# Patient Record
Sex: MALE | Race: WHITE | Employment: PART TIME | URBAN - METROPOLITAN AREA
[De-identification: names, ages, dates, MRNs, and addresses within clinical notes are randomized per-mention and may not be internally consistent; named-entity substitution may affect disease eponyms.]

---

## 2019-03-14 ENCOUNTER — HOSPITAL ENCOUNTER (EMERGENCY)
Age: 69
Discharge: HOME | End: 2019-03-14
Attending: EMERGENCY MEDICINE
Payer: MEDICARE

## 2019-03-14 VITALS
SYSTOLIC BLOOD PRESSURE: 184 MMHG | DIASTOLIC BLOOD PRESSURE: 85 MMHG | RESPIRATION RATE: 20 BRPM | HEART RATE: 88 BPM | OXYGEN SATURATION: 96 % | TEMPERATURE: 97.8 F

## 2019-03-14 DIAGNOSIS — S61.211A LACERATION OF LEFT INDEX FINGER WITHOUT FOREIGN BODY WITHOUT DAMAGE TO NAIL, INITIAL ENCOUNTER: Primary | ICD-10-CM

## 2019-03-14 PROCEDURE — 90471 IMMUNIZATION ADMIN: CPT | Performed by: EMERGENCY MEDICINE

## 2019-03-14 PROCEDURE — 90715 TDAP VACCINE 7 YRS/> IM: CPT | Performed by: EMERGENCY MEDICINE

## 2019-03-14 PROCEDURE — 6360000002 HC RX W HCPCS: Performed by: EMERGENCY MEDICINE

## 2019-03-14 PROCEDURE — 4500000022 HC ED LEVEL 2 PROCEDURE

## 2019-03-14 PROCEDURE — 99282 EMERGENCY DEPT VISIT SF MDM: CPT

## 2019-03-14 RX ADMIN — TETANUS TOXOID, REDUCED DIPHTHERIA TOXOID AND ACELLULAR PERTUSSIS VACCINE, ADSORBED 0.5 ML: 5; 2.5; 8; 8; 2.5 SUSPENSION INTRAMUSCULAR at 20:37

## 2019-03-14 ASSESSMENT — PAIN SCALES - GENERAL: PAINLEVEL_OUTOF10: 7

## 2019-03-14 ASSESSMENT — PAIN DESCRIPTION - PAIN TYPE: TYPE: ACUTE PAIN

## 2019-10-31 ENCOUNTER — HOSPITAL ENCOUNTER (EMERGENCY)
Age: 69
Discharge: HOME | End: 2019-10-31
Attending: EMERGENCY MEDICINE
Payer: MEDICARE

## 2019-10-31 ENCOUNTER — APPOINTMENT (OUTPATIENT)
Dept: GENERAL RADIOLOGY | Age: 69
End: 2019-10-31
Payer: MEDICARE

## 2019-10-31 VITALS
TEMPERATURE: 98.4 F | RESPIRATION RATE: 16 BRPM | HEART RATE: 79 BPM | WEIGHT: 185 LBS | OXYGEN SATURATION: 97 % | HEIGHT: 68 IN | DIASTOLIC BLOOD PRESSURE: 75 MMHG | BODY MASS INDEX: 28.04 KG/M2 | SYSTOLIC BLOOD PRESSURE: 167 MMHG

## 2019-10-31 DIAGNOSIS — L03.116 CELLULITIS OF LEFT LOWER EXTREMITY: Primary | ICD-10-CM

## 2019-10-31 LAB
GLUCOSE BLD-MCNC: 159 MG/DL (ref 70–99)
PERFORMED ON: ABNORMAL

## 2019-10-31 PROCEDURE — 73620 X-RAY EXAM OF FOOT: CPT

## 2019-10-31 PROCEDURE — 6370000000 HC RX 637 (ALT 250 FOR IP): Performed by: STUDENT IN AN ORGANIZED HEALTH CARE EDUCATION/TRAINING PROGRAM

## 2019-10-31 PROCEDURE — 99283 EMERGENCY DEPT VISIT LOW MDM: CPT

## 2019-10-31 RX ORDER — OXYCODONE HYDROCHLORIDE AND ACETAMINOPHEN 5; 325 MG/1; MG/1
1 TABLET ORAL ONCE
Status: COMPLETED | OUTPATIENT
Start: 2019-10-31 | End: 2019-10-31

## 2019-10-31 RX ORDER — IBUPROFEN 800 MG/1
800 TABLET ORAL EVERY 8 HOURS PRN
Qty: 30 TABLET | Refills: 0 | Status: ON HOLD | OUTPATIENT
Start: 2019-10-31 | End: 2020-01-01 | Stop reason: CLARIF

## 2019-10-31 RX ORDER — ACETAMINOPHEN 500 MG
500 TABLET ORAL 4 TIMES DAILY PRN
Qty: 360 TABLET | Refills: 1 | Status: ON HOLD | OUTPATIENT
Start: 2019-10-31 | End: 2020-01-01 | Stop reason: CLARIF

## 2019-10-31 RX ORDER — PAROXETINE HYDROCHLORIDE 20 MG/1
40 TABLET, FILM COATED ORAL
Status: ON HOLD | COMMUNITY
Start: 2019-10-07 | End: 2020-01-01 | Stop reason: CLARIF

## 2019-10-31 RX ORDER — SULFAMETHOXAZOLE AND TRIMETHOPRIM 800; 160 MG/1; MG/1
1 TABLET ORAL 2 TIMES DAILY
Qty: 14 TABLET | Refills: 0 | Status: SHIPPED | OUTPATIENT
Start: 2019-10-31 | End: 2019-11-07

## 2019-10-31 RX ORDER — CEPHALEXIN 500 MG/1
500 CAPSULE ORAL 4 TIMES DAILY
Qty: 28 CAPSULE | Refills: 0 | Status: SHIPPED | OUTPATIENT
Start: 2019-10-31 | End: 2019-11-07

## 2019-10-31 RX ADMIN — OXYCODONE HYDROCHLORIDE AND ACETAMINOPHEN 1 TABLET: 5; 325 TABLET ORAL at 01:20

## 2019-10-31 ASSESSMENT — ENCOUNTER SYMPTOMS
PHOTOPHOBIA: 0
COUGH: 0
TROUBLE SWALLOWING: 0
SHORTNESS OF BREATH: 0
ABDOMINAL PAIN: 0
NAUSEA: 0
BLOOD IN STOOL: 0
RHINORRHEA: 0
CONSTIPATION: 0
SORE THROAT: 0
VOMITING: 0
DIARRHEA: 0

## 2019-10-31 ASSESSMENT — PAIN SCALES - GENERAL: PAINLEVEL_OUTOF10: 8

## 2020-01-01 ENCOUNTER — APPOINTMENT (OUTPATIENT)
Dept: CT IMAGING | Age: 70
DRG: 871 | End: 2020-01-01
Payer: MEDICARE

## 2020-01-01 ENCOUNTER — APPOINTMENT (OUTPATIENT)
Dept: GENERAL RADIOLOGY | Age: 70
DRG: 638 | End: 2020-01-01
Payer: MEDICARE

## 2020-01-01 ENCOUNTER — APPOINTMENT (OUTPATIENT)
Dept: GENERAL RADIOLOGY | Age: 70
DRG: 639 | End: 2020-01-01
Payer: MEDICARE

## 2020-01-01 ENCOUNTER — HOSPITAL ENCOUNTER (EMERGENCY)
Age: 70
Discharge: ANOTHER HOSPITAL | End: 2020-07-15
Attending: EMERGENCY MEDICINE
Payer: MEDICARE

## 2020-01-01 ENCOUNTER — APPOINTMENT (OUTPATIENT)
Dept: GENERAL RADIOLOGY | Age: 70
DRG: 871 | End: 2020-01-01
Payer: MEDICARE

## 2020-01-01 ENCOUNTER — HOSPITAL ENCOUNTER (EMERGENCY)
Age: 70
Discharge: HOME | DRG: 638 | End: 2020-09-22
Attending: EMERGENCY MEDICINE
Payer: MEDICARE

## 2020-01-01 ENCOUNTER — HOSPITAL ENCOUNTER (EMERGENCY)
Age: 70
Discharge: NURSING, REHAB OR LONG-TERM CARE | End: 2020-05-27
Attending: EMERGENCY MEDICINE
Payer: MEDICARE

## 2020-01-01 ENCOUNTER — APPOINTMENT (OUTPATIENT)
Dept: INTERVENTIONAL RADIOLOGY/VASCULAR | Age: 70
DRG: 871 | End: 2020-01-01
Payer: MEDICARE

## 2020-01-01 ENCOUNTER — APPOINTMENT (OUTPATIENT)
Dept: ULTRASOUND IMAGING | Age: 70
DRG: 871 | End: 2020-01-01
Payer: MEDICARE

## 2020-01-01 ENCOUNTER — CARE COORDINATION (OUTPATIENT)
Dept: CARE COORDINATION | Age: 70
End: 2020-01-01

## 2020-01-01 ENCOUNTER — TELEPHONE (OUTPATIENT)
Dept: OTHER | Facility: CLINIC | Age: 70
End: 2020-01-01

## 2020-01-01 ENCOUNTER — APPOINTMENT (OUTPATIENT)
Dept: VASCULAR LAB | Age: 70
DRG: 871 | End: 2020-01-01
Payer: MEDICARE

## 2020-01-01 ENCOUNTER — HOSPITAL ENCOUNTER (EMERGENCY)
Age: 70
Discharge: HOME | End: 2020-09-07
Attending: EMERGENCY MEDICINE
Payer: MEDICARE

## 2020-01-01 ENCOUNTER — APPOINTMENT (OUTPATIENT)
Dept: CT IMAGING | Age: 70
DRG: 637 | End: 2020-01-01
Payer: MEDICARE

## 2020-01-01 ENCOUNTER — APPOINTMENT (OUTPATIENT)
Dept: GENERAL RADIOLOGY | Age: 70
End: 2020-01-01
Payer: MEDICARE

## 2020-01-01 ENCOUNTER — HOSPITAL ENCOUNTER (INPATIENT)
Age: 70
LOS: 2 days | Discharge: NURSING, REHAB OR LONG-TERM CARE | DRG: 638 | End: 2020-08-06
Attending: EMERGENCY MEDICINE | Admitting: INTERNAL MEDICINE
Payer: MEDICARE

## 2020-01-01 ENCOUNTER — APPOINTMENT (OUTPATIENT)
Dept: CT IMAGING | Age: 70
End: 2020-01-01
Payer: MEDICARE

## 2020-01-01 ENCOUNTER — HOSPITAL ENCOUNTER (INPATIENT)
Age: 70
LOS: 2 days | Discharge: HOME HEALTH | DRG: 637 | End: 2020-10-03
Attending: EMERGENCY MEDICINE | Admitting: INTERNAL MEDICINE
Payer: MEDICARE

## 2020-01-01 ENCOUNTER — APPOINTMENT (OUTPATIENT)
Dept: CT IMAGING | Age: 70
DRG: 639 | End: 2020-01-01
Payer: MEDICARE

## 2020-01-01 ENCOUNTER — HOSPITAL ENCOUNTER (INPATIENT)
Age: 70
LOS: 3 days | Discharge: NURSING, REHAB OR LONG-TERM CARE | DRG: 639 | End: 2020-08-02
Attending: EMERGENCY MEDICINE | Admitting: INTERNAL MEDICINE
Payer: MEDICARE

## 2020-01-01 ENCOUNTER — FOLLOWUP TELEPHONE ENCOUNTER (OUTPATIENT)
Dept: NEPHROLOGY | Age: 70
End: 2020-01-01

## 2020-01-01 ENCOUNTER — HOSPITAL ENCOUNTER (INPATIENT)
Age: 70
LOS: 5 days | Discharge: HOME HEALTH | DRG: 377 | End: 2020-05-19
Attending: EMERGENCY MEDICINE | Admitting: INTERNAL MEDICINE
Payer: MEDICARE

## 2020-01-01 ENCOUNTER — HOSPITAL ENCOUNTER (EMERGENCY)
Age: 70
Discharge: HOME | DRG: 639 | End: 2020-06-17
Attending: EMERGENCY MEDICINE
Payer: MEDICARE

## 2020-01-01 ENCOUNTER — HOSPITAL ENCOUNTER (INPATIENT)
Age: 70
LOS: 9 days | Discharge: HOME HEALTH | DRG: 871 | End: 2020-08-30
Attending: EMERGENCY MEDICINE | Admitting: INTERNAL MEDICINE
Payer: MEDICARE

## 2020-01-01 ENCOUNTER — HOSPITAL ENCOUNTER (INPATIENT)
Age: 70
LOS: 7 days | Discharge: NURSING, REHAB OR LONG-TERM CARE | DRG: 871 | End: 2020-05-05
Attending: EMERGENCY MEDICINE | Admitting: INTERNAL MEDICINE
Payer: MEDICARE

## 2020-01-01 ENCOUNTER — HOSPITAL ENCOUNTER (EMERGENCY)
Age: 70
Discharge: HOME | End: 2020-10-07
Attending: EMERGENCY MEDICINE
Payer: MEDICARE

## 2020-01-01 ENCOUNTER — HOSPITAL ENCOUNTER (INPATIENT)
Age: 70
LOS: 4 days | Discharge: HOME HEALTH | DRG: 639 | End: 2020-06-22
Attending: EMERGENCY MEDICINE | Admitting: INTERNAL MEDICINE
Payer: MEDICARE

## 2020-01-01 ENCOUNTER — HOSPITAL ENCOUNTER (INPATIENT)
Age: 70
LOS: 2 days | Discharge: HOME | DRG: 639 | End: 2020-07-28
Attending: STUDENT IN AN ORGANIZED HEALTH CARE EDUCATION/TRAINING PROGRAM | Admitting: INTERNAL MEDICINE
Payer: MEDICARE

## 2020-01-01 ENCOUNTER — TELEPHONE (OUTPATIENT)
Dept: ENDOCRINOLOGY | Age: 70
End: 2020-01-01

## 2020-01-01 ENCOUNTER — APPOINTMENT (OUTPATIENT)
Dept: INTERVENTIONAL RADIOLOGY/VASCULAR | Age: 70
DRG: 639 | End: 2020-01-01
Payer: MEDICARE

## 2020-01-01 ENCOUNTER — APPOINTMENT (OUTPATIENT)
Dept: MRI IMAGING | Age: 70
DRG: 637 | End: 2020-01-01
Payer: MEDICARE

## 2020-01-01 ENCOUNTER — HOSPITAL ENCOUNTER (INPATIENT)
Age: 70
LOS: 2 days | Discharge: HOME HEALTH | DRG: 638 | End: 2020-09-25
Attending: EMERGENCY MEDICINE | Admitting: INTERNAL MEDICINE
Payer: MEDICARE

## 2020-01-01 ENCOUNTER — HOSPITAL ENCOUNTER (EMERGENCY)
Age: 70
Discharge: HOME | End: 2020-08-31
Attending: EMERGENCY MEDICINE
Payer: MEDICARE

## 2020-01-01 ENCOUNTER — HOSPITAL ENCOUNTER (EMERGENCY)
Age: 70
Discharge: HOME | End: 2020-07-21
Attending: EMERGENCY MEDICINE
Payer: MEDICARE

## 2020-01-01 VITALS
TEMPERATURE: 98.1 F | HEART RATE: 78 BPM | HEIGHT: 68 IN | DIASTOLIC BLOOD PRESSURE: 81 MMHG | SYSTOLIC BLOOD PRESSURE: 147 MMHG | WEIGHT: 206.79 LBS | BODY MASS INDEX: 31.34 KG/M2 | OXYGEN SATURATION: 100 % | RESPIRATION RATE: 18 BRPM

## 2020-01-01 VITALS
DIASTOLIC BLOOD PRESSURE: 76 MMHG | HEIGHT: 68 IN | RESPIRATION RATE: 20 BRPM | OXYGEN SATURATION: 95 % | HEART RATE: 81 BPM | TEMPERATURE: 97.9 F | BODY MASS INDEX: 24.39 KG/M2 | WEIGHT: 160.94 LBS | SYSTOLIC BLOOD PRESSURE: 160 MMHG

## 2020-01-01 VITALS
SYSTOLIC BLOOD PRESSURE: 154 MMHG | DIASTOLIC BLOOD PRESSURE: 66 MMHG | OXYGEN SATURATION: 98 % | RESPIRATION RATE: 18 BRPM | TEMPERATURE: 98.7 F | HEART RATE: 84 BPM

## 2020-01-01 VITALS
BODY MASS INDEX: 28.94 KG/M2 | HEIGHT: 68 IN | RESPIRATION RATE: 18 BRPM | DIASTOLIC BLOOD PRESSURE: 73 MMHG | SYSTOLIC BLOOD PRESSURE: 170 MMHG | HEART RATE: 85 BPM | TEMPERATURE: 97.3 F | WEIGHT: 190.92 LBS | OXYGEN SATURATION: 95 %

## 2020-01-01 VITALS
SYSTOLIC BLOOD PRESSURE: 138 MMHG | RESPIRATION RATE: 20 BRPM | DIASTOLIC BLOOD PRESSURE: 60 MMHG | BODY MASS INDEX: 31.47 KG/M2 | OXYGEN SATURATION: 98 % | HEART RATE: 93 BPM | TEMPERATURE: 98.5 F | WEIGHT: 207 LBS

## 2020-01-01 VITALS
SYSTOLIC BLOOD PRESSURE: 167 MMHG | BODY MASS INDEX: 27.97 KG/M2 | HEIGHT: 68 IN | OXYGEN SATURATION: 95 % | TEMPERATURE: 97.5 F | RESPIRATION RATE: 16 BRPM | WEIGHT: 184.53 LBS | HEART RATE: 90 BPM | DIASTOLIC BLOOD PRESSURE: 78 MMHG

## 2020-01-01 VITALS
TEMPERATURE: 98.2 F | DIASTOLIC BLOOD PRESSURE: 76 MMHG | HEART RATE: 82 BPM | OXYGEN SATURATION: 97 % | RESPIRATION RATE: 23 BRPM | HEIGHT: 68 IN | SYSTOLIC BLOOD PRESSURE: 165 MMHG | BODY MASS INDEX: 28.04 KG/M2 | WEIGHT: 185 LBS

## 2020-01-01 VITALS
WEIGHT: 193.34 LBS | HEIGHT: 68 IN | SYSTOLIC BLOOD PRESSURE: 147 MMHG | HEART RATE: 91 BPM | RESPIRATION RATE: 20 BRPM | TEMPERATURE: 97.7 F | BODY MASS INDEX: 29.3 KG/M2 | DIASTOLIC BLOOD PRESSURE: 74 MMHG | OXYGEN SATURATION: 96 %

## 2020-01-01 VITALS
BODY MASS INDEX: 26.13 KG/M2 | WEIGHT: 172.4 LBS | RESPIRATION RATE: 20 BRPM | SYSTOLIC BLOOD PRESSURE: 170 MMHG | TEMPERATURE: 98.1 F | DIASTOLIC BLOOD PRESSURE: 72 MMHG | OXYGEN SATURATION: 97 % | HEIGHT: 68 IN | HEART RATE: 76 BPM

## 2020-01-01 VITALS
TEMPERATURE: 98.1 F | OXYGEN SATURATION: 97 % | RESPIRATION RATE: 18 BRPM | WEIGHT: 160 LBS | HEART RATE: 84 BPM | DIASTOLIC BLOOD PRESSURE: 67 MMHG | BODY MASS INDEX: 24.25 KG/M2 | SYSTOLIC BLOOD PRESSURE: 116 MMHG | HEIGHT: 68 IN

## 2020-01-01 VITALS
RESPIRATION RATE: 22 BRPM | HEART RATE: 86 BPM | BODY MASS INDEX: 24.25 KG/M2 | TEMPERATURE: 97.6 F | DIASTOLIC BLOOD PRESSURE: 72 MMHG | WEIGHT: 160 LBS | HEIGHT: 68 IN | OXYGEN SATURATION: 100 % | SYSTOLIC BLOOD PRESSURE: 154 MMHG

## 2020-01-01 VITALS
HEIGHT: 68 IN | OXYGEN SATURATION: 96 % | WEIGHT: 185 LBS | DIASTOLIC BLOOD PRESSURE: 79 MMHG | SYSTOLIC BLOOD PRESSURE: 178 MMHG | HEART RATE: 85 BPM | BODY MASS INDEX: 28.04 KG/M2 | RESPIRATION RATE: 16 BRPM | TEMPERATURE: 98.3 F

## 2020-01-01 VITALS
HEART RATE: 87 BPM | DIASTOLIC BLOOD PRESSURE: 77 MMHG | TEMPERATURE: 97.8 F | RESPIRATION RATE: 25 BRPM | BODY MASS INDEX: 28.79 KG/M2 | HEIGHT: 68 IN | OXYGEN SATURATION: 99 % | SYSTOLIC BLOOD PRESSURE: 159 MMHG | WEIGHT: 190 LBS

## 2020-01-01 VITALS
OXYGEN SATURATION: 100 % | RESPIRATION RATE: 18 BRPM | SYSTOLIC BLOOD PRESSURE: 145 MMHG | HEART RATE: 82 BPM | TEMPERATURE: 98.7 F | DIASTOLIC BLOOD PRESSURE: 75 MMHG

## 2020-01-01 VITALS
OXYGEN SATURATION: 100 % | SYSTOLIC BLOOD PRESSURE: 147 MMHG | TEMPERATURE: 98.1 F | HEART RATE: 84 BPM | DIASTOLIC BLOOD PRESSURE: 57 MMHG | RESPIRATION RATE: 23 BRPM

## 2020-01-01 VITALS
TEMPERATURE: 96.9 F | WEIGHT: 207.67 LBS | OXYGEN SATURATION: 97 % | HEART RATE: 84 BPM | HEIGHT: 68 IN | RESPIRATION RATE: 16 BRPM | DIASTOLIC BLOOD PRESSURE: 73 MMHG | SYSTOLIC BLOOD PRESSURE: 156 MMHG | BODY MASS INDEX: 31.47 KG/M2

## 2020-01-01 VITALS
WEIGHT: 169.53 LBS | BODY MASS INDEX: 25.69 KG/M2 | SYSTOLIC BLOOD PRESSURE: 184 MMHG | HEIGHT: 68 IN | OXYGEN SATURATION: 95 % | HEART RATE: 79 BPM | DIASTOLIC BLOOD PRESSURE: 87 MMHG | TEMPERATURE: 97.4 F | RESPIRATION RATE: 18 BRPM

## 2020-01-01 LAB
A/G RATIO: 1.1 (ref 1.1–2.2)
A/G RATIO: 1.2 (ref 1.1–2.2)
A/G RATIO: 1.3 (ref 1.1–2.2)
A/G RATIO: 1.4 (ref 1.1–2.2)
A/G RATIO: 1.5 (ref 1.1–2.2)
ABO/RH: NORMAL
ABO/RH: NORMAL
ALBUMIN SERPL-MCNC: 2.3 G/DL (ref 3.4–5)
ALBUMIN SERPL-MCNC: 2.5 G/DL (ref 3.4–5)
ALBUMIN SERPL-MCNC: 2.6 G/DL (ref 3.4–5)
ALBUMIN SERPL-MCNC: 3 G/DL (ref 3.4–5)
ALBUMIN SERPL-MCNC: 3 G/DL (ref 3.4–5)
ALBUMIN SERPL-MCNC: 3.2 G/DL (ref 3.4–5)
ALBUMIN SERPL-MCNC: 3.3 G/DL (ref 3.4–5)
ALBUMIN SERPL-MCNC: 3.4 G/DL (ref 3.4–5)
ALBUMIN SERPL-MCNC: 3.5 G/DL (ref 3.4–5)
ALBUMIN SERPL-MCNC: 3.6 G/DL (ref 3.4–5)
ALBUMIN SERPL-MCNC: 3.7 G/DL (ref 3.4–5)
ALBUMIN SERPL-MCNC: 3.7 G/DL (ref 3.4–5)
ALBUMIN SERPL-MCNC: 3.9 G/DL (ref 3.4–5)
ALBUMIN SERPL-MCNC: 3.9 G/DL (ref 3.4–5)
ALBUMIN SERPL-MCNC: 4.1 G/DL (ref 3.4–5)
ALBUMIN SERPL-MCNC: 4.2 G/DL (ref 3.4–5)
ALP BLD-CCNC: 102 U/L (ref 40–129)
ALP BLD-CCNC: 103 U/L (ref 40–129)
ALP BLD-CCNC: 108 U/L (ref 40–129)
ALP BLD-CCNC: 112 U/L (ref 40–129)
ALP BLD-CCNC: 113 U/L (ref 40–129)
ALP BLD-CCNC: 121 U/L (ref 40–129)
ALP BLD-CCNC: 126 U/L (ref 40–129)
ALP BLD-CCNC: 127 U/L (ref 40–129)
ALP BLD-CCNC: 127 U/L (ref 40–129)
ALP BLD-CCNC: 128 U/L (ref 40–129)
ALP BLD-CCNC: 130 U/L (ref 40–129)
ALP BLD-CCNC: 135 U/L (ref 40–129)
ALP BLD-CCNC: 135 U/L (ref 40–129)
ALP BLD-CCNC: 136 U/L (ref 40–129)
ALP BLD-CCNC: 139 U/L (ref 40–129)
ALP BLD-CCNC: 140 U/L (ref 40–129)
ALP BLD-CCNC: 152 U/L (ref 40–129)
ALP BLD-CCNC: 154 U/L (ref 40–129)
ALP BLD-CCNC: 184 U/L (ref 40–129)
ALT SERPL-CCNC: 10 U/L (ref 10–40)
ALT SERPL-CCNC: 12 U/L (ref 10–40)
ALT SERPL-CCNC: 15 U/L (ref 10–40)
ALT SERPL-CCNC: 22 U/L (ref 10–40)
ALT SERPL-CCNC: 23 U/L (ref 10–40)
ALT SERPL-CCNC: <5 U/L (ref 10–40)
AMMONIA: 45 UMOL/L (ref 16–60)
AMOBARBITAL, SERUM/PLASMA, QUANT: <50 NG/ML
AMORPHOUS: ABNORMAL /HPF
AMPHETAMINE SCREEN, URINE: ABNORMAL
AMPHETAMINE SCREEN, URINE: ABNORMAL
AMPHETAMINES SCREEN BLOOD: NEGATIVE NG/ML
ANION GAP SERPL CALCULATED.3IONS-SCNC: 10 MMOL/L (ref 3–16)
ANION GAP SERPL CALCULATED.3IONS-SCNC: 11 MMOL/L (ref 3–16)
ANION GAP SERPL CALCULATED.3IONS-SCNC: 12 MMOL/L (ref 3–16)
ANION GAP SERPL CALCULATED.3IONS-SCNC: 13 MMOL/L (ref 3–16)
ANION GAP SERPL CALCULATED.3IONS-SCNC: 14 MMOL/L (ref 3–16)
ANION GAP SERPL CALCULATED.3IONS-SCNC: 15 MMOL/L (ref 3–16)
ANION GAP SERPL CALCULATED.3IONS-SCNC: 16 MMOL/L (ref 3–16)
ANION GAP SERPL CALCULATED.3IONS-SCNC: 17 MMOL/L (ref 3–16)
ANION GAP SERPL CALCULATED.3IONS-SCNC: 17 MMOL/L (ref 3–16)
ANION GAP SERPL CALCULATED.3IONS-SCNC: 19 MMOL/L (ref 3–16)
ANION GAP SERPL CALCULATED.3IONS-SCNC: 20 MMOL/L (ref 3–16)
ANION GAP SERPL CALCULATED.3IONS-SCNC: 20 MMOL/L (ref 3–16)
ANION GAP SERPL CALCULATED.3IONS-SCNC: 21 MMOL/L (ref 3–16)
ANION GAP SERPL CALCULATED.3IONS-SCNC: 22 MMOL/L (ref 3–16)
ANION GAP SERPL CALCULATED.3IONS-SCNC: 25 MMOL/L (ref 3–16)
ANION GAP SERPL CALCULATED.3IONS-SCNC: 25 MMOL/L (ref 3–16)
ANION GAP SERPL CALCULATED.3IONS-SCNC: 26 MMOL/L (ref 3–16)
ANION GAP SERPL CALCULATED.3IONS-SCNC: 26 MMOL/L (ref 3–16)
ANION GAP SERPL CALCULATED.3IONS-SCNC: 27 MMOL/L (ref 3–16)
ANION GAP SERPL CALCULATED.3IONS-SCNC: 31 MMOL/L (ref 3–16)
ANION GAP SERPL CALCULATED.3IONS-SCNC: 31 MMOL/L (ref 3–16)
ANION GAP SERPL CALCULATED.3IONS-SCNC: 32 MMOL/L (ref 3–16)
ANION GAP SERPL CALCULATED.3IONS-SCNC: 35 MMOL/L (ref 3–16)
ANION GAP SERPL CALCULATED.3IONS-SCNC: 6 MMOL/L (ref 3–16)
ANION GAP SERPL CALCULATED.3IONS-SCNC: 7 MMOL/L (ref 3–16)
ANION GAP SERPL CALCULATED.3IONS-SCNC: 8 MMOL/L (ref 3–16)
ANION GAP SERPL CALCULATED.3IONS-SCNC: 9 MMOL/L (ref 3–16)
ANISOCYTOSIS: ABNORMAL
ANTI-XA UNFRAC HEPARIN: 0.37 IU/ML (ref 0.3–0.7)
ANTI-XA UNFRAC HEPARIN: 0.39 IU/ML (ref 0.3–0.7)
ANTI-XA UNFRAC HEPARIN: 0.44 IU/ML (ref 0.3–0.7)
ANTI-XA UNFRAC HEPARIN: 0.47 IU/ML (ref 0.3–0.7)
ANTI-XA UNFRAC HEPARIN: 0.64 IU/ML (ref 0.3–0.7)
ANTIBODY SCREEN: NORMAL
APTT: 19.4 SEC (ref 24.2–36.2)
APTT: 23.6 SEC (ref 24.2–36.2)
AST SERPL-CCNC: 17 U/L (ref 15–37)
AST SERPL-CCNC: 17 U/L (ref 15–37)
AST SERPL-CCNC: 19 U/L (ref 15–37)
AST SERPL-CCNC: 20 U/L (ref 15–37)
AST SERPL-CCNC: 20 U/L (ref 15–37)
AST SERPL-CCNC: 21 U/L (ref 15–37)
AST SERPL-CCNC: 22 U/L (ref 15–37)
AST SERPL-CCNC: 25 U/L (ref 15–37)
AST SERPL-CCNC: 26 U/L (ref 15–37)
AST SERPL-CCNC: 32 U/L (ref 15–37)
AST SERPL-CCNC: 34 U/L (ref 15–37)
AST SERPL-CCNC: 41 U/L (ref 15–37)
AST SERPL-CCNC: 44 U/L (ref 15–37)
AST SERPL-CCNC: 51 U/L (ref 15–37)
AST SERPL-CCNC: 53 U/L (ref 15–37)
AST SERPL-CCNC: 73 U/L (ref 15–37)
BACTERIA: ABNORMAL /HPF
BANDED NEUTROPHILS RELATIVE PERCENT: 1 % (ref 0–7)
BANDED NEUTROPHILS RELATIVE PERCENT: 4 % (ref 0–7)
BANDED NEUTROPHILS RELATIVE PERCENT: 5 % (ref 0–7)
BANDED NEUTROPHILS RELATIVE PERCENT: 6 % (ref 0–7)
BANDED NEUTROPHILS RELATIVE PERCENT: 7 % (ref 0–7)
BARBITURATE SCREEN URINE: POSITIVE
BARBITURATE SCREEN URINE: POSITIVE
BARBITURATES SCREEN BLOOD: POSITIVE NG/ML
BASE EXCESS ARTERIAL: -11.2 MMOL/L (ref -3–3)
BASE EXCESS ARTERIAL: 0 (ref -3–3)
BASE EXCESS ARTERIAL: 1 (ref -3–3)
BASE EXCESS ARTERIAL: 3 (ref -3–3)
BASE EXCESS VENOUS: -0.4 MMOL/L (ref -2–3)
BASE EXCESS VENOUS: -0.7 MMOL/L (ref -2–3)
BASE EXCESS VENOUS: -1.3 MMOL/L (ref -2–3)
BASE EXCESS VENOUS: -1.8 MMOL/L (ref -2–3)
BASE EXCESS VENOUS: -10.8 MMOL/L (ref -2–3)
BASE EXCESS VENOUS: -14.6 MMOL/L (ref -2–3)
BASE EXCESS VENOUS: -15.2 MMOL/L (ref -2–3)
BASE EXCESS VENOUS: -15.4 MMOL/L (ref -2–3)
BASE EXCESS VENOUS: -17.5 MMOL/L (ref -2–3)
BASE EXCESS VENOUS: -3.7 MMOL/L (ref -2–3)
BASE EXCESS VENOUS: -5.2 MMOL/L (ref -2–3)
BASE EXCESS VENOUS: -7 (ref -3–3)
BASE EXCESS VENOUS: 0 MMOL/L (ref -2–3)
BASE EXCESS VENOUS: 1.3 MMOL/L (ref -2–3)
BASE EXCESS VENOUS: 2.2 MMOL/L (ref -2–3)
BASE EXCESS VENOUS: 4 (ref -3–3)
BASOPHILS ABSOLUTE: 0 K/UL (ref 0–0.2)
BASOPHILS ABSOLUTE: 0.1 K/UL (ref 0–0.2)
BASOPHILS RELATIVE PERCENT: 0 %
BASOPHILS RELATIVE PERCENT: 0.1 %
BASOPHILS RELATIVE PERCENT: 0.3 %
BASOPHILS RELATIVE PERCENT: 0.4 %
BASOPHILS RELATIVE PERCENT: 0.5 %
BASOPHILS RELATIVE PERCENT: 0.6 %
BASOPHILS RELATIVE PERCENT: 0.7 %
BASOPHILS RELATIVE PERCENT: 0.8 %
BASOPHILS RELATIVE PERCENT: 0.9 %
BASOPHILS RELATIVE PERCENT: 0.9 %
BASOPHILS RELATIVE PERCENT: 1 %
BASOPHILS RELATIVE PERCENT: 1 %
BASOPHILS RELATIVE PERCENT: 1.1 %
BASOPHILS RELATIVE PERCENT: 1.3 %
BASOPHILS RELATIVE PERCENT: 1.5 %
BASOPHILS RELATIVE PERCENT: 1.6 %
BENZODIAZEPINE SCREEN, URINE: ABNORMAL
BENZODIAZEPINE SCREEN, URINE: ABNORMAL
BENZODIAZEPINES SCREEN BLOOD: NEGATIVE NG/ML
BETA-HYDROXYBUTYRATE: 0.7 MMOL/L (ref 0–0.27)
BETA-HYDROXYBUTYRATE: 3.11 MMOL/L (ref 0–0.27)
BETA-HYDROXYBUTYRATE: 4.66 MMOL/L (ref 0–0.27)
BETA-HYDROXYBUTYRATE: 6.53 MMOL/L (ref 0–0.27)
BILIRUB SERPL-MCNC: 0.4 MG/DL (ref 0–1)
BILIRUB SERPL-MCNC: 0.4 MG/DL (ref 0–1)
BILIRUB SERPL-MCNC: 0.5 MG/DL (ref 0–1)
BILIRUB SERPL-MCNC: 0.6 MG/DL (ref 0–1)
BILIRUB SERPL-MCNC: 0.7 MG/DL (ref 0–1)
BILIRUB SERPL-MCNC: 1.1 MG/DL (ref 0–1)
BILIRUB SERPL-MCNC: 1.2 MG/DL (ref 0–1)
BILIRUB SERPL-MCNC: 1.4 MG/DL (ref 0–1)
BILIRUBIN DIRECT: 0.3 MG/DL (ref 0–0.3)
BILIRUBIN DIRECT: 0.3 MG/DL (ref 0–0.3)
BILIRUBIN DIRECT: <0.2 MG/DL (ref 0–0.3)
BILIRUBIN URINE: ABNORMAL
BILIRUBIN URINE: NEGATIVE
BILIRUBIN, INDIRECT: 0.8 MG/DL (ref 0–1)
BILIRUBIN, INDIRECT: 0.9 MG/DL (ref 0–1)
BILIRUBIN, INDIRECT: ABNORMAL MG/DL (ref 0–1)
BLOOD BANK DISPENSE STATUS: NORMAL
BLOOD BANK PRODUCT CODE: NORMAL
BLOOD CULTURE, ROUTINE: NORMAL
BLOOD CULTURE, ROUTINE: NORMAL
BLOOD, URINE: ABNORMAL
BLOOD, URINE: NEGATIVE
BPU ID: NORMAL
BUN BLDV-MCNC: 11 MG/DL (ref 7–20)
BUN BLDV-MCNC: 11 MG/DL (ref 7–20)
BUN BLDV-MCNC: 116 MG/DL (ref 7–20)
BUN BLDV-MCNC: 12 MG/DL (ref 7–20)
BUN BLDV-MCNC: 13 MG/DL (ref 7–20)
BUN BLDV-MCNC: 13 MG/DL (ref 7–20)
BUN BLDV-MCNC: 14 MG/DL (ref 7–20)
BUN BLDV-MCNC: 15 MG/DL (ref 7–20)
BUN BLDV-MCNC: 16 MG/DL (ref 7–20)
BUN BLDV-MCNC: 17 MG/DL (ref 7–20)
BUN BLDV-MCNC: 17 MG/DL (ref 7–20)
BUN BLDV-MCNC: 18 MG/DL (ref 7–20)
BUN BLDV-MCNC: 19 MG/DL (ref 7–20)
BUN BLDV-MCNC: 20 MG/DL (ref 7–20)
BUN BLDV-MCNC: 21 MG/DL (ref 7–20)
BUN BLDV-MCNC: 22 MG/DL (ref 7–20)
BUN BLDV-MCNC: 22 MG/DL (ref 7–20)
BUN BLDV-MCNC: 23 MG/DL (ref 7–20)
BUN BLDV-MCNC: 24 MG/DL (ref 7–20)
BUN BLDV-MCNC: 25 MG/DL (ref 7–20)
BUN BLDV-MCNC: 27 MG/DL (ref 7–20)
BUN BLDV-MCNC: 28 MG/DL (ref 7–20)
BUN BLDV-MCNC: 29 MG/DL (ref 7–20)
BUN BLDV-MCNC: 29 MG/DL (ref 7–20)
BUN BLDV-MCNC: 30 MG/DL (ref 7–20)
BUN BLDV-MCNC: 32 MG/DL (ref 7–20)
BUN BLDV-MCNC: 32 MG/DL (ref 7–20)
BUN BLDV-MCNC: 33 MG/DL (ref 7–20)
BUN BLDV-MCNC: 34 MG/DL (ref 7–20)
BUN BLDV-MCNC: 35 MG/DL (ref 7–20)
BUN BLDV-MCNC: 35 MG/DL (ref 7–20)
BUN BLDV-MCNC: 36 MG/DL (ref 7–20)
BUN BLDV-MCNC: 36 MG/DL (ref 7–20)
BUN BLDV-MCNC: 37 MG/DL (ref 7–20)
BUN BLDV-MCNC: 38 MG/DL (ref 7–20)
BUN BLDV-MCNC: 40 MG/DL (ref 7–20)
BUN BLDV-MCNC: 41 MG/DL (ref 7–20)
BUN BLDV-MCNC: 42 MG/DL (ref 7–20)
BUN BLDV-MCNC: 42 MG/DL (ref 7–20)
BUN BLDV-MCNC: 45 MG/DL (ref 7–20)
BUN BLDV-MCNC: 46 MG/DL (ref 7–20)
BUN BLDV-MCNC: 47 MG/DL (ref 7–20)
BUN BLDV-MCNC: 49 MG/DL (ref 7–20)
BUN BLDV-MCNC: 50 MG/DL (ref 7–20)
BUN BLDV-MCNC: 50 MG/DL (ref 7–20)
BUN BLDV-MCNC: 51 MG/DL (ref 7–20)
BUN BLDV-MCNC: 52 MG/DL (ref 7–20)
BUN BLDV-MCNC: 53 MG/DL (ref 7–20)
BUN BLDV-MCNC: 53 MG/DL (ref 7–20)
BUN BLDV-MCNC: 54 MG/DL (ref 7–20)
BUN BLDV-MCNC: 57 MG/DL (ref 7–20)
BUN BLDV-MCNC: 60 MG/DL (ref 7–20)
BUN BLDV-MCNC: 65 MG/DL (ref 7–20)
BUN BLDV-MCNC: 69 MG/DL (ref 7–20)
BUN BLDV-MCNC: 69 MG/DL (ref 7–20)
BUN BLDV-MCNC: 74 MG/DL (ref 7–20)
BUN BLDV-MCNC: 79 MG/DL (ref 7–20)
BUN BLDV-MCNC: 82 MG/DL (ref 7–20)
BUN BLDV-MCNC: 82 MG/DL (ref 7–20)
BUN BLDV-MCNC: 83 MG/DL (ref 7–20)
BUN BLDV-MCNC: 92 MG/DL (ref 7–20)
BUN BLDV-MCNC: 93 MG/DL (ref 7–20)
BUN BLDV-MCNC: 95 MG/DL (ref 7–20)
BUN BLDV-MCNC: 99 MG/DL (ref 7–20)
BUPRENORPHINE: NEGATIVE NG/ML
BUTALBITAL, SERUM/PLASMA, QUANT: <50 NG/ML
C-PEPTIDE: <0.1 NG/ML (ref 1.1–4.4)
CALCIUM IONIZED: 1.1 MMOL/L (ref 1.12–1.32)
CALCIUM IONIZED: 1.12 MMOL/L (ref 1.12–1.32)
CALCIUM IONIZED: 1.13 MMOL/L (ref 1.12–1.32)
CALCIUM IONIZED: 1.16 MMOL/L (ref 1.12–1.32)
CALCIUM SERPL-MCNC: 10 MG/DL (ref 8.3–10.6)
CALCIUM SERPL-MCNC: 6.2 MG/DL (ref 8.3–10.6)
CALCIUM SERPL-MCNC: 7 MG/DL (ref 8.3–10.6)
CALCIUM SERPL-MCNC: 7.4 MG/DL (ref 8.3–10.6)
CALCIUM SERPL-MCNC: 7.5 MG/DL (ref 8.3–10.6)
CALCIUM SERPL-MCNC: 7.6 MG/DL (ref 8.3–10.6)
CALCIUM SERPL-MCNC: 7.7 MG/DL (ref 8.3–10.6)
CALCIUM SERPL-MCNC: 7.7 MG/DL (ref 8.3–10.6)
CALCIUM SERPL-MCNC: 7.8 MG/DL (ref 8.3–10.6)
CALCIUM SERPL-MCNC: 8 MG/DL (ref 8.3–10.6)
CALCIUM SERPL-MCNC: 8 MG/DL (ref 8.3–10.6)
CALCIUM SERPL-MCNC: 8.1 MG/DL (ref 8.3–10.6)
CALCIUM SERPL-MCNC: 8.2 MG/DL (ref 8.3–10.6)
CALCIUM SERPL-MCNC: 8.3 MG/DL (ref 8.3–10.6)
CALCIUM SERPL-MCNC: 8.4 MG/DL (ref 8.3–10.6)
CALCIUM SERPL-MCNC: 8.5 MG/DL (ref 8.3–10.6)
CALCIUM SERPL-MCNC: 8.6 MG/DL (ref 8.3–10.6)
CALCIUM SERPL-MCNC: 8.7 MG/DL (ref 8.3–10.6)
CALCIUM SERPL-MCNC: 8.8 MG/DL (ref 8.3–10.6)
CALCIUM SERPL-MCNC: 8.9 MG/DL (ref 8.3–10.6)
CALCIUM SERPL-MCNC: 9 MG/DL (ref 8.3–10.6)
CALCIUM SERPL-MCNC: 9.1 MG/DL (ref 8.3–10.6)
CALCIUM SERPL-MCNC: 9.2 MG/DL (ref 8.3–10.6)
CALCIUM SERPL-MCNC: 9.3 MG/DL (ref 8.3–10.6)
CALCIUM SERPL-MCNC: 9.3 MG/DL (ref 8.3–10.6)
CALCIUM SERPL-MCNC: 9.4 MG/DL (ref 8.3–10.6)
CALCIUM SERPL-MCNC: 9.5 MG/DL (ref 8.3–10.6)
CALCIUM SERPL-MCNC: 9.6 MG/DL (ref 8.3–10.6)
CALCIUM SERPL-MCNC: 9.6 MG/DL (ref 8.3–10.6)
CALCIUM SERPL-MCNC: 9.7 MG/DL (ref 8.3–10.6)
CALCIUM SERPL-MCNC: 9.8 MG/DL (ref 8.3–10.6)
CANNABINOID SCREEN BLOOD: NEGATIVE NG/ML
CANNABINOID SCREEN URINE: ABNORMAL
CANNABINOID SCREEN URINE: ABNORMAL
CARBOXYHEMOGLOBIN ARTERIAL: 2.4 % (ref 0–1.5)
CARBOXYHEMOGLOBIN: 1 % (ref 0–1.5)
CARBOXYHEMOGLOBIN: 1 % (ref 0–1.5)
CARBOXYHEMOGLOBIN: 1.2 % (ref 0–1.5)
CARBOXYHEMOGLOBIN: 1.3 % (ref 0–1.5)
CARBOXYHEMOGLOBIN: 1.6 % (ref 0–1.5)
CARBOXYHEMOGLOBIN: 1.6 % (ref 0–1.5)
CARBOXYHEMOGLOBIN: 1.9 % (ref 0–1.5)
CARBOXYHEMOGLOBIN: 2 % (ref 0–1.5)
CARBOXYHEMOGLOBIN: 2.1 % (ref 0–1.5)
CARBOXYHEMOGLOBIN: 2.2 % (ref 0–1.5)
CARBOXYHEMOGLOBIN: 2.3 % (ref 0–1.5)
CARBOXYHEMOGLOBIN: 2.7 % (ref 0–1.5)
CHLORIDE BLD-SCNC: 100 MMOL/L (ref 99–110)
CHLORIDE BLD-SCNC: 101 MMOL/L (ref 99–110)
CHLORIDE BLD-SCNC: 102 MMOL/L (ref 99–110)
CHLORIDE BLD-SCNC: 103 MMOL/L (ref 99–110)
CHLORIDE BLD-SCNC: 104 MMOL/L (ref 99–110)
CHLORIDE BLD-SCNC: 105 MMOL/L (ref 99–110)
CHLORIDE BLD-SCNC: 105 MMOL/L (ref 99–110)
CHLORIDE BLD-SCNC: 107 MMOL/L (ref 99–110)
CHLORIDE BLD-SCNC: 107 MMOL/L (ref 99–110)
CHLORIDE BLD-SCNC: 84 MMOL/L (ref 99–110)
CHLORIDE BLD-SCNC: 85 MMOL/L (ref 99–110)
CHLORIDE BLD-SCNC: 87 MMOL/L (ref 99–110)
CHLORIDE BLD-SCNC: 87 MMOL/L (ref 99–110)
CHLORIDE BLD-SCNC: 88 MMOL/L (ref 99–110)
CHLORIDE BLD-SCNC: 88 MMOL/L (ref 99–110)
CHLORIDE BLD-SCNC: 89 MMOL/L (ref 99–110)
CHLORIDE BLD-SCNC: 90 MMOL/L (ref 99–110)
CHLORIDE BLD-SCNC: 91 MMOL/L (ref 99–110)
CHLORIDE BLD-SCNC: 92 MMOL/L (ref 99–110)
CHLORIDE BLD-SCNC: 93 MMOL/L (ref 99–110)
CHLORIDE BLD-SCNC: 94 MMOL/L (ref 99–110)
CHLORIDE BLD-SCNC: 95 MMOL/L (ref 99–110)
CHLORIDE BLD-SCNC: 96 MMOL/L (ref 99–110)
CHLORIDE BLD-SCNC: 97 MMOL/L (ref 99–110)
CHLORIDE BLD-SCNC: 98 MMOL/L (ref 99–110)
CHLORIDE BLD-SCNC: 99 MMOL/L (ref 99–110)
CHOLESTEROL, TOTAL: 96 MG/DL (ref 0–199)
CHP ED QC CHECK: YES
CLARITY: ABNORMAL
CLARITY: CLEAR
CO2: 10 MMOL/L (ref 21–32)
CO2: 11 MMOL/L (ref 21–32)
CO2: 11 MMOL/L (ref 21–32)
CO2: 12 MMOL/L (ref 21–32)
CO2: 13 MMOL/L (ref 21–32)
CO2: 14 MMOL/L (ref 21–32)
CO2: 15 MMOL/L (ref 21–32)
CO2: 16 MMOL/L (ref 21–32)
CO2: 16 MMOL/L (ref 21–32)
CO2: 18 MMOL/L (ref 21–32)
CO2: 18 MMOL/L (ref 21–32)
CO2: 19 MMOL/L (ref 21–32)
CO2: 20 MMOL/L (ref 21–32)
CO2: 21 MMOL/L (ref 21–32)
CO2: 22 MMOL/L (ref 21–32)
CO2: 23 MMOL/L (ref 21–32)
CO2: 24 MMOL/L (ref 21–32)
CO2: 25 MMOL/L (ref 21–32)
CO2: 26 MMOL/L (ref 21–32)
CO2: 27 MMOL/L (ref 21–32)
CO2: 28 MMOL/L (ref 21–32)
CO2: 28 MMOL/L (ref 21–32)
CO2: 30 MMOL/L (ref 21–32)
CO2: 31 MMOL/L (ref 21–32)
CO2: 33 MMOL/L (ref 21–32)
CO2: 33 MMOL/L (ref 21–32)
CO2: 34 MMOL/L (ref 21–32)
CO2: 37 MMOL/L (ref 21–32)
CO2: 38 MMOL/L (ref 21–32)
CO2: 40 MMOL/L (ref 21–32)
CO2: 5 MMOL/L (ref 21–32)
COCAINE METABOLITE SCREEN URINE: ABNORMAL
COCAINE METABOLITE SCREEN URINE: ABNORMAL
COCAINE SCREEN BLOOD: NEGATIVE NG/ML
COLOR: ABNORMAL
COLOR: YELLOW
CREAT SERPL-MCNC: 0.7 MG/DL (ref 0.8–1.3)
CREAT SERPL-MCNC: 0.8 MG/DL (ref 0.8–1.3)
CREAT SERPL-MCNC: 0.9 MG/DL (ref 0.8–1.3)
CREAT SERPL-MCNC: 1 MG/DL (ref 0.8–1.3)
CREAT SERPL-MCNC: 1.1 MG/DL (ref 0.8–1.3)
CREAT SERPL-MCNC: 1.2 MG/DL (ref 0.8–1.3)
CREAT SERPL-MCNC: 1.3 MG/DL (ref 0.8–1.3)
CREAT SERPL-MCNC: 1.4 MG/DL (ref 0.8–1.3)
CREAT SERPL-MCNC: 1.5 MG/DL (ref 0.8–1.3)
CREAT SERPL-MCNC: 1.7 MG/DL (ref 0.8–1.3)
CREAT SERPL-MCNC: 1.9 MG/DL (ref 0.8–1.3)
CREAT SERPL-MCNC: 2 MG/DL (ref 0.8–1.3)
CREAT SERPL-MCNC: 2.1 MG/DL (ref 0.8–1.3)
CREAT SERPL-MCNC: 2.3 MG/DL (ref 0.8–1.3)
CREAT SERPL-MCNC: 2.3 MG/DL (ref 0.8–1.3)
CREAT SERPL-MCNC: 2.6 MG/DL (ref 0.8–1.3)
CREAT SERPL-MCNC: 2.7 MG/DL (ref 0.8–1.3)
CREAT SERPL-MCNC: 3.5 MG/DL (ref 0.8–1.3)
CREAT SERPL-MCNC: 3.6 MG/DL (ref 0.8–1.3)
CREAT SERPL-MCNC: 3.7 MG/DL (ref 0.8–1.3)
CREAT SERPL-MCNC: 3.9 MG/DL (ref 0.8–1.3)
CREAT SERPL-MCNC: 4.1 MG/DL (ref 0.8–1.3)
CREAT SERPL-MCNC: 4.1 MG/DL (ref 0.8–1.3)
CREAT SERPL-MCNC: 4.2 MG/DL (ref 0.8–1.3)
CREAT SERPL-MCNC: 4.3 MG/DL (ref 0.8–1.3)
CREAT SERPL-MCNC: 4.3 MG/DL (ref 0.8–1.3)
CREAT SERPL-MCNC: 4.7 MG/DL (ref 0.8–1.3)
CREAT SERPL-MCNC: 4.8 MG/DL (ref 0.8–1.3)
CREAT SERPL-MCNC: 4.8 MG/DL (ref 0.8–1.3)
CREAT SERPL-MCNC: 5 MG/DL (ref 0.8–1.3)
CREAT SERPL-MCNC: 5.3 MG/DL (ref 0.8–1.3)
CREAT SERPL-MCNC: 5.7 MG/DL (ref 0.8–1.3)
CREAT SERPL-MCNC: 6.3 MG/DL (ref 0.8–1.3)
CREAT SERPL-MCNC: 6.9 MG/DL (ref 0.8–1.3)
CREATININE URINE: 203.7 MG/DL (ref 39–259)
CULTURE, BLOOD 2: NORMAL
CULTURE, BLOOD 2: NORMAL
CULTURE, RESPIRATORY: NORMAL
DESCRIPTION BLOOD BANK: NORMAL
EKG ATRIAL RATE: 104 BPM
EKG ATRIAL RATE: 67 BPM
EKG ATRIAL RATE: 74 BPM
EKG ATRIAL RATE: 77 BPM
EKG ATRIAL RATE: 84 BPM
EKG ATRIAL RATE: 85 BPM
EKG ATRIAL RATE: 88 BPM
EKG ATRIAL RATE: 90 BPM
EKG ATRIAL RATE: 92 BPM
EKG ATRIAL RATE: 94 BPM
EKG ATRIAL RATE: 96 BPM
EKG ATRIAL RATE: 96 BPM
EKG ATRIAL RATE: 98 BPM
EKG ATRIAL RATE: 98 BPM
EKG DIAGNOSIS: NORMAL
EKG P AXIS: 49 DEGREES
EKG P AXIS: 49 DEGREES
EKG P AXIS: 51 DEGREES
EKG P AXIS: 58 DEGREES
EKG P AXIS: 62 DEGREES
EKG P AXIS: 64 DEGREES
EKG P AXIS: 64 DEGREES
EKG P AXIS: 66 DEGREES
EKG P AXIS: 68 DEGREES
EKG P AXIS: 68 DEGREES
EKG P AXIS: 73 DEGREES
EKG P AXIS: 82 DEGREES
EKG P AXIS: 84 DEGREES
EKG P AXIS: 91 DEGREES
EKG P-R INTERVAL: 160 MS
EKG P-R INTERVAL: 164 MS
EKG P-R INTERVAL: 172 MS
EKG P-R INTERVAL: 174 MS
EKG P-R INTERVAL: 176 MS
EKG P-R INTERVAL: 178 MS
EKG P-R INTERVAL: 178 MS
EKG P-R INTERVAL: 180 MS
EKG P-R INTERVAL: 180 MS
EKG P-R INTERVAL: 186 MS
EKG P-R INTERVAL: 192 MS
EKG P-R INTERVAL: 192 MS
EKG P-R INTERVAL: 198 MS
EKG P-R INTERVAL: 210 MS
EKG Q-T INTERVAL: 338 MS
EKG Q-T INTERVAL: 348 MS
EKG Q-T INTERVAL: 350 MS
EKG Q-T INTERVAL: 354 MS
EKG Q-T INTERVAL: 356 MS
EKG Q-T INTERVAL: 356 MS
EKG Q-T INTERVAL: 360 MS
EKG Q-T INTERVAL: 360 MS
EKG Q-T INTERVAL: 364 MS
EKG Q-T INTERVAL: 368 MS
EKG Q-T INTERVAL: 388 MS
EKG Q-T INTERVAL: 388 MS
EKG Q-T INTERVAL: 394 MS
EKG Q-T INTERVAL: 410 MS
EKG QRS DURATION: 100 MS
EKG QRS DURATION: 100 MS
EKG QRS DURATION: 104 MS
EKG QRS DURATION: 106 MS
EKG QRS DURATION: 108 MS
EKG QRS DURATION: 116 MS
EKG QRS DURATION: 120 MS
EKG QRS DURATION: 90 MS
EKG QRS DURATION: 90 MS
EKG QRS DURATION: 92 MS
EKG QRS DURATION: 98 MS
EKG QRS DURATION: 98 MS
EKG QTC CALCULATION (BAZETT): 423 MS
EKG QTC CALCULATION (BAZETT): 425 MS
EKG QTC CALCULATION (BAZETT): 430 MS
EKG QTC CALCULATION (BAZETT): 433 MS
EKG QTC CALCULATION (BAZETT): 437 MS
EKG QTC CALCULATION (BAZETT): 437 MS
EKG QTC CALCULATION (BAZETT): 439 MS
EKG QTC CALCULATION (BAZETT): 439 MS
EKG QTC CALCULATION (BAZETT): 444 MS
EKG QTC CALCULATION (BAZETT): 445 MS
EKG QTC CALCULATION (BAZETT): 447 MS
EKG QTC CALCULATION (BAZETT): 459 MS
EKG QTC CALCULATION (BAZETT): 469 MS
EKG QTC CALCULATION (BAZETT): 479 MS
EKG R AXIS: -1 DEGREES
EKG R AXIS: -10 DEGREES
EKG R AXIS: -10 DEGREES
EKG R AXIS: -2 DEGREES
EKG R AXIS: -53 DEGREES
EKG R AXIS: -56 DEGREES
EKG R AXIS: -8 DEGREES
EKG R AXIS: -8 DEGREES
EKG R AXIS: 13 DEGREES
EKG R AXIS: 16 DEGREES
EKG R AXIS: 18 DEGREES
EKG R AXIS: 2 DEGREES
EKG R AXIS: 34 DEGREES
EKG R AXIS: 46 DEGREES
EKG T AXIS: -15 DEGREES
EKG T AXIS: 42 DEGREES
EKG T AXIS: 42 DEGREES
EKG T AXIS: 45 DEGREES
EKG T AXIS: 58 DEGREES
EKG T AXIS: 60 DEGREES
EKG T AXIS: 60 DEGREES
EKG T AXIS: 62 DEGREES
EKG T AXIS: 68 DEGREES
EKG T AXIS: 72 DEGREES
EKG T AXIS: 76 DEGREES
EKG T AXIS: 80 DEGREES
EKG T AXIS: 80 DEGREES
EKG T AXIS: 96 DEGREES
EKG VENTRICULAR RATE: 104 BPM
EKG VENTRICULAR RATE: 67 BPM
EKG VENTRICULAR RATE: 74 BPM
EKG VENTRICULAR RATE: 77 BPM
EKG VENTRICULAR RATE: 84 BPM
EKG VENTRICULAR RATE: 85 BPM
EKG VENTRICULAR RATE: 88 BPM
EKG VENTRICULAR RATE: 90 BPM
EKG VENTRICULAR RATE: 92 BPM
EKG VENTRICULAR RATE: 94 BPM
EKG VENTRICULAR RATE: 96 BPM
EKG VENTRICULAR RATE: 96 BPM
EKG VENTRICULAR RATE: 98 BPM
EKG VENTRICULAR RATE: 98 BPM
EOSINOPHILS ABSOLUTE: 0 K/UL (ref 0–0.6)
EOSINOPHILS ABSOLUTE: 0.1 K/UL (ref 0–0.6)
EOSINOPHILS ABSOLUTE: 0.2 K/UL (ref 0–0.6)
EOSINOPHILS ABSOLUTE: 0.3 K/UL (ref 0–0.6)
EOSINOPHILS ABSOLUTE: 0.5 K/UL (ref 0–0.6)
EOSINOPHILS RELATIVE PERCENT: 0 %
EOSINOPHILS RELATIVE PERCENT: 0.1 %
EOSINOPHILS RELATIVE PERCENT: 0.2 %
EOSINOPHILS RELATIVE PERCENT: 0.3 %
EOSINOPHILS RELATIVE PERCENT: 0.3 %
EOSINOPHILS RELATIVE PERCENT: 0.5 %
EOSINOPHILS RELATIVE PERCENT: 0.6 %
EOSINOPHILS RELATIVE PERCENT: 0.7 %
EOSINOPHILS RELATIVE PERCENT: 1 %
EOSINOPHILS RELATIVE PERCENT: 1.2 %
EOSINOPHILS RELATIVE PERCENT: 1.4 %
EOSINOPHILS RELATIVE PERCENT: 1.7 %
EOSINOPHILS RELATIVE PERCENT: 1.7 %
EOSINOPHILS RELATIVE PERCENT: 2 %
EOSINOPHILS RELATIVE PERCENT: 2.1 %
EOSINOPHILS RELATIVE PERCENT: 2.7 %
EOSINOPHILS RELATIVE PERCENT: 2.8 %
EOSINOPHILS RELATIVE PERCENT: 2.9 %
EOSINOPHILS RELATIVE PERCENT: 3 %
EOSINOPHILS RELATIVE PERCENT: 3.3 %
EOSINOPHILS RELATIVE PERCENT: 3.4 %
EOSINOPHILS RELATIVE PERCENT: 4 %
EOSINOPHILS RELATIVE PERCENT: 4 %
EOSINOPHILS RELATIVE PERCENT: 4.8 %
EPITHELIAL CELLS, UA: ABNORMAL /HPF (ref 0–5)
ESTIMATED AVERAGE GLUCOSE: 148.5 MG/DL
ESTIMATED AVERAGE GLUCOSE: 177.2 MG/DL
ESTIMATED AVERAGE GLUCOSE: 197.3 MG/DL
ESTIMATED AVERAGE GLUCOSE: 217.3 MG/DL
ESTIMATED AVERAGE GLUCOSE: 220.2 MG/DL
FERRITIN: 183.4 NG/ML (ref 30–400)
GFR AFRICAN AMERICAN: 10
GFR AFRICAN AMERICAN: 11
GFR AFRICAN AMERICAN: 12
GFR AFRICAN AMERICAN: 13
GFR AFRICAN AMERICAN: 14
GFR AFRICAN AMERICAN: 15
GFR AFRICAN AMERICAN: 17
GFR AFRICAN AMERICAN: 18
GFR AFRICAN AMERICAN: 18
GFR AFRICAN AMERICAN: 19
GFR AFRICAN AMERICAN: 20
GFR AFRICAN AMERICAN: 20
GFR AFRICAN AMERICAN: 21
GFR AFRICAN AMERICAN: 29
GFR AFRICAN AMERICAN: 30
GFR AFRICAN AMERICAN: 34
GFR AFRICAN AMERICAN: 34
GFR AFRICAN AMERICAN: 38
GFR AFRICAN AMERICAN: 40
GFR AFRICAN AMERICAN: 43
GFR AFRICAN AMERICAN: 49
GFR AFRICAN AMERICAN: 56
GFR AFRICAN AMERICAN: >60
GFR NON-AFRICAN AMERICAN: 10
GFR NON-AFRICAN AMERICAN: 11
GFR NON-AFRICAN AMERICAN: 12
GFR NON-AFRICAN AMERICAN: 14
GFR NON-AFRICAN AMERICAN: 15
GFR NON-AFRICAN AMERICAN: 16
GFR NON-AFRICAN AMERICAN: 17
GFR NON-AFRICAN AMERICAN: 17
GFR NON-AFRICAN AMERICAN: 24
GFR NON-AFRICAN AMERICAN: 25
GFR NON-AFRICAN AMERICAN: 28
GFR NON-AFRICAN AMERICAN: 28
GFR NON-AFRICAN AMERICAN: 31
GFR NON-AFRICAN AMERICAN: 33
GFR NON-AFRICAN AMERICAN: 35
GFR NON-AFRICAN AMERICAN: 40
GFR NON-AFRICAN AMERICAN: 46
GFR NON-AFRICAN AMERICAN: 50
GFR NON-AFRICAN AMERICAN: 55
GFR NON-AFRICAN AMERICAN: 8
GFR NON-AFRICAN AMERICAN: 9
GFR NON-AFRICAN AMERICAN: >60
GLOBULIN: 2.1 G/DL
GLOBULIN: 2.2 G/DL
GLOBULIN: 2.3 G/DL
GLOBULIN: 2.4 G/DL
GLOBULIN: 2.4 G/DL
GLOBULIN: 2.6 G/DL
GLOBULIN: 2.7 G/DL
GLOBULIN: 2.8 G/DL
GLOBULIN: 2.9 G/DL
GLOBULIN: 2.9 G/DL
GLOBULIN: 3 G/DL
GLOBULIN: 3 G/DL
GLOBULIN: 3.1 G/DL
GLUCOSE BLD-MCNC: 100 MG/DL (ref 70–99)
GLUCOSE BLD-MCNC: 100 MG/DL (ref 70–99)
GLUCOSE BLD-MCNC: 101 MG/DL (ref 70–99)
GLUCOSE BLD-MCNC: 103 MG/DL (ref 70–99)
GLUCOSE BLD-MCNC: 104 MG/DL (ref 70–99)
GLUCOSE BLD-MCNC: 105 MG/DL (ref 70–99)
GLUCOSE BLD-MCNC: 105 MG/DL (ref 70–99)
GLUCOSE BLD-MCNC: 106 MG/DL (ref 70–99)
GLUCOSE BLD-MCNC: 107 MG/DL (ref 70–99)
GLUCOSE BLD-MCNC: 107 MG/DL (ref 70–99)
GLUCOSE BLD-MCNC: 108 MG/DL (ref 70–99)
GLUCOSE BLD-MCNC: 108 MG/DL (ref 70–99)
GLUCOSE BLD-MCNC: 109 MG/DL (ref 70–99)
GLUCOSE BLD-MCNC: 110 MG/DL (ref 70–99)
GLUCOSE BLD-MCNC: 111 MG/DL (ref 70–99)
GLUCOSE BLD-MCNC: 112 MG/DL (ref 70–99)
GLUCOSE BLD-MCNC: 113 MG/DL (ref 70–99)
GLUCOSE BLD-MCNC: 114 MG/DL (ref 70–99)
GLUCOSE BLD-MCNC: 115 MG/DL (ref 70–99)
GLUCOSE BLD-MCNC: 116 MG/DL (ref 70–99)
GLUCOSE BLD-MCNC: 116 MG/DL (ref 70–99)
GLUCOSE BLD-MCNC: 118 MG/DL (ref 70–99)
GLUCOSE BLD-MCNC: 118 MG/DL (ref 70–99)
GLUCOSE BLD-MCNC: 119 MG/DL (ref 70–99)
GLUCOSE BLD-MCNC: 120 MG/DL (ref 70–99)
GLUCOSE BLD-MCNC: 121 MG/DL (ref 70–99)
GLUCOSE BLD-MCNC: 121 MG/DL (ref 70–99)
GLUCOSE BLD-MCNC: 122 MG/DL
GLUCOSE BLD-MCNC: 122 MG/DL (ref 70–99)
GLUCOSE BLD-MCNC: 122 MG/DL (ref 70–99)
GLUCOSE BLD-MCNC: 123 MG/DL (ref 70–99)
GLUCOSE BLD-MCNC: 124 MG/DL (ref 70–99)
GLUCOSE BLD-MCNC: 125 MG/DL (ref 70–99)
GLUCOSE BLD-MCNC: 127 MG/DL (ref 70–99)
GLUCOSE BLD-MCNC: 128 MG/DL (ref 70–99)
GLUCOSE BLD-MCNC: 129 MG/DL (ref 70–99)
GLUCOSE BLD-MCNC: 130 MG/DL (ref 70–99)
GLUCOSE BLD-MCNC: 131 MG/DL (ref 70–99)
GLUCOSE BLD-MCNC: 132 MG/DL (ref 70–99)
GLUCOSE BLD-MCNC: 133 MG/DL (ref 70–99)
GLUCOSE BLD-MCNC: 134 MG/DL (ref 70–99)
GLUCOSE BLD-MCNC: 135 MG/DL (ref 70–99)
GLUCOSE BLD-MCNC: 135 MG/DL (ref 70–99)
GLUCOSE BLD-MCNC: 136 MG/DL (ref 70–99)
GLUCOSE BLD-MCNC: 136 MG/DL (ref 70–99)
GLUCOSE BLD-MCNC: 137 MG/DL (ref 70–99)
GLUCOSE BLD-MCNC: 138 MG/DL (ref 70–99)
GLUCOSE BLD-MCNC: 139 MG/DL (ref 70–99)
GLUCOSE BLD-MCNC: 140 MG/DL (ref 70–99)
GLUCOSE BLD-MCNC: 142 MG/DL (ref 70–99)
GLUCOSE BLD-MCNC: 144 MG/DL (ref 70–99)
GLUCOSE BLD-MCNC: 145 MG/DL (ref 70–99)
GLUCOSE BLD-MCNC: 145 MG/DL (ref 70–99)
GLUCOSE BLD-MCNC: 146 MG/DL (ref 70–99)
GLUCOSE BLD-MCNC: 148 MG/DL (ref 70–99)
GLUCOSE BLD-MCNC: 148 MG/DL (ref 70–99)
GLUCOSE BLD-MCNC: 149 MG/DL (ref 70–99)
GLUCOSE BLD-MCNC: 149 MG/DL (ref 70–99)
GLUCOSE BLD-MCNC: 150 MG/DL (ref 70–99)
GLUCOSE BLD-MCNC: 151 MG/DL (ref 70–99)
GLUCOSE BLD-MCNC: 151 MG/DL (ref 70–99)
GLUCOSE BLD-MCNC: 152 MG/DL (ref 70–99)
GLUCOSE BLD-MCNC: 153 MG/DL (ref 70–99)
GLUCOSE BLD-MCNC: 153 MG/DL (ref 70–99)
GLUCOSE BLD-MCNC: 154 MG/DL (ref 70–99)
GLUCOSE BLD-MCNC: 160 MG/DL (ref 70–99)
GLUCOSE BLD-MCNC: 160 MG/DL (ref 70–99)
GLUCOSE BLD-MCNC: 161 MG/DL (ref 70–99)
GLUCOSE BLD-MCNC: 162 MG/DL (ref 70–99)
GLUCOSE BLD-MCNC: 163 MG/DL (ref 70–99)
GLUCOSE BLD-MCNC: 164 MG/DL (ref 70–99)
GLUCOSE BLD-MCNC: 165 MG/DL (ref 70–99)
GLUCOSE BLD-MCNC: 165 MG/DL (ref 70–99)
GLUCOSE BLD-MCNC: 167 MG/DL (ref 70–99)
GLUCOSE BLD-MCNC: 167 MG/DL (ref 70–99)
GLUCOSE BLD-MCNC: 168 MG/DL (ref 70–99)
GLUCOSE BLD-MCNC: 169 MG/DL (ref 70–99)
GLUCOSE BLD-MCNC: 171 MG/DL (ref 70–99)
GLUCOSE BLD-MCNC: 172 MG/DL (ref 70–99)
GLUCOSE BLD-MCNC: 172 MG/DL (ref 70–99)
GLUCOSE BLD-MCNC: 174 MG/DL (ref 70–99)
GLUCOSE BLD-MCNC: 174 MG/DL (ref 70–99)
GLUCOSE BLD-MCNC: 175 MG/DL (ref 70–99)
GLUCOSE BLD-MCNC: 175 MG/DL (ref 70–99)
GLUCOSE BLD-MCNC: 176 MG/DL (ref 70–99)
GLUCOSE BLD-MCNC: 177 MG/DL (ref 70–99)
GLUCOSE BLD-MCNC: 177 MG/DL (ref 70–99)
GLUCOSE BLD-MCNC: 18 MG/DL (ref 70–99)
GLUCOSE BLD-MCNC: 18 MG/DL (ref 70–99)
GLUCOSE BLD-MCNC: 180 MG/DL (ref 70–99)
GLUCOSE BLD-MCNC: 181 MG/DL (ref 70–99)
GLUCOSE BLD-MCNC: 181 MG/DL (ref 70–99)
GLUCOSE BLD-MCNC: 182 MG/DL (ref 70–99)
GLUCOSE BLD-MCNC: 183 MG/DL (ref 70–99)
GLUCOSE BLD-MCNC: 184 MG/DL (ref 70–99)
GLUCOSE BLD-MCNC: 185 MG/DL (ref 70–99)
GLUCOSE BLD-MCNC: 188 MG/DL (ref 70–99)
GLUCOSE BLD-MCNC: 189 MG/DL (ref 70–99)
GLUCOSE BLD-MCNC: 192 MG/DL (ref 70–99)
GLUCOSE BLD-MCNC: 192 MG/DL (ref 70–99)
GLUCOSE BLD-MCNC: 193 MG/DL (ref 70–99)
GLUCOSE BLD-MCNC: 194 MG/DL (ref 70–99)
GLUCOSE BLD-MCNC: 195 MG/DL (ref 70–99)
GLUCOSE BLD-MCNC: 196 MG/DL (ref 70–99)
GLUCOSE BLD-MCNC: 196 MG/DL (ref 70–99)
GLUCOSE BLD-MCNC: 197 MG/DL (ref 70–99)
GLUCOSE BLD-MCNC: 197 MG/DL (ref 70–99)
GLUCOSE BLD-MCNC: 199 MG/DL (ref 70–99)
GLUCOSE BLD-MCNC: 200 MG/DL (ref 70–99)
GLUCOSE BLD-MCNC: 201 MG/DL (ref 70–99)
GLUCOSE BLD-MCNC: 201 MG/DL (ref 70–99)
GLUCOSE BLD-MCNC: 202 MG/DL (ref 70–99)
GLUCOSE BLD-MCNC: 203 MG/DL (ref 70–99)
GLUCOSE BLD-MCNC: 205 MG/DL (ref 70–99)
GLUCOSE BLD-MCNC: 207 MG/DL (ref 70–99)
GLUCOSE BLD-MCNC: 210 MG/DL (ref 70–99)
GLUCOSE BLD-MCNC: 211 MG/DL (ref 70–99)
GLUCOSE BLD-MCNC: 211 MG/DL (ref 70–99)
GLUCOSE BLD-MCNC: 212 MG/DL (ref 70–99)
GLUCOSE BLD-MCNC: 213 MG/DL (ref 70–99)
GLUCOSE BLD-MCNC: 214 MG/DL (ref 70–99)
GLUCOSE BLD-MCNC: 215 MG/DL (ref 70–99)
GLUCOSE BLD-MCNC: 215 MG/DL (ref 70–99)
GLUCOSE BLD-MCNC: 216 MG/DL (ref 70–99)
GLUCOSE BLD-MCNC: 216 MG/DL (ref 70–99)
GLUCOSE BLD-MCNC: 220 MG/DL (ref 70–99)
GLUCOSE BLD-MCNC: 220 MG/DL (ref 70–99)
GLUCOSE BLD-MCNC: 223 MG/DL (ref 70–99)
GLUCOSE BLD-MCNC: 224 MG/DL (ref 70–99)
GLUCOSE BLD-MCNC: 225 MG/DL (ref 70–99)
GLUCOSE BLD-MCNC: 226 MG/DL (ref 70–99)
GLUCOSE BLD-MCNC: 227 MG/DL (ref 70–99)
GLUCOSE BLD-MCNC: 229 MG/DL (ref 70–99)
GLUCOSE BLD-MCNC: 229 MG/DL (ref 70–99)
GLUCOSE BLD-MCNC: 230 MG/DL (ref 70–99)
GLUCOSE BLD-MCNC: 231 MG/DL (ref 70–99)
GLUCOSE BLD-MCNC: 231 MG/DL (ref 70–99)
GLUCOSE BLD-MCNC: 232 MG/DL (ref 70–99)
GLUCOSE BLD-MCNC: 232 MG/DL (ref 70–99)
GLUCOSE BLD-MCNC: 233 MG/DL (ref 70–99)
GLUCOSE BLD-MCNC: 233 MG/DL (ref 70–99)
GLUCOSE BLD-MCNC: 234 MG/DL (ref 70–99)
GLUCOSE BLD-MCNC: 234 MG/DL (ref 70–99)
GLUCOSE BLD-MCNC: 235 MG/DL (ref 70–99)
GLUCOSE BLD-MCNC: 237 MG/DL (ref 70–99)
GLUCOSE BLD-MCNC: 238 MG/DL (ref 70–99)
GLUCOSE BLD-MCNC: 239 MG/DL (ref 70–99)
GLUCOSE BLD-MCNC: 24 MG/DL (ref 70–99)
GLUCOSE BLD-MCNC: 240 MG/DL (ref 70–99)
GLUCOSE BLD-MCNC: 240 MG/DL (ref 70–99)
GLUCOSE BLD-MCNC: 241 MG/DL (ref 70–99)
GLUCOSE BLD-MCNC: 242 MG/DL (ref 70–99)
GLUCOSE BLD-MCNC: 243 MG/DL (ref 70–99)
GLUCOSE BLD-MCNC: 243 MG/DL (ref 70–99)
GLUCOSE BLD-MCNC: 244 MG/DL (ref 70–99)
GLUCOSE BLD-MCNC: 245 MG/DL (ref 70–99)
GLUCOSE BLD-MCNC: 246 MG/DL (ref 70–99)
GLUCOSE BLD-MCNC: 246 MG/DL (ref 70–99)
GLUCOSE BLD-MCNC: 248 MG/DL (ref 70–99)
GLUCOSE BLD-MCNC: 249 MG/DL (ref 70–99)
GLUCOSE BLD-MCNC: 250 MG/DL (ref 70–99)
GLUCOSE BLD-MCNC: 250 MG/DL (ref 70–99)
GLUCOSE BLD-MCNC: 251 MG/DL (ref 70–99)
GLUCOSE BLD-MCNC: 252 MG/DL (ref 70–99)
GLUCOSE BLD-MCNC: 253 MG/DL
GLUCOSE BLD-MCNC: 253 MG/DL (ref 70–99)
GLUCOSE BLD-MCNC: 253 MG/DL (ref 70–99)
GLUCOSE BLD-MCNC: 254 MG/DL (ref 70–99)
GLUCOSE BLD-MCNC: 256 MG/DL (ref 70–99)
GLUCOSE BLD-MCNC: 258 MG/DL (ref 70–99)
GLUCOSE BLD-MCNC: 258 MG/DL (ref 70–99)
GLUCOSE BLD-MCNC: 259 MG/DL (ref 70–99)
GLUCOSE BLD-MCNC: 259 MG/DL (ref 70–99)
GLUCOSE BLD-MCNC: 26 MG/DL (ref 70–99)
GLUCOSE BLD-MCNC: 260 MG/DL (ref 70–99)
GLUCOSE BLD-MCNC: 262 MG/DL (ref 70–99)
GLUCOSE BLD-MCNC: 264 MG/DL (ref 70–99)
GLUCOSE BLD-MCNC: 264 MG/DL (ref 70–99)
GLUCOSE BLD-MCNC: 265 MG/DL (ref 70–99)
GLUCOSE BLD-MCNC: 266 MG/DL (ref 70–99)
GLUCOSE BLD-MCNC: 266 MG/DL (ref 70–99)
GLUCOSE BLD-MCNC: 267 MG/DL (ref 70–99)
GLUCOSE BLD-MCNC: 267 MG/DL (ref 70–99)
GLUCOSE BLD-MCNC: 269 MG/DL (ref 70–99)
GLUCOSE BLD-MCNC: 271 MG/DL (ref 70–99)
GLUCOSE BLD-MCNC: 272 MG/DL (ref 70–99)
GLUCOSE BLD-MCNC: 272 MG/DL (ref 70–99)
GLUCOSE BLD-MCNC: 274 MG/DL (ref 70–99)
GLUCOSE BLD-MCNC: 274 MG/DL (ref 70–99)
GLUCOSE BLD-MCNC: 275 MG/DL (ref 70–99)
GLUCOSE BLD-MCNC: 276 MG/DL (ref 70–99)
GLUCOSE BLD-MCNC: 279 MG/DL (ref 70–99)
GLUCOSE BLD-MCNC: 282 MG/DL (ref 70–99)
GLUCOSE BLD-MCNC: 284 MG/DL (ref 70–99)
GLUCOSE BLD-MCNC: 286 MG/DL (ref 70–99)
GLUCOSE BLD-MCNC: 286 MG/DL (ref 70–99)
GLUCOSE BLD-MCNC: 287 MG/DL (ref 70–99)
GLUCOSE BLD-MCNC: 289 MG/DL (ref 70–99)
GLUCOSE BLD-MCNC: 29 MG/DL (ref 70–99)
GLUCOSE BLD-MCNC: 290 MG/DL (ref 70–99)
GLUCOSE BLD-MCNC: 290 MG/DL (ref 70–99)
GLUCOSE BLD-MCNC: 293 MG/DL (ref 70–99)
GLUCOSE BLD-MCNC: 293 MG/DL (ref 70–99)
GLUCOSE BLD-MCNC: 297 MG/DL (ref 70–99)
GLUCOSE BLD-MCNC: 297 MG/DL (ref 70–99)
GLUCOSE BLD-MCNC: 300 MG/DL (ref 70–99)
GLUCOSE BLD-MCNC: 300 MG/DL (ref 70–99)
GLUCOSE BLD-MCNC: 301 MG/DL (ref 70–99)
GLUCOSE BLD-MCNC: 303 MG/DL (ref 70–99)
GLUCOSE BLD-MCNC: 306 MG/DL (ref 70–99)
GLUCOSE BLD-MCNC: 308 MG/DL (ref 70–99)
GLUCOSE BLD-MCNC: 310 MG/DL (ref 70–99)
GLUCOSE BLD-MCNC: 311 MG/DL (ref 70–99)
GLUCOSE BLD-MCNC: 313 MG/DL (ref 70–99)
GLUCOSE BLD-MCNC: 321 MG/DL (ref 70–99)
GLUCOSE BLD-MCNC: 323 MG/DL (ref 70–99)
GLUCOSE BLD-MCNC: 325 MG/DL (ref 70–99)
GLUCOSE BLD-MCNC: 331 MG/DL (ref 70–99)
GLUCOSE BLD-MCNC: 333 MG/DL (ref 70–99)
GLUCOSE BLD-MCNC: 333 MG/DL (ref 70–99)
GLUCOSE BLD-MCNC: 334 MG/DL (ref 70–99)
GLUCOSE BLD-MCNC: 338 MG/DL (ref 70–99)
GLUCOSE BLD-MCNC: 340 MG/DL (ref 70–99)
GLUCOSE BLD-MCNC: 340 MG/DL (ref 70–99)
GLUCOSE BLD-MCNC: 342 MG/DL (ref 70–99)
GLUCOSE BLD-MCNC: 343 MG/DL (ref 70–99)
GLUCOSE BLD-MCNC: 344 MG/DL (ref 70–99)
GLUCOSE BLD-MCNC: 347 MG/DL (ref 70–99)
GLUCOSE BLD-MCNC: 350 MG/DL (ref 70–99)
GLUCOSE BLD-MCNC: 351 MG/DL (ref 70–99)
GLUCOSE BLD-MCNC: 353 MG/DL (ref 70–99)
GLUCOSE BLD-MCNC: 353 MG/DL (ref 70–99)
GLUCOSE BLD-MCNC: 374 MG/DL (ref 70–99)
GLUCOSE BLD-MCNC: 381 MG/DL (ref 70–99)
GLUCOSE BLD-MCNC: 382 MG/DL (ref 70–99)
GLUCOSE BLD-MCNC: 384 MG/DL (ref 70–99)
GLUCOSE BLD-MCNC: 387 MG/DL (ref 70–99)
GLUCOSE BLD-MCNC: 405 MG/DL (ref 70–99)
GLUCOSE BLD-MCNC: 405 MG/DL (ref 70–99)
GLUCOSE BLD-MCNC: 407 MG/DL (ref 70–99)
GLUCOSE BLD-MCNC: 41 MG/DL (ref 70–99)
GLUCOSE BLD-MCNC: 411 MG/DL (ref 70–99)
GLUCOSE BLD-MCNC: 412 MG/DL (ref 70–99)
GLUCOSE BLD-MCNC: 414 MG/DL (ref 70–99)
GLUCOSE BLD-MCNC: 415 MG/DL (ref 70–99)
GLUCOSE BLD-MCNC: 423 MG/DL (ref 70–99)
GLUCOSE BLD-MCNC: 427 MG/DL (ref 70–99)
GLUCOSE BLD-MCNC: 428 MG/DL (ref 70–99)
GLUCOSE BLD-MCNC: 436 MG/DL (ref 70–99)
GLUCOSE BLD-MCNC: 442 MG/DL (ref 70–99)
GLUCOSE BLD-MCNC: 443 MG/DL (ref 70–99)
GLUCOSE BLD-MCNC: 45 MG/DL (ref 70–99)
GLUCOSE BLD-MCNC: 451 MG/DL (ref 70–99)
GLUCOSE BLD-MCNC: 46 MG/DL (ref 70–99)
GLUCOSE BLD-MCNC: 462 MG/DL (ref 70–99)
GLUCOSE BLD-MCNC: 477 MG/DL (ref 70–99)
GLUCOSE BLD-MCNC: 477 MG/DL (ref 70–99)
GLUCOSE BLD-MCNC: 48 MG/DL (ref 70–99)
GLUCOSE BLD-MCNC: 489 MG/DL (ref 70–99)
GLUCOSE BLD-MCNC: 523 MG/DL (ref 70–99)
GLUCOSE BLD-MCNC: 527 MG/DL (ref 70–99)
GLUCOSE BLD-MCNC: 537 MG/DL (ref 70–99)
GLUCOSE BLD-MCNC: 539 MG/DL (ref 70–99)
GLUCOSE BLD-MCNC: 54 MG/DL (ref 70–99)
GLUCOSE BLD-MCNC: 543 MG/DL (ref 70–99)
GLUCOSE BLD-MCNC: 564 MG/DL (ref 70–99)
GLUCOSE BLD-MCNC: 567 MG/DL (ref 70–99)
GLUCOSE BLD-MCNC: 569 MG/DL (ref 70–99)
GLUCOSE BLD-MCNC: 57 MG/DL (ref 70–99)
GLUCOSE BLD-MCNC: 578 MG/DL (ref 70–99)
GLUCOSE BLD-MCNC: 59 MG/DL (ref 70–99)
GLUCOSE BLD-MCNC: 595 MG/DL (ref 70–99)
GLUCOSE BLD-MCNC: 596 MG/DL (ref 70–99)
GLUCOSE BLD-MCNC: 60 MG/DL (ref 70–99)
GLUCOSE BLD-MCNC: 627 MG/DL (ref 70–99)
GLUCOSE BLD-MCNC: 63 MG/DL (ref 70–99)
GLUCOSE BLD-MCNC: 637 MG/DL (ref 70–99)
GLUCOSE BLD-MCNC: 64 MG/DL (ref 70–99)
GLUCOSE BLD-MCNC: 65 MG/DL (ref 70–99)
GLUCOSE BLD-MCNC: 666 MG/DL (ref 70–99)
GLUCOSE BLD-MCNC: 689 MG/DL (ref 70–99)
GLUCOSE BLD-MCNC: 698 MG/DL (ref 70–99)
GLUCOSE BLD-MCNC: 71 MG/DL (ref 70–99)
GLUCOSE BLD-MCNC: 71 MG/DL (ref 70–99)
GLUCOSE BLD-MCNC: 73 MG/DL (ref 70–99)
GLUCOSE BLD-MCNC: 74 MG/DL (ref 70–99)
GLUCOSE BLD-MCNC: 75 MG/DL (ref 70–99)
GLUCOSE BLD-MCNC: 76 MG/DL (ref 70–99)
GLUCOSE BLD-MCNC: 77 MG/DL (ref 70–99)
GLUCOSE BLD-MCNC: 774 MG/DL (ref 70–99)
GLUCOSE BLD-MCNC: 79 MG/DL (ref 70–99)
GLUCOSE BLD-MCNC: 81 MG/DL (ref 70–99)
GLUCOSE BLD-MCNC: 82 MG/DL (ref 70–99)
GLUCOSE BLD-MCNC: 822 MG/DL (ref 70–99)
GLUCOSE BLD-MCNC: 84 MG/DL (ref 70–99)
GLUCOSE BLD-MCNC: 84 MG/DL (ref 70–99)
GLUCOSE BLD-MCNC: 85 MG/DL (ref 70–99)
GLUCOSE BLD-MCNC: 86 MG/DL (ref 70–99)
GLUCOSE BLD-MCNC: 87 MG/DL (ref 70–99)
GLUCOSE BLD-MCNC: 88 MG/DL (ref 70–99)
GLUCOSE BLD-MCNC: 88 MG/DL (ref 70–99)
GLUCOSE BLD-MCNC: 89 MG/DL (ref 70–99)
GLUCOSE BLD-MCNC: 90 MG/DL (ref 70–99)
GLUCOSE BLD-MCNC: 90 MG/DL (ref 70–99)
GLUCOSE BLD-MCNC: 91 MG/DL (ref 70–99)
GLUCOSE BLD-MCNC: 92 MG/DL (ref 70–99)
GLUCOSE BLD-MCNC: 922 MG/DL (ref 70–99)
GLUCOSE BLD-MCNC: 93 MG/DL (ref 70–99)
GLUCOSE BLD-MCNC: 93 MG/DL (ref 70–99)
GLUCOSE BLD-MCNC: 94 MG/DL (ref 70–99)
GLUCOSE BLD-MCNC: 95 MG/DL (ref 70–99)
GLUCOSE BLD-MCNC: 97 MG/DL (ref 70–99)
GLUCOSE BLD-MCNC: 98 MG/DL (ref 70–99)
GLUCOSE BLD-MCNC: 99 MG/DL (ref 70–99)
GLUCOSE BLD-MCNC: 99 MG/DL (ref 70–99)
GLUCOSE BLD-MCNC: >600 MG/DL (ref 70–99)
GLUCOSE URINE: 250 MG/DL
GLUCOSE URINE: 500 MG/DL
GLUCOSE URINE: >=1000 MG/DL
GLUCOSE URINE: NEGATIVE MG/DL
GLUTAMIC ACID DECARB AB: <5 IU/ML (ref 0–5)
GRAM STAIN RESULT: NORMAL
HBA1C MFR BLD: 6.8 %
HBA1C MFR BLD: 7.8 %
HBA1C MFR BLD: 8.5 %
HBA1C MFR BLD: 9.2 %
HBA1C MFR BLD: 9.3 %
HBV SURFACE AB TITR SER: <3.5 MIU/ML
HCO3 ARTERIAL: 13 MMOL/L (ref 21–29)
HCO3 ARTERIAL: 25.5 MMOL/L (ref 21–29)
HCO3 ARTERIAL: 25.7 MMOL/L (ref 21–29)
HCO3 ARTERIAL: 27.7 MMOL/L (ref 21–29)
HCO3 VENOUS: 11 MMOL/L (ref 24–28)
HCO3 VENOUS: 11.8 MMOL/L (ref 24–28)
HCO3 VENOUS: 12.3 MMOL/L (ref 24–28)
HCO3 VENOUS: 13 MMOL/L (ref 24–28)
HCO3 VENOUS: 13.9 MMOL/L (ref 24–28)
HCO3 VENOUS: 19.6 MMOL/L (ref 24–28)
HCO3 VENOUS: 19.9 MMOL/L (ref 23–29)
HCO3 VENOUS: 20.7 MMOL/L (ref 24–28)
HCO3 VENOUS: 21.9 MMOL/L (ref 24–28)
HCO3 VENOUS: 23.1 MMOL/L (ref 24–28)
HCO3 VENOUS: 23.4 MMOL/L (ref 24–28)
HCO3 VENOUS: 23.5 MMOL/L (ref 24–28)
HCO3 VENOUS: 25.4 MMOL/L (ref 24–28)
HCO3 VENOUS: 26.7 MMOL/L (ref 24–28)
HCO3 VENOUS: 27 MMOL/L (ref 24–28)
HCO3 VENOUS: 29.8 MMOL/L (ref 23–29)
HCT VFR BLD CALC: 18.7 % (ref 40.5–52.5)
HCT VFR BLD CALC: 18.7 % (ref 40.5–52.5)
HCT VFR BLD CALC: 19.3 % (ref 40.5–52.5)
HCT VFR BLD CALC: 20.1 % (ref 40.5–52.5)
HCT VFR BLD CALC: 20.2 % (ref 40.5–52.5)
HCT VFR BLD CALC: 21.2 % (ref 40.5–52.5)
HCT VFR BLD CALC: 21.4 % (ref 40.5–52.5)
HCT VFR BLD CALC: 21.9 % (ref 40.5–52.5)
HCT VFR BLD CALC: 22.4 % (ref 40.5–52.5)
HCT VFR BLD CALC: 23.5 % (ref 40.5–52.5)
HCT VFR BLD CALC: 24.3 % (ref 40.5–52.5)
HCT VFR BLD CALC: 24.9 % (ref 40.5–52.5)
HCT VFR BLD CALC: 25.5 % (ref 40.5–52.5)
HCT VFR BLD CALC: 25.6 % (ref 40.5–52.5)
HCT VFR BLD CALC: 26.4 % (ref 40.5–52.5)
HCT VFR BLD CALC: 26.5 % (ref 40.5–52.5)
HCT VFR BLD CALC: 26.9 % (ref 40.5–52.5)
HCT VFR BLD CALC: 27.2 % (ref 40.5–52.5)
HCT VFR BLD CALC: 27.3 % (ref 40.5–52.5)
HCT VFR BLD CALC: 27.4 % (ref 40.5–52.5)
HCT VFR BLD CALC: 28 % (ref 40.5–52.5)
HCT VFR BLD CALC: 28 % (ref 40.5–52.5)
HCT VFR BLD CALC: 28.3 % (ref 40.5–52.5)
HCT VFR BLD CALC: 28.6 % (ref 40.5–52.5)
HCT VFR BLD CALC: 28.8 % (ref 40.5–52.5)
HCT VFR BLD CALC: 28.9 % (ref 40.5–52.5)
HCT VFR BLD CALC: 28.9 % (ref 40.5–52.5)
HCT VFR BLD CALC: 29.1 % (ref 40.5–52.5)
HCT VFR BLD CALC: 29.5 % (ref 40.5–52.5)
HCT VFR BLD CALC: 29.6 % (ref 40.5–52.5)
HCT VFR BLD CALC: 30 % (ref 40.5–52.5)
HCT VFR BLD CALC: 30.2 % (ref 40.5–52.5)
HCT VFR BLD CALC: 30.7 % (ref 40.5–52.5)
HCT VFR BLD CALC: 30.7 % (ref 40.5–52.5)
HCT VFR BLD CALC: 30.8 % (ref 40.5–52.5)
HCT VFR BLD CALC: 31 % (ref 40.5–52.5)
HCT VFR BLD CALC: 31.3 % (ref 40.5–52.5)
HCT VFR BLD CALC: 31.5 % (ref 40.5–52.5)
HCT VFR BLD CALC: 32 % (ref 40.5–52.5)
HCT VFR BLD CALC: 33.4 % (ref 40.5–52.5)
HCT VFR BLD CALC: 33.5 % (ref 40.5–52.5)
HCT VFR BLD CALC: 33.9 % (ref 40.5–52.5)
HCT VFR BLD CALC: 33.9 % (ref 40.5–52.5)
HCT VFR BLD CALC: 35.4 % (ref 40.5–52.5)
HCT VFR BLD CALC: 35.8 % (ref 40.5–52.5)
HCT VFR BLD CALC: 36.1 % (ref 40.5–52.5)
HCT VFR BLD CALC: 37.6 % (ref 40.5–52.5)
HCT VFR BLD CALC: 38 % (ref 40.5–52.5)
HCT VFR BLD CALC: 40.5 % (ref 40.5–52.5)
HCT VFR BLD CALC: 42 % (ref 40.5–52.5)
HCT VFR BLD CALC: 42.5 % (ref 40.5–52.5)
HCT VFR BLD CALC: 45.2 % (ref 40.5–52.5)
HDLC SERPL-MCNC: 40 MG/DL (ref 40–60)
HEMATOLOGY PATH CONSULT: NO
HEMATOLOGY PATH CONSULT: NO
HEMOGLOBIN, ART, EXTENDED: 12.7 G/DL
HEMOGLOBIN, VEN, REDUCED: 0.6 %
HEMOGLOBIN, VEN, REDUCED: 10.4 %
HEMOGLOBIN, VEN, REDUCED: 13.4 %
HEMOGLOBIN, VEN, REDUCED: 16.1 %
HEMOGLOBIN, VEN, REDUCED: 23.3 %
HEMOGLOBIN, VEN, REDUCED: 23.8 %
HEMOGLOBIN, VEN, REDUCED: 26.5 %
HEMOGLOBIN, VEN, REDUCED: 33.6 %
HEMOGLOBIN, VEN, REDUCED: 35.8 %
HEMOGLOBIN, VEN, REDUCED: 39.2 %
HEMOGLOBIN, VEN, REDUCED: 52.7 %
HEMOGLOBIN, VEN, REDUCED: 70.9 %
HEMOGLOBIN, VEN, REDUCED: 73.3 %
HEMOGLOBIN, VEN, REDUCED: 9.8 %
HEMOGLOBIN: 10.1 G/DL (ref 13.5–17.5)
HEMOGLOBIN: 10.1 G/DL (ref 13.5–17.5)
HEMOGLOBIN: 10.2 G/DL (ref 13.5–17.5)
HEMOGLOBIN: 10.4 G/DL (ref 13.5–17.5)
HEMOGLOBIN: 10.6 G/DL (ref 13.5–17.5)
HEMOGLOBIN: 10.8 G/DL (ref 13.5–17.5)
HEMOGLOBIN: 10.9 G/DL (ref 13.5–17.5)
HEMOGLOBIN: 10.9 G/DL (ref 13.5–17.5)
HEMOGLOBIN: 12 G/DL (ref 13.5–17.5)
HEMOGLOBIN: 12 G/DL (ref 13.5–17.5)
HEMOGLOBIN: 12.7 G/DL (ref 13.5–17.5)
HEMOGLOBIN: 12.8 G/DL (ref 13.5–17.5)
HEMOGLOBIN: 13.6 G/DL (ref 13.5–17.5)
HEMOGLOBIN: 14 G/DL (ref 13.5–17.5)
HEMOGLOBIN: 14.1 G/DL (ref 13.5–17.5)
HEMOGLOBIN: 15.2 G/DL (ref 13.5–17.5)
HEMOGLOBIN: 6.2 G/DL (ref 13.5–17.5)
HEMOGLOBIN: 6.4 G/DL (ref 13.5–17.5)
HEMOGLOBIN: 6.7 G/DL (ref 13.5–17.5)
HEMOGLOBIN: 6.8 G/DL (ref 13.5–17.5)
HEMOGLOBIN: 7 G/DL (ref 13.5–17.5)
HEMOGLOBIN: 7.4 G/DL (ref 13.5–17.5)
HEMOGLOBIN: 7.7 G/DL (ref 13.5–17.5)
HEMOGLOBIN: 7.9 G/DL (ref 13.5–17.5)
HEMOGLOBIN: 8.2 G/DL (ref 13.5–17.5)
HEMOGLOBIN: 8.3 G/DL (ref 13.5–17.5)
HEMOGLOBIN: 8.4 G/DL (ref 13.5–17.5)
HEMOGLOBIN: 8.7 G/DL (ref 13.5–17.5)
HEMOGLOBIN: 8.8 G/DL (ref 13.5–17.5)
HEMOGLOBIN: 8.8 G/DL (ref 13.5–17.5)
HEMOGLOBIN: 8.9 G/DL (ref 13.5–17.5)
HEMOGLOBIN: 8.9 G/DL (ref 13.5–17.5)
HEMOGLOBIN: 9 G/DL (ref 13.5–17.5)
HEMOGLOBIN: 9.2 G/DL (ref 13.5–17.5)
HEMOGLOBIN: 9.3 G/DL (ref 13.5–17.5)
HEMOGLOBIN: 9.3 G/DL (ref 13.5–17.5)
HEMOGLOBIN: 9.4 G/DL (ref 13.5–17.5)
HEMOGLOBIN: 9.5 G/DL (ref 13.5–17.5)
HEMOGLOBIN: 9.6 G/DL (ref 13.5–17.5)
HEMOGLOBIN: 9.7 G/DL (ref 13.5–17.5)
HEMOGLOBIN: 9.8 G/DL (ref 13.5–17.5)
HEMOGLOBIN: 9.8 G/DL (ref 13.5–17.5)
HEMOGLOBIN: 9.9 G/DL (ref 13.5–17.5)
HEMOGLOBIN: 9.9 G/DL (ref 13.5–17.5)
HEPATITIS B CORE TOTAL ANTIBODY: NEGATIVE
HEPATITIS B SURFACE ANTIGEN INTERPRETATION: NORMAL
HYALINE CASTS: ABNORMAL /LPF (ref 0–2)
INR BLD: 0.97 (ref 0.86–1.14)
INR BLD: 0.99 (ref 0.86–1.14)
INR BLD: 1.01 (ref 0.86–1.14)
INR BLD: 1.03 (ref 0.86–1.14)
INR BLD: 1.13 (ref 0.86–1.14)
IRON SATURATION: 8 % (ref 20–50)
IRON: 21 UG/DL (ref 59–158)
ISLET CELL ANTIBODY: NORMAL
KETONES, URINE: 15 MG/DL
KETONES, URINE: 15 MG/DL
KETONES, URINE: 40 MG/DL
KETONES, URINE: ABNORMAL MG/DL
KETONES, URINE: ABNORMAL MG/DL
KETONES, URINE: NEGATIVE MG/DL
KETONES, URINE: NEGATIVE MG/DL
L. PNEUMOPHILA SEROGP 1 UR AG: NORMAL
LACTIC ACID: 1.2 MMOL/L (ref 0.4–2)
LACTIC ACID: 2 MMOL/L (ref 0.4–2)
LACTIC ACID: 2 MMOL/L (ref 0.4–2)
LACTIC ACID: 2.3 MMOL/L (ref 0.4–2)
LACTIC ACID: 2.8 MMOL/L (ref 0.4–2)
LACTIC ACID: 6.6 MMOL/L (ref 0.4–2)
LACTIC ACID: 9.8 MMOL/L (ref 0.4–2)
LDL CHOLESTEROL CALCULATED: 42 MG/DL
LEUKOCYTE ESTERASE, URINE: ABNORMAL
LEUKOCYTE ESTERASE, URINE: NEGATIVE
LIPASE: 7 U/L (ref 13–60)
LV EF: 48 %
LV EF: 55 %
LVEF MODALITY: NORMAL
LVEF MODALITY: NORMAL
LYMPHOCYTES ABSOLUTE: 0.4 K/UL (ref 1–5.1)
LYMPHOCYTES ABSOLUTE: 0.7 K/UL (ref 1–5.1)
LYMPHOCYTES ABSOLUTE: 0.8 K/UL (ref 1–5.1)
LYMPHOCYTES ABSOLUTE: 0.9 K/UL (ref 1–5.1)
LYMPHOCYTES ABSOLUTE: 1 K/UL (ref 1–5.1)
LYMPHOCYTES ABSOLUTE: 1 K/UL (ref 1–5.1)
LYMPHOCYTES ABSOLUTE: 1.1 K/UL (ref 1–5.1)
LYMPHOCYTES ABSOLUTE: 1.2 K/UL (ref 1–5.1)
LYMPHOCYTES ABSOLUTE: 1.3 K/UL (ref 1–5.1)
LYMPHOCYTES ABSOLUTE: 1.4 K/UL (ref 1–5.1)
LYMPHOCYTES ABSOLUTE: 1.5 K/UL (ref 1–5.1)
LYMPHOCYTES ABSOLUTE: 1.6 K/UL (ref 1–5.1)
LYMPHOCYTES ABSOLUTE: 1.6 K/UL (ref 1–5.1)
LYMPHOCYTES ABSOLUTE: 1.8 K/UL (ref 1–5.1)
LYMPHOCYTES ABSOLUTE: 1.9 K/UL (ref 1–5.1)
LYMPHOCYTES ABSOLUTE: 1.9 K/UL (ref 1–5.1)
LYMPHOCYTES ABSOLUTE: 2 K/UL (ref 1–5.1)
LYMPHOCYTES ABSOLUTE: 2.1 K/UL (ref 1–5.1)
LYMPHOCYTES ABSOLUTE: 2.1 K/UL (ref 1–5.1)
LYMPHOCYTES ABSOLUTE: 2.2 K/UL (ref 1–5.1)
LYMPHOCYTES ABSOLUTE: 2.6 K/UL (ref 1–5.1)
LYMPHOCYTES RELATIVE PERCENT: 10.4 %
LYMPHOCYTES RELATIVE PERCENT: 10.6 %
LYMPHOCYTES RELATIVE PERCENT: 11 %
LYMPHOCYTES RELATIVE PERCENT: 11 %
LYMPHOCYTES RELATIVE PERCENT: 11.1 %
LYMPHOCYTES RELATIVE PERCENT: 12.6 %
LYMPHOCYTES RELATIVE PERCENT: 12.8 %
LYMPHOCYTES RELATIVE PERCENT: 13 %
LYMPHOCYTES RELATIVE PERCENT: 14 %
LYMPHOCYTES RELATIVE PERCENT: 14 %
LYMPHOCYTES RELATIVE PERCENT: 14.1 %
LYMPHOCYTES RELATIVE PERCENT: 14.3 %
LYMPHOCYTES RELATIVE PERCENT: 14.5 %
LYMPHOCYTES RELATIVE PERCENT: 14.8 %
LYMPHOCYTES RELATIVE PERCENT: 15 %
LYMPHOCYTES RELATIVE PERCENT: 15 %
LYMPHOCYTES RELATIVE PERCENT: 15.7 %
LYMPHOCYTES RELATIVE PERCENT: 15.9 %
LYMPHOCYTES RELATIVE PERCENT: 16.1 %
LYMPHOCYTES RELATIVE PERCENT: 16.2 %
LYMPHOCYTES RELATIVE PERCENT: 18.6 %
LYMPHOCYTES RELATIVE PERCENT: 19.7 %
LYMPHOCYTES RELATIVE PERCENT: 2.6 %
LYMPHOCYTES RELATIVE PERCENT: 20 %
LYMPHOCYTES RELATIVE PERCENT: 20.7 %
LYMPHOCYTES RELATIVE PERCENT: 25.4 %
LYMPHOCYTES RELATIVE PERCENT: 26.2 %
LYMPHOCYTES RELATIVE PERCENT: 26.9 %
LYMPHOCYTES RELATIVE PERCENT: 28.7 %
LYMPHOCYTES RELATIVE PERCENT: 30 %
LYMPHOCYTES RELATIVE PERCENT: 30.9 %
LYMPHOCYTES RELATIVE PERCENT: 31 %
LYMPHOCYTES RELATIVE PERCENT: 36.6 %
LYMPHOCYTES RELATIVE PERCENT: 38 %
LYMPHOCYTES RELATIVE PERCENT: 4 %
LYMPHOCYTES RELATIVE PERCENT: 5.9 %
LYMPHOCYTES RELATIVE PERCENT: 6.3 %
LYMPHOCYTES RELATIVE PERCENT: 6.8 %
LYMPHOCYTES RELATIVE PERCENT: 7 %
LYMPHOCYTES RELATIVE PERCENT: 8.1 %
LYMPHOCYTES RELATIVE PERCENT: 9 %
LYMPHOCYTES RELATIVE PERCENT: 9.6 %
LYMPHOCYTES RELATIVE PERCENT: 9.7 %
Lab: ABNORMAL
Lab: ABNORMAL
Lab: NORMAL
MAGNESIUM: 1.4 MG/DL (ref 1.8–2.4)
MAGNESIUM: 1.7 MG/DL (ref 1.8–2.4)
MAGNESIUM: 1.8 MG/DL (ref 1.8–2.4)
MAGNESIUM: 1.9 MG/DL (ref 1.8–2.4)
MAGNESIUM: 2 MG/DL (ref 1.8–2.4)
MAGNESIUM: 2.1 MG/DL (ref 1.8–2.4)
MAGNESIUM: 2.2 MG/DL (ref 1.8–2.4)
MAGNESIUM: 2.3 MG/DL (ref 1.8–2.4)
MAGNESIUM: 2.3 MG/DL (ref 1.8–2.4)
MAGNESIUM: 2.4 MG/DL (ref 1.8–2.4)
MAGNESIUM: 2.5 MG/DL (ref 1.8–2.4)
MAGNESIUM: 2.6 MG/DL (ref 1.8–2.4)
MAGNESIUM: 2.7 MG/DL (ref 1.8–2.4)
MAGNESIUM: 2.7 MG/DL (ref 1.8–2.4)
MAGNESIUM: 2.9 MG/DL (ref 1.8–2.4)
MCH RBC QN AUTO: 24.5 PG (ref 26–34)
MCH RBC QN AUTO: 24.7 PG (ref 26–34)
MCH RBC QN AUTO: 24.8 PG (ref 26–34)
MCH RBC QN AUTO: 24.9 PG (ref 26–34)
MCH RBC QN AUTO: 24.9 PG (ref 26–34)
MCH RBC QN AUTO: 25 PG (ref 26–34)
MCH RBC QN AUTO: 25 PG (ref 26–34)
MCH RBC QN AUTO: 25.1 PG (ref 26–34)
MCH RBC QN AUTO: 25.2 PG (ref 26–34)
MCH RBC QN AUTO: 25.3 PG (ref 26–34)
MCH RBC QN AUTO: 25.4 PG (ref 26–34)
MCH RBC QN AUTO: 25.5 PG (ref 26–34)
MCH RBC QN AUTO: 25.6 PG (ref 26–34)
MCH RBC QN AUTO: 25.6 PG (ref 26–34)
MCH RBC QN AUTO: 25.7 PG (ref 26–34)
MCH RBC QN AUTO: 25.8 PG (ref 26–34)
MCH RBC QN AUTO: 25.8 PG (ref 26–34)
MCH RBC QN AUTO: 25.9 PG (ref 26–34)
MCH RBC QN AUTO: 26 PG (ref 26–34)
MCH RBC QN AUTO: 26.3 PG (ref 26–34)
MCH RBC QN AUTO: 26.6 PG (ref 26–34)
MCH RBC QN AUTO: 26.8 PG (ref 26–34)
MCH RBC QN AUTO: 27.9 PG (ref 26–34)
MCH RBC QN AUTO: 28.3 PG (ref 26–34)
MCH RBC QN AUTO: 28.4 PG (ref 26–34)
MCH RBC QN AUTO: 28.4 PG (ref 26–34)
MCH RBC QN AUTO: 28.6 PG (ref 26–34)
MCH RBC QN AUTO: 28.6 PG (ref 26–34)
MCH RBC QN AUTO: 28.7 PG (ref 26–34)
MCH RBC QN AUTO: 28.7 PG (ref 26–34)
MCH RBC QN AUTO: 28.9 PG (ref 26–34)
MCH RBC QN AUTO: 29.2 PG (ref 26–34)
MCH RBC QN AUTO: 30.6 PG (ref 26–34)
MCH RBC QN AUTO: 30.8 PG (ref 26–34)
MCH RBC QN AUTO: 30.9 PG (ref 26–34)
MCH RBC QN AUTO: 31.1 PG (ref 26–34)
MCH RBC QN AUTO: 31.2 PG (ref 26–34)
MCHC RBC AUTO-ENTMCNC: 30.2 G/DL (ref 31–36)
MCHC RBC AUTO-ENTMCNC: 30.4 G/DL (ref 31–36)
MCHC RBC AUTO-ENTMCNC: 30.6 G/DL (ref 31–36)
MCHC RBC AUTO-ENTMCNC: 31.1 G/DL (ref 31–36)
MCHC RBC AUTO-ENTMCNC: 31.5 G/DL (ref 31–36)
MCHC RBC AUTO-ENTMCNC: 31.7 G/DL (ref 31–36)
MCHC RBC AUTO-ENTMCNC: 32 G/DL (ref 31–36)
MCHC RBC AUTO-ENTMCNC: 32 G/DL (ref 31–36)
MCHC RBC AUTO-ENTMCNC: 32.2 G/DL (ref 31–36)
MCHC RBC AUTO-ENTMCNC: 32.3 G/DL (ref 31–36)
MCHC RBC AUTO-ENTMCNC: 32.3 G/DL (ref 31–36)
MCHC RBC AUTO-ENTMCNC: 32.4 G/DL (ref 31–36)
MCHC RBC AUTO-ENTMCNC: 32.5 G/DL (ref 31–36)
MCHC RBC AUTO-ENTMCNC: 32.6 G/DL (ref 31–36)
MCHC RBC AUTO-ENTMCNC: 32.8 G/DL (ref 31–36)
MCHC RBC AUTO-ENTMCNC: 32.9 G/DL (ref 31–36)
MCHC RBC AUTO-ENTMCNC: 33 G/DL (ref 31–36)
MCHC RBC AUTO-ENTMCNC: 33.1 G/DL (ref 31–36)
MCHC RBC AUTO-ENTMCNC: 33.2 G/DL (ref 31–36)
MCHC RBC AUTO-ENTMCNC: 33.2 G/DL (ref 31–36)
MCHC RBC AUTO-ENTMCNC: 33.3 G/DL (ref 31–36)
MCHC RBC AUTO-ENTMCNC: 33.3 G/DL (ref 31–36)
MCHC RBC AUTO-ENTMCNC: 33.5 G/DL (ref 31–36)
MCHC RBC AUTO-ENTMCNC: 33.5 G/DL (ref 31–36)
MCHC RBC AUTO-ENTMCNC: 33.6 G/DL (ref 31–36)
MCHC RBC AUTO-ENTMCNC: 33.7 G/DL (ref 31–36)
MCHC RBC AUTO-ENTMCNC: 33.9 G/DL (ref 31–36)
MCHC RBC AUTO-ENTMCNC: 34 G/DL (ref 31–36)
MCHC RBC AUTO-ENTMCNC: 34.5 G/DL (ref 31–36)
MCHC RBC AUTO-ENTMCNC: 34.6 G/DL (ref 31–36)
MCHC RBC AUTO-ENTMCNC: 34.7 G/DL (ref 31–36)
MCHC RBC AUTO-ENTMCNC: 34.8 G/DL (ref 31–36)
MCV RBC AUTO: 75 FL (ref 80–100)
MCV RBC AUTO: 75 FL (ref 80–100)
MCV RBC AUTO: 75.2 FL (ref 80–100)
MCV RBC AUTO: 75.3 FL (ref 80–100)
MCV RBC AUTO: 75.7 FL (ref 80–100)
MCV RBC AUTO: 75.8 FL (ref 80–100)
MCV RBC AUTO: 76.1 FL (ref 80–100)
MCV RBC AUTO: 76.1 FL (ref 80–100)
MCV RBC AUTO: 76.2 FL (ref 80–100)
MCV RBC AUTO: 76.2 FL (ref 80–100)
MCV RBC AUTO: 76.3 FL (ref 80–100)
MCV RBC AUTO: 76.5 FL (ref 80–100)
MCV RBC AUTO: 76.7 FL (ref 80–100)
MCV RBC AUTO: 76.8 FL (ref 80–100)
MCV RBC AUTO: 77.2 FL (ref 80–100)
MCV RBC AUTO: 77.2 FL (ref 80–100)
MCV RBC AUTO: 78.1 FL (ref 80–100)
MCV RBC AUTO: 78.2 FL (ref 80–100)
MCV RBC AUTO: 78.3 FL (ref 80–100)
MCV RBC AUTO: 78.4 FL (ref 80–100)
MCV RBC AUTO: 78.9 FL (ref 80–100)
MCV RBC AUTO: 78.9 FL (ref 80–100)
MCV RBC AUTO: 79.7 FL (ref 80–100)
MCV RBC AUTO: 79.9 FL (ref 80–100)
MCV RBC AUTO: 79.9 FL (ref 80–100)
MCV RBC AUTO: 81.8 FL (ref 80–100)
MCV RBC AUTO: 82.6 FL (ref 80–100)
MCV RBC AUTO: 83.1 FL (ref 80–100)
MCV RBC AUTO: 83.4 FL (ref 80–100)
MCV RBC AUTO: 83.6 FL (ref 80–100)
MCV RBC AUTO: 84.2 FL (ref 80–100)
MCV RBC AUTO: 84.4 FL (ref 80–100)
MCV RBC AUTO: 84.4 FL (ref 80–100)
MCV RBC AUTO: 84.6 FL (ref 80–100)
MCV RBC AUTO: 84.8 FL (ref 80–100)
MCV RBC AUTO: 85.1 FL (ref 80–100)
MCV RBC AUTO: 85.5 FL (ref 80–100)
MCV RBC AUTO: 85.9 FL (ref 80–100)
MCV RBC AUTO: 86.1 FL (ref 80–100)
MCV RBC AUTO: 87.9 FL (ref 80–100)
MCV RBC AUTO: 89.3 FL (ref 80–100)
MCV RBC AUTO: 89.4 FL (ref 80–100)
MCV RBC AUTO: 89.7 FL (ref 80–100)
MCV RBC AUTO: 91.7 FL (ref 80–100)
METAMYELOCYTES RELATIVE PERCENT: 1 %
METAMYELOCYTES RELATIVE PERCENT: 2 %
METAMYELOCYTES RELATIVE PERCENT: 3 %
METAMYELOCYTES RELATIVE PERCENT: 5 %
METAMYELOCYTES RELATIVE PERCENT: 8 %
METHADONE SCREEN BLOOD: NEGATIVE NG/ML
METHADONE SCREEN, URINE: ABNORMAL
METHADONE SCREEN, URINE: ABNORMAL
METHAMPHETAMINES SERUM/ PLASMA: NEGATIVE NG/ML
METHEMOGLOBIN ARTERIAL: 0.6 % (ref 0–1.4)
METHEMOGLOBIN VENOUS: 0.3 % (ref 0–1.5)
METHEMOGLOBIN VENOUS: 0.4 % (ref 0–1.5)
METHEMOGLOBIN VENOUS: 0.5 % (ref 0–1.5)
METHEMOGLOBIN VENOUS: 0.6 % (ref 0–1.5)
METHEMOGLOBIN VENOUS: 0.7 % (ref 0–1.5)
METHEMOGLOBIN VENOUS: 0.8 % (ref 0–1.5)
MICROSCOPIC EXAMINATION: ABNORMAL
MICROSCOPIC EXAMINATION: ABNORMAL
MICROSCOPIC EXAMINATION: NORMAL
MICROSCOPIC EXAMINATION: YES
MONOCYTES ABSOLUTE: 0.2 K/UL (ref 0–1.3)
MONOCYTES ABSOLUTE: 0.3 K/UL (ref 0–1.3)
MONOCYTES ABSOLUTE: 0.3 K/UL (ref 0–1.3)
MONOCYTES ABSOLUTE: 0.4 K/UL (ref 0–1.3)
MONOCYTES ABSOLUTE: 0.5 K/UL (ref 0–1.3)
MONOCYTES ABSOLUTE: 0.6 K/UL (ref 0–1.3)
MONOCYTES ABSOLUTE: 0.7 K/UL (ref 0–1.3)
MONOCYTES ABSOLUTE: 0.8 K/UL (ref 0–1.3)
MONOCYTES ABSOLUTE: 0.9 K/UL (ref 0–1.3)
MONOCYTES ABSOLUTE: 1 K/UL (ref 0–1.3)
MONOCYTES ABSOLUTE: 1.1 K/UL (ref 0–1.3)
MONOCYTES ABSOLUTE: 1.2 K/UL (ref 0–1.3)
MONOCYTES ABSOLUTE: 1.2 K/UL (ref 0–1.3)
MONOCYTES ABSOLUTE: 1.3 K/UL (ref 0–1.3)
MONOCYTES ABSOLUTE: 1.4 K/UL (ref 0–1.3)
MONOCYTES ABSOLUTE: 1.6 K/UL (ref 0–1.3)
MONOCYTES RELATIVE PERCENT: 1.1 %
MONOCYTES RELATIVE PERCENT: 10.2 %
MONOCYTES RELATIVE PERCENT: 10.3 %
MONOCYTES RELATIVE PERCENT: 10.6 %
MONOCYTES RELATIVE PERCENT: 10.8 %
MONOCYTES RELATIVE PERCENT: 11.1 %
MONOCYTES RELATIVE PERCENT: 11.5 %
MONOCYTES RELATIVE PERCENT: 12.4 %
MONOCYTES RELATIVE PERCENT: 13 %
MONOCYTES RELATIVE PERCENT: 13 %
MONOCYTES RELATIVE PERCENT: 14.1 %
MONOCYTES RELATIVE PERCENT: 14.5 %
MONOCYTES RELATIVE PERCENT: 3 %
MONOCYTES RELATIVE PERCENT: 4 %
MONOCYTES RELATIVE PERCENT: 4.6 %
MONOCYTES RELATIVE PERCENT: 5.1 %
MONOCYTES RELATIVE PERCENT: 5.2 %
MONOCYTES RELATIVE PERCENT: 5.4 %
MONOCYTES RELATIVE PERCENT: 5.8 %
MONOCYTES RELATIVE PERCENT: 6 %
MONOCYTES RELATIVE PERCENT: 6.3 %
MONOCYTES RELATIVE PERCENT: 6.6 %
MONOCYTES RELATIVE PERCENT: 6.8 %
MONOCYTES RELATIVE PERCENT: 6.8 %
MONOCYTES RELATIVE PERCENT: 7.5 %
MONOCYTES RELATIVE PERCENT: 7.5 %
MONOCYTES RELATIVE PERCENT: 7.6 %
MONOCYTES RELATIVE PERCENT: 7.8 %
MONOCYTES RELATIVE PERCENT: 7.8 %
MONOCYTES RELATIVE PERCENT: 8 %
MONOCYTES RELATIVE PERCENT: 8 %
MONOCYTES RELATIVE PERCENT: 8.1 %
MONOCYTES RELATIVE PERCENT: 8.3 %
MONOCYTES RELATIVE PERCENT: 8.3 %
MONOCYTES RELATIVE PERCENT: 8.4 %
MONOCYTES RELATIVE PERCENT: 9 %
MONOCYTES RELATIVE PERCENT: 9.3 %
MONOCYTES RELATIVE PERCENT: 9.7 %
MRSA CULTURE ONLY: NORMAL
MRSA SCREEN RT-PCR: NORMAL
MYELOCYTE PERCENT: 1 %
MYELOCYTE PERCENT: 3 %
MYELOCYTE PERCENT: 4 %
MYELOCYTE PERCENT: 5 %
NEUTROPHILS ABSOLUTE: 1.8 K/UL (ref 1.7–7.7)
NEUTROPHILS ABSOLUTE: 10.2 K/UL (ref 1.7–7.7)
NEUTROPHILS ABSOLUTE: 10.3 K/UL (ref 1.7–7.7)
NEUTROPHILS ABSOLUTE: 10.8 K/UL (ref 1.7–7.7)
NEUTROPHILS ABSOLUTE: 11 K/UL (ref 1.7–7.7)
NEUTROPHILS ABSOLUTE: 11.2 K/UL (ref 1.7–7.7)
NEUTROPHILS ABSOLUTE: 11.4 K/UL (ref 1.7–7.7)
NEUTROPHILS ABSOLUTE: 11.8 K/UL (ref 1.7–7.7)
NEUTROPHILS ABSOLUTE: 12.4 K/UL (ref 1.7–7.7)
NEUTROPHILS ABSOLUTE: 12.6 K/UL (ref 1.7–7.7)
NEUTROPHILS ABSOLUTE: 15.5 K/UL (ref 1.7–7.7)
NEUTROPHILS ABSOLUTE: 15.6 K/UL (ref 1.7–7.7)
NEUTROPHILS ABSOLUTE: 15.9 K/UL (ref 1.7–7.7)
NEUTROPHILS ABSOLUTE: 18.9 K/UL (ref 1.7–7.7)
NEUTROPHILS ABSOLUTE: 2.4 K/UL (ref 1.7–7.7)
NEUTROPHILS ABSOLUTE: 2.6 K/UL (ref 1.7–7.7)
NEUTROPHILS ABSOLUTE: 3.4 K/UL (ref 1.7–7.7)
NEUTROPHILS ABSOLUTE: 3.6 K/UL (ref 1.7–7.7)
NEUTROPHILS ABSOLUTE: 3.8 K/UL (ref 1.7–7.7)
NEUTROPHILS ABSOLUTE: 3.9 K/UL (ref 1.7–7.7)
NEUTROPHILS ABSOLUTE: 4.1 K/UL (ref 1.7–7.7)
NEUTROPHILS ABSOLUTE: 4.4 K/UL (ref 1.7–7.7)
NEUTROPHILS ABSOLUTE: 4.7 K/UL (ref 1.7–7.7)
NEUTROPHILS ABSOLUTE: 4.8 K/UL (ref 1.7–7.7)
NEUTROPHILS ABSOLUTE: 4.9 K/UL (ref 1.7–7.7)
NEUTROPHILS ABSOLUTE: 5.1 K/UL (ref 1.7–7.7)
NEUTROPHILS ABSOLUTE: 5.1 K/UL (ref 1.7–7.7)
NEUTROPHILS ABSOLUTE: 5.2 K/UL (ref 1.7–7.7)
NEUTROPHILS ABSOLUTE: 5.8 K/UL (ref 1.7–7.7)
NEUTROPHILS ABSOLUTE: 6.2 K/UL (ref 1.7–7.7)
NEUTROPHILS ABSOLUTE: 6.3 K/UL (ref 1.7–7.7)
NEUTROPHILS ABSOLUTE: 6.4 K/UL (ref 1.7–7.7)
NEUTROPHILS ABSOLUTE: 6.4 K/UL (ref 1.7–7.7)
NEUTROPHILS ABSOLUTE: 7.1 K/UL (ref 1.7–7.7)
NEUTROPHILS ABSOLUTE: 7.1 K/UL (ref 1.7–7.7)
NEUTROPHILS ABSOLUTE: 7.5 K/UL (ref 1.7–7.7)
NEUTROPHILS ABSOLUTE: 9.1 K/UL (ref 1.7–7.7)
NEUTROPHILS ABSOLUTE: 9.1 K/UL (ref 1.7–7.7)
NEUTROPHILS ABSOLUTE: 9.5 K/UL (ref 1.7–7.7)
NEUTROPHILS ABSOLUTE: 9.6 K/UL (ref 1.7–7.7)
NEUTROPHILS ABSOLUTE: 9.7 K/UL (ref 1.7–7.7)
NEUTROPHILS ABSOLUTE: 9.8 K/UL (ref 1.7–7.7)
NEUTROPHILS ABSOLUTE: 9.8 K/UL (ref 1.7–7.7)
NEUTROPHILS RELATIVE PERCENT: 44.3 %
NEUTROPHILS RELATIVE PERCENT: 48.2 %
NEUTROPHILS RELATIVE PERCENT: 52 %
NEUTROPHILS RELATIVE PERCENT: 52 %
NEUTROPHILS RELATIVE PERCENT: 54.6 %
NEUTROPHILS RELATIVE PERCENT: 56.3 %
NEUTROPHILS RELATIVE PERCENT: 57.7 %
NEUTROPHILS RELATIVE PERCENT: 61 %
NEUTROPHILS RELATIVE PERCENT: 61.7 %
NEUTROPHILS RELATIVE PERCENT: 63 %
NEUTROPHILS RELATIVE PERCENT: 63.2 %
NEUTROPHILS RELATIVE PERCENT: 64.5 %
NEUTROPHILS RELATIVE PERCENT: 66 %
NEUTROPHILS RELATIVE PERCENT: 67.3 %
NEUTROPHILS RELATIVE PERCENT: 68 %
NEUTROPHILS RELATIVE PERCENT: 70.3 %
NEUTROPHILS RELATIVE PERCENT: 71 %
NEUTROPHILS RELATIVE PERCENT: 72.2 %
NEUTROPHILS RELATIVE PERCENT: 73 %
NEUTROPHILS RELATIVE PERCENT: 74 %
NEUTROPHILS RELATIVE PERCENT: 75 %
NEUTROPHILS RELATIVE PERCENT: 75.6 %
NEUTROPHILS RELATIVE PERCENT: 76.2 %
NEUTROPHILS RELATIVE PERCENT: 77 %
NEUTROPHILS RELATIVE PERCENT: 77.2 %
NEUTROPHILS RELATIVE PERCENT: 77.9 %
NEUTROPHILS RELATIVE PERCENT: 78 %
NEUTROPHILS RELATIVE PERCENT: 78.5 %
NEUTROPHILS RELATIVE PERCENT: 78.8 %
NEUTROPHILS RELATIVE PERCENT: 80.2 %
NEUTROPHILS RELATIVE PERCENT: 81 %
NEUTROPHILS RELATIVE PERCENT: 81.1 %
NEUTROPHILS RELATIVE PERCENT: 81.3 %
NEUTROPHILS RELATIVE PERCENT: 82.3 %
NEUTROPHILS RELATIVE PERCENT: 82.4 %
NEUTROPHILS RELATIVE PERCENT: 82.5 %
NEUTROPHILS RELATIVE PERCENT: 82.6 %
NEUTROPHILS RELATIVE PERCENT: 84.4 %
NEUTROPHILS RELATIVE PERCENT: 85 %
NEUTROPHILS RELATIVE PERCENT: 86 %
NEUTROPHILS RELATIVE PERCENT: 86.4 %
NEUTROPHILS RELATIVE PERCENT: 87 %
NEUTROPHILS RELATIVE PERCENT: 87.2 %
NEUTROPHILS RELATIVE PERCENT: 88 %
NEUTROPHILS RELATIVE PERCENT: 95.9 %
NITRITE, URINE: NEGATIVE
NITRITE, URINE: POSITIVE
O2 SAT, ARTERIAL: 95 % (ref 93–100)
O2 SAT, ARTERIAL: 97 % (ref 93–100)
O2 SAT, ARTERIAL: 99 % (ref 93–100)
O2 SAT, ARTERIAL: 99 % (ref 93–100)
O2 SAT, VEN: 25 %
O2 SAT, VEN: 28 %
O2 SAT, VEN: 46 %
O2 SAT, VEN: 60 %
O2 SAT, VEN: 63 %
O2 SAT, VEN: 66 %
O2 SAT, VEN: 67 %
O2 SAT, VEN: 72 %
O2 SAT, VEN: 73 %
O2 SAT, VEN: 75 %
O2 SAT, VEN: 76 %
O2 SAT, VEN: 84 %
O2 SAT, VEN: 86 %
O2 SAT, VEN: 89 %
O2 SAT, VEN: 90 %
O2 SAT, VEN: 99 %
OPIATE SCREEN URINE: ABNORMAL
OPIATE SCREEN URINE: ABNORMAL
OPIATES SCREEN BLOOD: NEGATIVE NG/ML
OVALOCYTES: ABNORMAL
OXYCODONE URINE: ABNORMAL
OXYCODONE URINE: ABNORMAL
OXYCODONE: NEGATIVE NG/ML
PCO2 ARTERIAL: 25.5 MMHG (ref 35–45)
PCO2 ARTERIAL: 42 MM HG (ref 35–45)
PCO2 ARTERIAL: 42.8 MM HG (ref 35–45)
PCO2 ARTERIAL: 43.6 MM HG (ref 35–45)
PCO2, VEN: 27.8 MMHG (ref 41–51)
PCO2, VEN: 28.4 MMHG (ref 41–51)
PCO2, VEN: 33.4 MMHG (ref 41–51)
PCO2, VEN: 35 MMHG (ref 41–51)
PCO2, VEN: 36.6 MMHG (ref 41–51)
PCO2, VEN: 36.9 MMHG (ref 41–51)
PCO2, VEN: 37 MMHG (ref 41–51)
PCO2, VEN: 38 MMHG (ref 41–51)
PCO2, VEN: 39.8 MMHG (ref 41–51)
PCO2, VEN: 41.3 MM HG (ref 40–50)
PCO2, VEN: 41.9 MMHG (ref 41–51)
PCO2, VEN: 42.9 MMHG (ref 41–51)
PCO2, VEN: 46.3 MMHG (ref 41–51)
PCO2, VEN: 49.2 MMHG (ref 41–51)
PCO2, VEN: 52 MMHG (ref 41–51)
PCO2, VEN: 53.8 MM HG (ref 40–50)
PDW BLD-RTO: 14.7 % (ref 12.4–15.4)
PDW BLD-RTO: 14.9 % (ref 12.4–15.4)
PDW BLD-RTO: 15 % (ref 12.4–15.4)
PDW BLD-RTO: 15.1 % (ref 12.4–15.4)
PDW BLD-RTO: 15.2 % (ref 12.4–15.4)
PDW BLD-RTO: 15.5 % (ref 12.4–15.4)
PDW BLD-RTO: 15.5 % (ref 12.4–15.4)
PDW BLD-RTO: 15.8 % (ref 12.4–15.4)
PDW BLD-RTO: 15.9 % (ref 12.4–15.4)
PDW BLD-RTO: 15.9 % (ref 12.4–15.4)
PDW BLD-RTO: 16.1 % (ref 12.4–15.4)
PDW BLD-RTO: 16.2 % (ref 12.4–15.4)
PDW BLD-RTO: 16.2 % (ref 12.4–15.4)
PDW BLD-RTO: 16.3 % (ref 12.4–15.4)
PDW BLD-RTO: 16.4 % (ref 12.4–15.4)
PDW BLD-RTO: 18.3 % (ref 12.4–15.4)
PDW BLD-RTO: 18.6 % (ref 12.4–15.4)
PDW BLD-RTO: 18.8 % (ref 12.4–15.4)
PDW BLD-RTO: 18.9 % (ref 12.4–15.4)
PDW BLD-RTO: 19 % (ref 12.4–15.4)
PDW BLD-RTO: 19.1 % (ref 12.4–15.4)
PDW BLD-RTO: 19.2 % (ref 12.4–15.4)
PDW BLD-RTO: 19.3 % (ref 12.4–15.4)
PDW BLD-RTO: 19.4 % (ref 12.4–15.4)
PDW BLD-RTO: 19.5 % (ref 12.4–15.4)
PDW BLD-RTO: 19.6 % (ref 12.4–15.4)
PDW BLD-RTO: 19.8 % (ref 12.4–15.4)
PDW BLD-RTO: 20.1 % (ref 12.4–15.4)
PDW BLD-RTO: 20.1 % (ref 12.4–15.4)
PDW BLD-RTO: 20.4 % (ref 12.4–15.4)
PENTOBARBITAL, SERUM/PLASMA, QUANT: <50 NG/ML
PERFORMED ON: ABNORMAL
PERFORMED ON: NORMAL
PH ARTERIAL: 7.33 (ref 7.35–7.45)
PH ARTERIAL: 7.37 (ref 7.35–7.45)
PH ARTERIAL: 7.39 (ref 7.35–7.45)
PH ARTERIAL: 7.42 (ref 7.35–7.45)
PH UA: 5 (ref 5–8)
PH UA: 5 (ref 5–8)
PH UA: 5.5
PH UA: 5.5 (ref 5–8)
PH UA: 6
PH UA: 6 (ref 5–8)
PH UA: 6 (ref 5–8)
PH UA: 6.5 (ref 5–8)
PH UA: 7 (ref 5–8)
PH VENOUS: 7.07 (ref 7.35–7.45)
PH VENOUS: 7.12 (ref 7.35–7.45)
PH VENOUS: 7.19 (ref 7.35–7.45)
PH VENOUS: 7.21 (ref 7.35–7.45)
PH VENOUS: 7.29 (ref 7.35–7.45)
PH VENOUS: 7.31 (ref 7.35–7.45)
PH VENOUS: 7.32 (ref 7.35–7.45)
PH VENOUS: 7.33 (ref 7.35–7.45)
PH VENOUS: 7.35 (ref 7.35–7.45)
PH VENOUS: 7.36 (ref 7.35–7.45)
PH VENOUS: 7.36 (ref 7.35–7.45)
PH VENOUS: 7.37 (ref 7.35–7.45)
PH VENOUS: 7.38 (ref 7.35–7.45)
PH VENOUS: 7.38 (ref 7.35–7.45)
PH VENOUS: 7.4 (ref 7.35–7.45)
PH VENOUS: 7.41 (ref 7.35–7.45)
PH VENOUS: 7.42 (ref 7.35–7.45)
PH VENOUS: 7.46 (ref 7.35–7.45)
PHENCYCLIDINE SCREEN BLOOD: NEGATIVE NG/ML
PHENCYCLIDINE SCREEN URINE: ABNORMAL
PHENCYCLIDINE SCREEN URINE: ABNORMAL
PHENOBARBITAL, SERUM/PLASMA, QUANT: 858 NG/ML
PHOSPHORUS: 2 MG/DL (ref 2.5–4.9)
PHOSPHORUS: 2.2 MG/DL (ref 2.5–4.9)
PHOSPHORUS: 2.4 MG/DL (ref 2.5–4.9)
PHOSPHORUS: 2.5 MG/DL (ref 2.5–4.9)
PHOSPHORUS: 2.6 MG/DL (ref 2.5–4.9)
PHOSPHORUS: 2.7 MG/DL (ref 2.5–4.9)
PHOSPHORUS: 2.8 MG/DL (ref 2.5–4.9)
PHOSPHORUS: 2.9 MG/DL (ref 2.5–4.9)
PHOSPHORUS: 2.9 MG/DL (ref 2.5–4.9)
PHOSPHORUS: 3 MG/DL (ref 2.5–4.9)
PHOSPHORUS: 3.1 MG/DL (ref 2.5–4.9)
PHOSPHORUS: 3.1 MG/DL (ref 2.5–4.9)
PHOSPHORUS: 3.2 MG/DL (ref 2.5–4.9)
PHOSPHORUS: 3.3 MG/DL (ref 2.5–4.9)
PHOSPHORUS: 3.4 MG/DL (ref 2.5–4.9)
PHOSPHORUS: 3.4 MG/DL (ref 2.5–4.9)
PHOSPHORUS: 3.5 MG/DL (ref 2.5–4.9)
PHOSPHORUS: 3.7 MG/DL (ref 2.5–4.9)
PHOSPHORUS: 3.8 MG/DL (ref 2.5–4.9)
PHOSPHORUS: 3.8 MG/DL (ref 2.5–4.9)
PHOSPHORUS: 3.9 MG/DL (ref 2.5–4.9)
PHOSPHORUS: 4.3 MG/DL (ref 2.5–4.9)
PHOSPHORUS: 4.3 MG/DL (ref 2.5–4.9)
PHOSPHORUS: 4.7 MG/DL (ref 2.5–4.9)
PHOSPHORUS: 4.7 MG/DL (ref 2.5–4.9)
PHOSPHORUS: 5.2 MG/DL (ref 2.5–4.9)
PHOSPHORUS: 5.6 MG/DL (ref 2.5–4.9)
PHOSPHORUS: 5.8 MG/DL (ref 2.5–4.9)
PHOSPHORUS: 7.3 MG/DL (ref 2.5–4.9)
PHOSPHORUS: 8.1 MG/DL (ref 2.5–4.9)
PLATELET # BLD: 130 K/UL (ref 135–450)
PLATELET # BLD: 132 K/UL (ref 135–450)
PLATELET # BLD: 134 K/UL (ref 135–450)
PLATELET # BLD: 135 K/UL (ref 135–450)
PLATELET # BLD: 139 K/UL (ref 135–450)
PLATELET # BLD: 144 K/UL (ref 135–450)
PLATELET # BLD: 150 K/UL (ref 135–450)
PLATELET # BLD: 151 K/UL (ref 135–450)
PLATELET # BLD: 152 K/UL (ref 135–450)
PLATELET # BLD: 153 K/UL (ref 135–450)
PLATELET # BLD: 162 K/UL (ref 135–450)
PLATELET # BLD: 163 K/UL (ref 135–450)
PLATELET # BLD: 163 K/UL (ref 135–450)
PLATELET # BLD: 167 K/UL (ref 135–450)
PLATELET # BLD: 172 K/UL (ref 135–450)
PLATELET # BLD: 175 K/UL (ref 135–450)
PLATELET # BLD: 178 K/UL (ref 135–450)
PLATELET # BLD: 183 K/UL (ref 135–450)
PLATELET # BLD: 184 K/UL (ref 135–450)
PLATELET # BLD: 187 K/UL (ref 135–450)
PLATELET # BLD: 202 K/UL (ref 135–450)
PLATELET # BLD: 203 K/UL (ref 135–450)
PLATELET # BLD: 204 K/UL (ref 135–450)
PLATELET # BLD: 210 K/UL (ref 135–450)
PLATELET # BLD: 213 K/UL (ref 135–450)
PLATELET # BLD: 214 K/UL (ref 135–450)
PLATELET # BLD: 229 K/UL (ref 135–450)
PLATELET # BLD: 229 K/UL (ref 135–450)
PLATELET # BLD: 233 K/UL (ref 135–450)
PLATELET # BLD: 238 K/UL (ref 135–450)
PLATELET # BLD: 241 K/UL (ref 135–450)
PLATELET # BLD: 243 K/UL (ref 135–450)
PLATELET # BLD: 250 K/UL (ref 135–450)
PLATELET # BLD: 257 K/UL (ref 135–450)
PLATELET # BLD: 265 K/UL (ref 135–450)
PLATELET # BLD: 274 K/UL (ref 135–450)
PLATELET # BLD: 277 K/UL (ref 135–450)
PLATELET # BLD: 281 K/UL (ref 135–450)
PLATELET # BLD: 281 K/UL (ref 135–450)
PLATELET # BLD: 287 K/UL (ref 135–450)
PLATELET # BLD: 289 K/UL (ref 135–450)
PLATELET # BLD: 289 K/UL (ref 135–450)
PLATELET # BLD: 300 K/UL (ref 135–450)
PLATELET # BLD: 302 K/UL (ref 135–450)
PLATELET # BLD: 311 K/UL (ref 135–450)
PLATELET # BLD: 361 K/UL (ref 135–450)
PLATELET SLIDE REVIEW: ADEQUATE
PMV BLD AUTO: 6.6 FL (ref 5–10.5)
PMV BLD AUTO: 6.7 FL (ref 5–10.5)
PMV BLD AUTO: 6.8 FL (ref 5–10.5)
PMV BLD AUTO: 7 FL (ref 5–10.5)
PMV BLD AUTO: 7.2 FL (ref 5–10.5)
PMV BLD AUTO: 7.3 FL (ref 5–10.5)
PMV BLD AUTO: 7.3 FL (ref 5–10.5)
PMV BLD AUTO: 7.4 FL (ref 5–10.5)
PMV BLD AUTO: 7.5 FL (ref 5–10.5)
PMV BLD AUTO: 7.6 FL (ref 5–10.5)
PMV BLD AUTO: 7.7 FL (ref 5–10.5)
PMV BLD AUTO: 7.8 FL (ref 5–10.5)
PMV BLD AUTO: 7.9 FL (ref 5–10.5)
PMV BLD AUTO: 8 FL (ref 5–10.5)
PMV BLD AUTO: 8.1 FL (ref 5–10.5)
PMV BLD AUTO: 8.2 FL (ref 5–10.5)
PMV BLD AUTO: 8.3 FL (ref 5–10.5)
PMV BLD AUTO: 8.4 FL (ref 5–10.5)
PO2 ARTERIAL: 123 MMHG (ref 75–108)
PO2 ARTERIAL: 130.9 MM HG (ref 75–108)
PO2 ARTERIAL: 75.2 MM HG (ref 75–108)
PO2 ARTERIAL: 92.9 MM HG (ref 75–108)
PO2, VEN: 117 MMHG (ref 25–40)
PO2, VEN: 22 MMHG (ref 25–40)
PO2, VEN: 28.9 MMHG (ref 25–40)
PO2, VEN: 35.9 MMHG (ref 25–40)
PO2, VEN: 36.3 MMHG (ref 25–40)
PO2, VEN: 39 MM HG
PO2, VEN: 39.8 MMHG (ref 25–40)
PO2, VEN: 40 MM HG
PO2, VEN: 42.7 MMHG (ref 25–40)
PO2, VEN: 44.1 MMHG (ref 25–40)
PO2, VEN: 47.2 MMHG (ref 25–40)
PO2, VEN: 50.7 MMHG (ref 25–40)
PO2, VEN: 55.4 MMHG (ref 25–40)
PO2, VEN: 59.1 MMHG (ref 25–40)
PO2, VEN: 60.4 MMHG (ref 25–40)
PO2, VEN: 68.9 MMHG (ref 25–40)
POC OCCULT BLOOD STOOL: POSITIVE
POC SAMPLE TYPE: ABNORMAL
POC SAMPLE TYPE: NORMAL
POIKILOCYTES: ABNORMAL
POLYCHROMASIA: ABNORMAL
POTASSIUM REFLEX MAGNESIUM: 3.1 MMOL/L (ref 3.5–5.1)
POTASSIUM REFLEX MAGNESIUM: 3.2 MMOL/L (ref 3.5–5.1)
POTASSIUM REFLEX MAGNESIUM: 3.4 MMOL/L (ref 3.5–5.1)
POTASSIUM REFLEX MAGNESIUM: 3.5 MMOL/L (ref 3.5–5.1)
POTASSIUM REFLEX MAGNESIUM: 3.6 MMOL/L (ref 3.5–5.1)
POTASSIUM REFLEX MAGNESIUM: 3.7 MMOL/L (ref 3.5–5.1)
POTASSIUM REFLEX MAGNESIUM: 3.8 MMOL/L (ref 3.5–5.1)
POTASSIUM REFLEX MAGNESIUM: 3.8 MMOL/L (ref 3.5–5.1)
POTASSIUM REFLEX MAGNESIUM: 3.9 MMOL/L (ref 3.5–5.1)
POTASSIUM REFLEX MAGNESIUM: 4 MMOL/L (ref 3.5–5.1)
POTASSIUM REFLEX MAGNESIUM: 4.1 MMOL/L (ref 3.5–5.1)
POTASSIUM REFLEX MAGNESIUM: 4.1 MMOL/L (ref 3.5–5.1)
POTASSIUM REFLEX MAGNESIUM: 4.3 MMOL/L (ref 3.5–5.1)
POTASSIUM REFLEX MAGNESIUM: 4.4 MMOL/L (ref 3.5–5.1)
POTASSIUM REFLEX MAGNESIUM: 4.4 MMOL/L (ref 3.5–5.1)
POTASSIUM REFLEX MAGNESIUM: 4.5 MMOL/L (ref 3.5–5.1)
POTASSIUM REFLEX MAGNESIUM: 4.5 MMOL/L (ref 3.5–5.1)
POTASSIUM REFLEX MAGNESIUM: 4.6 MMOL/L (ref 3.5–5.1)
POTASSIUM REFLEX MAGNESIUM: 4.6 MMOL/L (ref 3.5–5.1)
POTASSIUM REFLEX MAGNESIUM: 4.7 MMOL/L (ref 3.5–5.1)
POTASSIUM REFLEX MAGNESIUM: 4.8 MMOL/L (ref 3.5–5.1)
POTASSIUM REFLEX MAGNESIUM: 4.9 MMOL/L (ref 3.5–5.1)
POTASSIUM REFLEX MAGNESIUM: 5 MMOL/L (ref 3.5–5.1)
POTASSIUM REFLEX MAGNESIUM: 5 MMOL/L (ref 3.5–5.1)
POTASSIUM REFLEX MAGNESIUM: 5.2 MMOL/L (ref 3.5–5.1)
POTASSIUM REFLEX MAGNESIUM: 5.3 MMOL/L (ref 3.5–5.1)
POTASSIUM REFLEX MAGNESIUM: 5.5 MMOL/L (ref 3.5–5.1)
POTASSIUM REFLEX MAGNESIUM: 5.8 MMOL/L (ref 3.5–5.1)
POTASSIUM REFLEX MAGNESIUM: 5.8 MMOL/L (ref 3.5–5.1)
POTASSIUM REFLEX MAGNESIUM: 5.9 MMOL/L (ref 3.5–5.1)
POTASSIUM REFLEX MAGNESIUM: 6.1 MMOL/L (ref 3.5–5.1)
POTASSIUM REFLEX MAGNESIUM: 6.4 MMOL/L (ref 3.5–5.1)
POTASSIUM SERPL-SCNC: 3 MMOL/L (ref 3.5–5.1)
POTASSIUM SERPL-SCNC: 3.3 MMOL/L (ref 3.5–5.1)
POTASSIUM SERPL-SCNC: 3.4 MMOL/L (ref 3.5–5.1)
POTASSIUM SERPL-SCNC: 3.4 MMOL/L (ref 3.5–5.1)
POTASSIUM SERPL-SCNC: 3.5 MMOL/L (ref 3.5–5.1)
POTASSIUM SERPL-SCNC: 3.6 MMOL/L (ref 3.5–5.1)
POTASSIUM SERPL-SCNC: 3.7 MMOL/L (ref 3.5–5.1)
POTASSIUM SERPL-SCNC: 3.8 MMOL/L (ref 3.5–5.1)
POTASSIUM SERPL-SCNC: 3.9 MMOL/L (ref 3.5–5.1)
POTASSIUM SERPL-SCNC: 4 MMOL/L (ref 3.5–5.1)
POTASSIUM SERPL-SCNC: 4.1 MMOL/L (ref 3.5–5.1)
POTASSIUM SERPL-SCNC: 4.2 MMOL/L (ref 3.5–5.1)
POTASSIUM SERPL-SCNC: 4.3 MMOL/L (ref 3.5–5.1)
POTASSIUM SERPL-SCNC: 4.3 MMOL/L (ref 3.5–5.1)
POTASSIUM SERPL-SCNC: 4.4 MMOL/L (ref 3.5–5.1)
POTASSIUM SERPL-SCNC: 4.5 MMOL/L (ref 3.5–5.1)
POTASSIUM SERPL-SCNC: 4.5 MMOL/L (ref 3.5–5.1)
POTASSIUM SERPL-SCNC: 4.6 MMOL/L (ref 3.5–5.1)
POTASSIUM SERPL-SCNC: 4.6 MMOL/L (ref 3.5–5.1)
POTASSIUM SERPL-SCNC: 4.8 MMOL/L (ref 3.5–5.1)
POTASSIUM SERPL-SCNC: 4.9 MMOL/L (ref 3.5–5.1)
POTASSIUM SERPL-SCNC: 4.9 MMOL/L (ref 3.5–5.1)
POTASSIUM SERPL-SCNC: 5.1 MMOL/L (ref 3.5–5.1)
POTASSIUM SERPL-SCNC: 5.1 MMOL/L (ref 3.5–5.1)
POTASSIUM SERPL-SCNC: 5.2 MMOL/L (ref 3.5–5.1)
POTASSIUM SERPL-SCNC: 5.2 MMOL/L (ref 3.5–5.1)
POTASSIUM SERPL-SCNC: 5.3 MMOL/L (ref 3.5–5.1)
POTASSIUM SERPL-SCNC: 5.7 MMOL/L (ref 3.5–5.1)
POTASSIUM SERPL-SCNC: 5.8 MMOL/L (ref 3.5–5.1)
POTASSIUM SERPL-SCNC: 5.8 MMOL/L (ref 3.5–5.1)
POTASSIUM SERPL-SCNC: 5.9 MMOL/L (ref 3.5–5.1)
POTASSIUM SERPL-SCNC: 6 MMOL/L (ref 3.5–5.1)
POTASSIUM SERPL-SCNC: 7.1 MMOL/L (ref 3.5–5.1)
PRO-BNP: 1493 PG/ML (ref 0–124)
PRO-BNP: 2073 PG/ML (ref 0–124)
PRO-BNP: 3675 PG/ML (ref 0–124)
PROCALCITONIN: 2.15 NG/ML (ref 0–0.15)
PROCALCITONIN: 3.23 NG/ML (ref 0–0.15)
PROMYELOCYTES PERCENT: 7 %
PROPOXYPHENE SCREEN: ABNORMAL
PROPOXYPHENE SCREEN: ABNORMAL
PROTEIN PROTEIN: 107.8 MG/DL
PROTEIN UA: 100 MG/DL
PROTEIN UA: 30 MG/DL
PROTEIN UA: NEGATIVE MG/DL
PROTHROMBIN TIME: 11.2 SEC (ref 10–13.2)
PROTHROMBIN TIME: 11.5 SEC (ref 10–13.2)
PROTHROMBIN TIME: 11.7 SEC (ref 10–13.2)
PROTHROMBIN TIME: 12 SEC (ref 10–13.2)
PROTHROMBIN TIME: 13.1 SEC (ref 10–13.2)
RBC # BLD: 2.23 M/UL (ref 4.2–5.9)
RBC # BLD: 2.24 M/UL (ref 4.2–5.9)
RBC # BLD: 2.39 M/UL (ref 4.2–5.9)
RBC # BLD: 2.41 M/UL (ref 4.2–5.9)
RBC # BLD: 2.54 M/UL (ref 4.2–5.9)
RBC # BLD: 2.71 M/UL (ref 4.2–5.9)
RBC # BLD: 2.86 M/UL (ref 4.2–5.9)
RBC # BLD: 3.21 M/UL (ref 4.2–5.9)
RBC # BLD: 3.35 M/UL (ref 4.2–5.9)
RBC # BLD: 3.35 M/UL (ref 4.2–5.9)
RBC # BLD: 3.38 M/UL (ref 4.2–5.9)
RBC # BLD: 3.52 M/UL (ref 4.2–5.9)
RBC # BLD: 3.57 M/UL (ref 4.2–5.9)
RBC # BLD: 3.58 M/UL (ref 4.2–5.9)
RBC # BLD: 3.58 M/UL (ref 4.2–5.9)
RBC # BLD: 3.64 M/UL (ref 4.2–5.9)
RBC # BLD: 3.67 M/UL (ref 4.2–5.9)
RBC # BLD: 3.69 M/UL (ref 4.2–5.9)
RBC # BLD: 3.7 M/UL (ref 4.2–5.9)
RBC # BLD: 3.71 M/UL (ref 4.2–5.9)
RBC # BLD: 3.73 M/UL (ref 4.2–5.9)
RBC # BLD: 3.73 M/UL (ref 4.2–5.9)
RBC # BLD: 3.76 M/UL (ref 4.2–5.9)
RBC # BLD: 3.78 M/UL (ref 4.2–5.9)
RBC # BLD: 3.81 M/UL (ref 4.2–5.9)
RBC # BLD: 3.82 M/UL (ref 4.2–5.9)
RBC # BLD: 3.83 M/UL (ref 4.2–5.9)
RBC # BLD: 3.83 M/UL (ref 4.2–5.9)
RBC # BLD: 3.87 M/UL (ref 4.2–5.9)
RBC # BLD: 3.9 M/UL (ref 4.2–5.9)
RBC # BLD: 3.92 M/UL (ref 4.2–5.9)
RBC # BLD: 3.95 M/UL (ref 4.2–5.9)
RBC # BLD: 4.01 M/UL (ref 4.2–5.9)
RBC # BLD: 4.04 M/UL (ref 4.2–5.9)
RBC # BLD: 4.18 M/UL (ref 4.2–5.9)
RBC # BLD: 4.2 M/UL (ref 4.2–5.9)
RBC # BLD: 4.24 M/UL (ref 4.2–5.9)
RBC # BLD: 4.28 M/UL (ref 4.2–5.9)
RBC # BLD: 4.36 M/UL (ref 4.2–5.9)
RBC # BLD: 4.39 M/UL (ref 4.2–5.9)
RBC # BLD: 4.45 M/UL (ref 4.2–5.9)
RBC # BLD: 4.46 M/UL (ref 4.2–5.9)
RBC # BLD: 4.8 M/UL (ref 4.2–5.9)
RBC # BLD: 4.88 M/UL (ref 4.2–5.9)
RBC # BLD: 4.97 M/UL (ref 4.2–5.9)
RBC # BLD: 5.36 M/UL (ref 4.2–5.9)
RBC UA: ABNORMAL /HPF (ref 0–4)
RBC UA: NORMAL /HPF (ref 0–4)
RBC UA: NORMAL /HPF (ref 0–4)
REPORT: NORMAL
REPORT: NORMAL
RESPIRATORY PANEL PCR: NORMAL
RESPIRATORY PANEL PCR: NORMAL
SARS-COV-2, NAA: NOT DETECTED
SARS-COV-2, NAAT: NOT DETECTED
SARS-COV-2, PCR: NOT DETECTED
SECOBARBITAL, SERUM/PLASMA, QUANT: <50 NG/ML
SLIDE REVIEW: ABNORMAL
SODIUM BLD-SCNC: 116 MMOL/L (ref 136–145)
SODIUM BLD-SCNC: 124 MMOL/L (ref 136–145)
SODIUM BLD-SCNC: 125 MMOL/L (ref 136–145)
SODIUM BLD-SCNC: 125 MMOL/L (ref 136–145)
SODIUM BLD-SCNC: 126 MMOL/L (ref 136–145)
SODIUM BLD-SCNC: 126 MMOL/L (ref 136–145)
SODIUM BLD-SCNC: 127 MMOL/L (ref 136–145)
SODIUM BLD-SCNC: 128 MMOL/L (ref 136–145)
SODIUM BLD-SCNC: 129 MMOL/L (ref 136–145)
SODIUM BLD-SCNC: 130 MMOL/L (ref 136–145)
SODIUM BLD-SCNC: 131 MMOL/L (ref 136–145)
SODIUM BLD-SCNC: 132 MMOL/L (ref 136–145)
SODIUM BLD-SCNC: 133 MMOL/L (ref 136–145)
SODIUM BLD-SCNC: 134 MMOL/L (ref 136–145)
SODIUM BLD-SCNC: 135 MMOL/L (ref 136–145)
SODIUM BLD-SCNC: 136 MMOL/L (ref 136–145)
SODIUM BLD-SCNC: 137 MMOL/L (ref 136–145)
SODIUM BLD-SCNC: 138 MMOL/L (ref 136–145)
SODIUM BLD-SCNC: 139 MMOL/L (ref 136–145)
SODIUM BLD-SCNC: 140 MMOL/L (ref 136–145)
SODIUM BLD-SCNC: 141 MMOL/L (ref 136–145)
SODIUM BLD-SCNC: 141 MMOL/L (ref 136–145)
SODIUM BLD-SCNC: 143 MMOL/L (ref 136–145)
SODIUM BLD-SCNC: 144 MMOL/L (ref 136–145)
SODIUM BLD-SCNC: 145 MMOL/L (ref 136–145)
SODIUM URINE: 66 MMOL/L
SPECIFIC GRAVITY UA: 1.01 (ref 1–1.03)
SPECIFIC GRAVITY UA: 1.02 (ref 1–1.03)
SPECIFIC GRAVITY UA: 1.02 (ref 1–1.03)
SPECIFIC GRAVITY UA: >=1.03 (ref 1–1.03)
STREP PNEUMONIAE ANTIGEN, URINE: NORMAL
TCO2 ARTERIAL: 14 MMOL/L
TCO2 ARTERIAL: 27 MMOL/L
TCO2 ARTERIAL: 27 MMOL/L
TCO2 ARTERIAL: 29 MMOL/L
TCO2 CALC VENOUS: 12 MMOL/L
TCO2 CALC VENOUS: 13 MMOL/L
TCO2 CALC VENOUS: 13 MMOL/L
TCO2 CALC VENOUS: 14 MMOL/L
TCO2 CALC VENOUS: 15 MMOL/L
TCO2 CALC VENOUS: 21 MMOL/L
TCO2 CALC VENOUS: 21 MMOL/L
TCO2 CALC VENOUS: 22 MMOL/L
TCO2 CALC VENOUS: 23 MMOL/L
TCO2 CALC VENOUS: 24 MMOL/L
TCO2 CALC VENOUS: 25 MMOL/L
TCO2 CALC VENOUS: 25 MMOL/L
TCO2 CALC VENOUS: 27 MMOL/L
TCO2 CALC VENOUS: 28 MMOL/L
TCO2 CALC VENOUS: 29 MMOL/L
TCO2 CALC VENOUS: 31 MMOL/L
TEAR DROP CELLS: ABNORMAL
TEAR DROP CELLS: ABNORMAL
TOTAL CK: 204 U/L (ref 39–308)
TOTAL CK: 2252 U/L (ref 39–308)
TOTAL CK: 269 U/L (ref 39–308)
TOTAL CK: 458 U/L (ref 39–308)
TOTAL CK: 4987 U/L (ref 39–308)
TOTAL CK: 86 U/L (ref 39–308)
TOTAL CK: ABNORMAL U/L (ref 39–308)
TOTAL IRON BINDING CAPACITY: 261 UG/DL (ref 260–445)
TOTAL PROTEIN: 5.1 G/DL (ref 6.4–8.2)
TOTAL PROTEIN: 5.4 G/DL (ref 6.4–8.2)
TOTAL PROTEIN: 5.5 G/DL (ref 6.4–8.2)
TOTAL PROTEIN: 5.7 G/DL (ref 6.4–8.2)
TOTAL PROTEIN: 6 G/DL (ref 6.4–8.2)
TOTAL PROTEIN: 6 G/DL (ref 6.4–8.2)
TOTAL PROTEIN: 6.2 G/DL (ref 6.4–8.2)
TOTAL PROTEIN: 6.3 G/DL (ref 6.4–8.2)
TOTAL PROTEIN: 6.5 G/DL (ref 6.4–8.2)
TOTAL PROTEIN: 6.5 G/DL (ref 6.4–8.2)
TOTAL PROTEIN: 6.7 G/DL (ref 6.4–8.2)
TOTAL PROTEIN: 7 G/DL (ref 6.4–8.2)
TOTAL PROTEIN: 7 G/DL (ref 6.4–8.2)
TOTAL PROTEIN: 7.2 G/DL (ref 6.4–8.2)
TOTAL PROTEIN: 7.3 G/DL (ref 6.4–8.2)
TRANSFERRIN: 231 MG/DL (ref 200–360)
TRICHOMONAS: ABNORMAL /HPF
TRICHOMONAS: ABNORMAL /HPF
TRIGL SERPL-MCNC: 69 MG/DL (ref 0–150)
TROPONIN: 0.02 NG/ML
TROPONIN: 0.02 NG/ML
TROPONIN: 0.04 NG/ML
TROPONIN: 0.06 NG/ML
TROPONIN: 0.12 NG/ML
TROPONIN: 0.15 NG/ML
TROPONIN: 1.03 NG/ML
TROPONIN: 1.05 NG/ML
TROPONIN: 1.1 NG/ML
TROPONIN: 1.11 NG/ML
TROPONIN: 1.82 NG/ML
TROPONIN: 2.54 NG/ML
TSH REFLEX: 1.32 UIU/ML (ref 0.27–4.2)
URINE CULTURE, ROUTINE: NORMAL
URINE CULTURE, ROUTINE: NORMAL
URINE REFLEX TO CULTURE: ABNORMAL
URINE REFLEX TO CULTURE: YES
URINE TYPE: ABNORMAL
URINE TYPE: NORMAL
UROBILINOGEN, URINE: 0.2 E.U./DL
VANCOMYCIN RANDOM: 14.8 UG/ML
VANCOMYCIN RANDOM: 15.4 UG/ML
VANCOMYCIN RANDOM: 15.5 UG/ML
VANCOMYCIN RANDOM: 15.6 UG/ML
VANCOMYCIN RANDOM: 18 UG/ML
VANCOMYCIN RANDOM: 22.6 UG/ML
VANCOMYCIN RANDOM: 24.8 UG/ML
VLDLC SERPL CALC-MCNC: 14 MG/DL
WBC # BLD: 11 K/UL (ref 4–11)
WBC # BLD: 11.5 K/UL (ref 4–11)
WBC # BLD: 12 K/UL (ref 4–11)
WBC # BLD: 12.4 K/UL (ref 4–11)
WBC # BLD: 12.4 K/UL (ref 4–11)
WBC # BLD: 12.5 K/UL (ref 4–11)
WBC # BLD: 12.6 K/UL (ref 4–11)
WBC # BLD: 12.7 K/UL (ref 4–11)
WBC # BLD: 12.8 K/UL (ref 4–11)
WBC # BLD: 12.8 K/UL (ref 4–11)
WBC # BLD: 12.9 K/UL (ref 4–11)
WBC # BLD: 13.3 K/UL (ref 4–11)
WBC # BLD: 14.3 K/UL (ref 4–11)
WBC # BLD: 14.3 K/UL (ref 4–11)
WBC # BLD: 14.8 K/UL (ref 4–11)
WBC # BLD: 14.9 K/UL (ref 4–11)
WBC # BLD: 16.3 K/UL (ref 4–11)
WBC # BLD: 17.8 K/UL (ref 4–11)
WBC # BLD: 18.1 K/UL (ref 4–11)
WBC # BLD: 21.7 K/UL (ref 4–11)
WBC # BLD: 3.8 K/UL (ref 4–11)
WBC # BLD: 4.6 K/UL (ref 4–11)
WBC # BLD: 5.5 K/UL (ref 4–11)
WBC # BLD: 5.8 K/UL (ref 4–11)
WBC # BLD: 6.1 K/UL (ref 4–11)
WBC # BLD: 6.2 K/UL (ref 4–11)
WBC # BLD: 6.5 K/UL (ref 4–11)
WBC # BLD: 6.6 K/UL (ref 4–11)
WBC # BLD: 6.7 K/UL (ref 4–11)
WBC # BLD: 6.8 K/UL (ref 4–11)
WBC # BLD: 6.9 K/UL (ref 4–11)
WBC # BLD: 7 K/UL (ref 4–11)
WBC # BLD: 7.2 K/UL (ref 4–11)
WBC # BLD: 7.2 K/UL (ref 4–11)
WBC # BLD: 7.4 K/UL (ref 4–11)
WBC # BLD: 7.9 K/UL (ref 4–11)
WBC # BLD: 8 K/UL (ref 4–11)
WBC # BLD: 8.1 K/UL (ref 4–11)
WBC # BLD: 8.2 K/UL (ref 4–11)
WBC # BLD: 8.3 K/UL (ref 4–11)
WBC # BLD: 8.6 K/UL (ref 4–11)
WBC # BLD: 8.7 K/UL (ref 4–11)
WBC # BLD: 9.2 K/UL (ref 4–11)
WBC # BLD: 9.3 K/UL (ref 4–11)
WBC UA: ABNORMAL /HPF (ref 0–5)
WBC UA: NORMAL /HPF (ref 0–5)
WBC UA: NORMAL /HPF (ref 0–5)

## 2020-01-01 PROCEDURE — 36415 COLL VENOUS BLD VENIPUNCTURE: CPT

## 2020-01-01 PROCEDURE — 99285 EMERGENCY DEPT VISIT HI MDM: CPT

## 2020-01-01 PROCEDURE — 83880 ASSAY OF NATRIURETIC PEPTIDE: CPT

## 2020-01-01 PROCEDURE — 2580000003 HC RX 258: Performed by: STUDENT IN AN ORGANIZED HEALTH CARE EDUCATION/TRAINING PROGRAM

## 2020-01-01 PROCEDURE — 85025 COMPLETE CBC W/AUTO DIFF WBC: CPT

## 2020-01-01 PROCEDURE — 82550 ASSAY OF CK (CPK): CPT

## 2020-01-01 PROCEDURE — 6370000000 HC RX 637 (ALT 250 FOR IP): Performed by: STUDENT IN AN ORGANIZED HEALTH CARE EDUCATION/TRAINING PROGRAM

## 2020-01-01 PROCEDURE — 82803 BLOOD GASES ANY COMBINATION: CPT

## 2020-01-01 PROCEDURE — 0JH63XZ INSERTION OF TUNNELED VASCULAR ACCESS DEVICE INTO CHEST SUBCUTANEOUS TISSUE AND FASCIA, PERCUTANEOUS APPROACH: ICD-10-PCS | Performed by: RADIOLOGY

## 2020-01-01 PROCEDURE — 6360000002 HC RX W HCPCS: Performed by: STUDENT IN AN ORGANIZED HEALTH CARE EDUCATION/TRAINING PROGRAM

## 2020-01-01 PROCEDURE — 80069 RENAL FUNCTION PANEL: CPT

## 2020-01-01 PROCEDURE — 84100 ASSAY OF PHOSPHORUS: CPT

## 2020-01-01 PROCEDURE — 70450 CT HEAD/BRAIN W/O DYE: CPT

## 2020-01-01 PROCEDURE — 83735 ASSAY OF MAGNESIUM: CPT

## 2020-01-01 PROCEDURE — 1200000000 HC SEMI PRIVATE

## 2020-01-01 PROCEDURE — 93010 ELECTROCARDIOGRAM REPORT: CPT | Performed by: INTERNAL MEDICINE

## 2020-01-01 PROCEDURE — 2580000003 HC RX 258: Performed by: NURSE PRACTITIONER

## 2020-01-01 PROCEDURE — 2000000000 HC ICU R&B

## 2020-01-01 PROCEDURE — 6370000000 HC RX 637 (ALT 250 FOR IP): Performed by: INTERNAL MEDICINE

## 2020-01-01 PROCEDURE — 36592 COLLECT BLOOD FROM PICC: CPT

## 2020-01-01 PROCEDURE — 94761 N-INVAS EAR/PLS OXIMETRY MLT: CPT

## 2020-01-01 PROCEDURE — 2580000003 HC RX 258: Performed by: INTERNAL MEDICINE

## 2020-01-01 PROCEDURE — 80048 BASIC METABOLIC PNL TOTAL CA: CPT

## 2020-01-01 PROCEDURE — 88305 TISSUE EXAM BY PATHOLOGIST: CPT

## 2020-01-01 PROCEDURE — 0100U HC RESPIRPTHGN MULT REV TRANS & AMP PRB TECH 21 TRGT: CPT

## 2020-01-01 PROCEDURE — 80053 COMPREHEN METABOLIC PANEL: CPT

## 2020-01-01 PROCEDURE — 93005 ELECTROCARDIOGRAM TRACING: CPT | Performed by: STUDENT IN AN ORGANIZED HEALTH CARE EDUCATION/TRAINING PROGRAM

## 2020-01-01 PROCEDURE — 74018 RADEX ABDOMEN 1 VIEW: CPT

## 2020-01-01 PROCEDURE — 2700000000 HC OXYGEN THERAPY PER DAY

## 2020-01-01 PROCEDURE — 2500000003 HC RX 250 WO HCPCS: Performed by: STUDENT IN AN ORGANIZED HEALTH CARE EDUCATION/TRAINING PROGRAM

## 2020-01-01 PROCEDURE — 6370000000 HC RX 637 (ALT 250 FOR IP): Performed by: EMERGENCY MEDICINE

## 2020-01-01 PROCEDURE — 2060000000 HC ICU INTERMEDIATE R&B

## 2020-01-01 PROCEDURE — 80076 HEPATIC FUNCTION PANEL: CPT

## 2020-01-01 PROCEDURE — 99232 SBSQ HOSP IP/OBS MODERATE 35: CPT | Performed by: INTERNAL MEDICINE

## 2020-01-01 PROCEDURE — 2709999900 HC NON-CHARGEABLE SUPPLY: Performed by: INTERNAL MEDICINE

## 2020-01-01 PROCEDURE — 5A1D70Z PERFORMANCE OF URINARY FILTRATION, INTERMITTENT, LESS THAN 6 HOURS PER DAY: ICD-10-PCS | Performed by: INTERNAL MEDICINE

## 2020-01-01 PROCEDURE — 94760 N-INVAS EAR/PLS OXIMETRY 1: CPT

## 2020-01-01 PROCEDURE — 97530 THERAPEUTIC ACTIVITIES: CPT

## 2020-01-01 PROCEDURE — U0002 COVID-19 LAB TEST NON-CDC: HCPCS

## 2020-01-01 PROCEDURE — 84484 ASSAY OF TROPONIN QUANT: CPT

## 2020-01-01 PROCEDURE — 93970 EXTREMITY STUDY: CPT

## 2020-01-01 PROCEDURE — 99232 SBSQ HOSP IP/OBS MODERATE 35: CPT | Performed by: NURSE PRACTITIONER

## 2020-01-01 PROCEDURE — 2500000003 HC RX 250 WO HCPCS: Performed by: INTERNAL MEDICINE

## 2020-01-01 PROCEDURE — 84145 PROCALCITONIN (PCT): CPT

## 2020-01-01 PROCEDURE — 94150 VITAL CAPACITY TEST: CPT

## 2020-01-01 PROCEDURE — 95819 EEG AWAKE AND ASLEEP: CPT

## 2020-01-01 PROCEDURE — 83036 HEMOGLOBIN GLYCOSYLATED A1C: CPT

## 2020-01-01 PROCEDURE — 86341 ISLET CELL ANTIBODY: CPT

## 2020-01-01 PROCEDURE — 6370000000 HC RX 637 (ALT 250 FOR IP)

## 2020-01-01 PROCEDURE — 2500000003 HC RX 250 WO HCPCS: Performed by: EMERGENCY MEDICINE

## 2020-01-01 PROCEDURE — 94644 CONT INHLJ TX 1ST HOUR: CPT

## 2020-01-01 PROCEDURE — 97162 PT EVAL MOD COMPLEX 30 MIN: CPT

## 2020-01-01 PROCEDURE — 6370000000 HC RX 637 (ALT 250 FOR IP): Performed by: HOSPITALIST

## 2020-01-01 PROCEDURE — 85018 HEMOGLOBIN: CPT

## 2020-01-01 PROCEDURE — 2580000003 HC RX 258: Performed by: EMERGENCY MEDICINE

## 2020-01-01 PROCEDURE — 72125 CT NECK SPINE W/O DYE: CPT

## 2020-01-01 PROCEDURE — 96372 THER/PROPH/DIAG INJ SC/IM: CPT

## 2020-01-01 PROCEDURE — 94664 DEMO&/EVAL PT USE INHALER: CPT

## 2020-01-01 PROCEDURE — 73130 X-RAY EXAM OF HAND: CPT

## 2020-01-01 PROCEDURE — 6360000002 HC RX W HCPCS

## 2020-01-01 PROCEDURE — 96374 THER/PROPH/DIAG INJ IV PUSH: CPT

## 2020-01-01 PROCEDURE — 87205 SMEAR GRAM STAIN: CPT

## 2020-01-01 PROCEDURE — 84466 ASSAY OF TRANSFERRIN: CPT

## 2020-01-01 PROCEDURE — C9113 INJ PANTOPRAZOLE SODIUM, VIA: HCPCS | Performed by: STUDENT IN AN ORGANIZED HEALTH CARE EDUCATION/TRAINING PROGRAM

## 2020-01-01 PROCEDURE — 99222 1ST HOSP IP/OBS MODERATE 55: CPT | Performed by: INTERNAL MEDICINE

## 2020-01-01 PROCEDURE — 84300 ASSAY OF URINE SODIUM: CPT

## 2020-01-01 PROCEDURE — 94003 VENT MGMT INPAT SUBQ DAY: CPT

## 2020-01-01 PROCEDURE — 85610 PROTHROMBIN TIME: CPT

## 2020-01-01 PROCEDURE — 6360000002 HC RX W HCPCS: Performed by: EMERGENCY MEDICINE

## 2020-01-01 PROCEDURE — 97166 OT EVAL MOD COMPLEX 45 MIN: CPT

## 2020-01-01 PROCEDURE — 97535 SELF CARE MNGMENT TRAINING: CPT

## 2020-01-01 PROCEDURE — 2500000003 HC RX 250 WO HCPCS

## 2020-01-01 PROCEDURE — 6370000000 HC RX 637 (ALT 250 FOR IP): Performed by: NURSE PRACTITIONER

## 2020-01-01 PROCEDURE — 80202 ASSAY OF VANCOMYCIN: CPT

## 2020-01-01 PROCEDURE — 82570 ASSAY OF URINE CREATININE: CPT

## 2020-01-01 PROCEDURE — 99233 SBSQ HOSP IP/OBS HIGH 50: CPT | Performed by: INTERNAL MEDICINE

## 2020-01-01 PROCEDURE — 6360000002 HC RX W HCPCS: Performed by: INTERNAL MEDICINE

## 2020-01-01 PROCEDURE — 87086 URINE CULTURE/COLONY COUNT: CPT

## 2020-01-01 PROCEDURE — 81001 URINALYSIS AUTO W/SCOPE: CPT

## 2020-01-01 PROCEDURE — 83605 ASSAY OF LACTIC ACID: CPT

## 2020-01-01 PROCEDURE — 97116 GAIT TRAINING THERAPY: CPT

## 2020-01-01 PROCEDURE — 96376 TX/PRO/DX INJ SAME DRUG ADON: CPT

## 2020-01-01 PROCEDURE — 93306 TTE W/DOPPLER COMPLETE: CPT

## 2020-01-01 PROCEDURE — 99284 EMERGENCY DEPT VISIT MOD MDM: CPT

## 2020-01-01 PROCEDURE — 2720000010 HC SURG SUPPLY STERILE: Performed by: INTERNAL MEDICINE

## 2020-01-01 PROCEDURE — C1769 GUIDE WIRE: HCPCS

## 2020-01-01 PROCEDURE — 96375 TX/PRO/DX INJ NEW DRUG ADDON: CPT

## 2020-01-01 PROCEDURE — 90935 HEMODIALYSIS ONE EVALUATION: CPT

## 2020-01-01 PROCEDURE — 94770 HC ETCO2 MONITOR DAILY: CPT

## 2020-01-01 PROCEDURE — 99291 CRITICAL CARE FIRST HOUR: CPT | Performed by: INTERNAL MEDICINE

## 2020-01-01 PROCEDURE — 2580000003 HC RX 258

## 2020-01-01 PROCEDURE — 85027 COMPLETE CBC AUTOMATED: CPT

## 2020-01-01 PROCEDURE — 81003 URINALYSIS AUTO W/O SCOPE: CPT

## 2020-01-01 PROCEDURE — 70551 MRI BRAIN STEM W/O DYE: CPT

## 2020-01-01 PROCEDURE — 86704 HEP B CORE ANTIBODY TOTAL: CPT

## 2020-01-01 PROCEDURE — 77001 FLUOROGUIDE FOR VEIN DEVICE: CPT

## 2020-01-01 PROCEDURE — 36556 INSERT NON-TUNNEL CV CATH: CPT

## 2020-01-01 PROCEDURE — 37799 UNLISTED PX VASCULAR SURGERY: CPT

## 2020-01-01 PROCEDURE — B548ZZA ULTRASONOGRAPHY OF SUPERIOR VENA CAVA, GUIDANCE: ICD-10-PCS | Performed by: SURGERY

## 2020-01-01 PROCEDURE — 93005 ELECTROCARDIOGRAM TRACING: CPT | Performed by: EMERGENCY MEDICINE

## 2020-01-01 PROCEDURE — 6370000000 HC RX 637 (ALT 250 FOR IP): Performed by: PHYSICIAN ASSISTANT

## 2020-01-01 PROCEDURE — 6360000004 HC RX CONTRAST MEDICATION: Performed by: EMERGENCY MEDICINE

## 2020-01-01 PROCEDURE — 5A1D90Z PERFORMANCE OF URINARY FILTRATION, CONTINUOUS, GREATER THAN 18 HOURS PER DAY: ICD-10-PCS | Performed by: INTERNAL MEDICINE

## 2020-01-01 PROCEDURE — 71046 X-RAY EXAM CHEST 2 VIEWS: CPT

## 2020-01-01 PROCEDURE — 82010 KETONE BODYS QUAN: CPT

## 2020-01-01 PROCEDURE — 82728 ASSAY OF FERRITIN: CPT

## 2020-01-01 PROCEDURE — 97163 PT EVAL HIGH COMPLEX 45 MIN: CPT

## 2020-01-01 PROCEDURE — 02PY33Z REMOVAL OF INFUSION DEVICE FROM GREAT VESSEL, PERCUTANEOUS APPROACH: ICD-10-PCS | Performed by: RADIOLOGY

## 2020-01-01 PROCEDURE — 88342 IMHCHEM/IMCYTCHM 1ST ANTB: CPT

## 2020-01-01 PROCEDURE — 92526 ORAL FUNCTION THERAPY: CPT

## 2020-01-01 PROCEDURE — 99291 CRITICAL CARE FIRST HOUR: CPT

## 2020-01-01 PROCEDURE — 86850 RBC ANTIBODY SCREEN: CPT

## 2020-01-01 PROCEDURE — 99223 1ST HOSP IP/OBS HIGH 75: CPT | Performed by: INTERNAL MEDICINE

## 2020-01-01 PROCEDURE — 73562 X-RAY EXAM OF KNEE 3: CPT

## 2020-01-01 PROCEDURE — 86900 BLOOD TYPING SEROLOGIC ABO: CPT

## 2020-01-01 PROCEDURE — 85014 HEMATOCRIT: CPT

## 2020-01-01 PROCEDURE — 86901 BLOOD TYPING SEROLOGIC RH(D): CPT

## 2020-01-01 PROCEDURE — 02HV33Z INSERTION OF INFUSION DEVICE INTO SUPERIOR VENA CAVA, PERCUTANEOUS APPROACH: ICD-10-PCS | Performed by: SURGERY

## 2020-01-01 PROCEDURE — 92610 EVALUATE SWALLOWING FUNCTION: CPT

## 2020-01-01 PROCEDURE — 71250 CT THORAX DX C-: CPT

## 2020-01-01 PROCEDURE — 03HY32Z INSERTION OF MONITORING DEVICE INTO UPPER ARTERY, PERCUTANEOUS APPROACH: ICD-10-PCS | Performed by: INTERNAL MEDICINE

## 2020-01-01 PROCEDURE — P9016 RBC LEUKOCYTES REDUCED: HCPCS

## 2020-01-01 PROCEDURE — 94640 AIRWAY INHALATION TREATMENT: CPT

## 2020-01-01 PROCEDURE — 2580000003 HC RX 258: Performed by: PHYSICIAN ASSISTANT

## 2020-01-01 PROCEDURE — 82330 ASSAY OF CALCIUM: CPT

## 2020-01-01 PROCEDURE — U0003 INFECTIOUS AGENT DETECTION BY NUCLEIC ACID (DNA OR RNA); SEVERE ACUTE RESPIRATORY SYNDROME CORONAVIRUS 2 (SARS-COV-2) (CORONAVIRUS DISEASE [COVID-19]), AMPLIFIED PROBE TECHNIQUE, MAKING USE OF HIGH THROUGHPUT TECHNOLOGIES AS DESCRIBED BY CMS-2020-01-R: HCPCS

## 2020-01-01 PROCEDURE — 70480 CT ORBIT/EAR/FOSSA W/O DYE: CPT

## 2020-01-01 PROCEDURE — 3609013000 HC EGD TRANSORAL CONTROL BLEEDING ANY METHOD: Performed by: INTERNAL MEDICINE

## 2020-01-01 PROCEDURE — 99222 1ST HOSP IP/OBS MODERATE 55: CPT | Performed by: NURSE PRACTITIONER

## 2020-01-01 PROCEDURE — 87081 CULTURE SCREEN ONLY: CPT

## 2020-01-01 PROCEDURE — 76770 US EXAM ABDO BACK WALL COMP: CPT

## 2020-01-01 PROCEDURE — 80307 DRUG TEST PRSMV CHEM ANLYZR: CPT

## 2020-01-01 PROCEDURE — 86923 COMPATIBILITY TEST ELECTRIC: CPT

## 2020-01-01 PROCEDURE — 85730 THROMBOPLASTIN TIME PARTIAL: CPT

## 2020-01-01 PROCEDURE — 71260 CT THORAX DX C+: CPT

## 2020-01-01 PROCEDURE — 84681 ASSAY OF C-PEPTIDE: CPT

## 2020-01-01 PROCEDURE — 94750 HC PULMONARY COMPLIANCE STUDY: CPT

## 2020-01-01 PROCEDURE — 99221 1ST HOSP IP/OBS SF/LOW 40: CPT | Performed by: INTERNAL MEDICINE

## 2020-01-01 PROCEDURE — 83690 ASSAY OF LIPASE: CPT

## 2020-01-01 PROCEDURE — 71045 X-RAY EXAM CHEST 1 VIEW: CPT

## 2020-01-01 PROCEDURE — 84443 ASSAY THYROID STIM HORMONE: CPT

## 2020-01-01 PROCEDURE — 72131 CT LUMBAR SPINE W/O DYE: CPT

## 2020-01-01 PROCEDURE — 0DB78ZX EXCISION OF STOMACH, PYLORUS, VIA NATURAL OR ARTIFICIAL OPENING ENDOSCOPIC, DIAGNOSTIC: ICD-10-PCS | Performed by: INTERNAL MEDICINE

## 2020-01-01 PROCEDURE — 85520 HEPARIN ASSAY: CPT

## 2020-01-01 PROCEDURE — C9113 INJ PANTOPRAZOLE SODIUM, VIA: HCPCS | Performed by: EMERGENCY MEDICINE

## 2020-01-01 PROCEDURE — 83540 ASSAY OF IRON: CPT

## 2020-01-01 PROCEDURE — 36430 TRANSFUSION BLD/BLD COMPNT: CPT

## 2020-01-01 PROCEDURE — C9113 INJ PANTOPRAZOLE SODIUM, VIA: HCPCS

## 2020-01-01 PROCEDURE — 82947 ASSAY GLUCOSE BLOOD QUANT: CPT

## 2020-01-01 PROCEDURE — 3609012400 HC EGD TRANSORAL BIOPSY SINGLE/MULTIPLE: Performed by: INTERNAL MEDICINE

## 2020-01-01 PROCEDURE — 94002 VENT MGMT INPAT INIT DAY: CPT

## 2020-01-01 PROCEDURE — 86706 HEP B SURFACE ANTIBODY: CPT

## 2020-01-01 PROCEDURE — 87040 BLOOD CULTURE FOR BACTERIA: CPT

## 2020-01-01 PROCEDURE — 87340 HEPATITIS B SURFACE AG IA: CPT

## 2020-01-01 PROCEDURE — 93005 ELECTROCARDIOGRAM TRACING: CPT

## 2020-01-01 PROCEDURE — 96366 THER/PROPH/DIAG IV INF ADDON: CPT

## 2020-01-01 PROCEDURE — 80061 LIPID PANEL: CPT

## 2020-01-01 PROCEDURE — 99152 MOD SED SAME PHYS/QHP 5/>YRS: CPT | Performed by: INTERNAL MEDICINE

## 2020-01-01 PROCEDURE — B548ZZA ULTRASONOGRAPHY OF SUPERIOR VENA CAVA, GUIDANCE: ICD-10-PCS | Performed by: RADIOLOGY

## 2020-01-01 PROCEDURE — 87449 NOS EACH ORGANISM AG IA: CPT

## 2020-01-01 PROCEDURE — 36620 INSERTION CATHETER ARTERY: CPT

## 2020-01-01 PROCEDURE — 02H633Z INSERTION OF INFUSION DEVICE INTO RIGHT ATRIUM, PERCUTANEOUS APPROACH: ICD-10-PCS | Performed by: RADIOLOGY

## 2020-01-01 PROCEDURE — 36589 REMOVAL TUNNELED CV CATH: CPT

## 2020-01-01 PROCEDURE — 5A1945Z RESPIRATORY VENTILATION, 24-96 CONSECUTIVE HOURS: ICD-10-PCS | Performed by: INTERNAL MEDICINE

## 2020-01-01 PROCEDURE — 87641 MR-STAPH DNA AMP PROBE: CPT

## 2020-01-01 PROCEDURE — 99153 MOD SED SAME PHYS/QHP EA: CPT | Performed by: INTERNAL MEDICINE

## 2020-01-01 PROCEDURE — 0W3P8ZZ CONTROL BLEEDING IN GASTROINTESTINAL TRACT, VIA NATURAL OR ARTIFICIAL OPENING ENDOSCOPIC: ICD-10-PCS | Performed by: INTERNAL MEDICINE

## 2020-01-01 PROCEDURE — 02HV33Z INSERTION OF INFUSION DEVICE INTO SUPERIOR VENA CAVA, PERCUTANEOUS APPROACH: ICD-10-PCS | Performed by: STUDENT IN AN ORGANIZED HEALTH CARE EDUCATION/TRAINING PROGRAM

## 2020-01-01 PROCEDURE — 05HM33Z INSERTION OF INFUSION DEVICE INTO RIGHT INTERNAL JUGULAR VEIN, PERCUTANEOUS APPROACH: ICD-10-PCS | Performed by: SURGERY

## 2020-01-01 PROCEDURE — 96367 TX/PROPH/DG ADDL SEQ IV INF: CPT

## 2020-01-01 PROCEDURE — 90945 DIALYSIS ONE EVALUATION: CPT

## 2020-01-01 PROCEDURE — 84132 ASSAY OF SERUM POTASSIUM: CPT

## 2020-01-01 PROCEDURE — 0BH17EZ INSERTION OF ENDOTRACHEAL AIRWAY INTO TRACHEA, VIA NATURAL OR ARTIFICIAL OPENING: ICD-10-PCS | Performed by: STUDENT IN AN ORGANIZED HEALTH CARE EDUCATION/TRAINING PROGRAM

## 2020-01-01 PROCEDURE — 73502 X-RAY EXAM HIP UNI 2-3 VIEWS: CPT

## 2020-01-01 PROCEDURE — 36558 INSERT TUNNELED CV CATH: CPT

## 2020-01-01 PROCEDURE — 74176 CT ABD & PELVIS W/O CONTRAST: CPT

## 2020-01-01 PROCEDURE — C1881 DIALYSIS ACCESS SYSTEM: HCPCS

## 2020-01-01 PROCEDURE — 89220 SPUTUM SPECIMEN COLLECTION: CPT

## 2020-01-01 PROCEDURE — 87070 CULTURE OTHR SPECIMN AEROBIC: CPT

## 2020-01-01 PROCEDURE — G0480 DRUG TEST DEF 1-7 CLASSES: HCPCS

## 2020-01-01 PROCEDURE — 93005 ELECTROCARDIOGRAM TRACING: CPT | Performed by: INTERNAL MEDICINE

## 2020-01-01 PROCEDURE — 94799 UNLISTED PULMONARY SVC/PX: CPT

## 2020-01-01 PROCEDURE — 82272 OCCULT BLD FECES 1-3 TESTS: CPT

## 2020-01-01 PROCEDURE — 82140 ASSAY OF AMMONIA: CPT

## 2020-01-01 PROCEDURE — 96365 THER/PROPH/DIAG IV INF INIT: CPT

## 2020-01-01 PROCEDURE — 84156 ASSAY OF PROTEIN URINE: CPT

## 2020-01-01 PROCEDURE — 99152 MOD SED SAME PHYS/QHP 5/>YRS: CPT

## 2020-01-01 RX ORDER — INSULIN LISPRO 100 [IU]/ML
9 INJECTION, SOLUTION INTRAVENOUS; SUBCUTANEOUS
Status: DISCONTINUED | OUTPATIENT
Start: 2020-01-01 | End: 2020-01-01

## 2020-01-01 RX ORDER — NIFEDIPINE 60 MG/1
60 TABLET, FILM COATED, EXTENDED RELEASE ORAL DAILY
Qty: 30 TABLET | Refills: 3 | Status: ON HOLD | OUTPATIENT
Start: 2020-01-01 | End: 2020-01-01

## 2020-01-01 RX ORDER — ALBUTEROL SULFATE 2.5 MG/3ML
2.5 SOLUTION RESPIRATORY (INHALATION) EVERY 4 HOURS PRN
Status: DISCONTINUED | OUTPATIENT
Start: 2020-01-01 | End: 2020-01-01 | Stop reason: HOSPADM

## 2020-01-01 RX ORDER — INSULIN LISPRO 100 [IU]/ML
10 INJECTION, SOLUTION INTRAVENOUS; SUBCUTANEOUS
Status: DISCONTINUED | OUTPATIENT
Start: 2020-01-01 | End: 2020-01-01 | Stop reason: HOSPADM

## 2020-01-01 RX ORDER — ONDANSETRON 2 MG/ML
4 INJECTION INTRAMUSCULAR; INTRAVENOUS ONCE
Status: COMPLETED | OUTPATIENT
Start: 2020-01-01 | End: 2020-01-01

## 2020-01-01 RX ORDER — LOSARTAN POTASSIUM 100 MG/1
100 TABLET ORAL DAILY
Status: DISCONTINUED | OUTPATIENT
Start: 2020-01-01 | End: 2020-01-01 | Stop reason: HOSPADM

## 2020-01-01 RX ORDER — POTASSIUM CHLORIDE 20 MEQ/1
20 TABLET, EXTENDED RELEASE ORAL 2 TIMES DAILY WITH MEALS
Status: DISCONTINUED | OUTPATIENT
Start: 2020-01-01 | End: 2020-01-01 | Stop reason: HOSPADM

## 2020-01-01 RX ORDER — INSULIN LISPRO 100 [IU]/ML
0-3 INJECTION, SOLUTION INTRAVENOUS; SUBCUTANEOUS NIGHTLY
Status: DISCONTINUED | OUTPATIENT
Start: 2020-01-01 | End: 2020-01-01 | Stop reason: HOSPADM

## 2020-01-01 RX ORDER — POTASSIUM CHLORIDE 7.45 MG/ML
10 INJECTION INTRAVENOUS PRN
Status: DISCONTINUED | OUTPATIENT
Start: 2020-01-01 | End: 2020-01-01 | Stop reason: HOSPADM

## 2020-01-01 RX ORDER — NIFEDIPINE 60 MG/1
60 TABLET, EXTENDED RELEASE ORAL DAILY
Status: DISCONTINUED | OUTPATIENT
Start: 2020-01-01 | End: 2020-01-01 | Stop reason: HOSPADM

## 2020-01-01 RX ORDER — OXYCODONE HYDROCHLORIDE 5 MG/1
5 TABLET ORAL ONCE
Status: COMPLETED | OUTPATIENT
Start: 2020-01-01 | End: 2020-01-01

## 2020-01-01 RX ORDER — ACETAMINOPHEN 325 MG/1
650 TABLET ORAL EVERY 4 HOURS PRN
Status: DISCONTINUED | OUTPATIENT
Start: 2020-01-01 | End: 2020-01-01 | Stop reason: HOSPADM

## 2020-01-01 RX ORDER — INSULIN LISPRO 100 [IU]/ML
3 INJECTION, SOLUTION INTRAVENOUS; SUBCUTANEOUS
Status: DISCONTINUED | OUTPATIENT
Start: 2020-01-01 | End: 2020-01-01

## 2020-01-01 RX ORDER — SODIUM CHLORIDE, SODIUM LACTATE, POTASSIUM CHLORIDE, CALCIUM CHLORIDE 600; 310; 30; 20 MG/100ML; MG/100ML; MG/100ML; MG/100ML
INJECTION, SOLUTION INTRAVENOUS ONCE
Status: DISCONTINUED | OUTPATIENT
Start: 2020-01-01 | End: 2020-01-01 | Stop reason: HOSPADM

## 2020-01-01 RX ORDER — DEXTROSE MONOHYDRATE 25 G/50ML
12.5 INJECTION, SOLUTION INTRAVENOUS PRN
Status: DISCONTINUED | OUTPATIENT
Start: 2020-01-01 | End: 2020-01-01 | Stop reason: ALTCHOICE

## 2020-01-01 RX ORDER — OMEGA-3S/DHA/EPA/FISH OIL/D3 300MG-1000
1000 CAPSULE ORAL DAILY
Status: DISCONTINUED | OUTPATIENT
Start: 2020-01-01 | End: 2020-01-01 | Stop reason: HOSPADM

## 2020-01-01 RX ORDER — ALBUTEROL SULFATE 2.5 MG/3ML
2.5 SOLUTION RESPIRATORY (INHALATION) EVERY 6 HOURS PRN
Status: DISCONTINUED | OUTPATIENT
Start: 2020-01-01 | End: 2020-01-01 | Stop reason: HOSPADM

## 2020-01-01 RX ORDER — ACETAMINOPHEN 325 MG/1
650 TABLET ORAL EVERY 6 HOURS PRN
Status: DISCONTINUED | OUTPATIENT
Start: 2020-01-01 | End: 2020-01-01 | Stop reason: HOSPADM

## 2020-01-01 RX ORDER — INSULIN LISPRO 100 [IU]/ML
0-6 INJECTION, SOLUTION INTRAVENOUS; SUBCUTANEOUS
Status: DISCONTINUED | OUTPATIENT
Start: 2020-01-01 | End: 2020-01-01 | Stop reason: HOSPADM

## 2020-01-01 RX ORDER — OMEGA-3S/DHA/EPA/FISH OIL/D3 300MG-1000
400 CAPSULE ORAL DAILY
COMMUNITY

## 2020-01-01 RX ORDER — PRIMIDONE 50 MG/1
50 TABLET ORAL 2 TIMES DAILY
Status: DISCONTINUED | OUTPATIENT
Start: 2020-01-01 | End: 2020-01-01 | Stop reason: HOSPADM

## 2020-01-01 RX ORDER — DEXTROSE MONOHYDRATE 50 MG/ML
100 INJECTION, SOLUTION INTRAVENOUS PRN
Status: DISCONTINUED | OUTPATIENT
Start: 2020-01-01 | End: 2020-01-01 | Stop reason: HOSPADM

## 2020-01-01 RX ORDER — HEPARIN SODIUM 1000 [USP'U]/ML
2400 INJECTION, SOLUTION INTRAVENOUS; SUBCUTANEOUS PRN
Status: DISCONTINUED | OUTPATIENT
Start: 2020-01-01 | End: 2020-01-01 | Stop reason: HOSPADM

## 2020-01-01 RX ORDER — POLYETHYLENE GLYCOL 3350 17 G/17G
17 POWDER, FOR SOLUTION ORAL DAILY PRN
Status: DISCONTINUED | OUTPATIENT
Start: 2020-01-01 | End: 2020-01-01 | Stop reason: HOSPADM

## 2020-01-01 RX ORDER — DEXTROSE MONOHYDRATE 25 G/50ML
INJECTION, SOLUTION INTRAVENOUS
Status: DISCONTINUED
Start: 2020-01-01 | End: 2020-01-01 | Stop reason: HOSPADM

## 2020-01-01 RX ORDER — INSULIN LISPRO 100 [IU]/ML
12 INJECTION, SOLUTION INTRAVENOUS; SUBCUTANEOUS
Qty: 15 PEN | Refills: 3 | Status: SHIPPED | OUTPATIENT
Start: 2020-01-01 | End: 2020-01-01

## 2020-01-01 RX ORDER — DEXTROSE AND SODIUM CHLORIDE 5; .45 G/100ML; G/100ML
INJECTION, SOLUTION INTRAVENOUS CONTINUOUS PRN
Status: DISCONTINUED | OUTPATIENT
Start: 2020-01-01 | End: 2020-01-01

## 2020-01-01 RX ORDER — LOSARTAN POTASSIUM 50 MG/1
50 TABLET ORAL DAILY
Status: CANCELLED | OUTPATIENT
Start: 2020-01-01

## 2020-01-01 RX ORDER — LOSARTAN POTASSIUM 100 MG/1
100 TABLET ORAL DAILY
Status: ON HOLD | COMMUNITY
End: 2020-01-01

## 2020-01-01 RX ORDER — SODIUM CHLORIDE 0.9 % (FLUSH) 0.9 %
10 SYRINGE (ML) INJECTION PRN
Status: DISCONTINUED | OUTPATIENT
Start: 2020-01-01 | End: 2020-01-01 | Stop reason: HOSPADM

## 2020-01-01 RX ORDER — HEPARIN SODIUM 1000 [USP'U]/ML
3200 INJECTION, SOLUTION INTRAVENOUS; SUBCUTANEOUS PRN
Status: DISCONTINUED | OUTPATIENT
Start: 2020-01-01 | End: 2020-01-01 | Stop reason: HOSPADM

## 2020-01-01 RX ORDER — FAMOTIDINE 20 MG/1
10 TABLET, FILM COATED ORAL 2 TIMES DAILY
Status: DISCONTINUED | OUTPATIENT
Start: 2020-01-01 | End: 2020-01-01

## 2020-01-01 RX ORDER — SODIUM CHLORIDE, SODIUM LACTATE, POTASSIUM CHLORIDE, AND CALCIUM CHLORIDE .6; .31; .03; .02 G/100ML; G/100ML; G/100ML; G/100ML
30 INJECTION, SOLUTION INTRAVENOUS ONCE
Status: COMPLETED | OUTPATIENT
Start: 2020-01-01 | End: 2020-01-01

## 2020-01-01 RX ORDER — HYDRALAZINE HYDROCHLORIDE 20 MG/ML
10 INJECTION INTRAMUSCULAR; INTRAVENOUS EVERY 4 HOURS PRN
Status: DISCONTINUED | OUTPATIENT
Start: 2020-01-01 | End: 2020-01-01 | Stop reason: HOSPADM

## 2020-01-01 RX ORDER — INSULIN LISPRO 100 [IU]/ML
2 INJECTION, SOLUTION INTRAVENOUS; SUBCUTANEOUS
Status: DISCONTINUED | OUTPATIENT
Start: 2020-01-01 | End: 2020-01-01

## 2020-01-01 RX ORDER — MIDAZOLAM HYDROCHLORIDE 1 MG/ML
INJECTION INTRAMUSCULAR; INTRAVENOUS PRN
Status: DISCONTINUED | OUTPATIENT
Start: 2020-01-01 | End: 2020-01-01 | Stop reason: HOSPADM

## 2020-01-01 RX ORDER — POTASSIUM CHLORIDE 7.45 MG/ML
10 INJECTION INTRAVENOUS PRN
Status: DISCONTINUED | OUTPATIENT
Start: 2020-01-01 | End: 2020-01-01

## 2020-01-01 RX ORDER — INSULIN LISPRO 100 [IU]/ML
0-6 INJECTION, SOLUTION INTRAVENOUS; SUBCUTANEOUS NIGHTLY
Status: DISCONTINUED | OUTPATIENT
Start: 2020-01-01 | End: 2020-01-01

## 2020-01-01 RX ORDER — 0.9 % SODIUM CHLORIDE 0.9 %
1000 INTRAVENOUS SOLUTION INTRAVENOUS
Status: DISPENSED | OUTPATIENT
Start: 2020-01-01 | End: 2020-01-01

## 2020-01-01 RX ORDER — CARVEDILOL 6.25 MG/1
6.25 TABLET ORAL 2 TIMES DAILY WITH MEALS
Status: DISCONTINUED | OUTPATIENT
Start: 2020-01-01 | End: 2020-01-01

## 2020-01-01 RX ORDER — CARVEDILOL 3.12 MG/1
3.12 TABLET ORAL 2 TIMES DAILY WITH MEALS
Status: DISCONTINUED | OUTPATIENT
Start: 2020-01-01 | End: 2020-01-01

## 2020-01-01 RX ORDER — NICOTINE POLACRILEX 4 MG
15 LOZENGE BUCCAL PRN
Status: DISCONTINUED | OUTPATIENT
Start: 2020-01-01 | End: 2020-01-01 | Stop reason: HOSPADM

## 2020-01-01 RX ORDER — ACETAMINOPHEN 325 MG/1
650 TABLET ORAL ONCE
Status: COMPLETED | OUTPATIENT
Start: 2020-01-01 | End: 2020-01-01

## 2020-01-01 RX ORDER — INSULIN LISPRO 100 [IU]/ML
5 INJECTION, SOLUTION INTRAVENOUS; SUBCUTANEOUS
Status: DISCONTINUED | OUTPATIENT
Start: 2020-01-01 | End: 2020-01-01

## 2020-01-01 RX ORDER — CALCIUM CARBONATE 200(500)MG
500 TABLET,CHEWABLE ORAL ONCE
Status: COMPLETED | OUTPATIENT
Start: 2020-01-01 | End: 2020-01-01

## 2020-01-01 RX ORDER — LOSARTAN POTASSIUM 50 MG/1
50 TABLET ORAL DAILY
Status: DISCONTINUED | OUTPATIENT
Start: 2020-01-01 | End: 2020-01-01

## 2020-01-01 RX ORDER — POTASSIUM CHLORIDE 750 MG/1
10 TABLET, EXTENDED RELEASE ORAL ONCE
Status: COMPLETED | OUTPATIENT
Start: 2020-01-01 | End: 2020-01-01

## 2020-01-01 RX ORDER — LABETALOL 20 MG/4 ML (5 MG/ML) INTRAVENOUS SYRINGE
10
Status: COMPLETED | OUTPATIENT
Start: 2020-01-01 | End: 2020-01-01

## 2020-01-01 RX ORDER — 0.9 % SODIUM CHLORIDE 0.9 %
1000 INTRAVENOUS SOLUTION INTRAVENOUS ONCE
Status: COMPLETED | OUTPATIENT
Start: 2020-01-01 | End: 2020-01-01

## 2020-01-01 RX ORDER — PROPRANOLOL HYDROCHLORIDE 20 MG/1
20 TABLET ORAL DAILY
Status: DISCONTINUED | OUTPATIENT
Start: 2020-01-01 | End: 2020-01-01

## 2020-01-01 RX ORDER — DEXTROSE MONOHYDRATE 25 G/50ML
12.5 INJECTION, SOLUTION INTRAVENOUS PRN
Status: DISCONTINUED | OUTPATIENT
Start: 2020-01-01 | End: 2020-01-01 | Stop reason: HOSPADM

## 2020-01-01 RX ORDER — SODIUM CHLORIDE 9 MG/ML
INJECTION, SOLUTION INTRAVENOUS
Status: DISPENSED
Start: 2020-01-01 | End: 2020-01-01

## 2020-01-01 RX ORDER — ALBUTEROL SULFATE 90 UG/1
2 AEROSOL, METERED RESPIRATORY (INHALATION) EVERY 6 HOURS PRN
Status: DISCONTINUED | OUTPATIENT
Start: 2020-01-01 | End: 2020-01-01 | Stop reason: HOSPADM

## 2020-01-01 RX ORDER — 0.9 % SODIUM CHLORIDE 0.9 %
20 INTRAVENOUS SOLUTION INTRAVENOUS ONCE
Status: DISCONTINUED | OUTPATIENT
Start: 2020-01-01 | End: 2020-01-01

## 2020-01-01 RX ORDER — ROCURONIUM BROMIDE 10 MG/ML
INJECTION, SOLUTION INTRAVENOUS
Status: DISPENSED
Start: 2020-01-01 | End: 2020-01-01

## 2020-01-01 RX ORDER — SODIUM CHLORIDE, SODIUM LACTATE, POTASSIUM CHLORIDE, CALCIUM CHLORIDE 600; 310; 30; 20 MG/100ML; MG/100ML; MG/100ML; MG/100ML
1000 INJECTION, SOLUTION INTRAVENOUS ONCE
Status: COMPLETED | OUTPATIENT
Start: 2020-01-01 | End: 2020-01-01

## 2020-01-01 RX ORDER — INSULIN LISPRO 100 [IU]/ML
10 INJECTION, SOLUTION INTRAVENOUS; SUBCUTANEOUS
Qty: 15 PEN | Refills: 3 | Status: SHIPPED | OUTPATIENT
Start: 2020-01-01 | End: 2020-01-01

## 2020-01-01 RX ORDER — INSULIN LISPRO 100 [IU]/ML
7 INJECTION, SOLUTION INTRAVENOUS; SUBCUTANEOUS
Status: DISCONTINUED | OUTPATIENT
Start: 2020-01-01 | End: 2020-01-01 | Stop reason: HOSPADM

## 2020-01-01 RX ORDER — DEXTROSE MONOHYDRATE 25 G/50ML
25 INJECTION, SOLUTION INTRAVENOUS ONCE
Status: DISCONTINUED | OUTPATIENT
Start: 2020-01-01 | End: 2020-01-01

## 2020-01-01 RX ORDER — POTASSIUM CHLORIDE 20 MEQ/1
40 TABLET, EXTENDED RELEASE ORAL PRN
Status: DISCONTINUED | OUTPATIENT
Start: 2020-01-01 | End: 2020-01-01 | Stop reason: HOSPADM

## 2020-01-01 RX ORDER — ROPINIROLE 1 MG/1
3 TABLET, FILM COATED ORAL 3 TIMES DAILY
Status: DISCONTINUED | OUTPATIENT
Start: 2020-01-01 | End: 2020-01-01 | Stop reason: HOSPADM

## 2020-01-01 RX ORDER — AMLODIPINE BESYLATE 5 MG/1
5 TABLET ORAL DAILY
Status: DISCONTINUED | OUTPATIENT
Start: 2020-01-01 | End: 2020-01-01

## 2020-01-01 RX ORDER — VALSARTAN 160 MG/1
160 TABLET ORAL DAILY
Status: DISCONTINUED | OUTPATIENT
Start: 2020-01-01 | End: 2020-01-01

## 2020-01-01 RX ORDER — BLOOD SUGAR DIAGNOSTIC
1 STRIP MISCELLANEOUS 4 TIMES DAILY
Qty: 100 EACH | Refills: 3 | Status: ON HOLD | OUTPATIENT
Start: 2020-01-01 | End: 2020-01-01 | Stop reason: HOSPADM

## 2020-01-01 RX ORDER — PAROXETINE HYDROCHLORIDE 20 MG/1
40 TABLET, FILM COATED ORAL EVERY MORNING
Status: DISCONTINUED | OUTPATIENT
Start: 2020-01-01 | End: 2020-01-01 | Stop reason: HOSPADM

## 2020-01-01 RX ORDER — DEXTROSE MONOHYDRATE 50 MG/ML
100 INJECTION, SOLUTION INTRAVENOUS PRN
Status: DISCONTINUED | OUTPATIENT
Start: 2020-01-01 | End: 2020-01-01

## 2020-01-01 RX ORDER — NIFEDIPINE 60 MG/1
60 TABLET, EXTENDED RELEASE ORAL NIGHTLY
Status: ON HOLD | COMMUNITY
Start: 2020-01-01 | End: 2020-01-01 | Stop reason: HOSPADM

## 2020-01-01 RX ORDER — ASPIRIN 81 MG/1
81 TABLET, CHEWABLE ORAL DAILY
Status: DISCONTINUED | OUTPATIENT
Start: 2020-01-01 | End: 2020-01-01 | Stop reason: HOSPADM

## 2020-01-01 RX ORDER — HEPARIN SODIUM 1000 [USP'U]/ML
1000 INJECTION, SOLUTION INTRAVENOUS; SUBCUTANEOUS ONCE
Status: COMPLETED | OUTPATIENT
Start: 2020-01-01 | End: 2020-01-01

## 2020-01-01 RX ORDER — PRIMIDONE 50 MG/1
50 TABLET ORAL 2 TIMES DAILY
COMMUNITY
Start: 2019-07-16

## 2020-01-01 RX ORDER — OMEGA-3S/DHA/EPA/FISH OIL/D3 300MG-1000
400 CAPSULE ORAL DAILY
Status: CANCELLED | OUTPATIENT
Start: 2020-01-01

## 2020-01-01 RX ORDER — PAROXETINE HYDROCHLORIDE 40 MG/1
40 TABLET, FILM COATED ORAL EVERY MORNING
Status: DISCONTINUED | OUTPATIENT
Start: 2020-01-01 | End: 2020-01-01 | Stop reason: HOSPADM

## 2020-01-01 RX ORDER — SODIUM CHLORIDE 9 MG/ML
INJECTION, SOLUTION INTRAVENOUS CONTINUOUS
Status: DISCONTINUED | OUTPATIENT
Start: 2020-01-01 | End: 2020-01-01

## 2020-01-01 RX ORDER — SODIUM CHLORIDE 0.9 % (FLUSH) 0.9 %
10 SYRINGE (ML) INJECTION EVERY 12 HOURS SCHEDULED
Status: DISCONTINUED | OUTPATIENT
Start: 2020-01-01 | End: 2020-01-01 | Stop reason: HOSPADM

## 2020-01-01 RX ORDER — SODIUM CHLORIDE 9 MG/ML
INJECTION, SOLUTION INTRAVENOUS
Status: COMPLETED
Start: 2020-01-01 | End: 2020-01-01

## 2020-01-01 RX ORDER — SODIUM CHLORIDE, SODIUM LACTATE, POTASSIUM CHLORIDE, AND CALCIUM CHLORIDE .6; .31; .03; .02 G/100ML; G/100ML; G/100ML; G/100ML
1000 INJECTION, SOLUTION INTRAVENOUS
Status: DISCONTINUED | OUTPATIENT
Start: 2020-01-01 | End: 2020-01-01

## 2020-01-01 RX ORDER — ATORVASTATIN CALCIUM 20 MG/1
10 TABLET, FILM COATED ORAL NIGHTLY
Status: DISCONTINUED | OUTPATIENT
Start: 2020-01-01 | End: 2020-01-01 | Stop reason: HOSPADM

## 2020-01-01 RX ORDER — CALCIUM CARBONATE 200(500)MG
1000 TABLET,CHEWABLE ORAL 3 TIMES DAILY PRN
Status: DISCONTINUED | OUTPATIENT
Start: 2020-01-01 | End: 2020-01-01 | Stop reason: HOSPADM

## 2020-01-01 RX ORDER — OMEGA-3S/DHA/EPA/FISH OIL/D3 300MG-1000
400 CAPSULE ORAL DAILY
Status: DISCONTINUED | OUTPATIENT
Start: 2020-01-01 | End: 2020-01-01 | Stop reason: HOSPADM

## 2020-01-01 RX ORDER — INSULIN LISPRO 100 [IU]/ML
8 INJECTION, SOLUTION INTRAVENOUS; SUBCUTANEOUS
Status: DISCONTINUED | OUTPATIENT
Start: 2020-01-01 | End: 2020-01-01

## 2020-01-01 RX ORDER — CALCIUM GLUCONATE 94 MG/ML
1 INJECTION, SOLUTION INTRAVENOUS ONCE
Status: COMPLETED | OUTPATIENT
Start: 2020-01-01 | End: 2020-01-01

## 2020-01-01 RX ORDER — ATORVASTATIN CALCIUM 20 MG/1
10 TABLET, FILM COATED ORAL DAILY
Status: CANCELLED | OUTPATIENT
Start: 2020-01-01

## 2020-01-01 RX ORDER — NIFEDIPINE 30 MG/1
60 TABLET, FILM COATED, EXTENDED RELEASE ORAL NIGHTLY
Status: DISCONTINUED | OUTPATIENT
Start: 2020-01-01 | End: 2020-01-01 | Stop reason: HOSPADM

## 2020-01-01 RX ORDER — LOSARTAN POTASSIUM 25 MG/1
25 TABLET ORAL DAILY
Status: DISCONTINUED | OUTPATIENT
Start: 2020-01-01 | End: 2020-01-01 | Stop reason: HOSPADM

## 2020-01-01 RX ORDER — ROPINIROLE 3 MG/1
3 TABLET, FILM COATED ORAL NIGHTLY
Status: ON HOLD | COMMUNITY
Start: 2020-01-01 | End: 2020-01-01 | Stop reason: CLARIF

## 2020-01-01 RX ORDER — DEXTROSE MONOHYDRATE 25 G/50ML
12.5 INJECTION, SOLUTION INTRAVENOUS PRN
Status: DISCONTINUED | OUTPATIENT
Start: 2020-01-01 | End: 2020-01-01

## 2020-01-01 RX ORDER — HYDRALAZINE HYDROCHLORIDE 20 MG/ML
5 INJECTION INTRAMUSCULAR; INTRAVENOUS EVERY 6 HOURS PRN
Status: DISCONTINUED | OUTPATIENT
Start: 2020-01-01 | End: 2020-01-01

## 2020-01-01 RX ORDER — DEXTROSE MONOHYDRATE 50 MG/ML
INJECTION, SOLUTION INTRAVENOUS
Status: DISPENSED
Start: 2020-01-01 | End: 2020-01-01

## 2020-01-01 RX ORDER — DEXTROSE MONOHYDRATE 25 G/50ML
25 INJECTION, SOLUTION INTRAVENOUS ONCE
Status: COMPLETED | OUTPATIENT
Start: 2020-01-01 | End: 2020-01-01

## 2020-01-01 RX ORDER — PAROXETINE HYDROCHLORIDE 40 MG/1
40 TABLET, FILM COATED ORAL EVERY MORNING
COMMUNITY
Start: 2020-01-01

## 2020-01-01 RX ORDER — MANNITOL 20 G/100ML
1 INJECTION, SOLUTION INTRAVENOUS ONCE
Status: DISCONTINUED | OUTPATIENT
Start: 2020-01-01 | End: 2020-01-01 | Stop reason: CLARIF

## 2020-01-01 RX ORDER — MAGNESIUM SULFATE 1 G/100ML
1 INJECTION INTRAVENOUS PRN
Status: DISCONTINUED | OUTPATIENT
Start: 2020-01-01 | End: 2020-01-01

## 2020-01-01 RX ORDER — KETAMINE HYDROCHLORIDE 50 MG/ML
INJECTION, SOLUTION, CONCENTRATE INTRAMUSCULAR; INTRAVENOUS
Status: COMPLETED
Start: 2020-01-01 | End: 2020-01-01

## 2020-01-01 RX ORDER — LOSARTAN POTASSIUM 100 MG/1
100 TABLET ORAL DAILY
Status: DISCONTINUED | OUTPATIENT
Start: 2020-01-01 | End: 2020-01-01

## 2020-01-01 RX ORDER — ATORVASTATIN CALCIUM 10 MG/1
10 TABLET, FILM COATED ORAL DAILY
Status: DISCONTINUED | OUTPATIENT
Start: 2020-01-01 | End: 2020-01-01 | Stop reason: HOSPADM

## 2020-01-01 RX ORDER — HEPARIN SODIUM 5000 [USP'U]/ML
4000 INJECTION, SOLUTION INTRAVENOUS; SUBCUTANEOUS PRN
Status: DISCONTINUED | OUTPATIENT
Start: 2020-01-01 | End: 2020-01-01 | Stop reason: ALTCHOICE

## 2020-01-01 RX ORDER — SUCCINYLCHOLINE CHLORIDE 20 MG/ML
120 INJECTION INTRAMUSCULAR; INTRAVENOUS ONCE
Status: DISCONTINUED | OUTPATIENT
Start: 2020-01-01 | End: 2020-01-01

## 2020-01-01 RX ORDER — NICOTINE POLACRILEX 4 MG
15 LOZENGE BUCCAL PRN
Status: DISCONTINUED | OUTPATIENT
Start: 2020-01-01 | End: 2020-01-01

## 2020-01-01 RX ORDER — INSULIN LISPRO 100 [IU]/ML
10 INJECTION, SOLUTION INTRAVENOUS; SUBCUTANEOUS
Qty: 15 PEN | Refills: 3 | Status: SHIPPED
Start: 2020-01-01 | End: 2020-01-01 | Stop reason: HOSPADM

## 2020-01-01 RX ORDER — POTASSIUM CHLORIDE 7.45 MG/ML
10 INJECTION INTRAVENOUS
Status: DISCONTINUED | OUTPATIENT
Start: 2020-01-01 | End: 2020-01-01

## 2020-01-01 RX ORDER — INSULIN LISPRO 100 [IU]/ML
0-12 INJECTION, SOLUTION INTRAVENOUS; SUBCUTANEOUS
Status: DISCONTINUED | OUTPATIENT
Start: 2020-01-01 | End: 2020-01-01

## 2020-01-01 RX ORDER — SODIUM CHLORIDE, SODIUM LACTATE, POTASSIUM CHLORIDE, AND CALCIUM CHLORIDE .6; .31; .03; .02 G/100ML; G/100ML; G/100ML; G/100ML
1000 INJECTION, SOLUTION INTRAVENOUS ONCE
Status: COMPLETED | OUTPATIENT
Start: 2020-01-01 | End: 2020-01-01

## 2020-01-01 RX ORDER — SODIUM CHLORIDE 450 MG/100ML
INJECTION, SOLUTION INTRAVENOUS CONTINUOUS
Status: DISCONTINUED | OUTPATIENT
Start: 2020-01-01 | End: 2020-01-01

## 2020-01-01 RX ORDER — FLUTICASONE PROPIONATE 50 MCG
2 SPRAY, SUSPENSION (ML) NASAL DAILY
Status: DISCONTINUED | OUTPATIENT
Start: 2020-01-01 | End: 2020-01-01 | Stop reason: HOSPADM

## 2020-01-01 RX ORDER — INSULIN LISPRO 100 [IU]/ML
9 INJECTION, SOLUTION INTRAVENOUS; SUBCUTANEOUS
Qty: 15 PEN | Refills: 3 | Status: SHIPPED
Start: 2020-01-01 | End: 2020-01-01 | Stop reason: HOSPADM

## 2020-01-01 RX ORDER — INSULIN LISPRO 100 [IU]/ML
6 INJECTION, SOLUTION INTRAVENOUS; SUBCUTANEOUS
Status: DISCONTINUED | OUTPATIENT
Start: 2020-01-01 | End: 2020-01-01

## 2020-01-01 RX ORDER — SODIUM CHLORIDE 9 MG/ML
INJECTION, SOLUTION INTRAVENOUS CONTINUOUS
Status: ACTIVE | OUTPATIENT
Start: 2020-01-01 | End: 2020-01-01

## 2020-01-01 RX ORDER — PROPOFOL 10 MG/ML
INJECTION, EMULSION INTRAVENOUS
Status: DISPENSED
Start: 2020-01-01 | End: 2020-01-01

## 2020-01-01 RX ORDER — HEPARIN SODIUM 5000 [USP'U]/ML
5000 INJECTION, SOLUTION INTRAVENOUS; SUBCUTANEOUS EVERY 8 HOURS SCHEDULED
Status: DISCONTINUED | OUTPATIENT
Start: 2020-01-01 | End: 2020-01-01 | Stop reason: HOSPADM

## 2020-01-01 RX ORDER — POTASSIUM CHLORIDE 20 MEQ/1
20 TABLET, EXTENDED RELEASE ORAL 2 TIMES DAILY WITH MEALS
Status: COMPLETED | OUTPATIENT
Start: 2020-01-01 | End: 2020-01-01

## 2020-01-01 RX ORDER — DEXTROSE, SODIUM CHLORIDE, AND POTASSIUM CHLORIDE 5; .45; .15 G/100ML; G/100ML; G/100ML
INJECTION INTRAVENOUS CONTINUOUS PRN
Status: DISCONTINUED | OUTPATIENT
Start: 2020-01-01 | End: 2020-01-01 | Stop reason: HOSPADM

## 2020-01-01 RX ORDER — FLUTICASONE PROPIONATE 50 MCG
1 SPRAY, SUSPENSION (ML) NASAL DAILY
Status: DISCONTINUED | OUTPATIENT
Start: 2020-01-01 | End: 2020-01-01

## 2020-01-01 RX ORDER — HEPARIN SODIUM 5000 [USP'U]/ML
80 INJECTION, SOLUTION INTRAVENOUS; SUBCUTANEOUS ONCE
Status: DISCONTINUED | OUTPATIENT
Start: 2020-01-01 | End: 2020-01-01

## 2020-01-01 RX ORDER — HYDRALAZINE HYDROCHLORIDE 20 MG/ML
10 INJECTION INTRAMUSCULAR; INTRAVENOUS EVERY 8 HOURS PRN
Status: DISCONTINUED | OUTPATIENT
Start: 2020-01-01 | End: 2020-01-01 | Stop reason: HOSPADM

## 2020-01-01 RX ORDER — INSULIN LISPRO 100 [IU]/ML
8 INJECTION, SOLUTION INTRAVENOUS; SUBCUTANEOUS
Status: DISCONTINUED | OUTPATIENT
Start: 2020-01-01 | End: 2020-01-01 | Stop reason: HOSPADM

## 2020-01-01 RX ORDER — AMPICILLIN TRIHYDRATE 250 MG
1 CAPSULE ORAL DAILY
COMMUNITY

## 2020-01-01 RX ORDER — NIFEDIPINE 30 MG/1
60 TABLET, FILM COATED, EXTENDED RELEASE ORAL DAILY
Status: DISCONTINUED | OUTPATIENT
Start: 2020-01-01 | End: 2020-01-01

## 2020-01-01 RX ORDER — INSULIN LISPRO 100 [IU]/ML
12 INJECTION, SOLUTION INTRAVENOUS; SUBCUTANEOUS
Status: DISCONTINUED | OUTPATIENT
Start: 2020-01-01 | End: 2020-01-01

## 2020-01-01 RX ORDER — LOSARTAN POTASSIUM 100 MG/1
100 TABLET ORAL DAILY
Status: ON HOLD | COMMUNITY
End: 2020-01-01 | Stop reason: HOSPADM

## 2020-01-01 RX ORDER — MAGNESIUM SULFATE 1 G/100ML
1 INJECTION INTRAVENOUS PRN
Status: DISCONTINUED | OUTPATIENT
Start: 2020-01-01 | End: 2020-01-01 | Stop reason: SDUPTHER

## 2020-01-01 RX ORDER — INSULIN LISPRO 100 [IU]/ML
0-3 INJECTION, SOLUTION INTRAVENOUS; SUBCUTANEOUS NIGHTLY
Status: DISCONTINUED | OUTPATIENT
Start: 2020-01-01 | End: 2020-01-01

## 2020-01-01 RX ORDER — HEPARIN SODIUM 5000 [USP'U]/ML
2000 INJECTION, SOLUTION INTRAVENOUS; SUBCUTANEOUS PRN
Status: DISCONTINUED | OUTPATIENT
Start: 2020-01-01 | End: 2020-01-01 | Stop reason: ALTCHOICE

## 2020-01-01 RX ORDER — SODIUM CHLORIDE, SODIUM LACTATE, POTASSIUM CHLORIDE, CALCIUM CHLORIDE 600; 310; 30; 20 MG/100ML; MG/100ML; MG/100ML; MG/100ML
1000 INJECTION, SOLUTION INTRAVENOUS CONTINUOUS
Status: DISPENSED | OUTPATIENT
Start: 2020-01-01 | End: 2020-01-01

## 2020-01-01 RX ORDER — LOSARTAN POTASSIUM 100 MG/1
100 TABLET ORAL DAILY
COMMUNITY
Start: 2018-12-04

## 2020-01-01 RX ORDER — LORAZEPAM 2 MG/ML
1 INJECTION INTRAMUSCULAR ONCE
Status: COMPLETED | OUTPATIENT
Start: 2020-01-01 | End: 2020-01-01

## 2020-01-01 RX ORDER — FUROSEMIDE 10 MG/ML
80 INJECTION INTRAMUSCULAR; INTRAVENOUS ONCE
Status: COMPLETED | OUTPATIENT
Start: 2020-01-01 | End: 2020-01-01

## 2020-01-01 RX ORDER — MAGNESIUM SULFATE 1 G/100ML
1 INJECTION INTRAVENOUS PRN
Status: DISCONTINUED | OUTPATIENT
Start: 2020-01-01 | End: 2020-01-01 | Stop reason: HOSPADM

## 2020-01-01 RX ORDER — ATORVASTATIN CALCIUM 20 MG/1
10 TABLET, FILM COATED ORAL DAILY
Status: DISCONTINUED | OUTPATIENT
Start: 2020-01-01 | End: 2020-01-01 | Stop reason: HOSPADM

## 2020-01-01 RX ORDER — PEN NEEDLE, DIABETIC 29 GAUGE
1 NEEDLE, DISPOSABLE MISCELLANEOUS DAILY
Qty: 100 EACH | Refills: 3 | Status: SHIPPED | OUTPATIENT
Start: 2020-01-01

## 2020-01-01 RX ORDER — ACETAMINOPHEN 500 MG
500 TABLET ORAL 4 TIMES DAILY PRN
Status: DISCONTINUED | OUTPATIENT
Start: 2020-01-01 | End: 2020-01-01 | Stop reason: HOSPADM

## 2020-01-01 RX ORDER — PANTOPRAZOLE SODIUM 40 MG/1
40 TABLET, DELAYED RELEASE ORAL 2 TIMES DAILY
COMMUNITY

## 2020-01-01 RX ORDER — ONDANSETRON 2 MG/ML
4 INJECTION INTRAMUSCULAR; INTRAVENOUS EVERY 6 HOURS PRN
Status: DISCONTINUED | OUTPATIENT
Start: 2020-01-01 | End: 2020-01-01 | Stop reason: HOSPADM

## 2020-01-01 RX ORDER — HEPARIN SODIUM 5000 [USP'U]/ML
40 INJECTION, SOLUTION INTRAVENOUS; SUBCUTANEOUS PRN
Status: DISCONTINUED | OUTPATIENT
Start: 2020-01-01 | End: 2020-01-01

## 2020-01-01 RX ORDER — INSULIN GLARGINE 100 [IU]/ML
20 INJECTION, SOLUTION SUBCUTANEOUS NIGHTLY
Qty: 1 VIAL | Refills: 2 | Status: SHIPPED
Start: 2020-01-01 | End: 2020-01-01 | Stop reason: HOSPADM

## 2020-01-01 RX ORDER — INSULIN LISPRO 100 [IU]/ML
0-18 INJECTION, SOLUTION INTRAVENOUS; SUBCUTANEOUS
Status: DISCONTINUED | OUTPATIENT
Start: 2020-01-01 | End: 2020-01-01

## 2020-01-01 RX ORDER — VALSARTAN 160 MG/1
160 TABLET ORAL DAILY
Status: ON HOLD | COMMUNITY
Start: 2019-06-20 | End: 2020-01-01 | Stop reason: CLARIF

## 2020-01-01 RX ORDER — ALBUTEROL SULFATE 2.5 MG/3ML
10 SOLUTION RESPIRATORY (INHALATION) ONCE
Status: COMPLETED | OUTPATIENT
Start: 2020-01-01 | End: 2020-01-01

## 2020-01-01 RX ORDER — DEXTROSE AND SODIUM CHLORIDE 5; .45 G/100ML; G/100ML
INJECTION, SOLUTION INTRAVENOUS CONTINUOUS
Status: DISCONTINUED | OUTPATIENT
Start: 2020-01-01 | End: 2020-01-01

## 2020-01-01 RX ORDER — PROMETHAZINE HYDROCHLORIDE 12.5 MG/1
12.5 TABLET ORAL EVERY 6 HOURS PRN
Status: DISCONTINUED | OUTPATIENT
Start: 2020-01-01 | End: 2020-01-01 | Stop reason: HOSPADM

## 2020-01-01 RX ORDER — ALBUTEROL SULFATE 90 UG/1
2 AEROSOL, METERED RESPIRATORY (INHALATION) EVERY 6 HOURS PRN
Status: DISCONTINUED | OUTPATIENT
Start: 2020-01-01 | End: 2020-01-01 | Stop reason: ALTCHOICE

## 2020-01-01 RX ORDER — PANTOPRAZOLE SODIUM 40 MG/10ML
40 INJECTION, POWDER, LYOPHILIZED, FOR SOLUTION INTRAVENOUS ONCE
Status: COMPLETED | OUTPATIENT
Start: 2020-01-01 | End: 2020-01-01

## 2020-01-01 RX ORDER — LORAZEPAM 2 MG/ML
0.5 INJECTION INTRAMUSCULAR ONCE
Status: DISCONTINUED | OUTPATIENT
Start: 2020-01-01 | End: 2020-01-01 | Stop reason: HOSPADM

## 2020-01-01 RX ORDER — DOCUSATE SODIUM 100 MG/1
100 CAPSULE, LIQUID FILLED ORAL DAILY
Status: DISCONTINUED | OUTPATIENT
Start: 2020-01-01 | End: 2020-01-01 | Stop reason: HOSPADM

## 2020-01-01 RX ORDER — KETAMINE HYDROCHLORIDE 50 MG/ML
100 INJECTION, SOLUTION, CONCENTRATE INTRAMUSCULAR; INTRAVENOUS ONCE
Status: COMPLETED | OUTPATIENT
Start: 2020-01-01 | End: 2020-01-01

## 2020-01-01 RX ORDER — CARVEDILOL 12.5 MG/1
12.5 TABLET ORAL 2 TIMES DAILY WITH MEALS
Status: DISCONTINUED | OUTPATIENT
Start: 2020-01-01 | End: 2020-01-01 | Stop reason: HOSPADM

## 2020-01-01 RX ORDER — INSULIN LISPRO 100 [IU]/ML
0-6 INJECTION, SOLUTION INTRAVENOUS; SUBCUTANEOUS NIGHTLY
Status: DISCONTINUED | OUTPATIENT
Start: 2020-01-01 | End: 2020-01-01 | Stop reason: HOSPADM

## 2020-01-01 RX ORDER — 0.9 % SODIUM CHLORIDE 0.9 %
30 INTRAVENOUS SOLUTION INTRAVENOUS ONCE
Status: DISCONTINUED | OUTPATIENT
Start: 2020-01-01 | End: 2020-01-01

## 2020-01-01 RX ORDER — INSULIN LISPRO 100 [IU]/ML
0-6 INJECTION, SOLUTION INTRAVENOUS; SUBCUTANEOUS
Status: DISCONTINUED | OUTPATIENT
Start: 2020-01-01 | End: 2020-01-01

## 2020-01-01 RX ORDER — MAGNESIUM SULFATE IN WATER 40 MG/ML
2 INJECTION, SOLUTION INTRAVENOUS ONCE
Status: COMPLETED | OUTPATIENT
Start: 2020-01-01 | End: 2020-01-01

## 2020-01-01 RX ORDER — INSULIN GLARGINE 100 [IU]/ML
10 INJECTION, SOLUTION SUBCUTANEOUS EVERY MORNING
Qty: 5 PEN | Refills: 0 | Status: ON HOLD | OUTPATIENT
Start: 2020-01-01 | End: 2020-01-01 | Stop reason: SDUPTHER

## 2020-01-01 RX ORDER — CALCIUM CARBONATE 200(500)MG
500 TABLET,CHEWABLE ORAL 3 TIMES DAILY PRN
Status: DISCONTINUED | OUTPATIENT
Start: 2020-01-01 | End: 2020-01-01 | Stop reason: HOSPADM

## 2020-01-01 RX ORDER — INSULIN GLARGINE 100 [IU]/ML
10 INJECTION, SOLUTION SUBCUTANEOUS NIGHTLY PRN
Qty: 5 PEN | Refills: 0 | Status: SHIPPED
Start: 2020-01-01 | End: 2020-01-01 | Stop reason: HOSPADM

## 2020-01-01 RX ORDER — CLINDAMYCIN PHOSPHATE 300 MG/50ML
300 INJECTION INTRAVENOUS EVERY 6 HOURS
Status: DISCONTINUED | OUTPATIENT
Start: 2020-01-01 | End: 2020-01-01

## 2020-01-01 RX ORDER — INSULIN LISPRO 100 [IU]/ML
4 INJECTION, SOLUTION INTRAVENOUS; SUBCUTANEOUS
Status: DISCONTINUED | OUTPATIENT
Start: 2020-01-01 | End: 2020-01-01

## 2020-01-01 RX ORDER — ACETAMINOPHEN 650 MG/1
650 SUPPOSITORY RECTAL EVERY 6 HOURS PRN
Status: DISCONTINUED | OUTPATIENT
Start: 2020-01-01 | End: 2020-01-01 | Stop reason: HOSPADM

## 2020-01-01 RX ORDER — FUROSEMIDE 10 MG/ML
40 INJECTION INTRAMUSCULAR; INTRAVENOUS DAILY
Status: DISCONTINUED | OUTPATIENT
Start: 2020-01-01 | End: 2020-01-01

## 2020-01-01 RX ORDER — DEXTROSE AND SODIUM CHLORIDE 5; .45 G/100ML; G/100ML
INJECTION, SOLUTION INTRAVENOUS CONTINUOUS PRN
Status: DISCONTINUED | OUTPATIENT
Start: 2020-01-01 | End: 2020-01-01 | Stop reason: HOSPADM

## 2020-01-01 RX ORDER — DEXTROSE AND SODIUM CHLORIDE 5; .9 G/100ML; G/100ML
INJECTION, SOLUTION INTRAVENOUS CONTINUOUS
Status: DISCONTINUED | OUTPATIENT
Start: 2020-01-01 | End: 2020-01-01

## 2020-01-01 RX ORDER — PANTOPRAZOLE SODIUM 40 MG/1
40 TABLET, DELAYED RELEASE ORAL
Status: DISCONTINUED | OUTPATIENT
Start: 2020-01-01 | End: 2020-01-01 | Stop reason: HOSPADM

## 2020-01-01 RX ORDER — PRIMIDONE 50 MG/1
50 TABLET ORAL 2 TIMES DAILY
Status: ON HOLD | COMMUNITY
End: 2020-01-01 | Stop reason: CLARIF

## 2020-01-01 RX ORDER — PANTOPRAZOLE SODIUM 40 MG/10ML
40 INJECTION, POWDER, LYOPHILIZED, FOR SOLUTION INTRAVENOUS DAILY
Status: DISCONTINUED | OUTPATIENT
Start: 2020-01-01 | End: 2020-01-01 | Stop reason: HOSPADM

## 2020-01-01 RX ORDER — ASPIRIN 81 MG/1
81 TABLET, CHEWABLE ORAL DAILY
Qty: 30 TABLET | Refills: 3 | Status: SHIPPED | OUTPATIENT
Start: 2020-01-01

## 2020-01-01 RX ORDER — ROCURONIUM BROMIDE 10 MG/ML
INJECTION, SOLUTION INTRAVENOUS
Status: COMPLETED
Start: 2020-01-01 | End: 2020-01-01

## 2020-01-01 RX ORDER — PANTOPRAZOLE SODIUM 40 MG/10ML
40 INJECTION, POWDER, LYOPHILIZED, FOR SOLUTION INTRAVENOUS 2 TIMES DAILY
Status: DISCONTINUED | OUTPATIENT
Start: 2020-01-01 | End: 2020-01-01 | Stop reason: SDUPTHER

## 2020-01-01 RX ORDER — INSULIN LISPRO 100 [IU]/ML
10 INJECTION, SOLUTION INTRAVENOUS; SUBCUTANEOUS ONCE
Status: COMPLETED | OUTPATIENT
Start: 2020-01-01 | End: 2020-01-01

## 2020-01-01 RX ORDER — HEPARIN SODIUM 5000 [USP'U]/ML
4000 INJECTION, SOLUTION INTRAVENOUS; SUBCUTANEOUS ONCE
Status: COMPLETED | OUTPATIENT
Start: 2020-01-01 | End: 2020-01-01

## 2020-01-01 RX ORDER — INSULIN LISPRO 100 [IU]/ML
0-9 INJECTION, SOLUTION INTRAVENOUS; SUBCUTANEOUS NIGHTLY
Status: DISCONTINUED | OUTPATIENT
Start: 2020-01-01 | End: 2020-01-01

## 2020-01-01 RX ORDER — BACITRACIN ZINC AND POLYMYXIN B SULFATE 500; 1000 [USP'U]/G; [USP'U]/G
OINTMENT TOPICAL ONCE
Status: COMPLETED | OUTPATIENT
Start: 2020-01-01 | End: 2020-01-01

## 2020-01-01 RX ORDER — PROPOFOL 10 MG/ML
INJECTION, EMULSION INTRAVENOUS
Status: COMPLETED
Start: 2020-01-01 | End: 2020-01-01

## 2020-01-01 RX ORDER — POTASSIUM CHLORIDE 29.8 MG/ML
20 INJECTION INTRAVENOUS PRN
Status: DISCONTINUED | OUTPATIENT
Start: 2020-01-01 | End: 2020-01-01

## 2020-01-01 RX ORDER — 0.9 % SODIUM CHLORIDE 0.9 %
20 INTRAVENOUS SOLUTION INTRAVENOUS ONCE
Status: COMPLETED | OUTPATIENT
Start: 2020-01-01 | End: 2020-01-01

## 2020-01-01 RX ORDER — HEPARIN SODIUM 5000 [USP'U]/ML
80 INJECTION, SOLUTION INTRAVENOUS; SUBCUTANEOUS PRN
Status: DISCONTINUED | OUTPATIENT
Start: 2020-01-01 | End: 2020-01-01 | Stop reason: ALTCHOICE

## 2020-01-01 RX ORDER — INSULIN GLARGINE 100 [IU]/ML
20 INJECTION, SOLUTION SUBCUTANEOUS NIGHTLY
Qty: 1 VIAL | Refills: 2 | Status: ON HOLD | OUTPATIENT
Start: 2020-01-01 | End: 2020-01-01 | Stop reason: SDUPTHER

## 2020-01-01 RX ORDER — BACITRACIN ZINC AND POLYMYXIN B SULFATE 500; 1000 [USP'U]/G; [USP'U]/G
OINTMENT TOPICAL
Qty: 1 TUBE | Refills: 2 | Status: SHIPPED | OUTPATIENT
Start: 2020-01-01 | End: 2020-01-01

## 2020-01-01 RX ORDER — NIFEDIPINE 30 MG/1
30 TABLET, FILM COATED, EXTENDED RELEASE ORAL DAILY
Status: DISCONTINUED | OUTPATIENT
Start: 2020-01-01 | End: 2020-01-01

## 2020-01-01 RX ORDER — INSULIN LISPRO 100 [IU]/ML
0-9 INJECTION, SOLUTION INTRAVENOUS; SUBCUTANEOUS NIGHTLY
Status: DISCONTINUED | OUTPATIENT
Start: 2020-01-01 | End: 2020-01-01 | Stop reason: HOSPADM

## 2020-01-01 RX ORDER — INSULIN LISPRO 100 [IU]/ML
0-12 INJECTION, SOLUTION INTRAVENOUS; SUBCUTANEOUS
Status: DISCONTINUED | OUTPATIENT
Start: 2020-01-01 | End: 2020-01-01 | Stop reason: HOSPADM

## 2020-01-01 RX ORDER — ATORVASTATIN CALCIUM 10 MG/1
10 TABLET, FILM COATED ORAL DAILY
Status: ON HOLD | COMMUNITY
Start: 2020-01-01 | End: 2020-01-01 | Stop reason: CLARIF

## 2020-01-01 RX ORDER — DOXYCYCLINE 100 MG/1
100 CAPSULE ORAL EVERY 12 HOURS SCHEDULED
Status: DISCONTINUED | OUTPATIENT
Start: 2020-01-01 | End: 2020-01-01 | Stop reason: HOSPADM

## 2020-01-01 RX ORDER — HEPARIN SODIUM 5000 [USP'U]/ML
5000 INJECTION, SOLUTION INTRAVENOUS; SUBCUTANEOUS EVERY 8 HOURS SCHEDULED
Status: DISCONTINUED | OUTPATIENT
Start: 2020-01-01 | End: 2020-01-01 | Stop reason: ALTCHOICE

## 2020-01-01 RX ORDER — NIFEDIPINE 60 MG/1
60 TABLET, EXTENDED RELEASE ORAL NIGHTLY
Status: DISCONTINUED | OUTPATIENT
Start: 2020-01-01 | End: 2020-01-01 | Stop reason: HOSPADM

## 2020-01-01 RX ORDER — PANTOPRAZOLE SODIUM 40 MG/1
40 TABLET, DELAYED RELEASE ORAL 2 TIMES DAILY
Status: DISCONTINUED | OUTPATIENT
Start: 2020-01-01 | End: 2020-01-01 | Stop reason: HOSPADM

## 2020-01-01 RX ORDER — PROMETHAZINE HYDROCHLORIDE 25 MG/1
12.5 TABLET ORAL EVERY 6 HOURS PRN
Status: DISCONTINUED | OUTPATIENT
Start: 2020-01-01 | End: 2020-01-01 | Stop reason: HOSPADM

## 2020-01-01 RX ORDER — INSULIN LISPRO 100 [IU]/ML
0-18 INJECTION, SOLUTION INTRAVENOUS; SUBCUTANEOUS
Status: DISCONTINUED | OUTPATIENT
Start: 2020-01-01 | End: 2020-01-01 | Stop reason: HOSPADM

## 2020-01-01 RX ORDER — HEPARIN SODIUM 1000 [USP'U]/ML
2600 INJECTION, SOLUTION INTRAVENOUS; SUBCUTANEOUS PRN
Status: DISCONTINUED | OUTPATIENT
Start: 2020-01-01 | End: 2020-01-01 | Stop reason: HOSPADM

## 2020-01-01 RX ORDER — HEPARIN SODIUM 10000 [USP'U]/100ML
11 INJECTION, SOLUTION INTRAVENOUS CONTINUOUS
Status: DISCONTINUED | OUTPATIENT
Start: 2020-01-01 | End: 2020-01-01

## 2020-01-01 RX ORDER — CARVEDILOL 12.5 MG/1
12.5 TABLET ORAL 2 TIMES DAILY WITH MEALS
Qty: 60 TABLET | Refills: 3 | Status: SHIPPED | OUTPATIENT
Start: 2020-01-01

## 2020-01-01 RX ORDER — SPIRONOLACTONE 25 MG/1
12.5 TABLET ORAL DAILY
Status: DISCONTINUED | OUTPATIENT
Start: 2020-01-01 | End: 2020-01-01 | Stop reason: HOSPADM

## 2020-01-01 RX ORDER — FUROSEMIDE 10 MG/ML
40 INJECTION INTRAMUSCULAR; INTRAVENOUS ONCE
Status: COMPLETED | OUTPATIENT
Start: 2020-01-01 | End: 2020-01-01

## 2020-01-01 RX ORDER — LOSARTAN POTASSIUM 25 MG/1
12.5 TABLET ORAL ONCE
Status: COMPLETED | OUTPATIENT
Start: 2020-01-01 | End: 2020-01-01

## 2020-01-01 RX ORDER — LORAZEPAM 2 MG/ML
INJECTION INTRAMUSCULAR
Status: DISPENSED
Start: 2020-01-01 | End: 2020-01-01

## 2020-01-01 RX ORDER — ASPIRIN 81 MG/1
81 TABLET ORAL DAILY
Status: DISCONTINUED | OUTPATIENT
Start: 2020-01-01 | End: 2020-01-01 | Stop reason: HOSPADM

## 2020-01-01 RX ORDER — INSULIN LISPRO 100 [IU]/ML
7 INJECTION, SOLUTION INTRAVENOUS; SUBCUTANEOUS
Qty: 6.3 ML | Refills: 0 | Status: SHIPPED
Start: 2020-01-01 | End: 2020-01-01 | Stop reason: HOSPADM

## 2020-01-01 RX ORDER — METHOCARBAMOL 500 MG/1
750 TABLET, FILM COATED ORAL 4 TIMES DAILY
Status: DISCONTINUED | OUTPATIENT
Start: 2020-01-01 | End: 2020-01-01 | Stop reason: HOSPADM

## 2020-01-01 RX ORDER — ENALAPRILAT 2.5 MG/2ML
1.25 INJECTION INTRAVENOUS ONCE
Status: COMPLETED | OUTPATIENT
Start: 2020-01-01 | End: 2020-01-01

## 2020-01-01 RX ORDER — POTASSIUM CHLORIDE 29.8 MG/ML
20 INJECTION INTRAVENOUS
Status: COMPLETED | OUTPATIENT
Start: 2020-01-01 | End: 2020-01-01

## 2020-01-01 RX ORDER — 0.9 % SODIUM CHLORIDE 0.9 %
500 INTRAVENOUS SOLUTION INTRAVENOUS ONCE
Status: DISCONTINUED | OUTPATIENT
Start: 2020-01-01 | End: 2020-01-01

## 2020-01-01 RX ORDER — POLYMYXIN B SULFATE AND TRIMETHOPRIM 1; 10000 MG/ML; [USP'U]/ML
2 SOLUTION OPHTHALMIC EVERY 6 HOURS SCHEDULED
Status: DISCONTINUED | OUTPATIENT
Start: 2020-01-01 | End: 2020-01-01 | Stop reason: HOSPADM

## 2020-01-01 RX ORDER — FENTANYL CITRATE 50 UG/ML
INJECTION, SOLUTION INTRAMUSCULAR; INTRAVENOUS PRN
Status: DISCONTINUED | OUTPATIENT
Start: 2020-01-01 | End: 2020-01-01 | Stop reason: HOSPADM

## 2020-01-01 RX ORDER — MAGNESIUM SULFATE IN WATER 40 MG/ML
2 INJECTION, SOLUTION INTRAVENOUS PRN
Status: DISCONTINUED | OUTPATIENT
Start: 2020-01-01 | End: 2020-01-01 | Stop reason: HOSPADM

## 2020-01-01 RX ORDER — NIFEDIPINE 30 MG/1
60 TABLET, FILM COATED, EXTENDED RELEASE ORAL ONCE
Status: COMPLETED | OUTPATIENT
Start: 2020-01-01 | End: 2020-01-01

## 2020-01-01 RX ORDER — FUROSEMIDE 10 MG/ML
100 INJECTION INTRAMUSCULAR; INTRAVENOUS ONCE
Status: COMPLETED | OUTPATIENT
Start: 2020-01-01 | End: 2020-01-01

## 2020-01-01 RX ORDER — DEXAMETHASONE SODIUM PHOSPHATE 4 MG/ML
6 INJECTION, SOLUTION INTRA-ARTICULAR; INTRALESIONAL; INTRAMUSCULAR; INTRAVENOUS; SOFT TISSUE DAILY
Status: DISCONTINUED | OUTPATIENT
Start: 2020-01-01 | End: 2020-01-01

## 2020-01-01 RX ORDER — PAROXETINE HYDROCHLORIDE 20 MG/1
20 TABLET, FILM COATED ORAL DAILY
Status: DISCONTINUED | OUTPATIENT
Start: 2020-01-01 | End: 2020-01-01

## 2020-01-01 RX ORDER — ROPINIROLE 3 MG/1
3 TABLET, FILM COATED ORAL 3 TIMES DAILY
COMMUNITY

## 2020-01-01 RX ORDER — LOSARTAN POTASSIUM 25 MG/1
25 TABLET ORAL DAILY
Status: DISCONTINUED | OUTPATIENT
Start: 2020-01-01 | End: 2020-01-01

## 2020-01-01 RX ORDER — LORAZEPAM 2 MG/ML
INJECTION INTRAMUSCULAR
Status: COMPLETED
Start: 2020-01-01 | End: 2020-01-01

## 2020-01-01 RX ORDER — ATORVASTATIN CALCIUM 40 MG/1
40 TABLET, FILM COATED ORAL NIGHTLY
Status: DISCONTINUED | OUTPATIENT
Start: 2020-01-01 | End: 2020-01-01 | Stop reason: HOSPADM

## 2020-01-01 RX ORDER — 0.9 % SODIUM CHLORIDE 0.9 %
15 INTRAVENOUS SOLUTION INTRAVENOUS ONCE
Status: DISCONTINUED | OUTPATIENT
Start: 2020-01-01 | End: 2020-01-01

## 2020-01-01 RX ORDER — SPIRONOLACTONE 25 MG/1
12.5 TABLET ORAL DAILY
Qty: 30 TABLET | Refills: 3 | Status: SHIPPED | OUTPATIENT
Start: 2020-01-01

## 2020-01-01 RX ORDER — HEPARIN SODIUM 10000 [USP'U]/100ML
18 INJECTION, SOLUTION INTRAVENOUS CONTINUOUS
Status: DISCONTINUED | OUTPATIENT
Start: 2020-01-01 | End: 2020-01-01

## 2020-01-01 RX ORDER — DEXTROSE MONOHYDRATE 25 G/50ML
50 INJECTION, SOLUTION INTRAVENOUS ONCE
Status: COMPLETED | OUTPATIENT
Start: 2020-01-01 | End: 2020-01-01

## 2020-01-01 RX ORDER — PANTOPRAZOLE SODIUM 40 MG/1
40 TABLET, DELAYED RELEASE ORAL
Qty: 30 TABLET | Refills: 3 | Status: SHIPPED | OUTPATIENT
Start: 2020-01-01 | End: 2020-01-01

## 2020-01-01 RX ORDER — INSULIN ASPART 100 [IU]/ML
10 INJECTION, SOLUTION INTRAVENOUS; SUBCUTANEOUS
Qty: 5 PEN | Refills: 3 | Status: SHIPPED | OUTPATIENT
Start: 2020-01-01

## 2020-01-01 RX ORDER — ALBUTEROL SULFATE 2.5 MG/3ML
2.5 SOLUTION RESPIRATORY (INHALATION) EVERY 6 HOURS PRN
Status: DISCONTINUED | OUTPATIENT
Start: 2020-01-01 | End: 2020-01-01

## 2020-01-01 RX ORDER — FLUTICASONE PROPIONATE 50 MCG
2 SPRAY, SUSPENSION (ML) NASAL DAILY
COMMUNITY
Start: 2020-01-01

## 2020-01-01 RX ORDER — QUETIAPINE FUMARATE 25 MG/1
12.5 TABLET, FILM COATED ORAL NIGHTLY
Status: DISCONTINUED | OUTPATIENT
Start: 2020-01-01 | End: 2020-01-01 | Stop reason: HOSPADM

## 2020-01-01 RX ORDER — INSULIN ASPART 100 [IU]/ML
8 INJECTION, SOLUTION INTRAVENOUS; SUBCUTANEOUS
Qty: 5 PEN | Refills: 3 | Status: ON HOLD | OUTPATIENT
Start: 2020-01-01 | End: 2020-01-01 | Stop reason: SDUPTHER

## 2020-01-01 RX ORDER — ALBUTEROL SULFATE 90 UG/1
2 AEROSOL, METERED RESPIRATORY (INHALATION) EVERY 4 HOURS PRN
Status: DISCONTINUED | OUTPATIENT
Start: 2020-01-01 | End: 2020-01-01

## 2020-01-01 RX ORDER — HEPARIN SODIUM 1000 [USP'U]/ML
10000 INJECTION, SOLUTION INTRAVENOUS; SUBCUTANEOUS ONCE
Status: DISCONTINUED | OUTPATIENT
Start: 2020-01-01 | End: 2020-01-01 | Stop reason: HOSPADM

## 2020-01-01 RX ORDER — TETRACAINE HYDROCHLORIDE 5 MG/ML
1 SOLUTION OPHTHALMIC ONCE
Status: COMPLETED | OUTPATIENT
Start: 2020-01-01 | End: 2020-01-01

## 2020-01-01 RX ORDER — ATORVASTATIN CALCIUM 10 MG/1
10 TABLET, FILM COATED ORAL NIGHTLY
Status: DISCONTINUED | OUTPATIENT
Start: 2020-01-01 | End: 2020-01-01 | Stop reason: HOSPADM

## 2020-01-01 RX ORDER — PAROXETINE HYDROCHLORIDE 40 MG/1
40 TABLET, FILM COATED ORAL EVERY MORNING
Status: ON HOLD | COMMUNITY
End: 2020-01-01 | Stop reason: CLARIF

## 2020-01-01 RX ORDER — DEXTROSE MONOHYDRATE 25 G/50ML
INJECTION, SOLUTION INTRAVENOUS
Status: DISPENSED
Start: 2020-01-01 | End: 2020-01-01

## 2020-01-01 RX ORDER — PANTOPRAZOLE SODIUM 40 MG/1
40 TABLET, DELAYED RELEASE ORAL
Qty: 30 TABLET | Refills: 3 | Status: ON HOLD | OUTPATIENT
Start: 2020-01-01 | End: 2020-01-01

## 2020-01-01 RX ORDER — PANTOPRAZOLE SODIUM 40 MG/1
40 TABLET, DELAYED RELEASE ORAL
Status: DISCONTINUED | OUTPATIENT
Start: 2020-01-01 | End: 2020-01-01

## 2020-01-01 RX ORDER — FERROUS SULFATE 325(65) MG
325 TABLET ORAL
Status: DISCONTINUED | OUTPATIENT
Start: 2020-01-01 | End: 2020-01-01 | Stop reason: HOSPADM

## 2020-01-01 RX ORDER — SODIUM CHLORIDE, SODIUM LACTATE, POTASSIUM CHLORIDE, CALCIUM CHLORIDE 600; 310; 30; 20 MG/100ML; MG/100ML; MG/100ML; MG/100ML
1000 INJECTION, SOLUTION INTRAVENOUS ONCE
Status: DISCONTINUED | OUTPATIENT
Start: 2020-01-01 | End: 2020-01-01

## 2020-01-01 RX ORDER — INSULIN LISPRO 100 [IU]/ML
0-12 INJECTION, SOLUTION INTRAVENOUS; SUBCUTANEOUS EVERY 6 HOURS SCHEDULED
Status: DISCONTINUED | OUTPATIENT
Start: 2020-01-01 | End: 2020-01-01

## 2020-01-01 RX ORDER — LOSARTAN POTASSIUM 25 MG/1
12.5 TABLET ORAL DAILY
Status: DISCONTINUED | OUTPATIENT
Start: 2020-01-01 | End: 2020-01-01

## 2020-01-01 RX ORDER — FUROSEMIDE 10 MG/ML
40 INJECTION INTRAMUSCULAR; INTRAVENOUS 2 TIMES DAILY
Status: DISCONTINUED | OUTPATIENT
Start: 2020-01-01 | End: 2020-01-01

## 2020-01-01 RX ORDER — SODIUM CHLORIDE 9 MG/ML
INJECTION, SOLUTION INTRAVENOUS CONTINUOUS
Status: CANCELLED | OUTPATIENT
Start: 2020-01-01

## 2020-01-01 RX ORDER — LORAZEPAM 2 MG/ML
0.5 INJECTION INTRAMUSCULAR ONCE
Status: COMPLETED | OUTPATIENT
Start: 2020-01-01 | End: 2020-01-01

## 2020-01-01 RX ADMIN — SODIUM CHLORIDE 8 MG/HR: 900 INJECTION, SOLUTION INTRAVENOUS at 22:29

## 2020-01-01 RX ADMIN — ROPINIROLE HYDROCHLORIDE 3 MG: 1 TABLET, FILM COATED ORAL at 15:20

## 2020-01-01 RX ADMIN — SODIUM CHLORIDE 0.1 UNITS/KG/HR: 9 INJECTION, SOLUTION INTRAVENOUS at 13:09

## 2020-01-01 RX ADMIN — PANTOPRAZOLE SODIUM 40 MG: 40 TABLET, DELAYED RELEASE ORAL at 04:08

## 2020-01-01 RX ADMIN — POTASSIUM CHLORIDE 10 MEQ: 7.46 INJECTION, SOLUTION INTRAVENOUS at 09:29

## 2020-01-01 RX ADMIN — INSULIN LISPRO 6 UNITS: 100 INJECTION, SOLUTION INTRAVENOUS; SUBCUTANEOUS at 12:06

## 2020-01-01 RX ADMIN — PRIMIDONE 50 MG: 50 TABLET ORAL at 08:28

## 2020-01-01 RX ADMIN — ATORVASTATIN CALCIUM 10 MG: 10 TABLET, FILM COATED ORAL at 08:48

## 2020-01-01 RX ADMIN — DEXMEDETOMIDINE 1.4 MCG/KG/HR: 100 INJECTION, SOLUTION, CONCENTRATE INTRAVENOUS at 02:20

## 2020-01-01 RX ADMIN — INSULIN LISPRO 3 UNITS: 100 INJECTION, SOLUTION INTRAVENOUS; SUBCUTANEOUS at 17:29

## 2020-01-01 RX ADMIN — CARBIDOPA AND LEVODOPA 2 TABLET: 25; 100 TABLET ORAL at 08:27

## 2020-01-01 RX ADMIN — ENOXAPARIN SODIUM 40 MG: 40 INJECTION SUBCUTANEOUS at 08:27

## 2020-01-01 RX ADMIN — ATORVASTATIN CALCIUM 10 MG: 20 TABLET, FILM COATED ORAL at 21:17

## 2020-01-01 RX ADMIN — Medication 10 ML: at 08:28

## 2020-01-01 RX ADMIN — CARBIDOPA AND LEVODOPA 2 TABLET: 25; 100 TABLET ORAL at 08:07

## 2020-01-01 RX ADMIN — ROPINIROLE HYDROCHLORIDE 3 MG: 2 TABLET, FILM COATED ORAL at 08:35

## 2020-01-01 RX ADMIN — Medication 10 ML: at 22:34

## 2020-01-01 RX ADMIN — VANCOMYCIN HYDROCHLORIDE 1250 MG: 10 INJECTION, POWDER, LYOPHILIZED, FOR SOLUTION INTRAVENOUS at 10:55

## 2020-01-01 RX ADMIN — ENOXAPARIN SODIUM 40 MG: 40 INJECTION SUBCUTANEOUS at 08:47

## 2020-01-01 RX ADMIN — ROPINIROLE HYDROCHLORIDE 3 MG: 2 TABLET, FILM COATED ORAL at 12:51

## 2020-01-01 RX ADMIN — INSULIN LISPRO 2 UNITS: 100 INJECTION, SOLUTION INTRAVENOUS; SUBCUTANEOUS at 08:35

## 2020-01-01 RX ADMIN — DEXMEDETOMIDINE 1.1 MCG/KG/HR: 100 INJECTION, SOLUTION, CONCENTRATE INTRAVENOUS at 11:51

## 2020-01-01 RX ADMIN — CALCIUM GLUCONATE 1 G: 94 INJECTION, SOLUTION INTRAVENOUS at 22:58

## 2020-01-01 RX ADMIN — PRIMIDONE 50 MG: 50 TABLET ORAL at 13:06

## 2020-01-01 RX ADMIN — FUROSEMIDE 40 MG: 10 INJECTION, SOLUTION INTRAMUSCULAR; INTRAVENOUS at 08:39

## 2020-01-01 RX ADMIN — Medication 1000 ML/HR: at 09:37

## 2020-01-01 RX ADMIN — HEPARIN SODIUM 5000 UNITS: 5000 INJECTION INTRAVENOUS; SUBCUTANEOUS at 22:26

## 2020-01-01 RX ADMIN — CARVEDILOL 3.12 MG: 3.12 TABLET, FILM COATED ORAL at 19:28

## 2020-01-01 RX ADMIN — CARBIDOPA AND LEVODOPA 2 TABLET: 25; 100 TABLET ORAL at 08:59

## 2020-01-01 RX ADMIN — PAROXETINE 40 MG: 40 TABLET, FILM COATED ORAL at 08:30

## 2020-01-01 RX ADMIN — ENOXAPARIN SODIUM 40 MG: 40 INJECTION SUBCUTANEOUS at 01:32

## 2020-01-01 RX ADMIN — INSULIN LISPRO 8 UNITS: 100 INJECTION, SOLUTION INTRAVENOUS; SUBCUTANEOUS at 08:31

## 2020-01-01 RX ADMIN — POTASSIUM BICARBONATE 40 MEQ: 782 TABLET, EFFERVESCENT ORAL at 10:13

## 2020-01-01 RX ADMIN — CARBIDOPA AND LEVODOPA 2 TABLET: 25; 100 TABLET ORAL at 21:49

## 2020-01-01 RX ADMIN — Medication 10 ML: at 21:04

## 2020-01-01 RX ADMIN — PRIMIDONE 50 MG: 50 TABLET ORAL at 08:45

## 2020-01-01 RX ADMIN — HEPARIN SODIUM 5000 UNITS: 5000 INJECTION INTRAVENOUS; SUBCUTANEOUS at 14:09

## 2020-01-01 RX ADMIN — INSULIN HUMAN 10 UNITS: 100 INJECTION, SOLUTION PARENTERAL at 10:04

## 2020-01-01 RX ADMIN — SODIUM CHLORIDE 1000 ML: 9 INJECTION, SOLUTION INTRAVENOUS at 13:01

## 2020-01-01 RX ADMIN — SODIUM CHLORIDE, SODIUM LACTATE, POTASSIUM CHLORIDE, AND CALCIUM CHLORIDE 1000 ML: .6; .31; .03; .02 INJECTION, SOLUTION INTRAVENOUS at 10:50

## 2020-01-01 RX ADMIN — PAROXETINE HYDROCHLORIDE 40 MG: 20 TABLET, FILM COATED ORAL at 21:21

## 2020-01-01 RX ADMIN — CARBIDOPA AND LEVODOPA 2 TABLET: 25; 100 TABLET ORAL at 21:17

## 2020-01-01 RX ADMIN — ENALAPRILAT 1.25 MG: 2.5 INJECTION INTRAVENOUS at 21:21

## 2020-01-01 RX ADMIN — SODIUM CHLORIDE 6.6 UNITS/HR: 900 INJECTION, SOLUTION INTRAVENOUS at 11:14

## 2020-01-01 RX ADMIN — ROPINIROLE HYDROCHLORIDE 3 MG: 2 TABLET, FILM COATED ORAL at 08:31

## 2020-01-01 RX ADMIN — PAROXETINE HYDROCHLORIDE 40 MG: 20 TABLET, FILM COATED ORAL at 08:07

## 2020-01-01 RX ADMIN — PANTOPRAZOLE SODIUM 40 MG: 40 TABLET, DELAYED RELEASE ORAL at 05:56

## 2020-01-01 RX ADMIN — HEPARIN SODIUM 5000 UNITS: 5000 INJECTION INTRAVENOUS; SUBCUTANEOUS at 09:32

## 2020-01-01 RX ADMIN — ALBUTEROL SULFATE 2.5 MG: 2.5 SOLUTION RESPIRATORY (INHALATION) at 18:40

## 2020-01-01 RX ADMIN — INSULIN GLARGINE 50 UNITS: 100 INJECTION, SOLUTION SUBCUTANEOUS at 21:42

## 2020-01-01 RX ADMIN — CARBIDOPA AND LEVODOPA 2 TABLET: 25; 100 TABLET ORAL at 04:16

## 2020-01-01 RX ADMIN — PAROXETINE HYDROCHLORIDE 40 MG: 20 TABLET, FILM COATED ORAL at 20:00

## 2020-01-01 RX ADMIN — FUROSEMIDE 40 MG: 10 INJECTION, SOLUTION INTRAMUSCULAR; INTRAVENOUS at 19:28

## 2020-01-01 RX ADMIN — PANTOPRAZOLE SODIUM 40 MG: 40 TABLET, DELAYED RELEASE ORAL at 10:19

## 2020-01-01 RX ADMIN — INSULIN LISPRO 8 UNITS: 100 INJECTION, SOLUTION INTRAVENOUS; SUBCUTANEOUS at 08:49

## 2020-01-01 RX ADMIN — ANTACID TABLETS 500 MG: 500 TABLET, CHEWABLE ORAL at 20:58

## 2020-01-01 RX ADMIN — INSULIN LISPRO 1 UNITS: 100 INJECTION, SOLUTION INTRAVENOUS; SUBCUTANEOUS at 13:43

## 2020-01-01 RX ADMIN — ATORVASTATIN CALCIUM 10 MG: 20 TABLET, FILM COATED ORAL at 22:27

## 2020-01-01 RX ADMIN — INSULIN LISPRO 10 UNITS: 100 INJECTION, SOLUTION INTRAVENOUS; SUBCUTANEOUS at 12:17

## 2020-01-01 RX ADMIN — POLYMYXIN B SULFATE AND TRIMETHOPRIM SULFATE 2 DROP: 100000; 1 SOLUTION/ DROPS OPHTHALMIC at 05:19

## 2020-01-01 RX ADMIN — FAMOTIDINE 10 MG: 10 INJECTION, SOLUTION INTRAVENOUS at 20:34

## 2020-01-01 RX ADMIN — FUROSEMIDE 40 MG: 10 INJECTION, SOLUTION INTRAMUSCULAR; INTRAVENOUS at 07:59

## 2020-01-01 RX ADMIN — PRIMIDONE 50 MG: 50 TABLET ORAL at 01:33

## 2020-01-01 RX ADMIN — FAMOTIDINE 10 MG: 10 INJECTION, SOLUTION INTRAVENOUS at 19:29

## 2020-01-01 RX ADMIN — SODIUM CHLORIDE 0.04 UNITS/KG/HR: 9 INJECTION, SOLUTION INTRAVENOUS at 08:57

## 2020-01-01 RX ADMIN — INSULIN LISPRO 2 UNITS: 100 INJECTION, SOLUTION INTRAVENOUS; SUBCUTANEOUS at 08:19

## 2020-01-01 RX ADMIN — ONDANSETRON 4 MG: 2 INJECTION INTRAMUSCULAR; INTRAVENOUS at 12:19

## 2020-01-01 RX ADMIN — LOSARTAN POTASSIUM 100 MG: 100 TABLET, FILM COATED ORAL at 09:48

## 2020-01-01 RX ADMIN — ROPINIROLE HYDROCHLORIDE 3 MG: 1 TABLET, FILM COATED ORAL at 21:21

## 2020-01-01 RX ADMIN — PANTOPRAZOLE SODIUM 40 MG: 40 TABLET, DELAYED RELEASE ORAL at 23:33

## 2020-01-01 RX ADMIN — POLYMYXIN B SULFATE AND TRIMETHOPRIM SULFATE 2 DROP: 100000; 1 SOLUTION/ DROPS OPHTHALMIC at 00:30

## 2020-01-01 RX ADMIN — ROPINIROLE HYDROCHLORIDE 3 MG: 2 TABLET, FILM COATED ORAL at 15:09

## 2020-01-01 RX ADMIN — FUROSEMIDE 5 MG/HR: 10 INJECTION, SOLUTION INTRAMUSCULAR; INTRAVENOUS at 20:57

## 2020-01-01 RX ADMIN — ATORVASTATIN CALCIUM 40 MG: 40 TABLET, FILM COATED ORAL at 21:04

## 2020-01-01 RX ADMIN — INSULIN LISPRO 3 UNITS: 100 INJECTION, SOLUTION INTRAVENOUS; SUBCUTANEOUS at 13:14

## 2020-01-01 RX ADMIN — INSULIN LISPRO 3 UNITS: 100 INJECTION, SOLUTION INTRAVENOUS; SUBCUTANEOUS at 13:19

## 2020-01-01 RX ADMIN — FAMOTIDINE 10 MG: 20 TABLET ORAL at 07:58

## 2020-01-01 RX ADMIN — INSULIN LISPRO 2 UNITS: 100 INJECTION, SOLUTION INTRAVENOUS; SUBCUTANEOUS at 17:29

## 2020-01-01 RX ADMIN — POTASSIUM CHLORIDE 10 MEQ: 7.46 INJECTION, SOLUTION INTRAVENOUS at 05:26

## 2020-01-01 RX ADMIN — CARBIDOPA AND LEVODOPA 2 TABLET: 25; 100 TABLET ORAL at 13:52

## 2020-01-01 RX ADMIN — ASPIRIN 81 MG: 81 TABLET, COATED ORAL at 10:06

## 2020-01-01 RX ADMIN — INSULIN LISPRO 4 UNITS: 100 INJECTION, SOLUTION INTRAVENOUS; SUBCUTANEOUS at 12:34

## 2020-01-01 RX ADMIN — Medication 10 ML: at 22:23

## 2020-01-01 RX ADMIN — CEFTRIAXONE 1 G: 1 INJECTION, POWDER, FOR SOLUTION INTRAMUSCULAR; INTRAVENOUS at 23:48

## 2020-01-01 RX ADMIN — PRIMIDONE 50 MG: 50 TABLET ORAL at 22:43

## 2020-01-01 RX ADMIN — VANCOMYCIN HYDROCHLORIDE 1000 MG: 10 INJECTION, POWDER, LYOPHILIZED, FOR SOLUTION INTRAVENOUS at 08:50

## 2020-01-01 RX ADMIN — ROPINIROLE HYDROCHLORIDE 3 MG: 2 TABLET, FILM COATED ORAL at 01:43

## 2020-01-01 RX ADMIN — CARBIDOPA AND LEVODOPA 2 TABLET: 25; 100 TABLET ORAL at 13:23

## 2020-01-01 RX ADMIN — CARBIDOPA AND LEVODOPA 2 TABLET: 25; 100 TABLET ORAL at 14:11

## 2020-01-01 RX ADMIN — PRIMIDONE 50 MG: 50 TABLET ORAL at 10:19

## 2020-01-01 RX ADMIN — POTASSIUM PHOSPHATE, MONOBASIC 250 MG: 500 TABLET, SOLUBLE ORAL at 13:10

## 2020-01-01 RX ADMIN — IRON SUCROSE 200 MG: 20 INJECTION, SOLUTION INTRAVENOUS at 11:16

## 2020-01-01 RX ADMIN — Medication 10 ML: at 22:01

## 2020-01-01 RX ADMIN — INSULIN LISPRO 10 UNITS: 100 INJECTION, SOLUTION INTRAVENOUS; SUBCUTANEOUS at 10:15

## 2020-01-01 RX ADMIN — POLYMYXIN B SULFATE AND TRIMETHOPRIM SULFATE 2 DROP: 100000; 1 SOLUTION/ DROPS OPHTHALMIC at 18:15

## 2020-01-01 RX ADMIN — Medication 10 ML: at 20:52

## 2020-01-01 RX ADMIN — PRIMIDONE 50 MG: 50 TABLET ORAL at 20:16

## 2020-01-01 RX ADMIN — ROPINIROLE HYDROCHLORIDE 3 MG: 1 TABLET, FILM COATED ORAL at 20:45

## 2020-01-01 RX ADMIN — INSULIN LISPRO 5 UNITS: 100 INJECTION, SOLUTION INTRAVENOUS; SUBCUTANEOUS at 21:25

## 2020-01-01 RX ADMIN — PRIMIDONE 50 MG: 50 TABLET ORAL at 20:14

## 2020-01-01 RX ADMIN — INSULIN LISPRO 3 UNITS: 100 INJECTION, SOLUTION INTRAVENOUS; SUBCUTANEOUS at 13:04

## 2020-01-01 RX ADMIN — PANTOPRAZOLE SODIUM 40 MG: 40 INJECTION, POWDER, FOR SOLUTION INTRAVENOUS at 10:42

## 2020-01-01 RX ADMIN — DOXYCYCLINE 100 MG: 100 CAPSULE ORAL at 20:29

## 2020-01-01 RX ADMIN — CARBIDOPA AND LEVODOPA 2 TABLET: 25; 100 TABLET ORAL at 08:26

## 2020-01-01 RX ADMIN — DEXTROSE 50 % IN WATER (D50W) INTRAVENOUS SYRINGE 50 ML: at 13:48

## 2020-01-01 RX ADMIN — PANTOPRAZOLE SODIUM 40 MG: 40 TABLET, DELAYED RELEASE ORAL at 17:56

## 2020-01-01 RX ADMIN — ANTACID TABLETS 500 MG: 500 TABLET, CHEWABLE ORAL at 06:24

## 2020-01-01 RX ADMIN — CEFEPIME HYDROCHLORIDE 1 G: 1 INJECTION, POWDER, FOR SOLUTION INTRAMUSCULAR; INTRAVENOUS at 14:59

## 2020-01-01 RX ADMIN — PRIMIDONE 50 MG: 50 TABLET ORAL at 13:33

## 2020-01-01 RX ADMIN — LOSARTAN POTASSIUM 100 MG: 100 TABLET, FILM COATED ORAL at 08:28

## 2020-01-01 RX ADMIN — DEXMEDETOMIDINE 1.4 MCG/KG/HR: 100 INJECTION, SOLUTION, CONCENTRATE INTRAVENOUS at 05:52

## 2020-01-01 RX ADMIN — ONDANSETRON 4 MG: 2 INJECTION INTRAMUSCULAR; INTRAVENOUS at 04:59

## 2020-01-01 RX ADMIN — CARBIDOPA AND LEVODOPA 2 TABLET: 25; 100 TABLET ORAL at 08:30

## 2020-01-01 RX ADMIN — PAROXETINE HYDROCHLORIDE 40 MG: 20 TABLET, FILM COATED ORAL at 08:24

## 2020-01-01 RX ADMIN — CARVEDILOL 3.12 MG: 3.12 TABLET, FILM COATED ORAL at 07:59

## 2020-01-01 RX ADMIN — ENOXAPARIN SODIUM 40 MG: 40 INJECTION SUBCUTANEOUS at 20:13

## 2020-01-01 RX ADMIN — HEPARIN SODIUM 5000 UNITS: 5000 INJECTION INTRAVENOUS; SUBCUTANEOUS at 15:19

## 2020-01-01 RX ADMIN — CARBIDOPA AND LEVODOPA 2 TABLET: 25; 100 TABLET ORAL at 15:31

## 2020-01-01 RX ADMIN — ATORVASTATIN CALCIUM 10 MG: 10 TABLET, FILM COATED ORAL at 10:19

## 2020-01-01 RX ADMIN — DEXTROSE AND SODIUM CHLORIDE: 5; 450 INJECTION, SOLUTION INTRAVENOUS at 04:49

## 2020-01-01 RX ADMIN — PRIMIDONE 50 MG: 50 TABLET ORAL at 08:31

## 2020-01-01 RX ADMIN — ROPINIROLE 3 MG: 2 TABLET, FILM COATED ORAL at 13:14

## 2020-01-01 RX ADMIN — HEPARIN SODIUM 5000 UNITS: 5000 INJECTION INTRAVENOUS; SUBCUTANEOUS at 15:40

## 2020-01-01 RX ADMIN — INSULIN LISPRO 3 UNITS: 100 INJECTION, SOLUTION INTRAVENOUS; SUBCUTANEOUS at 18:45

## 2020-01-01 RX ADMIN — HEPARIN SODIUM 5000 UNITS: 5000 INJECTION INTRAVENOUS; SUBCUTANEOUS at 06:22

## 2020-01-01 RX ADMIN — ASPIRIN 81 MG: 81 TABLET, CHEWABLE ORAL at 09:21

## 2020-01-01 RX ADMIN — NIFEDIPINE 60 MG: 60 TABLET, FILM COATED, EXTENDED RELEASE ORAL at 17:22

## 2020-01-01 RX ADMIN — CARBIDOPA AND LEVODOPA 2 TABLET: 25; 100 TABLET ORAL at 21:21

## 2020-01-01 RX ADMIN — LOSARTAN POTASSIUM 25 MG: 25 TABLET, FILM COATED ORAL at 07:58

## 2020-01-01 RX ADMIN — SODIUM CHLORIDE, POTASSIUM CHLORIDE, SODIUM LACTATE AND CALCIUM CHLORIDE 1000 ML: 600; 310; 30; 20 INJECTION, SOLUTION INTRAVENOUS at 15:09

## 2020-01-01 RX ADMIN — SODIUM BICARBONATE 50 MEQ: 84 INJECTION, SOLUTION INTRAVENOUS at 10:06

## 2020-01-01 RX ADMIN — CARBIDOPA AND LEVODOPA 2 TABLET: 25; 100 TABLET ORAL at 09:48

## 2020-01-01 RX ADMIN — INSULIN LISPRO 4 UNITS: 100 INJECTION, SOLUTION INTRAVENOUS; SUBCUTANEOUS at 06:16

## 2020-01-01 RX ADMIN — CARBIDOPA AND LEVODOPA 2 TABLET: 25; 100 TABLET ORAL at 21:37

## 2020-01-01 RX ADMIN — SODIUM CHLORIDE 1000 ML: 9 INJECTION, SOLUTION INTRAVENOUS at 08:54

## 2020-01-01 RX ADMIN — HEPARIN SODIUM 2600 UNITS: 1000 INJECTION INTRAVENOUS; SUBCUTANEOUS at 12:16

## 2020-01-01 RX ADMIN — CARBIDOPA AND LEVODOPA 2 TABLET: 25; 100 TABLET ORAL at 19:28

## 2020-01-01 RX ADMIN — PRIMIDONE 50 MG: 50 TABLET ORAL at 08:07

## 2020-01-01 RX ADMIN — POLYMYXIN B SULFATE AND TRIMETHOPRIM SULFATE 2 DROP: 100000; 1 SOLUTION/ DROPS OPHTHALMIC at 16:49

## 2020-01-01 RX ADMIN — ROPINIROLE HYDROCHLORIDE 3 MG: 1 TABLET, FILM COATED ORAL at 08:47

## 2020-01-01 RX ADMIN — MEROPENEM 1 G: 1 INJECTION, POWDER, FOR SOLUTION INTRAVENOUS at 16:10

## 2020-01-01 RX ADMIN — LOSARTAN POTASSIUM 100 MG: 100 TABLET, FILM COATED ORAL at 10:21

## 2020-01-01 RX ADMIN — PRIMIDONE 50 MG: 50 TABLET ORAL at 09:54

## 2020-01-01 RX ADMIN — SODIUM CHLORIDE: 9 INJECTION, SOLUTION INTRAVENOUS at 17:43

## 2020-01-01 RX ADMIN — LOSARTAN POTASSIUM 12.5 MG: 25 TABLET, FILM COATED ORAL at 18:40

## 2020-01-01 RX ADMIN — ROPINIROLE HYDROCHLORIDE 3 MG: 2 TABLET, FILM COATED ORAL at 10:26

## 2020-01-01 RX ADMIN — INSULIN LISPRO 2 UNITS: 100 INJECTION, SOLUTION INTRAVENOUS; SUBCUTANEOUS at 12:53

## 2020-01-01 RX ADMIN — CARBIDOPA AND LEVODOPA 2 TABLET: 25; 100 TABLET ORAL at 08:25

## 2020-01-01 RX ADMIN — POLYMYXIN B SULFATE AND TRIMETHOPRIM SULFATE 2 DROP: 100000; 1 SOLUTION/ DROPS OPHTHALMIC at 18:09

## 2020-01-01 RX ADMIN — ROPINIROLE HYDROCHLORIDE 3 MG: 1 TABLET, FILM COATED ORAL at 09:48

## 2020-01-01 RX ADMIN — SUGAMMADEX 1200 MG: 100 INJECTION, SOLUTION INTRAVENOUS at 11:44

## 2020-01-01 RX ADMIN — INSULIN LISPRO 9 UNITS: 100 INJECTION, SOLUTION INTRAVENOUS; SUBCUTANEOUS at 18:09

## 2020-01-01 RX ADMIN — ONDANSETRON 4 MG: 2 INJECTION INTRAMUSCULAR; INTRAVENOUS at 08:23

## 2020-01-01 RX ADMIN — PRIMIDONE 50 MG: 50 TABLET ORAL at 09:47

## 2020-01-01 RX ADMIN — INSULIN LISPRO 10 UNITS: 100 INJECTION, SOLUTION INTRAVENOUS; SUBCUTANEOUS at 12:16

## 2020-01-01 RX ADMIN — ATORVASTATIN CALCIUM 10 MG: 10 TABLET, FILM COATED ORAL at 08:30

## 2020-01-01 RX ADMIN — FAMOTIDINE 10 MG: 10 INJECTION, SOLUTION INTRAVENOUS at 08:49

## 2020-01-01 RX ADMIN — Medication 10 ML: at 08:27

## 2020-01-01 RX ADMIN — Medication 10 ML: at 08:32

## 2020-01-01 RX ADMIN — DEXMEDETOMIDINE 1.2 MCG/KG/HR: 100 INJECTION, SOLUTION, CONCENTRATE INTRAVENOUS at 20:57

## 2020-01-01 RX ADMIN — PAROXETINE HYDROCHLORIDE 40 MG: 20 TABLET, FILM COATED ORAL at 22:09

## 2020-01-01 RX ADMIN — PAROXETINE 40 MG: 40 TABLET, FILM COATED ORAL at 10:19

## 2020-01-01 RX ADMIN — Medication 10 ML: at 21:29

## 2020-01-01 RX ADMIN — INSULIN LISPRO 2 UNITS: 100 INJECTION, SOLUTION INTRAVENOUS; SUBCUTANEOUS at 20:29

## 2020-01-01 RX ADMIN — PAROXETINE 40 MG: 40 TABLET, FILM COATED ORAL at 10:18

## 2020-01-01 RX ADMIN — Medication 10 ML: at 00:30

## 2020-01-01 RX ADMIN — CARBIDOPA AND LEVODOPA 2 TABLET: 25; 100 TABLET ORAL at 17:27

## 2020-01-01 RX ADMIN — PAROXETINE HYDROCHLORIDE 40 MG: 20 TABLET, FILM COATED ORAL at 13:33

## 2020-01-01 RX ADMIN — ATORVASTATIN CALCIUM 10 MG: 10 TABLET, FILM COATED ORAL at 09:48

## 2020-01-01 RX ADMIN — SODIUM CHLORIDE 0.1 UNITS/KG/HR: 9 INJECTION, SOLUTION INTRAVENOUS at 11:35

## 2020-01-01 RX ADMIN — SODIUM CHLORIDE, POTASSIUM CHLORIDE, SODIUM LACTATE AND CALCIUM CHLORIDE 1000 ML: 600; 310; 30; 20 INJECTION, SOLUTION INTRAVENOUS at 17:34

## 2020-01-01 RX ADMIN — INSULIN LISPRO 6 UNITS: 100 INJECTION, SOLUTION INTRAVENOUS; SUBCUTANEOUS at 10:16

## 2020-01-01 RX ADMIN — CARBIDOPA AND LEVODOPA 2 TABLET: 25; 100 TABLET ORAL at 14:35

## 2020-01-01 RX ADMIN — FUROSEMIDE 40 MG: 10 INJECTION, SOLUTION INTRAMUSCULAR; INTRAVENOUS at 10:12

## 2020-01-01 RX ADMIN — INSULIN HUMAN 30 UNITS: 100 INJECTION, SUSPENSION SUBCUTANEOUS at 12:03

## 2020-01-01 RX ADMIN — CARBIDOPA AND LEVODOPA 2 TABLET: 25; 100 TABLET ORAL at 15:09

## 2020-01-01 RX ADMIN — LABETALOL 20 MG/4 ML (5 MG/ML) INTRAVENOUS SYRINGE 10 MG: at 04:29

## 2020-01-01 RX ADMIN — FLUORESCEIN SODIUM 1 MG: 1 STRIP OPHTHALMIC at 13:42

## 2020-01-01 RX ADMIN — PRIMIDONE 50 MG: 50 TABLET ORAL at 21:32

## 2020-01-01 RX ADMIN — ROPINIROLE HYDROCHLORIDE 3 MG: 1 TABLET, FILM COATED ORAL at 17:26

## 2020-01-01 RX ADMIN — INSULIN GLARGINE 10 UNITS: 100 INJECTION, SOLUTION SUBCUTANEOUS at 12:44

## 2020-01-01 RX ADMIN — INSULIN HUMAN 8 UNITS: 100 INJECTION, SUSPENSION SUBCUTANEOUS at 17:10

## 2020-01-01 RX ADMIN — MEROPENEM 1 G: 1 INJECTION, POWDER, FOR SOLUTION INTRAVENOUS at 11:39

## 2020-01-01 RX ADMIN — CARBIDOPA AND LEVODOPA 2 TABLET: 25; 100 TABLET ORAL at 21:22

## 2020-01-01 RX ADMIN — NIFEDIPINE 60 MG: 60 TABLET, FILM COATED, EXTENDED RELEASE ORAL at 10:19

## 2020-01-01 RX ADMIN — CARBIDOPA AND LEVODOPA 2 TABLET: 25; 100 TABLET ORAL at 10:18

## 2020-01-01 RX ADMIN — PAROXETINE 40 MG: 40 TABLET, FILM COATED ORAL at 09:07

## 2020-01-01 RX ADMIN — CARBIDOPA AND LEVODOPA 2 TABLET: 25; 100 TABLET ORAL at 08:22

## 2020-01-01 RX ADMIN — VANCOMYCIN HYDROCHLORIDE 1500 MG: 10 INJECTION, POWDER, LYOPHILIZED, FOR SOLUTION INTRAVENOUS at 16:18

## 2020-01-01 RX ADMIN — INSULIN GLARGINE 20 UNITS: 100 INJECTION, SOLUTION SUBCUTANEOUS at 02:10

## 2020-01-01 RX ADMIN — DOXYCYCLINE 100 MG: 100 CAPSULE ORAL at 13:06

## 2020-01-01 RX ADMIN — INSULIN HUMAN 10 UNITS: 100 INJECTION, SOLUTION PARENTERAL at 17:49

## 2020-01-01 RX ADMIN — INSULIN LISPRO 3 UNITS: 100 INJECTION, SOLUTION INTRAVENOUS; SUBCUTANEOUS at 09:22

## 2020-01-01 RX ADMIN — CARBIDOPA AND LEVODOPA 2 TABLET: 25; 100 TABLET ORAL at 20:16

## 2020-01-01 RX ADMIN — MAGNESIUM SULFATE HEPTAHYDRATE 2 G: 40 INJECTION, SOLUTION INTRAVENOUS at 20:18

## 2020-01-01 RX ADMIN — QUETIAPINE FUMARATE 12.5 MG: 25 TABLET ORAL at 21:05

## 2020-01-01 RX ADMIN — ATORVASTATIN CALCIUM 10 MG: 20 TABLET, FILM COATED ORAL at 23:27

## 2020-01-01 RX ADMIN — ROPINIROLE 3 MG: 2 TABLET, FILM COATED ORAL at 08:24

## 2020-01-01 RX ADMIN — CARBIDOPA AND LEVODOPA 2 TABLET: 25; 100 TABLET ORAL at 08:28

## 2020-01-01 RX ADMIN — CARBIDOPA AND LEVODOPA 2 TABLET: 25; 100 TABLET ORAL at 22:01

## 2020-01-01 RX ADMIN — INSULIN LISPRO 3 UNITS: 100 INJECTION, SOLUTION INTRAVENOUS; SUBCUTANEOUS at 18:28

## 2020-01-01 RX ADMIN — HEPARIN SODIUM 5000 UNITS: 5000 INJECTION INTRAVENOUS; SUBCUTANEOUS at 13:14

## 2020-01-01 RX ADMIN — ROPINIROLE HYDROCHLORIDE 3 MG: 1 TABLET, FILM COATED ORAL at 07:35

## 2020-01-01 RX ADMIN — PAROXETINE HYDROCHLORIDE 40 MG: 20 TABLET, FILM COATED ORAL at 09:37

## 2020-01-01 RX ADMIN — PANTOPRAZOLE SODIUM 40 MG: 40 TABLET, DELAYED RELEASE ORAL at 15:04

## 2020-01-01 RX ADMIN — PRIMIDONE 50 MG: 50 TABLET ORAL at 21:09

## 2020-01-01 RX ADMIN — ATORVASTATIN CALCIUM 10 MG: 20 TABLET, FILM COATED ORAL at 08:59

## 2020-01-01 RX ADMIN — ONDANSETRON 4 MG: 2 INJECTION INTRAMUSCULAR; INTRAVENOUS at 18:50

## 2020-01-01 RX ADMIN — HEPARIN SODIUM 5000 UNITS: 5000 INJECTION INTRAVENOUS; SUBCUTANEOUS at 05:18

## 2020-01-01 RX ADMIN — INSULIN LISPRO 2 UNITS: 100 INJECTION, SOLUTION INTRAVENOUS; SUBCUTANEOUS at 12:16

## 2020-01-01 RX ADMIN — CARBIDOPA AND LEVODOPA 2 TABLET: 25; 100 TABLET ORAL at 08:21

## 2020-01-01 RX ADMIN — FAMOTIDINE 10 MG: 10 INJECTION, SOLUTION INTRAVENOUS at 20:57

## 2020-01-01 RX ADMIN — PRIMIDONE 50 MG: 50 TABLET ORAL at 20:30

## 2020-01-01 RX ADMIN — ACETAMINOPHEN 650 MG: 325 TABLET ORAL at 12:45

## 2020-01-01 RX ADMIN — FAMOTIDINE 10 MG: 10 INJECTION, SOLUTION INTRAVENOUS at 08:21

## 2020-01-01 RX ADMIN — DEXTROSE AND SODIUM CHLORIDE: 5; 450 INJECTION, SOLUTION INTRAVENOUS at 21:45

## 2020-01-01 RX ADMIN — DEXMEDETOMIDINE 1.2 MCG/KG/HR: 100 INJECTION, SOLUTION, CONCENTRATE INTRAVENOUS at 04:13

## 2020-01-01 RX ADMIN — SODIUM CHLORIDE 3.88 UNITS/HR: 900 INJECTION, SOLUTION INTRAVENOUS at 09:00

## 2020-01-01 RX ADMIN — DARBEPOETIN ALFA 100 MCG: 100 INJECTION, SOLUTION INTRAVENOUS; SUBCUTANEOUS at 11:52

## 2020-01-01 RX ADMIN — FUROSEMIDE 5 MG/HR: 10 INJECTION, SOLUTION INTRAMUSCULAR; INTRAVENOUS at 16:08

## 2020-01-01 RX ADMIN — ALTEPLASE 1 MG: 2.2 INJECTION, POWDER, LYOPHILIZED, FOR SOLUTION INTRAVENOUS at 18:13

## 2020-01-01 RX ADMIN — POTASSIUM PHOSPHATE, MONOBASIC 250 MG: 500 TABLET, SOLUBLE ORAL at 16:34

## 2020-01-01 RX ADMIN — NIFEDIPINE 60 MG: 30 TABLET, EXTENDED RELEASE ORAL at 06:54

## 2020-01-01 RX ADMIN — ROPINIROLE HYDROCHLORIDE 3 MG: 1 TABLET, FILM COATED ORAL at 20:59

## 2020-01-01 RX ADMIN — ASPIRIN 81 MG: 81 TABLET, CHEWABLE ORAL at 08:22

## 2020-01-01 RX ADMIN — INSULIN LISPRO 10 UNITS: 100 INJECTION, SOLUTION INTRAVENOUS; SUBCUTANEOUS at 13:38

## 2020-01-01 RX ADMIN — PANTOPRAZOLE SODIUM 40 MG: 40 TABLET, DELAYED RELEASE ORAL at 06:42

## 2020-01-01 RX ADMIN — ROPINIROLE HYDROCHLORIDE 3 MG: 1 TABLET, FILM COATED ORAL at 13:52

## 2020-01-01 RX ADMIN — DEXMEDETOMIDINE 1.4 MCG/KG/HR: 100 INJECTION, SOLUTION, CONCENTRATE INTRAVENOUS at 14:31

## 2020-01-01 RX ADMIN — POLYMYXIN B SULFATE AND TRIMETHOPRIM SULFATE 2 DROP: 100000; 1 SOLUTION/ DROPS OPHTHALMIC at 05:53

## 2020-01-01 RX ADMIN — INSULIN LISPRO 2 UNITS: 100 INJECTION, SOLUTION INTRAVENOUS; SUBCUTANEOUS at 16:56

## 2020-01-01 RX ADMIN — PRIMIDONE 50 MG: 50 TABLET ORAL at 21:29

## 2020-01-01 RX ADMIN — ATORVASTATIN CALCIUM 10 MG: 10 TABLET, FILM COATED ORAL at 08:28

## 2020-01-01 RX ADMIN — LOSARTAN POTASSIUM 100 MG: 100 TABLET, FILM COATED ORAL at 10:18

## 2020-01-01 RX ADMIN — POTASSIUM CHLORIDE 20 MEQ: 400 INJECTION, SOLUTION INTRAVENOUS at 05:38

## 2020-01-01 RX ADMIN — ROPINIROLE HYDROCHLORIDE 3 MG: 1 TABLET, FILM COATED ORAL at 15:03

## 2020-01-01 RX ADMIN — DEXTROSE 15 G: 15 GEL ORAL at 01:18

## 2020-01-01 RX ADMIN — LORAZEPAM 1 MG: 2 INJECTION INTRAMUSCULAR at 03:26

## 2020-01-01 RX ADMIN — VANCOMYCIN HYDROCHLORIDE 750 MG: 10 INJECTION, POWDER, LYOPHILIZED, FOR SOLUTION INTRAVENOUS at 10:46

## 2020-01-01 RX ADMIN — INSULIN GLARGINE 30 UNITS: 100 INJECTION, SOLUTION SUBCUTANEOUS at 21:12

## 2020-01-01 RX ADMIN — PANTOPRAZOLE SODIUM 40 MG: 40 TABLET, DELAYED RELEASE ORAL at 05:23

## 2020-01-01 RX ADMIN — ROPINIROLE HYDROCHLORIDE 3 MG: 1 TABLET, FILM COATED ORAL at 08:31

## 2020-01-01 RX ADMIN — PANTOPRAZOLE SODIUM 40 MG: 40 TABLET, DELAYED RELEASE ORAL at 08:27

## 2020-01-01 RX ADMIN — POTASSIUM CHLORIDE 10 MEQ: 7.46 INJECTION, SOLUTION INTRAVENOUS at 06:17

## 2020-01-01 RX ADMIN — DOXYCYCLINE 100 MG: 100 CAPSULE ORAL at 22:23

## 2020-01-01 RX ADMIN — ATORVASTATIN CALCIUM 40 MG: 40 TABLET, FILM COATED ORAL at 20:58

## 2020-01-01 RX ADMIN — INSULIN GLARGINE 40 UNITS: 100 INJECTION, SOLUTION SUBCUTANEOUS at 21:10

## 2020-01-01 RX ADMIN — PRIMIDONE 50 MG: 50 TABLET ORAL at 08:26

## 2020-01-01 RX ADMIN — ASPIRIN 81 MG: 81 TABLET, COATED ORAL at 08:44

## 2020-01-01 RX ADMIN — PANTOPRAZOLE SODIUM 40 MG: 40 INJECTION, POWDER, FOR SOLUTION INTRAVENOUS at 18:03

## 2020-01-01 RX ADMIN — CARBIDOPA AND LEVODOPA 2 TABLET: 25; 100 TABLET ORAL at 15:18

## 2020-01-01 RX ADMIN — INSULIN LISPRO 8 UNITS: 100 INJECTION, SOLUTION INTRAVENOUS; SUBCUTANEOUS at 13:48

## 2020-01-01 RX ADMIN — SODIUM CHLORIDE 8 MG/HR: 900 INJECTION, SOLUTION INTRAVENOUS at 08:08

## 2020-01-01 RX ADMIN — DEXMEDETOMIDINE 0.9 MCG/KG/HR: 100 INJECTION, SOLUTION, CONCENTRATE INTRAVENOUS at 08:27

## 2020-01-01 RX ADMIN — BACITRACIN ZINC AND POLYMYXIN B SULFATE 28.3 G: at 22:43

## 2020-01-01 RX ADMIN — VANCOMYCIN HYDROCHLORIDE 1500 MG: 10 INJECTION, POWDER, LYOPHILIZED, FOR SOLUTION INTRAVENOUS at 12:37

## 2020-01-01 RX ADMIN — ONDANSETRON 4 MG: 2 INJECTION INTRAMUSCULAR; INTRAVENOUS at 18:03

## 2020-01-01 RX ADMIN — INSULIN LISPRO 2 UNITS: 100 INJECTION, SOLUTION INTRAVENOUS; SUBCUTANEOUS at 20:54

## 2020-01-01 RX ADMIN — ROPINIROLE HYDROCHLORIDE 3 MG: 2 TABLET, FILM COATED ORAL at 14:59

## 2020-01-01 RX ADMIN — ATORVASTATIN CALCIUM 10 MG: 10 TABLET, FILM COATED ORAL at 10:07

## 2020-01-01 RX ADMIN — HEPARIN SODIUM 5000 UNITS: 5000 INJECTION INTRAVENOUS; SUBCUTANEOUS at 22:10

## 2020-01-01 RX ADMIN — CARBIDOPA AND LEVODOPA 2 TABLET: 25; 100 TABLET ORAL at 15:04

## 2020-01-01 RX ADMIN — INSULIN LISPRO 2 UNITS: 100 INJECTION, SOLUTION INTRAVENOUS; SUBCUTANEOUS at 21:48

## 2020-01-01 RX ADMIN — PAROXETINE HYDROCHLORIDE 40 MG: 20 TABLET, FILM COATED ORAL at 11:18

## 2020-01-01 RX ADMIN — CARBIDOPA AND LEVODOPA 2 TABLET: 25; 100 TABLET ORAL at 21:27

## 2020-01-01 RX ADMIN — DEXTROSE 50 % IN WATER (D50W) INTRAVENOUS SYRINGE 12.5 G: at 00:10

## 2020-01-01 RX ADMIN — DEXMEDETOMIDINE 1.4 MCG/KG/HR: 100 INJECTION, SOLUTION, CONCENTRATE INTRAVENOUS at 10:36

## 2020-01-01 RX ADMIN — MEROPENEM 1 G: 1 INJECTION, POWDER, FOR SOLUTION INTRAVENOUS at 14:19

## 2020-01-01 RX ADMIN — ATORVASTATIN CALCIUM 10 MG: 10 TABLET, FILM COATED ORAL at 08:07

## 2020-01-01 RX ADMIN — SODIUM CHLORIDE: 9 INJECTION, SOLUTION INTRAVENOUS at 22:19

## 2020-01-01 RX ADMIN — DEXMEDETOMIDINE 1.3 MCG/KG/HR: 100 INJECTION, SOLUTION, CONCENTRATE INTRAVENOUS at 06:10

## 2020-01-01 RX ADMIN — ACETAMINOPHEN 650 MG: 325 TABLET ORAL at 15:43

## 2020-01-01 RX ADMIN — PRIMIDONE 50 MG: 50 TABLET ORAL at 21:36

## 2020-01-01 RX ADMIN — NIFEDIPINE 60 MG: 60 TABLET, FILM COATED, EXTENDED RELEASE ORAL at 08:28

## 2020-01-01 RX ADMIN — Medication 10 ML: at 21:09

## 2020-01-01 RX ADMIN — CARBIDOPA AND LEVODOPA 2 TABLET: 25; 100 TABLET ORAL at 09:37

## 2020-01-01 RX ADMIN — SODIUM CHLORIDE 1000 ML: 9 INJECTION, SOLUTION INTRAVENOUS at 14:22

## 2020-01-01 RX ADMIN — PAROXETINE HYDROCHLORIDE 40 MG: 20 TABLET, FILM COATED ORAL at 08:26

## 2020-01-01 RX ADMIN — INSULIN LISPRO 3 UNITS: 100 INJECTION, SOLUTION INTRAVENOUS; SUBCUTANEOUS at 22:44

## 2020-01-01 RX ADMIN — ROPINIROLE HYDROCHLORIDE 3 MG: 2 TABLET, FILM COATED ORAL at 09:21

## 2020-01-01 RX ADMIN — INSULIN HUMAN 10 UNITS: 100 INJECTION, SUSPENSION SUBCUTANEOUS at 15:20

## 2020-01-01 RX ADMIN — AZITHROMYCIN MONOHYDRATE 250 MG: 500 INJECTION, POWDER, LYOPHILIZED, FOR SOLUTION INTRAVENOUS at 16:24

## 2020-01-01 RX ADMIN — SPIRONOLACTONE 12.5 MG: 25 TABLET, FILM COATED ORAL at 07:58

## 2020-01-01 RX ADMIN — PANTOPRAZOLE SODIUM 40 MG: 40 TABLET, DELAYED RELEASE ORAL at 06:48

## 2020-01-01 RX ADMIN — POLYETHYLENE GLYCOL 3350 17 G: 17 POWDER, FOR SOLUTION ORAL at 13:33

## 2020-01-01 RX ADMIN — FERROUS SULFATE TAB 325 MG (65 MG ELEMENTAL FE) 325 MG: 325 (65 FE) TAB at 08:28

## 2020-01-01 RX ADMIN — CARBIDOPA AND LEVODOPA 2 TABLET: 25; 100 TABLET ORAL at 21:04

## 2020-01-01 RX ADMIN — DEXTROSE AND SODIUM CHLORIDE: 5; 450 INJECTION, SOLUTION INTRAVENOUS at 03:29

## 2020-01-01 RX ADMIN — LOSARTAN POTASSIUM 12.5 MG: 25 TABLET, FILM COATED ORAL at 11:02

## 2020-01-01 RX ADMIN — PRIMIDONE 50 MG: 50 TABLET ORAL at 20:29

## 2020-01-01 RX ADMIN — ENOXAPARIN SODIUM 40 MG: 40 INJECTION SUBCUTANEOUS at 19:08

## 2020-01-01 RX ADMIN — INSULIN HUMAN 20 UNITS: 100 INJECTION, SOLUTION PARENTERAL at 16:33

## 2020-01-01 RX ADMIN — ROPINIROLE HYDROCHLORIDE 3 MG: 1 TABLET, FILM COATED ORAL at 13:33

## 2020-01-01 RX ADMIN — ROPINIROLE HYDROCHLORIDE 3 MG: 1 TABLET, FILM COATED ORAL at 22:01

## 2020-01-01 RX ADMIN — INSULIN LISPRO 2 UNITS: 100 INJECTION, SOLUTION INTRAVENOUS; SUBCUTANEOUS at 13:23

## 2020-01-01 RX ADMIN — CARVEDILOL 3.12 MG: 3.12 TABLET, FILM COATED ORAL at 18:23

## 2020-01-01 RX ADMIN — CARBIDOPA AND LEVODOPA 2 TABLET: 25; 100 TABLET ORAL at 08:12

## 2020-01-01 RX ADMIN — PRIMIDONE 50 MG: 50 TABLET ORAL at 21:22

## 2020-01-01 RX ADMIN — SODIUM CHLORIDE 1000 ML: 9 INJECTION, SOLUTION INTRAVENOUS at 09:48

## 2020-01-01 RX ADMIN — ENOXAPARIN SODIUM 30 MG: 30 INJECTION SUBCUTANEOUS at 20:37

## 2020-01-01 RX ADMIN — ANTACID TABLETS 500 MG: 500 TABLET, CHEWABLE ORAL at 03:56

## 2020-01-01 RX ADMIN — ATORVASTATIN CALCIUM 40 MG: 40 TABLET, FILM COATED ORAL at 19:28

## 2020-01-01 RX ADMIN — SODIUM CHLORIDE: 9 INJECTION, SOLUTION INTRAVENOUS at 04:11

## 2020-01-01 RX ADMIN — INSULIN GLARGINE 20 UNITS: 100 INJECTION, SOLUTION SUBCUTANEOUS at 18:58

## 2020-01-01 RX ADMIN — CARBIDOPA AND LEVODOPA 2 TABLET: 25; 100 TABLET ORAL at 22:26

## 2020-01-01 RX ADMIN — DEXTROSE AND SODIUM CHLORIDE: 5; 450 INJECTION, SOLUTION INTRAVENOUS at 02:41

## 2020-01-01 RX ADMIN — INSULIN LISPRO 4 UNITS: 100 INJECTION, SOLUTION INTRAVENOUS; SUBCUTANEOUS at 08:31

## 2020-01-01 RX ADMIN — SODIUM CHLORIDE: 4.5 INJECTION, SOLUTION INTRAVENOUS at 19:53

## 2020-01-01 RX ADMIN — SODIUM CHLORIDE, POTASSIUM CHLORIDE, SODIUM LACTATE AND CALCIUM CHLORIDE 1000 ML: 600; 310; 30; 20 INJECTION, SOLUTION INTRAVENOUS at 04:33

## 2020-01-01 RX ADMIN — CALCIUM GLUCONATE 1 G: 98 INJECTION, SOLUTION INTRAVENOUS at 07:56

## 2020-01-01 RX ADMIN — ROPINIROLE HYDROCHLORIDE 3 MG: 1 TABLET, FILM COATED ORAL at 21:35

## 2020-01-01 RX ADMIN — INSULIN LISPRO 3 UNITS: 100 INJECTION, SOLUTION INTRAVENOUS; SUBCUTANEOUS at 21:22

## 2020-01-01 RX ADMIN — POLYMYXIN B SULFATE AND TRIMETHOPRIM SULFATE 2 DROP: 100000; 1 SOLUTION/ DROPS OPHTHALMIC at 17:30

## 2020-01-01 RX ADMIN — CARBIDOPA AND LEVODOPA 2 TABLET: 25; 100 TABLET ORAL at 08:31

## 2020-01-01 RX ADMIN — ENOXAPARIN SODIUM 40 MG: 40 INJECTION SUBCUTANEOUS at 09:21

## 2020-01-01 RX ADMIN — HEPARIN SODIUM 5000 UNITS: 5000 INJECTION INTRAVENOUS; SUBCUTANEOUS at 07:01

## 2020-01-01 RX ADMIN — CARBIDOPA AND LEVODOPA 2 TABLET: 25; 100 TABLET ORAL at 15:00

## 2020-01-01 RX ADMIN — INSULIN LISPRO 10 UNITS: 100 INJECTION, SOLUTION INTRAVENOUS; SUBCUTANEOUS at 12:33

## 2020-01-01 RX ADMIN — SPIRONOLACTONE 12.5 MG: 25 TABLET, FILM COATED ORAL at 10:02

## 2020-01-01 RX ADMIN — Medication 10 ML: at 20:30

## 2020-01-01 RX ADMIN — ROPINIROLE 3 MG: 2 TABLET, FILM COATED ORAL at 20:20

## 2020-01-01 RX ADMIN — SODIUM CHLORIDE 10.7 UNITS/HR: 9 INJECTION, SOLUTION INTRAVENOUS at 23:08

## 2020-01-01 RX ADMIN — ROPINIROLE HYDROCHLORIDE 3 MG: 1 TABLET, FILM COATED ORAL at 08:06

## 2020-01-01 RX ADMIN — METHOCARBAMOL TABLETS 750 MG: 500 TABLET, COATED ORAL at 12:46

## 2020-01-01 RX ADMIN — SODIUM CHLORIDE, POTASSIUM CHLORIDE, SODIUM LACTATE AND CALCIUM CHLORIDE 2802 ML: 600; 310; 30; 20 INJECTION, SOLUTION INTRAVENOUS at 12:47

## 2020-01-01 RX ADMIN — SODIUM CHLORIDE 4.5 UNITS/HR: 9 INJECTION, SOLUTION INTRAVENOUS at 03:29

## 2020-01-01 RX ADMIN — ROPINIROLE HYDROCHLORIDE 3 MG: 1 TABLET, FILM COATED ORAL at 15:18

## 2020-01-01 RX ADMIN — ASPIRIN 81 MG: 81 TABLET, CHEWABLE ORAL at 08:01

## 2020-01-01 RX ADMIN — ENOXAPARIN SODIUM 40 MG: 40 INJECTION SUBCUTANEOUS at 09:00

## 2020-01-01 RX ADMIN — INSULIN LISPRO 2 UNITS: 100 INJECTION, SOLUTION INTRAVENOUS; SUBCUTANEOUS at 11:07

## 2020-01-01 RX ADMIN — ANTACID TABLETS 500 MG: 500 TABLET, CHEWABLE ORAL at 22:09

## 2020-01-01 RX ADMIN — INSULIN LISPRO 2 UNITS: 100 INJECTION, SOLUTION INTRAVENOUS; SUBCUTANEOUS at 21:42

## 2020-01-01 RX ADMIN — INSULIN LISPRO 2 UNITS: 100 INJECTION, SOLUTION INTRAVENOUS; SUBCUTANEOUS at 20:46

## 2020-01-01 RX ADMIN — INSULIN LISPRO 6 UNITS: 100 INJECTION, SOLUTION INTRAVENOUS; SUBCUTANEOUS at 08:26

## 2020-01-01 RX ADMIN — DEXMEDETOMIDINE 1.2 MCG/KG/HR: 100 INJECTION, SOLUTION, CONCENTRATE INTRAVENOUS at 00:06

## 2020-01-01 RX ADMIN — POLYMYXIN B SULFATE AND TRIMETHOPRIM SULFATE 2 DROP: 100000; 1 SOLUTION/ DROPS OPHTHALMIC at 12:38

## 2020-01-01 RX ADMIN — FUROSEMIDE 80 MG: 10 INJECTION, SOLUTION INTRAMUSCULAR; INTRAVENOUS at 23:53

## 2020-01-01 RX ADMIN — KETAMINE HYDROCHLORIDE 100 MG: 50 INJECTION, SOLUTION, CONCENTRATE INTRAMUSCULAR; INTRAVENOUS at 10:57

## 2020-01-01 RX ADMIN — Medication 10 ML: at 09:04

## 2020-01-01 RX ADMIN — POLYETHYLENE GLYCOL 3350 17 G: 17 POWDER, FOR SOLUTION ORAL at 10:06

## 2020-01-01 RX ADMIN — CARVEDILOL 6.25 MG: 6.25 TABLET, FILM COATED ORAL at 16:46

## 2020-01-01 RX ADMIN — ENOXAPARIN SODIUM 40 MG: 40 INJECTION SUBCUTANEOUS at 09:38

## 2020-01-01 RX ADMIN — PANTOPRAZOLE SODIUM 40 MG: 40 TABLET, DELAYED RELEASE ORAL at 17:27

## 2020-01-01 RX ADMIN — PRIMIDONE 50 MG: 50 TABLET ORAL at 09:38

## 2020-01-01 RX ADMIN — LOSARTAN POTASSIUM 100 MG: 100 TABLET, FILM COATED ORAL at 11:18

## 2020-01-01 RX ADMIN — ROPINIROLE HYDROCHLORIDE 3 MG: 1 TABLET, FILM COATED ORAL at 13:05

## 2020-01-01 RX ADMIN — ATORVASTATIN CALCIUM 10 MG: 10 TABLET, FILM COATED ORAL at 21:04

## 2020-01-01 RX ADMIN — CHOLECALCIFEROL (VITAMIN D3) 10 MCG (400 UNIT) TABLET 400 UNITS: at 08:31

## 2020-01-01 RX ADMIN — ANTACID TABLETS 500 MG: 500 TABLET, CHEWABLE ORAL at 02:32

## 2020-01-01 RX ADMIN — PROPOFOL 1000 MG: 10 INJECTION, EMULSION INTRAVENOUS at 11:02

## 2020-01-01 RX ADMIN — SODIUM CHLORIDE 0.05 UNITS/KG/HR: 900 INJECTION, SOLUTION INTRAVENOUS at 12:46

## 2020-01-01 RX ADMIN — SODIUM CHLORIDE 1000 ML: 9 INJECTION, SOLUTION INTRAVENOUS at 07:03

## 2020-01-01 RX ADMIN — MEROPENEM 1 G: 1 INJECTION, POWDER, FOR SOLUTION INTRAVENOUS at 04:17

## 2020-01-01 RX ADMIN — POTASSIUM CHLORIDE 10 MEQ: 7.46 INJECTION, SOLUTION INTRAVENOUS at 10:42

## 2020-01-01 RX ADMIN — DEXMEDETOMIDINE 1.4 MCG/KG/HR: 100 INJECTION, SOLUTION, CONCENTRATE INTRAVENOUS at 02:55

## 2020-01-01 RX ADMIN — ONDANSETRON 4 MG: 2 INJECTION INTRAMUSCULAR; INTRAVENOUS at 04:31

## 2020-01-01 RX ADMIN — HEPARIN SODIUM 5000 UNITS: 5000 INJECTION INTRAVENOUS; SUBCUTANEOUS at 21:22

## 2020-01-01 RX ADMIN — CARBIDOPA AND LEVODOPA 2 TABLET: 25; 100 TABLET ORAL at 12:52

## 2020-01-01 RX ADMIN — DEXMEDETOMIDINE 1.4 MCG/KG/HR: 100 INJECTION, SOLUTION, CONCENTRATE INTRAVENOUS at 02:36

## 2020-01-01 RX ADMIN — CARBIDOPA AND LEVODOPA 2 TABLET: 25; 100 TABLET ORAL at 15:08

## 2020-01-01 RX ADMIN — PAROXETINE HYDROCHLORIDE 40 MG: 20 TABLET, FILM COATED ORAL at 08:31

## 2020-01-01 RX ADMIN — Medication 1000 ML/HR: at 03:19

## 2020-01-01 RX ADMIN — DEXTROSE AND SODIUM CHLORIDE: 5; 900 INJECTION, SOLUTION INTRAVENOUS at 20:56

## 2020-01-01 RX ADMIN — LOSARTAN POTASSIUM 100 MG: 100 TABLET, FILM COATED ORAL at 06:03

## 2020-01-01 RX ADMIN — DOCUSATE SODIUM 100 MG: 100 CAPSULE, LIQUID FILLED ORAL at 19:31

## 2020-01-01 RX ADMIN — SODIUM CHLORIDE: 9 INJECTION, SOLUTION INTRAVENOUS at 17:12

## 2020-01-01 RX ADMIN — PAROXETINE HYDROCHLORIDE 40 MG: 20 TABLET, FILM COATED ORAL at 07:35

## 2020-01-01 RX ADMIN — ROPINIROLE 3 MG: 2 TABLET, FILM COATED ORAL at 22:43

## 2020-01-01 RX ADMIN — ROPINIROLE HYDROCHLORIDE 3 MG: 2 TABLET, FILM COATED ORAL at 13:51

## 2020-01-01 RX ADMIN — NIFEDIPINE 60 MG: 30 TABLET, EXTENDED RELEASE ORAL at 21:17

## 2020-01-01 RX ADMIN — INSULIN LISPRO 10 UNITS: 100 INJECTION, SOLUTION INTRAVENOUS; SUBCUTANEOUS at 14:04

## 2020-01-01 RX ADMIN — SODIUM CHLORIDE, POTASSIUM CHLORIDE, SODIUM LACTATE AND CALCIUM CHLORIDE 1000 ML: 600; 310; 30; 20 INJECTION, SOLUTION INTRAVENOUS at 13:42

## 2020-01-01 RX ADMIN — POLYMYXIN B SULFATE AND TRIMETHOPRIM SULFATE 2 DROP: 100000; 1 SOLUTION/ DROPS OPHTHALMIC at 05:52

## 2020-01-01 RX ADMIN — ASPIRIN 81 MG: 81 TABLET, CHEWABLE ORAL at 08:40

## 2020-01-01 RX ADMIN — QUETIAPINE FUMARATE 12.5 MG: 25 TABLET ORAL at 20:20

## 2020-01-01 RX ADMIN — CARBIDOPA AND LEVODOPA 2 TABLET: 25; 100 TABLET ORAL at 08:43

## 2020-01-01 RX ADMIN — PRIMIDONE 50 MG: 50 TABLET ORAL at 09:07

## 2020-01-01 RX ADMIN — POLYMYXIN B SULFATE AND TRIMETHOPRIM SULFATE 2 DROP: 100000; 1 SOLUTION/ DROPS OPHTHALMIC at 19:29

## 2020-01-01 RX ADMIN — PAROXETINE HYDROCHLORIDE 40 MG: 20 TABLET, FILM COATED ORAL at 13:06

## 2020-01-01 RX ADMIN — INSULIN LISPRO 10 UNITS: 100 INJECTION, SOLUTION INTRAVENOUS; SUBCUTANEOUS at 17:29

## 2020-01-01 RX ADMIN — ROPINIROLE 3 MG: 2 TABLET, FILM COATED ORAL at 08:31

## 2020-01-01 RX ADMIN — INSULIN GLARGINE 45 UNITS: 100 INJECTION, SOLUTION SUBCUTANEOUS at 03:43

## 2020-01-01 RX ADMIN — Medication 10 ML: at 21:33

## 2020-01-01 RX ADMIN — ROPINIROLE HYDROCHLORIDE 3 MG: 2 TABLET, FILM COATED ORAL at 21:17

## 2020-01-01 RX ADMIN — INSULIN LISPRO 10 UNITS: 100 INJECTION, SOLUTION INTRAVENOUS; SUBCUTANEOUS at 13:09

## 2020-01-01 RX ADMIN — SODIUM CHLORIDE 1000 ML: 9 INJECTION, SOLUTION INTRAVENOUS at 20:37

## 2020-01-01 RX ADMIN — PRIMIDONE 50 MG: 50 TABLET ORAL at 08:27

## 2020-01-01 RX ADMIN — SODIUM CHLORIDE 0.1 UNITS/KG/HR: 9 INJECTION, SOLUTION INTRAVENOUS at 13:42

## 2020-01-01 RX ADMIN — CARBIDOPA AND LEVODOPA 2 TABLET: 25; 100 TABLET ORAL at 20:48

## 2020-01-01 RX ADMIN — ROPINIROLE HYDROCHLORIDE 3 MG: 1 TABLET, FILM COATED ORAL at 14:11

## 2020-01-01 RX ADMIN — INSULIN LISPRO 5 UNITS: 100 INJECTION, SOLUTION INTRAVENOUS; SUBCUTANEOUS at 17:56

## 2020-01-01 RX ADMIN — NIFEDIPINE 60 MG: 30 TABLET, EXTENDED RELEASE ORAL at 20:16

## 2020-01-01 RX ADMIN — INSULIN GLARGINE 20 UNITS: 100 INJECTION, SOLUTION SUBCUTANEOUS at 20:55

## 2020-01-01 RX ADMIN — INSULIN LISPRO 10 UNITS: 100 INJECTION, SOLUTION INTRAVENOUS; SUBCUTANEOUS at 19:23

## 2020-01-01 RX ADMIN — DEXMEDETOMIDINE 1.4 MCG/KG/HR: 100 INJECTION, SOLUTION, CONCENTRATE INTRAVENOUS at 19:28

## 2020-01-01 RX ADMIN — INSULIN LISPRO 4 UNITS: 100 INJECTION, SOLUTION INTRAVENOUS; SUBCUTANEOUS at 12:06

## 2020-01-01 RX ADMIN — INSULIN LISPRO 2 UNITS: 100 INJECTION, SOLUTION INTRAVENOUS; SUBCUTANEOUS at 13:36

## 2020-01-01 RX ADMIN — Medication 1000 UNITS: at 13:19

## 2020-01-01 RX ADMIN — LORAZEPAM 1 MG: 2 INJECTION INTRAMUSCULAR; INTRAVENOUS at 03:26

## 2020-01-01 RX ADMIN — INSULIN LISPRO 6 UNITS: 100 INJECTION, SOLUTION INTRAVENOUS; SUBCUTANEOUS at 18:29

## 2020-01-01 RX ADMIN — INSULIN GLARGINE 30 UNITS: 100 INJECTION, SOLUTION SUBCUTANEOUS at 19:41

## 2020-01-01 RX ADMIN — ASPIRIN 81 MG: 81 TABLET, CHEWABLE ORAL at 10:03

## 2020-01-01 RX ADMIN — DOCUSATE SODIUM 100 MG: 100 CAPSULE, LIQUID FILLED ORAL at 08:27

## 2020-01-01 RX ADMIN — CARBIDOPA AND LEVODOPA 2 TABLET: 25; 100 TABLET ORAL at 07:35

## 2020-01-01 RX ADMIN — ROPINIROLE HYDROCHLORIDE 3 MG: 1 TABLET, FILM COATED ORAL at 20:01

## 2020-01-01 RX ADMIN — INSULIN GLARGINE 25 UNITS: 100 INJECTION, SOLUTION SUBCUTANEOUS at 20:15

## 2020-01-01 RX ADMIN — Medication 10 ML: at 09:22

## 2020-01-01 RX ADMIN — DEXMEDETOMIDINE 1.4 MCG/KG/HR: 100 INJECTION, SOLUTION, CONCENTRATE INTRAVENOUS at 23:50

## 2020-01-01 RX ADMIN — PRIMIDONE 50 MG: 50 TABLET ORAL at 08:56

## 2020-01-01 RX ADMIN — DEXTROSE MONOHYDRATE 25 G: 25 INJECTION, SOLUTION INTRAVENOUS at 10:08

## 2020-01-01 RX ADMIN — SODIUM CHLORIDE: 9 INJECTION, SOLUTION INTRAVENOUS at 09:28

## 2020-01-01 RX ADMIN — CARBIDOPA AND LEVODOPA 2 TABLET: 25; 100 TABLET ORAL at 01:35

## 2020-01-01 RX ADMIN — PRIMIDONE 50 MG: 50 TABLET ORAL at 20:48

## 2020-01-01 RX ADMIN — ASPIRIN 81 MG: 81 TABLET, COATED ORAL at 20:38

## 2020-01-01 RX ADMIN — ROPINIROLE HYDROCHLORIDE 3 MG: 1 TABLET, FILM COATED ORAL at 20:47

## 2020-01-01 RX ADMIN — ENOXAPARIN SODIUM 40 MG: 40 INJECTION SUBCUTANEOUS at 09:48

## 2020-01-01 RX ADMIN — INSULIN HUMAN 10 UNITS: 100 INJECTION, SOLUTION PARENTERAL at 15:21

## 2020-01-01 RX ADMIN — Medication 1000 ML/HR: at 09:39

## 2020-01-01 RX ADMIN — CLINDAMYCIN PHOSPHATE 300 MG: 300 INJECTION, SOLUTION INTRAVENOUS at 05:54

## 2020-01-01 RX ADMIN — FLUTICASONE PROPIONATE 2 SPRAY: 50 SPRAY, METERED NASAL at 08:37

## 2020-01-01 RX ADMIN — DEXMEDETOMIDINE 0.2 MCG/KG/HR: 100 INJECTION, SOLUTION, CONCENTRATE INTRAVENOUS at 17:31

## 2020-01-01 RX ADMIN — ROPINIROLE 3 MG: 2 TABLET, FILM COATED ORAL at 22:28

## 2020-01-01 RX ADMIN — CARBIDOPA AND LEVODOPA 2 TABLET: 25; 100 TABLET ORAL at 20:01

## 2020-01-01 RX ADMIN — POTASSIUM BICARBONATE 40 MEQ: 782 TABLET, EFFERVESCENT ORAL at 08:25

## 2020-01-01 RX ADMIN — INSULIN LISPRO 6 UNITS: 100 INJECTION, SOLUTION INTRAVENOUS; SUBCUTANEOUS at 18:16

## 2020-01-01 RX ADMIN — POLYMYXIN B SULFATE AND TRIMETHOPRIM SULFATE 2 DROP: 100000; 1 SOLUTION/ DROPS OPHTHALMIC at 23:19

## 2020-01-01 RX ADMIN — PANTOPRAZOLE SODIUM 40 MG: 40 INJECTION, POWDER, FOR SOLUTION INTRAVENOUS at 10:02

## 2020-01-01 RX ADMIN — ROPINIROLE 3 MG: 2 TABLET, FILM COATED ORAL at 14:08

## 2020-01-01 RX ADMIN — HEPARIN SODIUM 5000 UNITS: 5000 INJECTION INTRAVENOUS; SUBCUTANEOUS at 22:21

## 2020-01-01 RX ADMIN — MAGNESIUM SULFATE 2 G: 2 INJECTION INTRAVENOUS at 08:39

## 2020-01-01 RX ADMIN — DOCUSATE SODIUM 100 MG: 100 CAPSULE, LIQUID FILLED ORAL at 10:02

## 2020-01-01 RX ADMIN — CARBIDOPA AND LEVODOPA 2 TABLET: 25; 100 TABLET ORAL at 08:20

## 2020-01-01 RX ADMIN — ROPINIROLE HYDROCHLORIDE 3 MG: 1 TABLET, FILM COATED ORAL at 08:30

## 2020-01-01 RX ADMIN — POLYMYXIN B SULFATE AND TRIMETHOPRIM SULFATE 2 DROP: 100000; 1 SOLUTION/ DROPS OPHTHALMIC at 15:04

## 2020-01-01 RX ADMIN — SODIUM CHLORIDE 5.4 UNITS/HR: 900 INJECTION, SOLUTION INTRAVENOUS at 10:46

## 2020-01-01 RX ADMIN — PRIMIDONE 50 MG: 50 TABLET ORAL at 21:04

## 2020-01-01 RX ADMIN — PANTOPRAZOLE SODIUM 40 MG: 40 TABLET, DELAYED RELEASE ORAL at 09:37

## 2020-01-01 RX ADMIN — FUROSEMIDE 5 MG/HR: 10 INJECTION, SOLUTION INTRAMUSCULAR; INTRAVENOUS at 19:13

## 2020-01-01 RX ADMIN — ATORVASTATIN CALCIUM 10 MG: 10 TABLET, FILM COATED ORAL at 20:38

## 2020-01-01 RX ADMIN — PRIMIDONE 50 MG: 50 TABLET ORAL at 08:24

## 2020-01-01 RX ADMIN — INSULIN LISPRO 4 UNITS: 100 INJECTION, SOLUTION INTRAVENOUS; SUBCUTANEOUS at 10:15

## 2020-01-01 RX ADMIN — INSULIN LISPRO 4 UNITS: 100 INJECTION, SOLUTION INTRAVENOUS; SUBCUTANEOUS at 16:41

## 2020-01-01 RX ADMIN — SODIUM CHLORIDE: 4.5 INJECTION, SOLUTION INTRAVENOUS at 06:10

## 2020-01-01 RX ADMIN — CHOLECALCIFEROL (VITAMIN D3) 10 MCG (400 UNIT) TABLET 400 UNITS: at 10:19

## 2020-01-01 RX ADMIN — ROPINIROLE HYDROCHLORIDE 3 MG: 2 TABLET, FILM COATED ORAL at 13:25

## 2020-01-01 RX ADMIN — MEROPENEM 1 G: 1 INJECTION, POWDER, FOR SOLUTION INTRAVENOUS at 03:27

## 2020-01-01 RX ADMIN — PANTOPRAZOLE SODIUM 40 MG: 40 TABLET, DELAYED RELEASE ORAL at 22:26

## 2020-01-01 RX ADMIN — INSULIN LISPRO 5 UNITS: 100 INJECTION, SOLUTION INTRAVENOUS; SUBCUTANEOUS at 13:19

## 2020-01-01 RX ADMIN — POLYMYXIN B SULFATE AND TRIMETHOPRIM SULFATE 2 DROP: 100000; 1 SOLUTION/ DROPS OPHTHALMIC at 06:22

## 2020-01-01 RX ADMIN — ROPINIROLE 3 MG: 2 TABLET, FILM COATED ORAL at 14:41

## 2020-01-01 RX ADMIN — NIFEDIPINE 60 MG: 60 TABLET, FILM COATED, EXTENDED RELEASE ORAL at 13:34

## 2020-01-01 RX ADMIN — LOSARTAN POTASSIUM 100 MG: 100 TABLET, FILM COATED ORAL at 08:26

## 2020-01-01 RX ADMIN — INSULIN HUMAN 5 UNITS: 100 INJECTION, SOLUTION PARENTERAL at 05:57

## 2020-01-01 RX ADMIN — ROPINIROLE HYDROCHLORIDE 3 MG: 2 TABLET, FILM COATED ORAL at 09:00

## 2020-01-01 RX ADMIN — MEROPENEM 1 G: 1 INJECTION, POWDER, FOR SOLUTION INTRAVENOUS at 20:54

## 2020-01-01 RX ADMIN — LOSARTAN POTASSIUM 100 MG: 100 TABLET, FILM COATED ORAL at 08:27

## 2020-01-01 RX ADMIN — HEPARIN SODIUM 2600 UNITS: 1000 INJECTION INTRAVENOUS; SUBCUTANEOUS at 11:05

## 2020-01-01 RX ADMIN — CARVEDILOL 12.5 MG: 12.5 TABLET, FILM COATED ORAL at 09:37

## 2020-01-01 RX ADMIN — CARBIDOPA AND LEVODOPA 2 TABLET: 25; 100 TABLET ORAL at 14:59

## 2020-01-01 RX ADMIN — INSULIN HUMAN 25 UNITS: 100 INJECTION, SUSPENSION SUBCUTANEOUS at 08:32

## 2020-01-01 RX ADMIN — SODIUM CHLORIDE 5.82 UNITS/HR: 900 INJECTION, SOLUTION INTRAVENOUS at 10:00

## 2020-01-01 RX ADMIN — FAMOTIDINE 10 MG: 10 INJECTION, SOLUTION INTRAVENOUS at 14:19

## 2020-01-01 RX ADMIN — LORAZEPAM 0.5 MG: 2 INJECTION INTRAMUSCULAR; INTRAVENOUS at 10:13

## 2020-01-01 RX ADMIN — Medication 10 ML: at 21:10

## 2020-01-01 RX ADMIN — ROPINIROLE HYDROCHLORIDE 3 MG: 1 TABLET, FILM COATED ORAL at 08:28

## 2020-01-01 RX ADMIN — DEXTROSE MONOHYDRATE 10 MCG/MIN: 50 INJECTION, SOLUTION INTRAVENOUS at 10:56

## 2020-01-01 RX ADMIN — Medication 10 ML: at 07:36

## 2020-01-01 RX ADMIN — FAMOTIDINE 10 MG: 10 INJECTION, SOLUTION INTRAVENOUS at 22:52

## 2020-01-01 RX ADMIN — HEPARIN SODIUM 5000 UNITS: 5000 INJECTION INTRAVENOUS; SUBCUTANEOUS at 14:57

## 2020-01-01 RX ADMIN — DOXYCYCLINE 100 MG: 100 CAPSULE ORAL at 13:32

## 2020-01-01 RX ADMIN — ASPIRIN 81 MG: 81 TABLET, COATED ORAL at 08:56

## 2020-01-01 RX ADMIN — CALCIUM GLUCONATE 1 G: 98 INJECTION, SOLUTION INTRAVENOUS at 10:03

## 2020-01-01 RX ADMIN — AZITHROMYCIN MONOHYDRATE 250 MG: 500 INJECTION, POWDER, LYOPHILIZED, FOR SOLUTION INTRAVENOUS at 16:18

## 2020-01-01 RX ADMIN — HEPARIN SODIUM 5000 UNITS: 5000 INJECTION INTRAVENOUS; SUBCUTANEOUS at 21:17

## 2020-01-01 RX ADMIN — CARBIDOPA AND LEVODOPA 2 TABLET: 25; 100 TABLET ORAL at 20:58

## 2020-01-01 RX ADMIN — CARBIDOPA AND LEVODOPA 2 TABLET: 25; 100 TABLET ORAL at 10:07

## 2020-01-01 RX ADMIN — PRIMIDONE 50 MG: 50 TABLET ORAL at 00:08

## 2020-01-01 RX ADMIN — ANTACID TABLETS 500 MG: 500 TABLET, CHEWABLE ORAL at 18:08

## 2020-01-01 RX ADMIN — ENOXAPARIN SODIUM 40 MG: 40 INJECTION SUBCUTANEOUS at 08:24

## 2020-01-01 RX ADMIN — ENOXAPARIN SODIUM 40 MG: 40 INJECTION SUBCUTANEOUS at 10:21

## 2020-01-01 RX ADMIN — PANTOPRAZOLE SODIUM 40 MG: 40 INJECTION, POWDER, FOR SOLUTION INTRAVENOUS at 08:23

## 2020-01-01 RX ADMIN — INSULIN LISPRO 5 UNITS: 100 INJECTION, SOLUTION INTRAVENOUS; SUBCUTANEOUS at 17:46

## 2020-01-01 RX ADMIN — CALCIUM GLUCONATE 1 G: 98 INJECTION, SOLUTION INTRAVENOUS at 07:35

## 2020-01-01 RX ADMIN — HEPARIN SODIUM 11 UNITS/KG/HR: 10000 INJECTION, SOLUTION INTRAVENOUS at 15:59

## 2020-01-01 RX ADMIN — DOXYCYCLINE 100 MG: 100 CAPSULE ORAL at 10:06

## 2020-01-01 RX ADMIN — ROPINIROLE HYDROCHLORIDE 3 MG: 1 TABLET, FILM COATED ORAL at 17:22

## 2020-01-01 RX ADMIN — Medication 10 ML: at 08:22

## 2020-01-01 RX ADMIN — Medication 10 ML: at 11:16

## 2020-01-01 RX ADMIN — AZITHROMYCIN MONOHYDRATE 500 MG: 500 INJECTION, POWDER, LYOPHILIZED, FOR SOLUTION INTRAVENOUS at 14:21

## 2020-01-01 RX ADMIN — ROPINIROLE HYDROCHLORIDE 3 MG: 2 TABLET, FILM COATED ORAL at 20:15

## 2020-01-01 RX ADMIN — PANTOPRAZOLE SODIUM 40 MG: 40 TABLET, DELAYED RELEASE ORAL at 16:54

## 2020-01-01 RX ADMIN — INSULIN LISPRO 8 UNITS: 100 INJECTION, SOLUTION INTRAVENOUS; SUBCUTANEOUS at 19:30

## 2020-01-01 RX ADMIN — PANTOPRAZOLE SODIUM 40 MG: 40 INJECTION, POWDER, FOR SOLUTION INTRAVENOUS at 22:48

## 2020-01-01 RX ADMIN — INSULIN GLARGINE 10 UNITS: 100 INJECTION, SOLUTION SUBCUTANEOUS at 12:59

## 2020-01-01 RX ADMIN — POLYMYXIN B SULFATE AND TRIMETHOPRIM SULFATE 2 DROP: 100000; 1 SOLUTION/ DROPS OPHTHALMIC at 00:15

## 2020-01-01 RX ADMIN — PRIMIDONE 50 MG: 50 TABLET ORAL at 20:01

## 2020-01-01 RX ADMIN — POLYMYXIN B SULFATE AND TRIMETHOPRIM SULFATE 2 DROP: 100000; 1 SOLUTION/ DROPS OPHTHALMIC at 12:02

## 2020-01-01 RX ADMIN — PRIMIDONE 50 MG: 50 TABLET ORAL at 08:21

## 2020-01-01 RX ADMIN — CARBIDOPA AND LEVODOPA 2 TABLET: 25; 100 TABLET ORAL at 13:06

## 2020-01-01 RX ADMIN — PRIMIDONE 50 MG: 50 TABLET ORAL at 22:27

## 2020-01-01 RX ADMIN — PANTOPRAZOLE SODIUM 40 MG: 40 TABLET, DELAYED RELEASE ORAL at 08:31

## 2020-01-01 RX ADMIN — ROPINIROLE 3 MG: 2 TABLET, FILM COATED ORAL at 15:18

## 2020-01-01 RX ADMIN — DEXTROSE 15 G: 15 GEL ORAL at 08:26

## 2020-01-01 RX ADMIN — POTASSIUM CHLORIDE 10 MEQ: 7.46 INJECTION, SOLUTION INTRAVENOUS at 03:45

## 2020-01-01 RX ADMIN — CARVEDILOL 3.12 MG: 3.12 TABLET, FILM COATED ORAL at 18:39

## 2020-01-01 RX ADMIN — INSULIN GLARGINE 15 UNITS: 100 INJECTION, SOLUTION SUBCUTANEOUS at 10:31

## 2020-01-01 RX ADMIN — INSULIN LISPRO 2 UNITS: 100 INJECTION, SOLUTION INTRAVENOUS; SUBCUTANEOUS at 12:42

## 2020-01-01 RX ADMIN — POLYMYXIN B SULFATE AND TRIMETHOPRIM SULFATE 2 DROP: 100000; 1 SOLUTION/ DROPS OPHTHALMIC at 05:18

## 2020-01-01 RX ADMIN — CARBIDOPA AND LEVODOPA 2 TABLET: 25; 100 TABLET ORAL at 22:52

## 2020-01-01 RX ADMIN — INSULIN LISPRO 1 UNITS: 100 INJECTION, SOLUTION INTRAVENOUS; SUBCUTANEOUS at 12:19

## 2020-01-01 RX ADMIN — Medication 1000 ML/HR: at 09:38

## 2020-01-01 RX ADMIN — FUROSEMIDE 5 MG/HR: 10 INJECTION, SOLUTION INTRAMUSCULAR; INTRAVENOUS at 05:42

## 2020-01-01 RX ADMIN — LOSARTAN POTASSIUM 100 MG: 100 TABLET, FILM COATED ORAL at 15:05

## 2020-01-01 RX ADMIN — DOXYCYCLINE 100 MG: 100 CAPSULE ORAL at 20:48

## 2020-01-01 RX ADMIN — INSULIN LISPRO 10 UNITS: 100 INJECTION, SOLUTION INTRAVENOUS; SUBCUTANEOUS at 09:30

## 2020-01-01 RX ADMIN — DEXMEDETOMIDINE 0.6 MCG/KG/HR: 100 INJECTION, SOLUTION, CONCENTRATE INTRAVENOUS at 14:18

## 2020-01-01 RX ADMIN — CARBIDOPA AND LEVODOPA 2 TABLET: 25; 100 TABLET ORAL at 14:19

## 2020-01-01 RX ADMIN — HEPARIN SODIUM 5000 UNITS: 5000 INJECTION INTRAVENOUS; SUBCUTANEOUS at 15:20

## 2020-01-01 RX ADMIN — INSULIN LISPRO 1 UNITS: 100 INJECTION, SOLUTION INTRAVENOUS; SUBCUTANEOUS at 12:33

## 2020-01-01 RX ADMIN — SPIRONOLACTONE 12.5 MG: 25 TABLET, FILM COATED ORAL at 11:03

## 2020-01-01 RX ADMIN — INSULIN LISPRO 7 UNITS: 100 INJECTION, SOLUTION INTRAVENOUS; SUBCUTANEOUS at 12:55

## 2020-01-01 RX ADMIN — CARBIDOPA AND LEVODOPA 2 TABLET: 25; 100 TABLET ORAL at 10:19

## 2020-01-01 RX ADMIN — HEPARIN SODIUM 4000 UNITS: 5000 INJECTION INTRAVENOUS; SUBCUTANEOUS at 15:58

## 2020-01-01 RX ADMIN — AMLODIPINE BESYLATE 5 MG: 5 TABLET ORAL at 08:56

## 2020-01-01 RX ADMIN — HEPARIN SODIUM 5000 UNITS: 5000 INJECTION INTRAVENOUS; SUBCUTANEOUS at 21:01

## 2020-01-01 RX ADMIN — PRIMIDONE 50 MG: 50 TABLET ORAL at 22:01

## 2020-01-01 RX ADMIN — SODIUM CHLORIDE 1000 ML: 0.9 INJECTION, SOLUTION INTRAVENOUS at 10:00

## 2020-01-01 RX ADMIN — Medication 10 ML: at 09:54

## 2020-01-01 RX ADMIN — CARBIDOPA AND LEVODOPA 2 TABLET: 25; 100 TABLET ORAL at 22:23

## 2020-01-01 RX ADMIN — INSULIN LISPRO 1 UNITS: 100 INJECTION, SOLUTION INTRAVENOUS; SUBCUTANEOUS at 20:01

## 2020-01-01 RX ADMIN — INSULIN LISPRO 6 UNITS: 100 INJECTION, SOLUTION INTRAVENOUS; SUBCUTANEOUS at 13:39

## 2020-01-01 RX ADMIN — CARBIDOPA AND LEVODOPA 2 TABLET: 25; 100 TABLET ORAL at 14:08

## 2020-01-01 RX ADMIN — DEXTROSE 50 % IN WATER (D50W) INTRAVENOUS SYRINGE 25 G: at 19:39

## 2020-01-01 RX ADMIN — POTASSIUM CHLORIDE 20 MEQ: 29.8 INJECTION, SOLUTION INTRAVENOUS at 08:40

## 2020-01-01 RX ADMIN — HYDRALAZINE HYDROCHLORIDE 5 MG: 20 INJECTION INTRAMUSCULAR; INTRAVENOUS at 19:20

## 2020-01-01 RX ADMIN — CARBIDOPA AND LEVODOPA 2 TABLET: 25; 100 TABLET ORAL at 20:29

## 2020-01-01 RX ADMIN — INSULIN LISPRO 3 UNITS: 100 INJECTION, SOLUTION INTRAVENOUS; SUBCUTANEOUS at 13:05

## 2020-01-01 RX ADMIN — CARVEDILOL 3.12 MG: 3.12 TABLET, FILM COATED ORAL at 08:39

## 2020-01-01 RX ADMIN — ASPIRIN 81 MG: 81 TABLET, CHEWABLE ORAL at 08:27

## 2020-01-01 RX ADMIN — PRIMIDONE 50 MG: 50 TABLET ORAL at 08:30

## 2020-01-01 RX ADMIN — ATORVASTATIN CALCIUM 10 MG: 10 TABLET, FILM COATED ORAL at 13:06

## 2020-01-01 RX ADMIN — CARBIDOPA AND LEVODOPA 2 TABLET: 25; 100 TABLET ORAL at 21:18

## 2020-01-01 RX ADMIN — SODIUM CHLORIDE, POTASSIUM CHLORIDE, SODIUM LACTATE AND CALCIUM CHLORIDE 1000 ML: 600; 310; 30; 20 INJECTION, SOLUTION INTRAVENOUS at 15:47

## 2020-01-01 RX ADMIN — LORAZEPAM 1 MG: 2 INJECTION INTRAMUSCULAR; INTRAVENOUS at 18:19

## 2020-01-01 RX ADMIN — IOPAMIDOL 80 ML: 755 INJECTION, SOLUTION INTRAVENOUS at 17:09

## 2020-01-01 RX ADMIN — ATORVASTATIN CALCIUM 10 MG: 10 TABLET, FILM COATED ORAL at 19:08

## 2020-01-01 RX ADMIN — PANTOPRAZOLE SODIUM 40 MG: 40 TABLET, DELAYED RELEASE ORAL at 21:04

## 2020-01-01 RX ADMIN — ALBUTEROL SULFATE 10 MG: 2.5 SOLUTION RESPIRATORY (INHALATION) at 10:47

## 2020-01-01 RX ADMIN — NIFEDIPINE 60 MG: 60 TABLET, FILM COATED, EXTENDED RELEASE ORAL at 08:44

## 2020-01-01 RX ADMIN — FUROSEMIDE 5 MG/HR: 10 INJECTION, SOLUTION INTRAMUSCULAR; INTRAVENOUS at 04:54

## 2020-01-01 RX ADMIN — PANTOPRAZOLE SODIUM 40 MG: 40 TABLET, DELAYED RELEASE ORAL at 06:41

## 2020-01-01 RX ADMIN — CARBIDOPA AND LEVODOPA 2 TABLET: 25; 100 TABLET ORAL at 08:47

## 2020-01-01 RX ADMIN — INSULIN LISPRO 2 UNITS: 100 INJECTION, SOLUTION INTRAVENOUS; SUBCUTANEOUS at 17:30

## 2020-01-01 RX ADMIN — INSULIN GLARGINE 20 UNITS: 100 INJECTION, SOLUTION SUBCUTANEOUS at 22:10

## 2020-01-01 RX ADMIN — PRIMIDONE 50 MG: 50 TABLET ORAL at 22:20

## 2020-01-01 RX ADMIN — INSULIN LISPRO 3 UNITS: 100 INJECTION, SOLUTION INTRAVENOUS; SUBCUTANEOUS at 10:08

## 2020-01-01 RX ADMIN — ATORVASTATIN CALCIUM 10 MG: 20 TABLET, FILM COATED ORAL at 08:26

## 2020-01-01 RX ADMIN — DEXMEDETOMIDINE 1.4 MCG/KG/HR: 100 INJECTION, SOLUTION, CONCENTRATE INTRAVENOUS at 10:37

## 2020-01-01 RX ADMIN — PRIMIDONE 50 MG: 50 TABLET ORAL at 21:16

## 2020-01-01 RX ADMIN — CARBIDOPA AND LEVODOPA 2 TABLET: 25; 100 TABLET ORAL at 09:34

## 2020-01-01 RX ADMIN — TETRACAINE HYDROCHLORIDE 1 DROP: 5 SOLUTION OPHTHALMIC at 13:42

## 2020-01-01 RX ADMIN — PRIMIDONE 50 MG: 50 TABLET ORAL at 01:43

## 2020-01-01 RX ADMIN — ATORVASTATIN CALCIUM 40 MG: 40 TABLET, FILM COATED ORAL at 19:31

## 2020-01-01 RX ADMIN — INSULIN LISPRO 8 UNITS: 100 INJECTION, SOLUTION INTRAVENOUS; SUBCUTANEOUS at 11:07

## 2020-01-01 RX ADMIN — CARBIDOPA AND LEVODOPA 2 TABLET: 25; 100 TABLET ORAL at 15:19

## 2020-01-01 RX ADMIN — ROPINIROLE HYDROCHLORIDE 3 MG: 2 TABLET, FILM COATED ORAL at 09:37

## 2020-01-01 RX ADMIN — HEPARIN SODIUM 5000 UNITS: 5000 INJECTION INTRAVENOUS; SUBCUTANEOUS at 17:23

## 2020-01-01 RX ADMIN — ASPIRIN 81 MG: 81 TABLET, COATED ORAL at 09:52

## 2020-01-01 RX ADMIN — ROPINIROLE HYDROCHLORIDE 3 MG: 1 TABLET, FILM COATED ORAL at 21:36

## 2020-01-01 RX ADMIN — PAROXETINE HYDROCHLORIDE 40 MG: 20 TABLET, FILM COATED ORAL at 08:28

## 2020-01-01 RX ADMIN — VANCOMYCIN HYDROCHLORIDE 1000 MG: 10 INJECTION, POWDER, LYOPHILIZED, FOR SOLUTION INTRAVENOUS at 12:14

## 2020-01-01 RX ADMIN — POTASSIUM CHLORIDE 20 MEQ: 29.8 INJECTION, SOLUTION INTRAVENOUS at 10:00

## 2020-01-01 RX ADMIN — SODIUM CHLORIDE, POTASSIUM CHLORIDE, SODIUM LACTATE AND CALCIUM CHLORIDE 1000 ML: 600; 310; 30; 20 INJECTION, SOLUTION INTRAVENOUS at 10:48

## 2020-01-01 RX ADMIN — SPIRONOLACTONE 12.5 MG: 25 TABLET, FILM COATED ORAL at 08:23

## 2020-01-01 RX ADMIN — PAROXETINE 40 MG: 40 TABLET, FILM COATED ORAL at 08:29

## 2020-01-01 RX ADMIN — CARBIDOPA AND LEVODOPA 2 TABLET: 25; 100 TABLET ORAL at 21:16

## 2020-01-01 RX ADMIN — Medication 10 ML: at 09:39

## 2020-01-01 RX ADMIN — FENTANYL CITRATE 50 MCG/HR: 50 INJECTION INTRAMUSCULAR; INTRAVENOUS at 16:24

## 2020-01-01 RX ADMIN — DOXYCYCLINE 100 MG: 100 CAPSULE ORAL at 08:43

## 2020-01-01 RX ADMIN — DOXYCYCLINE 100 MG: 100 CAPSULE ORAL at 16:41

## 2020-01-01 RX ADMIN — NIFEDIPINE 60 MG: 60 TABLET, FILM COATED, EXTENDED RELEASE ORAL at 10:13

## 2020-01-01 RX ADMIN — SODIUM CHLORIDE 1000 ML: 9 INJECTION, SOLUTION INTRAVENOUS at 15:07

## 2020-01-01 RX ADMIN — INSULIN LISPRO 2 UNITS: 100 INJECTION, SOLUTION INTRAVENOUS; SUBCUTANEOUS at 13:13

## 2020-01-01 RX ADMIN — SODIUM CHLORIDE 2.36 UNITS/HR: 900 INJECTION, SOLUTION INTRAVENOUS at 13:16

## 2020-01-01 RX ADMIN — CARVEDILOL 12.5 MG: 12.5 TABLET, FILM COATED ORAL at 18:45

## 2020-01-01 RX ADMIN — INSULIN LISPRO 1 UNITS: 100 INJECTION, SOLUTION INTRAVENOUS; SUBCUTANEOUS at 21:18

## 2020-01-01 RX ADMIN — ANTACID TABLETS 500 MG: 500 TABLET, CHEWABLE ORAL at 05:19

## 2020-01-01 RX ADMIN — HEPARIN SODIUM 5000 UNITS: 5000 INJECTION INTRAVENOUS; SUBCUTANEOUS at 06:29

## 2020-01-01 RX ADMIN — POTASSIUM CHLORIDE 10 MEQ: 7.46 INJECTION, SOLUTION INTRAVENOUS at 02:29

## 2020-01-01 RX ADMIN — ENOXAPARIN SODIUM 40 MG: 40 INJECTION SUBCUTANEOUS at 23:39

## 2020-01-01 RX ADMIN — ATORVASTATIN CALCIUM 10 MG: 10 TABLET, FILM COATED ORAL at 13:33

## 2020-01-01 RX ADMIN — CARBIDOPA AND LEVODOPA 2 TABLET: 25; 100 TABLET ORAL at 20:14

## 2020-01-01 RX ADMIN — CARBIDOPA AND LEVODOPA 2 TABLET: 25; 100 TABLET ORAL at 09:21

## 2020-01-01 RX ADMIN — ATORVASTATIN CALCIUM 40 MG: 40 TABLET, FILM COATED ORAL at 21:29

## 2020-01-01 RX ADMIN — CARBIDOPA AND LEVODOPA 2 TABLET: 25; 100 TABLET ORAL at 17:22

## 2020-01-01 RX ADMIN — Medication 1000 UNITS: at 08:31

## 2020-01-01 RX ADMIN — ANTACID TABLETS 500 MG: 500 TABLET, CHEWABLE ORAL at 16:52

## 2020-01-01 RX ADMIN — PANTOPRAZOLE SODIUM 40 MG: 40 INJECTION, POWDER, FOR SOLUTION INTRAVENOUS at 21:17

## 2020-01-01 RX ADMIN — ROPINIROLE HYDROCHLORIDE 3 MG: 2 TABLET, FILM COATED ORAL at 21:04

## 2020-01-01 RX ADMIN — SODIUM CHLORIDE: 9 INJECTION, SOLUTION INTRAVENOUS at 01:37

## 2020-01-01 RX ADMIN — POTASSIUM CHLORIDE 20 MEQ: 400 INJECTION, SOLUTION INTRAVENOUS at 07:23

## 2020-01-01 RX ADMIN — CEFEPIME HYDROCHLORIDE 1 G: 1 INJECTION, POWDER, FOR SOLUTION INTRAMUSCULAR; INTRAVENOUS at 14:35

## 2020-01-01 RX ADMIN — DEXTROSE AND SODIUM CHLORIDE: 5; 450 INJECTION, SOLUTION INTRAVENOUS at 11:28

## 2020-01-01 RX ADMIN — ASPIRIN 81 MG: 81 TABLET, COATED ORAL at 13:06

## 2020-01-01 RX ADMIN — CARBIDOPA AND LEVODOPA 2 TABLET: 25; 100 TABLET ORAL at 13:39

## 2020-01-01 RX ADMIN — CARBIDOPA AND LEVODOPA 2 TABLET: 25; 100 TABLET ORAL at 08:48

## 2020-01-01 RX ADMIN — POTASSIUM CHLORIDE 10 MEQ: 10 INJECTION, SOLUTION INTRAVENOUS at 13:37

## 2020-01-01 RX ADMIN — CARBIDOPA AND LEVODOPA 2 TABLET: 25; 100 TABLET ORAL at 16:58

## 2020-01-01 RX ADMIN — INSULIN LISPRO 1 UNITS: 100 INJECTION, SOLUTION INTRAVENOUS; SUBCUTANEOUS at 18:40

## 2020-01-01 RX ADMIN — ROCURONIUM BROMIDE 80 MG: 50 INJECTION INTRAVENOUS at 10:58

## 2020-01-01 RX ADMIN — PANTOPRAZOLE SODIUM 40 MG: 40 INJECTION, POWDER, FOR SOLUTION INTRAVENOUS at 09:33

## 2020-01-01 RX ADMIN — POLYMYXIN B SULFATE AND TRIMETHOPRIM SULFATE 2 DROP: 100000; 1 SOLUTION/ DROPS OPHTHALMIC at 01:00

## 2020-01-01 RX ADMIN — DOXYCYCLINE 100 MG: 100 CAPSULE ORAL at 21:27

## 2020-01-01 RX ADMIN — FUROSEMIDE 100 MG: 10 INJECTION, SOLUTION INTRAMUSCULAR; INTRAVENOUS at 12:30

## 2020-01-01 RX ADMIN — PRIMIDONE 50 MG: 50 TABLET ORAL at 22:12

## 2020-01-01 RX ADMIN — CARBIDOPA AND LEVODOPA 2 TABLET: 25; 100 TABLET ORAL at 09:02

## 2020-01-01 RX ADMIN — HEPARIN SODIUM 5000 UNITS: 5000 INJECTION INTRAVENOUS; SUBCUTANEOUS at 13:58

## 2020-01-01 RX ADMIN — CARBIDOPA AND LEVODOPA 2 TABLET: 25; 100 TABLET ORAL at 21:32

## 2020-01-01 RX ADMIN — POTASSIUM CHLORIDE 10 MEQ: 7.46 INJECTION, SOLUTION INTRAVENOUS at 08:20

## 2020-01-01 RX ADMIN — Medication 10 ML: at 13:07

## 2020-01-01 RX ADMIN — INSULIN LISPRO 4 UNITS: 100 INJECTION, SOLUTION INTRAVENOUS; SUBCUTANEOUS at 18:37

## 2020-01-01 RX ADMIN — Medication 10 ML: at 21:37

## 2020-01-01 RX ADMIN — SODIUM CHLORIDE 0.1 UNITS/KG/HR: 9 INJECTION, SOLUTION INTRAVENOUS at 15:35

## 2020-01-01 RX ADMIN — LOSARTAN POTASSIUM 25 MG: 25 TABLET, FILM COATED ORAL at 08:22

## 2020-01-01 RX ADMIN — CARBIDOPA AND LEVODOPA 2 TABLET: 25; 100 TABLET ORAL at 14:56

## 2020-01-01 RX ADMIN — PANTOPRAZOLE SODIUM 40 MG: 40 TABLET, DELAYED RELEASE ORAL at 15:18

## 2020-01-01 RX ADMIN — POTASSIUM CHLORIDE 10 MEQ: 750 TABLET, EXTENDED RELEASE ORAL at 14:59

## 2020-01-01 RX ADMIN — Medication 10 ML: at 21:32

## 2020-01-01 RX ADMIN — POLYMYXIN B SULFATE AND TRIMETHOPRIM SULFATE 2 DROP: 100000; 1 SOLUTION/ DROPS OPHTHALMIC at 12:34

## 2020-01-01 RX ADMIN — INSULIN LISPRO 10 UNITS: 100 INJECTION, SOLUTION INTRAVENOUS; SUBCUTANEOUS at 13:21

## 2020-01-01 RX ADMIN — ATORVASTATIN CALCIUM 10 MG: 20 TABLET, FILM COATED ORAL at 10:17

## 2020-01-01 RX ADMIN — ALTEPLASE 1 MG: 2.2 INJECTION, POWDER, LYOPHILIZED, FOR SOLUTION INTRAVENOUS at 19:13

## 2020-01-01 RX ADMIN — CARVEDILOL 12.5 MG: 12.5 TABLET, FILM COATED ORAL at 08:25

## 2020-01-01 RX ADMIN — CARVEDILOL 6.25 MG: 6.25 TABLET, FILM COATED ORAL at 10:02

## 2020-01-01 RX ADMIN — CARVEDILOL 12.5 MG: 12.5 TABLET, FILM COATED ORAL at 08:27

## 2020-01-01 RX ADMIN — INSULIN LISPRO 4 UNITS: 100 INJECTION, SOLUTION INTRAVENOUS; SUBCUTANEOUS at 01:38

## 2020-01-01 RX ADMIN — CARBIDOPA AND LEVODOPA 2 TABLET: 25; 100 TABLET ORAL at 18:02

## 2020-01-01 RX ADMIN — CARBIDOPA AND LEVODOPA 2 TABLET: 25; 100 TABLET ORAL at 20:24

## 2020-01-01 RX ADMIN — HEPARIN SODIUM 11 UNITS/KG/HR: 10000 INJECTION, SOLUTION INTRAVENOUS at 01:31

## 2020-01-01 RX ADMIN — ROPINIROLE 3 MG: 2 TABLET, FILM COATED ORAL at 23:27

## 2020-01-01 RX ADMIN — INSULIN LISPRO 4 UNITS: 100 INJECTION, SOLUTION INTRAVENOUS; SUBCUTANEOUS at 08:19

## 2020-01-01 RX ADMIN — ROPINIROLE HYDROCHLORIDE 3 MG: 1 TABLET, FILM COATED ORAL at 10:13

## 2020-01-01 RX ADMIN — HEPARIN SODIUM 1000 UNITS: 1000 INJECTION, SOLUTION INTRAVENOUS; SUBCUTANEOUS at 03:18

## 2020-01-01 RX ADMIN — SODIUM CHLORIDE 6.1 UNITS/HR: 900 INJECTION, SOLUTION INTRAVENOUS at 12:01

## 2020-01-01 RX ADMIN — SODIUM CHLORIDE: 4.5 INJECTION, SOLUTION INTRAVENOUS at 16:22

## 2020-01-01 RX ADMIN — DEXMEDETOMIDINE 0.5 MCG/KG/HR: 100 INJECTION, SOLUTION, CONCENTRATE INTRAVENOUS at 11:21

## 2020-01-01 RX ADMIN — INSULIN GLARGINE 30 UNITS: 100 INJECTION, SOLUTION SUBCUTANEOUS at 21:04

## 2020-01-01 RX ADMIN — DEXTROSE MONOHYDRATE 12.5 G: 25 INJECTION, SOLUTION INTRAVENOUS at 04:38

## 2020-01-01 RX ADMIN — PAROXETINE 40 MG: 40 TABLET, FILM COATED ORAL at 08:35

## 2020-01-01 RX ADMIN — ANTACID TABLETS 500 MG: 500 TABLET, CHEWABLE ORAL at 15:38

## 2020-01-01 RX ADMIN — ANTACID TABLETS 1000 MG: 500 TABLET, CHEWABLE ORAL at 22:03

## 2020-01-01 RX ADMIN — INSULIN LISPRO 8 UNITS: 100 INJECTION, SOLUTION INTRAVENOUS; SUBCUTANEOUS at 18:40

## 2020-01-01 RX ADMIN — CARBIDOPA AND LEVODOPA 2 TABLET: 25; 100 TABLET ORAL at 13:50

## 2020-01-01 RX ADMIN — INSULIN LISPRO 6 UNITS: 100 INJECTION, SOLUTION INTRAVENOUS; SUBCUTANEOUS at 19:42

## 2020-01-01 RX ADMIN — INSULIN LISPRO 2 UNITS: 100 INJECTION, SOLUTION INTRAVENOUS; SUBCUTANEOUS at 11:48

## 2020-01-01 RX ADMIN — PANTOPRAZOLE SODIUM 40 MG: 40 TABLET, DELAYED RELEASE ORAL at 06:50

## 2020-01-01 RX ADMIN — INSULIN LISPRO 18 UNITS: 100 INJECTION, SOLUTION INTRAVENOUS; SUBCUTANEOUS at 16:48

## 2020-01-01 RX ADMIN — ANTACID TABLETS 500 MG: 500 TABLET, CHEWABLE ORAL at 11:27

## 2020-01-01 RX ADMIN — INSULIN LISPRO 2 UNITS: 100 INJECTION, SOLUTION INTRAVENOUS; SUBCUTANEOUS at 18:00

## 2020-01-01 RX ADMIN — INSULIN LISPRO 8 UNITS: 100 INJECTION, SOLUTION INTRAVENOUS; SUBCUTANEOUS at 11:48

## 2020-01-01 RX ADMIN — ROPINIROLE HYDROCHLORIDE 3 MG: 1 TABLET, FILM COATED ORAL at 20:29

## 2020-01-01 RX ADMIN — MAGNESIUM SULFATE HEPTAHYDRATE 2 G: 40 INJECTION, SOLUTION INTRAVENOUS at 04:22

## 2020-01-01 RX ADMIN — CARVEDILOL 3.12 MG: 3.12 TABLET, FILM COATED ORAL at 08:23

## 2020-01-01 RX ADMIN — DEXMEDETOMIDINE 0.8 MCG/KG/HR: 100 INJECTION, SOLUTION, CONCENTRATE INTRAVENOUS at 22:38

## 2020-01-01 RX ADMIN — Medication 10 ML: at 13:34

## 2020-01-01 RX ADMIN — INSULIN LISPRO 15 UNITS: 100 INJECTION, SOLUTION INTRAVENOUS; SUBCUTANEOUS at 08:39

## 2020-01-01 RX ADMIN — OXYCODONE 5 MG: 5 TABLET ORAL at 12:46

## 2020-01-01 RX ADMIN — ROPINIROLE HYDROCHLORIDE 3 MG: 1 TABLET, FILM COATED ORAL at 10:18

## 2020-01-01 RX ADMIN — PAROXETINE HYDROCHLORIDE 40 MG: 20 TABLET, FILM COATED ORAL at 10:07

## 2020-01-01 RX ADMIN — ROPINIROLE HYDROCHLORIDE 3 MG: 1 TABLET, FILM COATED ORAL at 08:26

## 2020-01-01 RX ADMIN — PRIMIDONE 50 MG: 50 TABLET ORAL at 22:02

## 2020-01-01 RX ADMIN — ONDANSETRON 4 MG: 2 INJECTION INTRAMUSCULAR; INTRAVENOUS at 14:01

## 2020-01-01 RX ADMIN — ROPINIROLE HYDROCHLORIDE 3 MG: 2 TABLET, FILM COATED ORAL at 13:22

## 2020-01-01 RX ADMIN — INSULIN LISPRO 3 UNITS: 100 INJECTION, SOLUTION INTRAVENOUS; SUBCUTANEOUS at 22:27

## 2020-01-01 RX ADMIN — ROPINIROLE HYDROCHLORIDE 3 MG: 1 TABLET, FILM COATED ORAL at 21:50

## 2020-01-01 RX ADMIN — SODIUM CHLORIDE 0.82 UNITS/HR: 900 INJECTION, SOLUTION INTRAVENOUS at 15:07

## 2020-01-01 RX ADMIN — PAROXETINE 40 MG: 40 TABLET, FILM COATED ORAL at 08:31

## 2020-01-01 RX ADMIN — ENOXAPARIN SODIUM 40 MG: 40 INJECTION SUBCUTANEOUS at 08:29

## 2020-01-01 RX ADMIN — PAROXETINE HYDROCHLORIDE 40 MG: 20 TABLET, FILM COATED ORAL at 08:44

## 2020-01-01 RX ADMIN — HYDROMORPHONE HYDROCHLORIDE 1 MG: 1 INJECTION, SOLUTION INTRAMUSCULAR; INTRAVENOUS; SUBCUTANEOUS at 20:58

## 2020-01-01 RX ADMIN — INSULIN LISPRO 10 UNITS: 100 INJECTION, SOLUTION INTRAVENOUS; SUBCUTANEOUS at 12:44

## 2020-01-01 RX ADMIN — AMLODIPINE BESYLATE 5 MG: 5 TABLET ORAL at 04:08

## 2020-01-01 RX ADMIN — PRIMIDONE 50 MG: 50 TABLET ORAL at 22:23

## 2020-01-01 RX ADMIN — ROPINIROLE 3 MG: 2 TABLET, FILM COATED ORAL at 10:18

## 2020-01-01 RX ADMIN — ATORVASTATIN CALCIUM 40 MG: 40 TABLET, FILM COATED ORAL at 20:20

## 2020-01-01 RX ADMIN — PRIMIDONE 50 MG: 50 TABLET ORAL at 09:02

## 2020-01-01 RX ADMIN — ROPINIROLE HYDROCHLORIDE 3 MG: 1 TABLET, FILM COATED ORAL at 15:39

## 2020-01-01 RX ADMIN — INSULIN LISPRO 10 UNITS: 100 INJECTION, SOLUTION INTRAVENOUS; SUBCUTANEOUS at 18:10

## 2020-01-01 RX ADMIN — NIFEDIPINE 60 MG: 60 TABLET, FILM COATED, EXTENDED RELEASE ORAL at 14:56

## 2020-01-01 RX ADMIN — CARBIDOPA AND LEVODOPA 2 TABLET: 25; 100 TABLET ORAL at 13:14

## 2020-01-01 RX ADMIN — PANTOPRAZOLE SODIUM 40 MG: 40 TABLET, DELAYED RELEASE ORAL at 06:04

## 2020-01-01 RX ADMIN — PANTOPRAZOLE SODIUM 40 MG: 40 TABLET, DELAYED RELEASE ORAL at 08:29

## 2020-01-01 RX ADMIN — INSULIN LISPRO 3 UNITS: 100 INJECTION, SOLUTION INTRAVENOUS; SUBCUTANEOUS at 18:52

## 2020-01-01 RX ADMIN — PANTOPRAZOLE SODIUM 40 MG: 40 INJECTION, POWDER, FOR SOLUTION INTRAVENOUS at 15:31

## 2020-01-01 RX ADMIN — ATORVASTATIN CALCIUM 10 MG: 10 TABLET, FILM COATED ORAL at 08:26

## 2020-01-01 RX ADMIN — INSULIN HUMAN 10 UNITS: 100 INJECTION, SOLUTION PARENTERAL at 08:55

## 2020-01-01 RX ADMIN — INSULIN LISPRO 4 UNITS: 100 INJECTION, SOLUTION INTRAVENOUS; SUBCUTANEOUS at 17:46

## 2020-01-01 RX ADMIN — INSULIN HUMAN 20 UNITS: 100 INJECTION, SUSPENSION SUBCUTANEOUS at 08:36

## 2020-01-01 RX ADMIN — CARBIDOPA AND LEVODOPA 2 TABLET: 25; 100 TABLET ORAL at 20:46

## 2020-01-01 RX ADMIN — CARBIDOPA AND LEVODOPA 2 TABLET: 25; 100 TABLET ORAL at 13:25

## 2020-01-01 RX ADMIN — INSULIN LISPRO 6 UNITS: 100 INJECTION, SOLUTION INTRAVENOUS; SUBCUTANEOUS at 18:11

## 2020-01-01 RX ADMIN — ANTACID TABLETS 500 MG: 500 TABLET, CHEWABLE ORAL at 13:16

## 2020-01-01 RX ADMIN — QUETIAPINE FUMARATE 12.5 MG: 25 TABLET ORAL at 21:29

## 2020-01-01 RX ADMIN — DOCUSATE SODIUM 100 MG: 100 CAPSULE, LIQUID FILLED ORAL at 08:22

## 2020-01-01 RX ADMIN — Medication 10 ML: at 10:07

## 2020-01-01 RX ADMIN — LOSARTAN POTASSIUM 100 MG: 100 TABLET, FILM COATED ORAL at 08:47

## 2020-01-01 RX ADMIN — POTASSIUM CHLORIDE 20 MEQ: 1500 TABLET, EXTENDED RELEASE ORAL at 16:46

## 2020-01-01 RX ADMIN — ROPINIROLE HYDROCHLORIDE 3 MG: 1 TABLET, FILM COATED ORAL at 14:35

## 2020-01-01 RX ADMIN — FAMOTIDINE 10 MG: 10 INJECTION, SOLUTION INTRAVENOUS at 08:13

## 2020-01-01 RX ADMIN — QUETIAPINE FUMARATE 12.5 MG: 25 TABLET ORAL at 19:31

## 2020-01-01 RX ADMIN — INSULIN GLARGINE 25 UNITS: 100 INJECTION, SOLUTION SUBCUTANEOUS at 21:17

## 2020-01-01 RX ADMIN — QUETIAPINE FUMARATE 12.5 MG: 25 TABLET ORAL at 19:28

## 2020-01-01 RX ADMIN — ATORVASTATIN CALCIUM 10 MG: 10 TABLET, FILM COATED ORAL at 15:05

## 2020-01-01 RX ADMIN — POLYMYXIN B SULFATE AND TRIMETHOPRIM SULFATE 2 DROP: 100000; 1 SOLUTION/ DROPS OPHTHALMIC at 23:53

## 2020-01-01 RX ADMIN — SODIUM PHOSPHATE, MONOBASIC, MONOHYDRATE 10 MMOL: 276; 142 INJECTION, SOLUTION INTRAVENOUS at 22:34

## 2020-01-01 RX ADMIN — Medication 10 ML: at 08:48

## 2020-01-01 RX ADMIN — ROPINIROLE HYDROCHLORIDE 3 MG: 1 TABLET, FILM COATED ORAL at 08:48

## 2020-01-01 RX ADMIN — FAMOTIDINE 10 MG: 10 INJECTION, SOLUTION INTRAVENOUS at 08:40

## 2020-01-01 RX ADMIN — SODIUM CHLORIDE, POTASSIUM CHLORIDE, SODIUM LACTATE AND CALCIUM CHLORIDE 1000 ML: 600; 310; 30; 20 INJECTION, SOLUTION INTRAVENOUS at 14:47

## 2020-01-01 RX ADMIN — CARBIDOPA AND LEVODOPA 2 TABLET: 25; 100 TABLET ORAL at 15:03

## 2020-01-01 RX ADMIN — SODIUM CHLORIDE 12 UNITS/HR: 9 INJECTION, SOLUTION INTRAVENOUS at 16:00

## 2020-01-01 RX ADMIN — PAROXETINE HYDROCHLORIDE 40 MG: 20 TABLET, FILM COATED ORAL at 20:45

## 2020-01-01 RX ADMIN — SODIUM CHLORIDE: 9 INJECTION, SOLUTION INTRAVENOUS at 21:48

## 2020-01-01 RX ADMIN — CARBIDOPA AND LEVODOPA 2 TABLET: 25; 100 TABLET ORAL at 20:20

## 2020-01-01 RX ADMIN — CARBIDOPA AND LEVODOPA 2 TABLET: 25; 100 TABLET ORAL at 08:39

## 2020-01-01 RX ADMIN — PANTOPRAZOLE SODIUM 40 MG: 40 TABLET, DELAYED RELEASE ORAL at 18:30

## 2020-01-01 RX ADMIN — ROPINIROLE 3 MG: 2 TABLET, FILM COATED ORAL at 08:26

## 2020-01-01 RX ADMIN — INSULIN HUMAN 10 UNITS: 100 INJECTION, SOLUTION PARENTERAL at 15:46

## 2020-01-01 RX ADMIN — INSULIN GLARGINE 40 UNITS: 100 INJECTION, SOLUTION SUBCUTANEOUS at 22:44

## 2020-01-01 RX ADMIN — POLYMYXIN B SULFATE AND TRIMETHOPRIM SULFATE 2 DROP: 100000; 1 SOLUTION/ DROPS OPHTHALMIC at 13:48

## 2020-01-01 RX ADMIN — ROPINIROLE HYDROCHLORIDE 3 MG: 1 TABLET, FILM COATED ORAL at 15:04

## 2020-01-01 RX ADMIN — ASPIRIN 81 MG: 81 TABLET, CHEWABLE ORAL at 09:37

## 2020-01-01 RX ADMIN — HEPARIN SODIUM 5000 UNITS: 5000 INJECTION INTRAVENOUS; SUBCUTANEOUS at 21:48

## 2020-01-01 RX ADMIN — HEPARIN SODIUM 2400 UNITS: 1000 INJECTION INTRAVENOUS; SUBCUTANEOUS at 15:06

## 2020-01-01 RX ADMIN — CARBIDOPA AND LEVODOPA 2 TABLET: 25; 100 TABLET ORAL at 22:20

## 2020-01-01 RX ADMIN — INSULIN LISPRO 5 UNITS: 100 INJECTION, SOLUTION INTRAVENOUS; SUBCUTANEOUS at 18:06

## 2020-01-01 RX ADMIN — POLYMYXIN B SULFATE AND TRIMETHOPRIM SULFATE 2 DROP: 100000; 1 SOLUTION/ DROPS OPHTHALMIC at 13:09

## 2020-01-01 RX ADMIN — INSULIN LISPRO 4 UNITS: 100 INJECTION, SOLUTION INTRAVENOUS; SUBCUTANEOUS at 08:29

## 2020-01-01 RX ADMIN — INSULIN LISPRO 7 UNITS: 100 INJECTION, SOLUTION INTRAVENOUS; SUBCUTANEOUS at 08:35

## 2020-01-01 RX ADMIN — ROPINIROLE HYDROCHLORIDE 3 MG: 2 TABLET, FILM COATED ORAL at 21:05

## 2020-01-01 RX ADMIN — PAROXETINE HYDROCHLORIDE 40 MG: 20 TABLET, FILM COATED ORAL at 08:21

## 2020-01-01 RX ADMIN — CARBIDOPA AND LEVODOPA 2 TABLET: 25; 100 TABLET ORAL at 01:43

## 2020-01-01 RX ADMIN — CARBIDOPA AND LEVODOPA 2 TABLET: 25; 100 TABLET ORAL at 10:02

## 2020-01-01 RX ADMIN — PRIMIDONE 50 MG: 50 TABLET ORAL at 21:49

## 2020-01-01 RX ADMIN — FUROSEMIDE 5 MG/HR: 10 INJECTION, SOLUTION INTRAMUSCULAR; INTRAVENOUS at 23:11

## 2020-01-01 RX ADMIN — CARBIDOPA AND LEVODOPA 2 TABLET: 25; 100 TABLET ORAL at 07:59

## 2020-01-01 RX ADMIN — SODIUM CHLORIDE 1.23 UNITS/HR: 900 INJECTION, SOLUTION INTRAVENOUS at 14:08

## 2020-01-01 RX ADMIN — POLYETHYLENE GLYCOL 3350 17 G: 17 POWDER, FOR SOLUTION ORAL at 08:42

## 2020-01-01 RX ADMIN — INSULIN LISPRO 9 UNITS: 100 INJECTION, SOLUTION INTRAVENOUS; SUBCUTANEOUS at 09:32

## 2020-01-01 RX ADMIN — INSULIN LISPRO 1 UNITS: 100 INJECTION, SOLUTION INTRAVENOUS; SUBCUTANEOUS at 20:13

## 2020-01-01 RX ADMIN — CARBIDOPA AND LEVODOPA 2 TABLET: 25; 100 TABLET ORAL at 19:31

## 2020-01-01 RX ADMIN — CARBIDOPA AND LEVODOPA 2 TABLET: 25; 100 TABLET ORAL at 15:39

## 2020-01-01 RX ADMIN — FENTANYL CITRATE 50 MCG/HR: 50 INJECTION INTRAMUSCULAR; INTRAVENOUS at 11:05

## 2020-01-01 RX ADMIN — DEXTROSE AND SODIUM CHLORIDE: 5; 450 INJECTION, SOLUTION INTRAVENOUS at 19:15

## 2020-01-01 RX ADMIN — ATORVASTATIN CALCIUM 10 MG: 10 TABLET, FILM COATED ORAL at 08:44

## 2020-01-01 RX ADMIN — HEPARIN SODIUM 11 UNITS/KG/HR: 10000 INJECTION, SOLUTION INTRAVENOUS at 05:42

## 2020-01-01 RX ADMIN — CHOLECALCIFEROL (VITAMIN D3) 10 MCG (400 UNIT) TABLET 400 UNITS: at 08:26

## 2020-01-01 RX ADMIN — NIFEDIPINE 60 MG: 60 TABLET, FILM COATED, EXTENDED RELEASE ORAL at 13:05

## 2020-01-01 RX ADMIN — INSULIN LISPRO 1 UNITS: 100 INJECTION, SOLUTION INTRAVENOUS; SUBCUTANEOUS at 20:15

## 2020-01-01 RX ADMIN — MEROPENEM 1 G: 1 INJECTION, POWDER, FOR SOLUTION INTRAVENOUS at 04:11

## 2020-01-01 RX ADMIN — ACETAMINOPHEN 650 MG: 325 TABLET ORAL at 16:58

## 2020-01-01 RX ADMIN — FLUTICASONE PROPIONATE 2 SPRAY: 50 SPRAY, METERED NASAL at 18:44

## 2020-01-01 RX ADMIN — INSULIN LISPRO 3 UNITS: 100 INJECTION, SOLUTION INTRAVENOUS; SUBCUTANEOUS at 16:42

## 2020-01-01 RX ADMIN — INSULIN LISPRO 4 UNITS: 100 INJECTION, SOLUTION INTRAVENOUS; SUBCUTANEOUS at 21:34

## 2020-01-01 RX ADMIN — ROPINIROLE HYDROCHLORIDE 3 MG: 1 TABLET, FILM COATED ORAL at 21:32

## 2020-01-01 RX ADMIN — ANTACID TABLETS 500 MG: 500 TABLET, CHEWABLE ORAL at 22:56

## 2020-01-01 RX ADMIN — LOSARTAN POTASSIUM 100 MG: 100 TABLET, FILM COATED ORAL at 13:39

## 2020-01-01 RX ADMIN — CARBIDOPA AND LEVODOPA 2 TABLET: 25; 100 TABLET ORAL at 21:28

## 2020-01-01 RX ADMIN — VANCOMYCIN HYDROCHLORIDE 1750 MG: 10 INJECTION, POWDER, LYOPHILIZED, FOR SOLUTION INTRAVENOUS at 14:09

## 2020-01-01 RX ADMIN — INSULIN LISPRO 6 UNITS: 100 INJECTION, SOLUTION INTRAVENOUS; SUBCUTANEOUS at 17:28

## 2020-01-01 RX ADMIN — Medication 10 ML: at 21:48

## 2020-01-01 RX ADMIN — SODIUM CHLORIDE 0.1 UNITS/KG/HR: 9 INJECTION, SOLUTION INTRAVENOUS at 15:10

## 2020-01-01 RX ADMIN — INSULIN LISPRO 18 UNITS: 100 INJECTION, SOLUTION INTRAVENOUS; SUBCUTANEOUS at 12:03

## 2020-01-01 RX ADMIN — POLYMYXIN B SULFATE AND TRIMETHOPRIM SULFATE 2 DROP: 100000; 1 SOLUTION/ DROPS OPHTHALMIC at 06:30

## 2020-01-01 RX ADMIN — KETAMINE HYDROCHLORIDE 100 MG: 50 INJECTION INTRAMUSCULAR; INTRAVENOUS at 10:57

## 2020-01-01 RX ADMIN — SODIUM CHLORIDE 8 MG/HR: 900 INJECTION, SOLUTION INTRAVENOUS at 22:20

## 2020-01-01 RX ADMIN — POTASSIUM CHLORIDE 20 MEQ: 1500 TABLET, EXTENDED RELEASE ORAL at 10:02

## 2020-01-01 RX ADMIN — INSULIN LISPRO 6 UNITS: 100 INJECTION, SOLUTION INTRAVENOUS; SUBCUTANEOUS at 17:31

## 2020-01-01 RX ADMIN — PRIMIDONE 50 MG: 50 TABLET ORAL at 10:07

## 2020-01-01 RX ADMIN — ROPINIROLE HYDROCHLORIDE 3 MG: 1 TABLET, FILM COATED ORAL at 14:56

## 2020-01-01 RX ADMIN — ROPINIROLE HYDROCHLORIDE 3 MG: 1 TABLET, FILM COATED ORAL at 09:02

## 2020-01-01 RX ADMIN — NIFEDIPINE 60 MG: 60 TABLET, FILM COATED, EXTENDED RELEASE ORAL at 21:17

## 2020-01-01 RX ADMIN — INSULIN LISPRO 8 UNITS: 100 INJECTION, SOLUTION INTRAVENOUS; SUBCUTANEOUS at 17:06

## 2020-01-01 RX ADMIN — SODIUM CHLORIDE, POTASSIUM CHLORIDE, SODIUM LACTATE AND CALCIUM CHLORIDE 1000 ML: 600; 310; 30; 20 INJECTION, SOLUTION INTRAVENOUS at 12:06

## 2020-01-01 RX ADMIN — ROPINIROLE HYDROCHLORIDE 3 MG: 1 TABLET, FILM COATED ORAL at 22:23

## 2020-01-01 RX ADMIN — ROPINIROLE HYDROCHLORIDE 3 MG: 2 TABLET, FILM COATED ORAL at 08:21

## 2020-01-01 RX ADMIN — METOPROLOL TARTRATE 12.5 MG: 25 TABLET, FILM COATED ORAL at 10:13

## 2020-01-01 RX ADMIN — FAMOTIDINE 10 MG: 20 TABLET ORAL at 19:31

## 2020-01-01 RX ADMIN — INSULIN LISPRO 6 UNITS: 100 INJECTION, SOLUTION INTRAVENOUS; SUBCUTANEOUS at 12:59

## 2020-01-01 RX ADMIN — CLINDAMYCIN PHOSPHATE 300 MG: 300 INJECTION, SOLUTION INTRAVENOUS at 00:02

## 2020-01-01 RX ADMIN — INSULIN LISPRO 3 UNITS: 100 INJECTION, SOLUTION INTRAVENOUS; SUBCUTANEOUS at 21:06

## 2020-01-01 RX ADMIN — INSULIN LISPRO 4 UNITS: 100 INJECTION, SOLUTION INTRAVENOUS; SUBCUTANEOUS at 21:14

## 2020-01-01 RX ADMIN — HEPARIN SODIUM 5000 UNITS: 5000 INJECTION INTRAVENOUS; SUBCUTANEOUS at 05:10

## 2020-01-01 RX ADMIN — PROMETHAZINE HYDROCHLORIDE 12.5 MG: 25 TABLET ORAL at 05:28

## 2020-01-01 RX ADMIN — ROPINIROLE HYDROCHLORIDE 3 MG: 1 TABLET, FILM COATED ORAL at 22:20

## 2020-01-01 RX ADMIN — PRIMIDONE 50 MG: 50 TABLET ORAL at 09:21

## 2020-01-01 RX ADMIN — CEFEPIME 2 G: 2 INJECTION, POWDER, FOR SOLUTION INTRAMUSCULAR; INTRAVENOUS at 13:00

## 2020-01-01 RX ADMIN — CARVEDILOL 12.5 MG: 12.5 TABLET, FILM COATED ORAL at 09:21

## 2020-01-01 RX ADMIN — CARBIDOPA AND LEVODOPA 2 TABLET: 25; 100 TABLET ORAL at 21:09

## 2020-01-01 RX ADMIN — LOSARTAN POTASSIUM 100 MG: 100 TABLET, FILM COATED ORAL at 09:00

## 2020-01-01 RX ADMIN — INSULIN GLARGINE 50 UNITS: 100 INJECTION, SOLUTION SUBCUTANEOUS at 21:19

## 2020-01-01 RX ADMIN — POTASSIUM CHLORIDE, DEXTROSE MONOHYDRATE AND SODIUM CHLORIDE: 150; 5; 450 INJECTION, SOLUTION INTRAVENOUS at 18:55

## 2020-01-01 RX ADMIN — ASPIRIN 81 MG: 81 TABLET, CHEWABLE ORAL at 08:24

## 2020-01-01 RX ADMIN — ANTACID TABLETS 1000 MG: 500 TABLET, CHEWABLE ORAL at 08:47

## 2020-01-01 RX ADMIN — ROPINIROLE HYDROCHLORIDE 3 MG: 1 TABLET, FILM COATED ORAL at 21:16

## 2020-01-01 RX ADMIN — PANTOPRAZOLE SODIUM 40 MG: 40 TABLET, DELAYED RELEASE ORAL at 21:17

## 2020-01-01 RX ADMIN — INSULIN LISPRO 6 UNITS: 100 INJECTION, SOLUTION INTRAVENOUS; SUBCUTANEOUS at 12:04

## 2020-01-01 RX ADMIN — LOSARTAN POTASSIUM 100 MG: 100 TABLET, FILM COATED ORAL at 08:07

## 2020-01-01 RX ADMIN — INSULIN LISPRO 9 UNITS: 100 INJECTION, SOLUTION INTRAVENOUS; SUBCUTANEOUS at 13:48

## 2020-01-01 RX ADMIN — CARBIDOPA AND LEVODOPA 2 TABLET: 25; 100 TABLET ORAL at 13:54

## 2020-01-01 RX ADMIN — ROPINIROLE HYDROCHLORIDE 3 MG: 1 TABLET, FILM COATED ORAL at 08:21

## 2020-01-01 RX ADMIN — CHOLECALCIFEROL (VITAMIN D3) 10 MCG (400 UNIT) TABLET 400 UNITS: at 08:28

## 2020-01-01 RX ADMIN — ROPINIROLE HYDROCHLORIDE 3 MG: 1 TABLET, FILM COATED ORAL at 21:08

## 2020-01-01 RX ADMIN — INSULIN LISPRO 2 UNITS: 100 INJECTION, SOLUTION INTRAVENOUS; SUBCUTANEOUS at 18:14

## 2020-01-01 RX ADMIN — PANTOPRAZOLE SODIUM 40 MG: 40 TABLET, DELAYED RELEASE ORAL at 09:21

## 2020-01-01 RX ADMIN — PAROXETINE HYDROCHLORIDE 40 MG: 20 TABLET, FILM COATED ORAL at 09:02

## 2020-01-01 RX ADMIN — CARBIDOPA AND LEVODOPA 2 TABLET: 25; 100 TABLET ORAL at 23:27

## 2020-01-01 RX ADMIN — INSULIN LISPRO 5 UNITS: 100 INJECTION, SOLUTION INTRAVENOUS; SUBCUTANEOUS at 17:06

## 2020-01-01 RX ADMIN — SODIUM CHLORIDE 20 ML: 900 INJECTION, SOLUTION INTRAVENOUS at 19:11

## 2020-01-01 RX ADMIN — DEXTROSE AND SODIUM CHLORIDE: 5; 450 INJECTION, SOLUTION INTRAVENOUS at 20:05

## 2020-01-01 RX ADMIN — DEXTROSE 50 % IN WATER (D50W) INTRAVENOUS SYRINGE 12.5 G: at 00:05

## 2020-01-01 RX ADMIN — ROPINIROLE HYDROCHLORIDE 3 MG: 1 TABLET, FILM COATED ORAL at 21:28

## 2020-01-01 RX ADMIN — POTASSIUM PHOSPHATE, MONOBASIC 250 MG: 500 TABLET, SOLUBLE ORAL at 14:37

## 2020-01-01 RX ADMIN — INSULIN LISPRO 5 UNITS: 100 INJECTION, SOLUTION INTRAVENOUS; SUBCUTANEOUS at 12:20

## 2020-01-01 RX ADMIN — NIFEDIPINE 60 MG: 60 TABLET, FILM COATED, EXTENDED RELEASE ORAL at 08:20

## 2020-01-01 RX ADMIN — INSULIN LISPRO 3 UNITS: 100 INJECTION, SOLUTION INTRAVENOUS; SUBCUTANEOUS at 22:11

## 2020-01-01 RX ADMIN — Medication 10 ML: at 09:02

## 2020-01-01 RX ADMIN — HEPARIN SODIUM 5000 UNITS: 5000 INJECTION INTRAVENOUS; SUBCUTANEOUS at 05:35

## 2020-01-01 RX ADMIN — INSULIN LISPRO 2 UNITS: 100 INJECTION, SOLUTION INTRAVENOUS; SUBCUTANEOUS at 21:18

## 2020-01-01 RX ADMIN — PANTOPRAZOLE SODIUM 40 MG: 40 TABLET, DELAYED RELEASE ORAL at 20:20

## 2020-01-01 RX ADMIN — INSULIN LISPRO 4 UNITS: 100 INJECTION, SOLUTION INTRAVENOUS; SUBCUTANEOUS at 23:24

## 2020-01-01 RX ADMIN — INSULIN HUMAN 20 UNITS: 100 INJECTION, SUSPENSION SUBCUTANEOUS at 11:54

## 2020-01-01 RX ADMIN — INSULIN HUMAN 30 UNITS: 100 INJECTION, SUSPENSION SUBCUTANEOUS at 16:49

## 2020-01-01 RX ADMIN — SODIUM CHLORIDE: 9 INJECTION, SOLUTION INTRAVENOUS at 17:05

## 2020-01-01 RX ADMIN — CARVEDILOL 12.5 MG: 12.5 TABLET, FILM COATED ORAL at 18:05

## 2020-01-01 RX ADMIN — CARBIDOPA AND LEVODOPA 2 TABLET: 25; 100 TABLET ORAL at 13:33

## 2020-01-01 ASSESSMENT — PULMONARY FUNCTION TESTS
PIF_VALUE: 24
PIF_VALUE: 20
PIF_VALUE: 44
PIF_VALUE: 22
PIF_VALUE: 13
PIF_VALUE: 19
PIF_VALUE: 18
PIF_VALUE: 22
PIF_VALUE: 22
PIF_VALUE: 13
PIF_VALUE: 20
PIF_VALUE: 22
PIF_VALUE: 18
PIF_VALUE: 18
PIF_VALUE: 19
PIF_VALUE: 22
PIF_VALUE: 19
PIF_VALUE: 28
PIF_VALUE: 18
PIF_VALUE: 13
PIF_VALUE: 18
PIF_VALUE: 22
PIF_VALUE: 24
PIF_VALUE: 19
PIF_VALUE: 13
PIF_VALUE: 39
PIF_VALUE: 24
PIF_VALUE: 17
PIF_VALUE: 23
PIF_VALUE: 26
PIF_VALUE: 19
PIF_VALUE: 36
PIF_VALUE: 19
PIF_VALUE: 36
PIF_VALUE: 13
PIF_VALUE: 22
PIF_VALUE: 13
PIF_VALUE: 18
PIF_VALUE: 23
PIF_VALUE: 25
PIF_VALUE: 27
PIF_VALUE: 13
PIF_VALUE: 18
PIF_VALUE: 41
PIF_VALUE: 22
PIF_VALUE: 13
PIF_VALUE: 18
PIF_VALUE: 20
PIF_VALUE: 31
PIF_VALUE: 18
PIF_VALUE: 22
PIF_VALUE: 18
PIF_VALUE: 21
PIF_VALUE: 18
PIF_VALUE: 24
PIF_VALUE: 22
PIF_VALUE: 25
PIF_VALUE: 18
PIF_VALUE: 18
PIF_VALUE: 22
PIF_VALUE: 10
PIF_VALUE: 21
PIF_VALUE: 26
PIF_VALUE: 21
PIF_VALUE: 15
PIF_VALUE: 22
PIF_VALUE: 20
PIF_VALUE: 22
PIF_VALUE: 18
PIF_VALUE: 10
PIF_VALUE: 23
PIF_VALUE: 19
PIF_VALUE: 23
PIF_VALUE: 19
PIF_VALUE: 23
PIF_VALUE: 19
PIF_VALUE: 23
PIF_VALUE: 26
PIF_VALUE: 22
PIF_VALUE: 25
PIF_VALUE: 23
PIF_VALUE: 23
PIF_VALUE: 20
PIF_VALUE: 19
PIF_VALUE: 13
PIF_VALUE: 20
PIF_VALUE: 18
PIF_VALUE: 13
PIF_VALUE: 26
PIF_VALUE: 23
PIF_VALUE: 21
PIF_VALUE: 18
PIF_VALUE: 21
PIF_VALUE: 19
PIF_VALUE: 17
PIF_VALUE: 13
PIF_VALUE: 20
PIF_VALUE: 18
PIF_VALUE: 20
PIF_VALUE: 27
PIF_VALUE: 22
PIF_VALUE: 13
PIF_VALUE: 19
PIF_VALUE: 18
PIF_VALUE: 22
PIF_VALUE: 24
PIF_VALUE: 13
PIF_VALUE: 23
PIF_VALUE: 17
PIF_VALUE: 13
PIF_VALUE: 17
PIF_VALUE: 24
PIF_VALUE: 23
PIF_VALUE: 19
PIF_VALUE: 18
PIF_VALUE: 26
PIF_VALUE: 23
PIF_VALUE: 27
PIF_VALUE: 38
PIF_VALUE: 25
PIF_VALUE: 24
PIF_VALUE: 13
PIF_VALUE: 18
PIF_VALUE: 22
PIF_VALUE: 19
PIF_VALUE: 19
PIF_VALUE: 22
PIF_VALUE: 23
PIF_VALUE: 22
PIF_VALUE: 19
PIF_VALUE: 10
PIF_VALUE: 18
PIF_VALUE: 26
PIF_VALUE: 18
PIF_VALUE: 29
PIF_VALUE: 22
PIF_VALUE: 23

## 2020-01-01 ASSESSMENT — PAIN SCALES - PAIN ASSESSMENT IN ADVANCED DEMENTIA (PAINAD)
BREATHING: 0
NEGVOCALIZATION: 0
TOTALSCORE: 0
NEGVOCALIZATION: 0
CONSOLABILITY: 0
TOTALSCORE: 0
BODYLANGUAGE: 0
TOTALSCORE: 0
NEGVOCALIZATION: 0
CONSOLABILITY: 0
FACIALEXPRESSION: 0
FACIALEXPRESSION: 0
CONSOLABILITY: 0
NEGVOCALIZATION: 0
FACIALEXPRESSION: 0
CONSOLABILITY: 0
CONSOLABILITY: 0
BODYLANGUAGE: 0
BODYLANGUAGE: 0
TOTALSCORE: 0
BREATHING: 0
TOTALSCORE: 0
FACIALEXPRESSION: 0
NEGVOCALIZATION: 0
BREATHING: 0
TOTALSCORE: 0
CONSOLABILITY: 0
BODYLANGUAGE: 0
BODYLANGUAGE: 0
FACIALEXPRESSION: 0
BODYLANGUAGE: 0
BREATHING: 0
FACIALEXPRESSION: 0
NEGVOCALIZATION: 0

## 2020-01-01 ASSESSMENT — ENCOUNTER SYMPTOMS
DIARRHEA: 0
BACK PAIN: 0
ABDOMINAL PAIN: 0
FACIAL SWELLING: 0
CONSTIPATION: 0
COUGH: 0
CHEST TIGHTNESS: 0
CONSTIPATION: 0
PHOTOPHOBIA: 0
SHORTNESS OF BREATH: 0
CONSTIPATION: 0
NAUSEA: 1
WHEEZING: 0
COUGH: 0
NAUSEA: 0
COUGH: 0
ABDOMINAL PAIN: 0
ABDOMINAL PAIN: 0
COLOR CHANGE: 0
SHORTNESS OF BREATH: 0
CONSTIPATION: 0
DIARRHEA: 0
COUGH: 0
ABDOMINAL PAIN: 0
SHORTNESS OF BREATH: 0
SINUS PRESSURE: 0
PHOTOPHOBIA: 0
BLOOD IN STOOL: 0
DIARRHEA: 0
BACK PAIN: 0
COUGH: 0
GASTROINTESTINAL NEGATIVE: 1
DIARRHEA: 0
VOMITING: 1
SINUS PRESSURE: 0
VOMITING: 0
COLOR CHANGE: 0
ABDOMINAL PAIN: 0
COUGH: 0
WHEEZING: 0
EYE PAIN: 0
ABDOMINAL PAIN: 0
SORE THROAT: 0
VOMITING: 0
DIARRHEA: 0
CONSTIPATION: 0
SHORTNESS OF BREATH: 0
VOMITING: 0
DIARRHEA: 0
SORE THROAT: 0
ABDOMINAL PAIN: 0
SINUS PAIN: 0
CONSTIPATION: 0
PHOTOPHOBIA: 0
SORE THROAT: 0
VOMITING: 1
COUGH: 0
APNEA: 0
ABDOMINAL PAIN: 0
SHORTNESS OF BREATH: 0
VOMITING: 0
COLOR CHANGE: 0
SINUS PRESSURE: 0
CHEST TIGHTNESS: 0
EYES NEGATIVE: 1
NAUSEA: 1
RESPIRATORY NEGATIVE: 1
PHOTOPHOBIA: 0
SINUS PRESSURE: 0
NAUSEA: 0
COLOR CHANGE: 0
CONSTIPATION: 0
NAUSEA: 1
SHORTNESS OF BREATH: 0
DIARRHEA: 0
RHINORRHEA: 0
WHEEZING: 0
NAUSEA: 0
NAUSEA: 0
CHEST TIGHTNESS: 0
WHEEZING: 0
CONSTIPATION: 0
VOMITING: 0
CHEST TIGHTNESS: 0
ABDOMINAL DISTENTION: 0
ABDOMINAL DISTENTION: 0
SHORTNESS OF BREATH: 0
VOMITING: 0
DIARRHEA: 0
DIARRHEA: 0
TROUBLE SWALLOWING: 0
CHEST TIGHTNESS: 0
ABDOMINAL DISTENTION: 0
SINUS PRESSURE: 0
NAUSEA: 1
CHEST TIGHTNESS: 0
SORE THROAT: 0
CONSTIPATION: 0
EYE PAIN: 0
CHEST TIGHTNESS: 0
EYE DISCHARGE: 0
SHORTNESS OF BREATH: 0
CONSTIPATION: 0
APNEA: 0
PHOTOPHOBIA: 0
BLOOD IN STOOL: 1
RESPIRATORY NEGATIVE: 1
WHEEZING: 0
ABDOMINAL PAIN: 0
ABDOMINAL PAIN: 0
RHINORRHEA: 0
BACK PAIN: 0
CHEST TIGHTNESS: 0
DIARRHEA: 0
RECTAL PAIN: 0
VOMITING: 0
VOMITING: 0
SORE THROAT: 0
SHORTNESS OF BREATH: 0
NAUSEA: 0
COUGH: 0
ABDOMINAL PAIN: 0
SHORTNESS OF BREATH: 0
ABDOMINAL DISTENTION: 0
ABDOMINAL PAIN: 1
COLOR CHANGE: 0
NAUSEA: 1
ABDOMINAL PAIN: 0
CONSTIPATION: 1
CHEST TIGHTNESS: 0
SHORTNESS OF BREATH: 0
PHOTOPHOBIA: 0
SINUS PAIN: 0
SORE THROAT: 0
COUGH: 0
DIARRHEA: 0
SORE THROAT: 0
COUGH: 0
CONSTIPATION: 0
ABDOMINAL PAIN: 0
NAUSEA: 1
NAUSEA: 0
BACK PAIN: 0
COLOR CHANGE: 0
VOMITING: 1
DIARRHEA: 0
COUGH: 0
ABDOMINAL DISTENTION: 0
ABDOMINAL PAIN: 0
VOMITING: 0
WHEEZING: 0
WHEEZING: 0
NAUSEA: 0
ABDOMINAL PAIN: 0
CHOKING: 0
COLOR CHANGE: 0
VOMITING: 1
COUGH: 0
CHEST TIGHTNESS: 0
SORE THROAT: 0
WHEEZING: 0
ABDOMINAL PAIN: 0
SORE THROAT: 0
GASTROINTESTINAL NEGATIVE: 1
VOMITING: 0
NAUSEA: 0
VOMITING: 0
SHORTNESS OF BREATH: 0
DIARRHEA: 0
EYES NEGATIVE: 1
VOMITING: 0
CHEST TIGHTNESS: 0
CHOKING: 0
SHORTNESS OF BREATH: 0
ABDOMINAL DISTENTION: 0
CHEST TIGHTNESS: 0
PHOTOPHOBIA: 0
DIARRHEA: 0
APNEA: 0
RESPIRATORY NEGATIVE: 1
VOMITING: 1
COUGH: 0
NAUSEA: 1
SHORTNESS OF BREATH: 0
SINUS PRESSURE: 0
COUGH: 0
VOMITING: 1
VOMITING: 1
EYE PAIN: 0
ABDOMINAL PAIN: 0
ABDOMINAL DISTENTION: 0
NAUSEA: 1
CONSTIPATION: 0
DIARRHEA: 0
ABDOMINAL PAIN: 0
NAUSEA: 1

## 2020-01-01 ASSESSMENT — PAIN SCALES - GENERAL
PAINLEVEL_OUTOF10: 0
PAINLEVEL_OUTOF10: 10
PAINLEVEL_OUTOF10: 3
PAINLEVEL_OUTOF10: 0
PAINLEVEL_OUTOF10: 6
PAINLEVEL_OUTOF10: 0
PAINLEVEL_OUTOF10: 6
PAINLEVEL_OUTOF10: 0
PAINLEVEL_OUTOF10: 10
PAINLEVEL_OUTOF10: 0
PAINLEVEL_OUTOF10: 5
PAINLEVEL_OUTOF10: 0
PAINLEVEL_OUTOF10: 2
PAINLEVEL_OUTOF10: 0
PAINLEVEL_OUTOF10: 0
PAINLEVEL_OUTOF10: 3
PAINLEVEL_OUTOF10: 0

## 2020-01-01 ASSESSMENT — PAIN DESCRIPTION - PROGRESSION
CLINICAL_PROGRESSION: GRADUALLY WORSENING

## 2020-01-01 ASSESSMENT — VISUAL ACUITY: OU: 1

## 2020-01-01 ASSESSMENT — PAIN SCALES - WONG BAKER
WONGBAKER_NUMERICALRESPONSE: 0
WONGBAKER_NUMERICALRESPONSE: 2
WONGBAKER_NUMERICALRESPONSE: 0

## 2020-01-01 ASSESSMENT — PAIN DESCRIPTION - PAIN TYPE
TYPE: ACUTE PAIN
TYPE: ACUTE PAIN

## 2020-01-01 ASSESSMENT — PAIN DESCRIPTION - ORIENTATION
ORIENTATION: LEFT
ORIENTATION: LEFT

## 2020-01-01 ASSESSMENT — PAIN DESCRIPTION - DESCRIPTORS
DESCRIPTORS: ACHING;CONSTANT;DISCOMFORT;DULL
DESCRIPTORS: ACHING

## 2020-01-01 ASSESSMENT — PAIN DESCRIPTION - LOCATION
LOCATION: HIP;KNEE
LOCATION: ABDOMEN
LOCATION: BACK
LOCATION: GENERALIZED

## 2020-01-01 ASSESSMENT — PAIN - FUNCTIONAL ASSESSMENT: PAIN_FUNCTIONAL_ASSESSMENT: ACTIVITIES ARE NOT PREVENTED

## 2020-01-01 ASSESSMENT — PAIN DESCRIPTION - FREQUENCY: FREQUENCY: CONTINUOUS

## 2020-01-01 ASSESSMENT — PAIN DESCRIPTION - ONSET: ONSET: GRADUAL

## 2020-04-28 PROBLEM — A41.9 SEPSIS (HCC): Status: ACTIVE | Noted: 2020-01-01

## 2020-04-28 NOTE — ED PROVIDER NOTES
tenderness to palpation. He also has mild erythema and redness to the right medial side of the calf, left thigh here to be more pressure type sores, but compartments are soft in all extremities and moving all extremities spontaneously. Neurologic:  Alert and oriented, speech is clear and intact without dysarthria   Psychologic: appropriate mood and affect    Diagnostic Results     EKG   Ventricular rate 104, intervals within normal limits, mild left sided axis no ST or T wave changes no signs of ischemia no changes from EKG in 2018. RADIOLOGY:  XR HAND LEFT (MIN 3 VIEWS)   Final Result      Favor chronic deformities of the left fourth and fifth proximal interphalangeal joints; no definite acute fracture      XR HAND RIGHT (MIN 3 VIEWS)   Final Result      No fracture      XR CHEST PORTABLE   Final Result      No evidence for acute cardiopulmonary disease.              CT Head WO Contrast    (Results Pending)   CT ORBITS WO CONTRAST    (Results Pending)       LABS:   Results for orders placed or performed during the hospital encounter of 11/66/56   Basic Metabolic Panel w/ Reflex to MG   Result Value Ref Range    Sodium 145 136 - 145 mmol/L    Potassium reflex Magnesium 5.0 3.5 - 5.1 mmol/L    Chloride 103 99 - 110 mmol/L    CO2 22 21 - 32 mmol/L    Anion Gap 20 (H) 3 - 16    Glucose 48 (LL) 70 - 99 mg/dL    BUN 38 (H) 7 - 20 mg/dL    CREATININE 2.6 (H) 0.8 - 1.3 mg/dL    GFR Non-African American 25 (A) >60    GFR  30 (A) >60    Calcium 9.3 8.3 - 10.6 mg/dL   CBC Auto Differential   Result Value Ref Range    WBC 21.7 (H) 4.0 - 11.0 K/uL    RBC 5.36 4.20 - 5.90 M/uL    Hemoglobin 15.2 13.5 - 17.5 g/dL    Hematocrit 45.2 40.5 - 52.5 %    MCV 84.4 80.0 - 100.0 fL    MCH 28.4 26.0 - 34.0 pg    MCHC 33.6 31.0 - 36.0 g/dL    RDW 16.1 (H) 12.4 - 15.4 %    Platelets 310 371 - 794 K/uL    MPV 7.6 5.0 - 10.5 fL    Neutrophils % 87.0 %    Lymphocytes % 7.0 %    Monocytes % 6.0 %    Eosinophils % 0.0 %

## 2020-04-28 NOTE — H&P
Internal Medicine  PGY 1  History & Physical       CC: Fall and fever    History Obtained From:  patient, electronic medical record    HISTORY OF PRESENT ILLNESS:    Gage Moreno is a 77 y.o. male with PMH DM2, HTN, Parkinson's who was found down at home. Patient states he thought he fell because he was hypoglycemic. He fell from his kitchen chair and was unable to get up from the floor or reach the phone. He reports multiple abrasions over his knees, right leg, and left eye. Denies vision changes, loss of consciousness, chest pain, palpitations, numbness, weakness, abdominal pain. Called his fiancee Booker Donald. She states they have not seeing each other regularly given the ongoing pandemic. She states she last spoke to him on the phone 4/27 at 1pm. He reported that he was feeling tired. She had trouble reaching him so she went to his house and found him down with a ladder laying over his legs. He was coherent but was unable to get up. She states this happened before Jan 2018 when he was hypoglycemic and had a syncopal event. No recent changes to medications and denies reports that the patient had any recent fever, cough, chest pain. On presentation to the ED the patient was febrile to 101.6, pulse 109, /86. SpO2 91% on room air. Leukocytosis 21.7, Cr 2.6, CK 54k, glucose 48, UA, CXR, CT Head, CT Orbits unremarkable. Patient started on vanc, cefepime, given 2L LR bolus. Past Medical History:        Diagnosis Date    Asthma     Diabetes mellitus ([facility]     Hypertension     Parkinson disease ([facility]    ·     Past Surgical History:    · History reviewed. No pertinent surgical history. Medications Priorto Admission:    · Not in a hospital admission. Allergies:  Erythromycin    Social History:   · TOBACCO:   reports that he has never smoked. He has never used smokeless tobacco.  · ETOH:   reports no history of alcohol use.   · DRUGS : Denies  · Patient currently lives alone    Family History:

## 2020-04-28 NOTE — ED NOTES
Bed: B17-17  Expected date:   Expected time:   Means of arrival:   Comments:  jose Atkins RN  04/28/20 [id number]

## 2020-04-28 NOTE — CONSULTS
Pharmacy Consult Note  - Admission Medication Reconciliation      Pharmacy consulted for reconciliation of [id number] medications. I reviewed Rx fill history via \"Complete Dispense Report\" in Epic, and spoke to patient on phone. The following changes made to xftfr-[id number] medication list:    ADDED:  ( none ordered on admission-please consider ordering)  1) Ropinerole 3 mg TID  2) Losartan 100 mg daily  3) Primidone 50 mg BID    DOSE or FREQUENCY CHANGE:  (please consider changing inpatient orders)   1) Carbidopa/Levodopa 25/100 mg- added dose/frequency. Takes 2 tabs TID  2) Paroxetine- changed from 20 mg to 40 mg daily (recent dose increase)  3) NPH insulin - dose changed from 46 units daily to 40 units daily    REMOVED:  1) Valsartan- ordered - please DC (pt takes losartan)  2) Flonase- ordered - please DC  3) Ondansetron  4) Propranolol-- ordered - please DC    Please call with questions:  [phone] (spsdptyo) [phone] ([address] [place] [address])    Karan Braden. Maria Isabel BOONE   4/28/2020 7:00 PM              Thank you for the consult

## 2020-04-28 NOTE — PROGRESS NOTES
physician, and their frequency and/or modality will be adjusted as defined by the patients Respiratory Severity Index (RSI) score. 2. If the patient does not have documented COPD, consider discontinuing anticholinergics when RSI is less than 9.  3. If the bronchospasm worsens (increased RSI), then the bronchodilator frequency can be increased to a maximum of every 4 hours. If greater than every 4 hours is required, the physician will be contacted. 4. If the bronchospasm improves, the frequency of the bronchodilator can be decreased, based on the patient's RSI, but not less than home treatment regimen frequency. 5. Bronchodilator(s) will be discontinued if patient has a RSI less than 9 and has received no scheduled or as needed treatment for 72  Hrs. Patients Ordered on a Mucolytic Agent:    1. Must always be administered with a bronchodilator. 2. Discontinue if patient experiences worsened bronchospasm, or secretions have lessened to the point that the patient is able to clear them with a cough. Anti-inflammatory and Combination Medications:    1. If the patient lacks prior history of lung disease, is not using inhaled anti-inflammatory medication at home, and lacks wheezing by examination or by history for at least 24 hours, contact physician for possible discontinuation.

## 2020-04-29 NOTE — PROGRESS NOTES
Received consult for urgent vas cath placement. Attempted to call Daughter, Nu who is next of kin. First number in the computer was the wrong number and the second one went to voicemail. Attempted multiple times. Spoke with girlfriend who states that she has been with him for over 20 years but she is not POA. Given worsening labs, will place line emergently.      Bouchra Ferguson MD PGY-3  04/29/20  3:15 PM  [phone]

## 2020-04-29 NOTE — PROGRESS NOTES
Dr Diallo Stein notified of pt receiving IVF NS at 250ml hr and that pt has not voided yet on this shift. Notified of pt's bun/creat.  Bladder scan residual shows over 380 residual.

## 2020-04-29 NOTE — PROGRESS NOTES
Pharmacy Med Rec reviewed and medication orders adjusted as appropriate.     Valsartan DC'd and replaced by losartan  Propranolol DC'ed and replaced w/ primidone   Ropinerole added    Sinemet dose adjusted to 2tab TID  Paroxetine adjusted to 40qd    Electronically signed by Tanner Hodges MD on 4/28/2020 at 9:15 PM

## 2020-04-29 NOTE — CONSULTS
Clinical Pharmacy Consult Note    Admit date: 4/28/2020    Subjective/Objective:  76 yo male with PMH significant for DM2, HTN, and Parkinson's Disease was admitted after being found down at home. Per patient, he believes he fell due to hypoglycemia. He was too weak to get up and was on the floor for ~24 hrs before being found by his girlfriend. Upon presentation, he was found to have sepsis 2/2 possible preseptal cellulitis vs. LE wounds, NABIL 2/2 rhabdomyolysis, hypoglycemia, and elevated troponin. He is also being tested for COVID-19. He was started on broad spectrum IV antibiotics and admitted to the floor. Pharmacy is consulted to dose Vancomycin per Dr. Jacques Cantu. Pertinent Medications:   (4/29)  Cefepime -- day #2   2 g IV x 1 (4/28)   1 g IV q24h (4/29-current)  Vancomycin intermittent dosing -- day #2  Date Random Level Dose   4/28 --- 1.75 g IV x 1   4/29 14.8 mcg/mL 1.5 g IV x 1       PERTINENT LABS:  Recent Labs     04/28/20  1212 04/29/20  0415    136   K 5.0 6.1*    100   CO2 22 15*   BUN 38* 69*   CREATININE 2.6* 4.3*       Estimated Creatinine Clearance: 16 mL/min (A) (based on SCr of 5 mg/dL (H)). Recent Labs     04/28/20  1212 04/29/20  0415   WBC 21.7* 17.8*   HGB 15.2 12.8*   HCT 45.2 38.0*   MCV 84.4 85.1    153       Height:  5' 8\" (172.7 cm)  Weight: 213 lb 6.5 oz (96.8 kg)    Micro:  Date Site Micro Susceptibility   4/28 COVID-19 Collected    4/28 Urine culture In process    4/28 Blood culture x 2 In process    4/28 MRSA nasal PCR In process        Assessment/Plan:  1. Preseptal cellulitis vs. LE wound infection / COVID-19 rule-out: Vancomycin + cefepime day #2  Vancomycin  · Patient has NABIL with SCr 5.0 mg/dL today (baseline SCr <1 mg/dL). No UOP overnight. · Will dose intermittently by levels at this time. · Vancomycin 1.75 g IV x 1 dose was given in the ED yesterday. Random follow-up level today was 14.8 mcg/mL.    · Will re-dose with vancomycin 1.5 g IV x 1 dose. Random level ordered for tomorrow AM.   · Renal function will be monitored closely and dosing will be adjusted as appropriate. Cefepime  · Patient will be starting HD per Nephrology. · Dose of 1 g IV q24h appropriate for HD dosing. · Will continue to monitor and adjust the dose as appropriate per [facility] Renal Dose Adjustment Protocol. Please call with any questions. Thank you for consulting pharmacy!   Laura Whitney, PharmD, [facility]  Wireless: [phone]  4/29/2020 10:20 AM

## 2020-04-29 NOTE — PROCEDURES
Julianna Munoz is a 77 y.o. male patient. 1. Traumatic rhabdomyolysis, initial encounter ([facility]    2. Preseptal cellulitis of left eye    3. Fever, unspecified fever cause    4. Fall, initial encounter      Past Medical History:   Diagnosis Date    Asthma     Diabetes mellitus ([facility]     Hypertension     Parkinson disease ([facility]      Blood pressure (!) 159/63, pulse 90, temperature 98.6 °F (37 °C), temperature source Oral, resp. rate (!) 40, height 5' 8\" (1.727 m), weight 213 lb 6.5 oz (96.8 kg), SpO2 96 %. Central Line  Date/Time: 4/29/2020 4:20 PM  Performed by: Iva Gonzalez MD  Authorized by: Iva Gonzalez MD   Consent: The procedure was performed in an emergent situation. Patient understanding: patient states understanding of the procedure being performed  Patient consent: the patient's understanding of the procedure matches consent given  Procedure consent: procedure consent matches procedure scheduled  Relevant documents: relevant documents present and verified  Test results: test results available and properly labeled  Site marked: the operative site was marked  Imaging studies: imaging studies available  Patient identity confirmed: arm band and hospital-assigned identification number  Time out: Immediately prior to procedure a \"time out\" was called to verify the correct patient, procedure, equipment, support staff and site/side marked as required.   Indications: vascular access    Anesthesia:  Local Anesthetic: lidocaine 1% with epinephrine    Sedation:  Patient sedated: no    Preparation: skin prepped with ChloraPrep  Skin prep agent dried: skin prep agent completely dried prior to procedure  Sterile barriers: all five maximum sterile barriers used - cap, mask, sterile gown, sterile gloves, and large sterile sheet  Hand hygiene: hand hygiene performed prior to central venous catheter insertion  Location details: right internal jugular  Patient position: Trendelenburg  Catheter type: triple

## 2020-04-29 NOTE — PROGRESS NOTES
Physical Therapy/Occupational Therapy  Hold note    Referral received, chart reviewed. RN asks to hold therapy this date due to multiple medical issues. Will f/u 4/30. Raj Parikh, PT  MARTIN.  Nate Frye, [address] [place] [address]

## 2020-04-29 NOTE — CONSULTS
MCG/ACT inhaler 2 puff, Q4H PRN  aspirin EC tablet 81 mg, Daily  atorvastatin (LIPITOR) tablet 10 mg, Daily  insulin lispro (1 Unit Dial) 0-18 Units, TID WC  insulin lispro (1 Unit Dial) 0-9 Units, Nightly  sodium chloride flush 0.9 % injection 10 mL, 2 times per day  sodium chloride flush 0.9 % injection 10 mL, PRN  acetaminophen (TYLENOL) tablet 650 mg, Q6H PRN    Or  acetaminophen (TYLENOL) suppository 650 mg, Q6H PRN  polyethylene glycol (GLYCOLAX) packet 17 g, Daily PRN  promethazine (PHENERGAN) tablet 12.5 mg, Q6H PRN    Or  ondansetron (ZOFRAN) injection 4 mg, Q6H PRN  glucose (GLUTOSE) 40 % oral gel 15 g, PRN  dextrose 50 % IV solution, PRN  glucagon (rDNA) injection 1 mg, PRN  dextrose 5 % solution, PRN  perflutren lipid microspheres (DEFINITY) injection 1.65 mg, ONCE PRN  PARoxetine (PAXIL) tablet 40 mg, QAM  carbidopa-levodopa (SINEMET)  MG per tablet 2 tablet, TID  primidone (MYSOLINE) tablet 50 mg, BID  rOPINIRole (REQUIP) tablet 3 mg, TID    ROS and PE per primary team note  ROS: A 10 point review of systems was conducted, significant findings as noted in HPI.     Physical Exam  Vitals signs and nursing note reviewed. Exam conducted with a chaperone present. Constitutional:       Comments: Laying in bed, no apparent distress    HENT:      Head: Atraumatic. Eyes:      Pupils: Pupils are equal, round, and reactive to light. Comments: Abrasion of left eye, conjunctival injection. No pain with occular movements   Neck:      Musculoskeletal: Neck supple. Cardiovascular:      Rate and Rhythm: Normal rate and regular rhythm. Heart sounds: Normal heart sounds. No murmur. Pulmonary:      Effort: Pulmonary effort is normal.      Breath sounds: Normal breath sounds. No wheezing. Abdominal:      General: Bowel sounds are normal.      Palpations: Abdomen is soft. Tenderness: There is no abdominal tenderness. Musculoskeletal:      Right lower leg: Edema present.       Left lower leg: Edema present. Skin:     General: Skin is warm and dry. Capillary Refill: Capillary refill takes less than 2 seconds. Findings: Bruising and erythema present. Neurological:      Mental Status: He is alert and oriented to person, place, and time. Cranial Nerves: No cranial nerve deficit.         Data:   Labs:  CBC:   Recent Labs     04/28/20  1212 04/29/20  0415   WBC 21.7* 17.8*   HGB 15.2 12.8*    153     BMP:    Recent Labs     04/29/20  0415 04/29/20  1210 04/29/20  1629 04/29/20  1801    135*  --  132*   K 6.1* 6.0* 5.9* 7.1*    98*  --  96*   CO2 15* 12*  --  14*   BUN 69* 82*  --  93*   CREATININE 4.3* 5.0*  --  5.7*   GLUCOSE 232* 313*  --  477*     Ca/Mg/Phos:   Recent Labs     04/29/20  0415 04/29/20  1210 04/29/20  1801   CALCIUM 7.8* 7.8* 7.8*   PHOS  --   --  5.8*     Hepatic: No results for input(s): AST, ALT, ALB, BILITOT, ALKPHOS in the last 72 hours. Troponin:   Recent Labs     04/28/20  1212 04/28/20  1800 04/29/20  0415   TROPONINI 0.15* 0.15* 0.15*     BNP: No results for input(s): BNP in the last 72 hours. Lipids: No results for input(s): CHOL, TRIG, HDL, LDLCALC, LABVLDL in the last 72 hours.   ABGs:   Recent Labs     04/29/20  1240   PHART 7.328*   PO2ART 123.0*   [id number] 25.5*     INR:   Recent Labs     04/29/20  1400   INR 1.13     UA:  Recent Labs     04/29/20  1432   COLORU BROWN*   CLARITYU CLOUDY*   GLUCOSEU 500*   BILIRUBINUR SMALL*   KETUA TRACE*   SPECGRAV 1.025   BLOODU LARGE*   PHUR 5.0   PROTEINU 100*   UROBILINOGEN 0.2   NITRU POSITIVE*   LEUKOCYTESUR Negative   LABMICR YES   URINETYPE NotGiven      Urine Microscopic:   Recent Labs     04/28/20  1211 04/29/20  1432   BACTERIA 2+* 2+*   HYALCAST 0-2  --    WBCUA 3-5 11-20*   RBCUA 0-2 see below*   EPIU 6-10*  --      Urine Culture:   Recent Labs     04/28/20  1211   LABURIN No growth at 18-36 hours     Urine Chemistry:   Recent Labs     04/29/20  1005   LABCREA 203.7   PROTEINUR 107.80*   NAUR

## 2020-04-30 NOTE — PROGRESS NOTES
Point person, daughter, called and updated. Daughter stated that she followed up from the 911 call placed prior to patients admission and stated that the patient fell off a ladder.

## 2020-04-30 NOTE — FLOWSHEET NOTE
Rinseback Volume (ml)  --  400 ml   Total Liters Processed (l/min)  --  51.7 l/min   Dialyzer Clearance  --  Lightly streaked   Duration of Treatment (minutes)  --  210 minutes   Hemodialysis Intake (ml)  --  400 ml   Hemodialysis Output (ml)  --  3900 ml   NET Removed (ml)  --  3500 ml   Tolerated Treatment  --  Good

## 2020-04-30 NOTE — PROGRESS NOTES
PROCEDURE:  Patient seen on hemodialysis at 11:06 AM    PHYSICIAN:  Dirk Ferrari MD    INDICATION:  NABIL     Wt Readings from Last 3 Encounters:   04/30/20 212 lb 4.9 oz (96.3 kg)   10/31/19 185 lb (83.9 kg)   01/19/18 185 lb (83.9 kg)     Temp Readings from Last 3 Encounters:   04/30/20 98.1 °F (36.7 °C)   10/31/19 98.4 °F (36.9 °C) (Oral)   03/14/19 97.8 °F (36.6 °C) (Oral)     BP Readings from Last 3 Encounters:   04/30/20 (!) 149/70   10/31/19 (!) 167/75   03/14/19 (!) 184/85     Pulse Readings from Last 3 Encounters:   04/30/20 80   10/31/19 79   03/14/19 88      In: 250 [P.O.:240; I.V.:10]  Out: 2751 [Urine:350]     CBC:   Recent Labs     04/28/20  1212 04/29/20  0415 04/30/20  0442   WBC 21.7* 17.8* 18.1*   HGB 15.2 12.8* 12.0*    153 175     BMP:    Recent Labs     04/29/20  1801 04/30/20  0024 04/30/20  0442   * 135* 133*   K 7.1* 5.1 5.2*  5.2*   CL 96* 96* 97*   CO2 14* 20* 21   BUN 93* 57* 65*   CREATININE 5.7* 4.1* 4.8*   GLUCOSE 477* 220* 213*     BMD:   Lab Results   Component Value Date    CALCIUM 8.0 (L) 04/30/2020    CAION 4.5 08/01/2014    PHOS 5.6 (H) 04/30/2020       Iron:   No results found for: IRON, TIBC, FERRITIN         RX:  See dialysis flowsheet for specifics on access, blood flow rate, dialysate baths, duration of dialysis, anticoagulation and other technical information.     COMMENTS:      Dirk Ferrari MD  Cell - [phone]  Pager -  [phone]

## 2020-04-30 NOTE — PROGRESS NOTES
Physical Therapy/Occupational Therapy    Hold note    Per RN, pt continues to be combative and in restraints this am and she advises hold therapy evaluations. Will f/u 5/1 per her request.       Gabriel Ledesma, SARAH Joaquin, [address] [place] [address]

## 2020-04-30 NOTE — PROGRESS NOTES
(Last 24 hours) at 4/30/2020 0819  Last data filed at 4/30/2020 0429  Gross per 24 hour   Intake 490 ml   Output 2751 ml   Net -2261 ml       Physical Exam  Vitals signs and nursing note reviewed. Exam conducted with a chaperone present. Constitutional:       Comments: Laying in bed, no apparent distress    HENT:      Head: Atraumatic. Eyes:      Conjunctiva/sclera: Conjunctivae normal.      Pupils: Pupils are equal, round, and reactive to light. Comments: Abrasions over left eye; no discharge   Neck:      Musculoskeletal: Neck supple. Cardiovascular:      Rate and Rhythm: Normal rate and regular rhythm. Heart sounds: Normal heart sounds. No murmur. Pulmonary:      Breath sounds: No wheezing. Comments: Labored breathing, accessory muscle use. Crackles bilaterally  Abdominal:      General: Bowel sounds are normal.      Palpations: Abdomen is soft. Tenderness: There is no abdominal tenderness. Musculoskeletal:      Right lower leg: Edema present. Left lower leg: Edema present. Skin:     General: Skin is warm and dry. Capillary Refill: Capillary refill takes less than 2 seconds. Neurological:      General: No focal deficit present. Mental Status: He is alert and oriented to person, place, and time. Cranial Nerves: No cranial nerve deficit.          LABS:    CBC:   Recent Labs     04/28/20  1212 04/29/20  0415 04/30/20  0442   WBC 21.7* 17.8* 18.1*   HGB 15.2 12.8* 12.0*   HCT 45.2 38.0* 35.4*    153 175   MCV 84.4 85.1 84.8     Renal:    Recent Labs     04/29/20  1801 04/30/20  0024 04/30/20  0442   * 135* 133*   K 7.1* 5.1 5.2*  5.2*   CL 96* 96* 97*   CO2 14* 20* 21   BUN 93* 57* 65*   CREATININE 5.7* 4.1* 4.8*   GLUCOSE 477* 220* 213*   CALCIUM 7.8* 8.0* 8.0*   PHOS 5.8* 5.2* 5.6*   ANIONGAP 22* 19* 15     Hepatic:   Recent Labs     04/29/20  1801 04/30/20  0024 04/30/20  0442   LABALBU 3.4 3.2* 3.3*     Troponin:   Recent Labs     04/28/20  1212 04/28/20  1800 04/29/20  0415   TROPONINI 0.15* 0.15* 0.15*     BNP: No results for input(s): BNP in the last 72 hours. Lipids: No results for input(s): CHOL, HDL in the last 72 hours. Invalid input(s): LDLCALCU, TRIGLYCERIDE  ABGs:    Recent Labs     04/29/20  1240   PHART 7.328*   PQM2RXE 25.5*   PO2ART 123.0*   BCK9JXY 13*   BEART -11.2*   C3CZVBZQ 99   EBZ4RMW 14       INR:   Recent Labs     04/29/20  1400   INR 1.13     Lactate: No results for input(s): LACTATE in the last 72 hours. Cultures:  -----------------------------------------------------------------  RAD:   XR CHEST PORTABLE   Final Result   Impression: Interval placement of a right internal jugular approach dialysis catheter terminating at the distal superior vena cava. No pneumothorax. US RENAL COMPLETE   Final Result      No hydronephrosis. CT CERVICAL SPINE WO CONTRAST   Final Result   1. No acute abnormality the cervical spine, chest, abdomen, or pelvis. CT CHEST WO CONTRAST   Final Result   1. No acute abnormality the cervical spine, chest, abdomen, or pelvis. CT ABDOMEN PELVIS WO CONTRAST Additional Contrast? None   Final Result   1. No acute abnormality the cervical spine, chest, abdomen, or pelvis. XR CHEST PORTABLE   Final Result      Development of mild diffuse bilateral ground glass opacities may relate to atelectasis pneumonitis or edema. CT ORBITS WO CONTRAST   Final Result      Left periorbital preseptal cellulitis; no post septal abnormality            CT Head WO Contrast   Final Result      1. No intracranial hemorrhage, mass effect or large acute territorial infarction   2.  Mild generalized atrophy and chronic small vessel ischemia      XR HAND LEFT (MIN 3 VIEWS)   Final Result      Favor chronic deformities of the left fourth and fifth proximal interphalangeal joints; no definite acute fracture      XR HAND RIGHT (MIN 3 VIEWS)   Final Result      No fracture      XR CHEST PORTABLE Final Result      No evidence for acute cardiopulmonary disease. VL Extremity Venous Bilateral    (Results Pending)         Assessment/Plan:     Fall vs syncope, unclear etiology; hypoglycemia vs mechanical fall  Patient with fall and found down for 24h. Unclear etiology of fall- possibilities include hypoglycemia, mechanical fall, cardiac arrhythmia, infection. CTH, CT Orbits unremarkable. -Repeat EKG stable  -CT C-spine, C/A/P unremarkable  -Drug screen pending  -Echo pending    Sepsis possibly 2/2 preseptal cellulitis  Unclear source- CXR, UA unremarkable. Possibly preseptal cellulitis. Presenting with , Temp 101.1, WBC 21.7, Lactic acid 2. Given 30cc/kg bolus.   -Continue vanc, cefepime  -D/c IVF  -Blood x2, urine cultures pending  -CT C/A/P showing band-like opacities in LLL; procal pending  -Resp panel pending     NABIL 2/2 Rhabdomyolysis  Baseline Cr < 1. Possibly pre-renal vs ATN 2/2 rhabdomyolysis. Creatinine uptrending, low UOP. -D/c IVF for tachypnea, volume overload  -Trend CK q12h  -Avoid nephrotoxic medications  -Urine studies: FENa 1.2% indicating intrinsic. Likely ATN 2/2 rhabdomyolysis  -Dialysis per nephrology, appreciate recs  -Strict I/O     Hyperkalemia, resolved  Patient with hyperkalemia 4/29 to 6.1. EKG stable. Received calcium gluconate. Resolved with CRRT. -Monitor daily BMP    NSTEMI 2  Patient with troponin 0.15, EKG without ST changes. Denies chest pain.  -Troponins stable x 3  -Repeat EKG stable     COVID-19 negative  Given fevers, COVID-19 sent. Patient denies URI symptoms including cough, fever, shortness of breath, myalgias.   -COVID negative     Chronic Medical Diagnoses:  HTN: Continue home valsartan  DM2: Holding home meds, SSI, hypoglycemia protocol  Parkinson disease: Continue home sinemet, primidone, ropinirole    Code Status: Full Code  FEN: DIET CARB CONTROL; Low Potassium  PPX: SQH  DISPO: [facility]    Ramses Joshi, PGY-1  04/30/20  8:19 AM    This patient has been staffed and discussed with Yari Boyd MD.

## 2020-04-30 NOTE — PROGRESS NOTES
scheduled again for today. Will continue to dose intermittently by levels at this time. · Vancomycin 1.5 g IV x 1 dose given yesterday. Random follow-up level today was 22.6 mcg/mL. · Will re-dose with vancomycin 1 g IV x 1 dose to be given at the end of HD. Random level ordered for tomorrow AM.   · Renal function will be monitored closely and dosing will be adjusted as appropriate. Cefepime  · Dose of 1 g IV q24h appropriate for HD dosing. · Will continue to monitor and adjust the dose as appropriate per [facility] Renal Dose Adjustment Protocol. Please call with any questions. Thank you for consulting pharmacy!   Nilda Matt, [place], [facility]  Wireless: [phone]  4/30/2020 7:29 AM

## 2020-05-01 NOTE — PLAN OF CARE
Problem: Falls - Risk of:  Goal: Will remain free from falls  Description: Will remain free from falls  Outcome: Ongoing  Note: Bed alarm in place at this time. Goal: Absence of physical injury  Description: Absence of physical injury  Outcome: Ongoing     Problem: Physical Regulation:  Goal: Ability to maintain vital signs within normal range will improve  Description: Ability to maintain vital signs within normal range will improve  Outcome: Ongoing     Problem: Skin Integrity:  Goal: Demonstration of wound healing without infection will improve  Description: Demonstration of wound healing without infection will improve  Outcome: Ongoing  Note: Skin assessment completed at this time. No new evidence of skin breakdown. Goal: Absence of new skin breakdown  Description: Absence of new skin breakdown  Outcome: Ongoing     Problem: Serum Glucose Level - Abnormal:  Goal: Ability to maintain appropriate glucose levels has stabilized  Description: Ability to maintain appropriate glucose levels has stabilized  Outcome: Ongoing     Problem: Mental Status - Impaired:  Goal: Mental status will improve  Description: Mental status will improve  Outcome: Ongoing     Problem: Falls - Risk of:  Goal: Will remain free from falls  Description: Will remain free from falls  Outcome: Ongoing  Note: Bed alarm in place at this time. Goal: Absence of physical injury  Description: Absence of physical injury  Outcome: Ongoing     Problem: Physical Regulation:  Goal: Ability to maintain vital signs within normal range will improve  Description: Ability to maintain vital signs within normal range will improve  Outcome: Ongoing     Problem: Skin Integrity:  Goal: Demonstration of wound healing without infection will improve  Description: Demonstration of wound healing without infection will improve  Outcome: Ongoing  Note: Skin assessment completed at this time. No new evidence of skin breakdown.    Goal: Absence of new skin

## 2020-05-01 NOTE — PROGRESS NOTES
Treatment time: 4h    Net UF: 2.7L    Pre weight: 93.8 kg (bedscale)  Post weight: 91.1 kg   EDW: TBD    Access used: [facility]  Access function: patent     Medications or blood products given: hep lock post HD    Regular outpatient schedule: tbd    Summary of response to treatment: was not able to tolerate initial fluid removal of 4L as bp dropped. pt slept for most of treatment. Report called to ROXANNA NOLASCO. Copy of dialysis treatment record placed in chart, to be scanned into EMR.

## 2020-05-01 NOTE — PROGRESS NOTES
Comments: Laying in bed, no apparent distress    HENT:      Head: Atraumatic. Eyes:      Conjunctiva/sclera: Conjunctivae normal.      Pupils: Pupils are equal, round, and reactive to light. Comments: Abrasions over left eye; no discharge   Neck:      Musculoskeletal: Neck supple. Cardiovascular:      Rate and Rhythm: Normal rate and regular rhythm. Heart sounds: Normal heart sounds. No murmur. Pulmonary:      Effort: Pulmonary effort is normal.      Breath sounds: No wheezing. Abdominal:      General: Bowel sounds are normal.      Palpations: Abdomen is soft. Tenderness: There is no abdominal tenderness. Musculoskeletal:      Right lower leg: Edema present. Left lower leg: Edema present. Skin:     General: Skin is warm and dry. Capillary Refill: Capillary refill takes less than 2 seconds. Neurological:      General: No focal deficit present. Mental Status: He is alert and oriented to person, place, and time. Cranial Nerves: No cranial nerve deficit. LABS:    CBC:   Recent Labs     04/29/20 0415 04/30/20 0442 05/01/20  0515   WBC 17.8* 18.1* 12.4*   HGB 12.8* 12.0* 12.0*   HCT 38.0* 35.4* 36.1*    175 162   MCV 85.1 84.8 85.9     Renal:    Recent Labs     04/30/20  0024 04/30/20 0442 04/30/20 2000   * 133* 133*  134*   K 5.1 5.2*  5.2* 4.9  4.9   CL 96* 97* 90*  91*   CO2 20* 21 16*  18*   BUN 57* 65* 47*  47*   CREATININE 4.1* 4.8* 3.9*  3.7*   GLUCOSE 220* 213* 249*  250*   CALCIUM 8.0* 8.0* 8.3  8.2*   PHOS 5.2* 5.6* 4.7   ANIONGAP 19* 15 27*  25*     Hepatic:   Recent Labs     04/30/20  0024 04/30/20 0442 04/30/20 2000   LABALBU 3.2* 3.3* 3.4     Troponin:   Recent Labs     04/28/20  1212 04/28/20  1800 04/29/20 0415   TROPONINI 0.15* 0.15* 0.15*     BNP: No results for input(s): BNP in the last 72 hours. Lipids: No results for input(s): CHOL, HDL in the last 72 hours.     Invalid input(s): LDLCALCU, TRIGLYCERIDE  ABGs: Recent Labs     04/29/20  1240   PHART 7.328*   TSD8DOC 25.5*   PO2ART 123.0*   LTL1NFL 13*   BEART -11.2*   H3WXBFBB 99   MZI9MZF 14       INR:   Recent Labs     04/29/20  1400   INR 1.13     Lactate: No results for input(s): LACTATE in the last 72 hours. Cultures:  -----------------------------------------------------------------  RAD:   VL Extremity Venous Bilateral         XR CHEST PORTABLE   Final Result   Impression: Interval placement of a right internal jugular approach dialysis catheter terminating at the distal superior vena cava. No pneumothorax. US RENAL COMPLETE   Final Result      No hydronephrosis. CT CERVICAL SPINE WO CONTRAST   Final Result   1. No acute abnormality the cervical spine, chest, abdomen, or pelvis. CT CHEST WO CONTRAST   Final Result   1. No acute abnormality the cervical spine, chest, abdomen, or pelvis. CT ABDOMEN PELVIS WO CONTRAST Additional Contrast? None   Final Result   1. No acute abnormality the cervical spine, chest, abdomen, or pelvis. XR CHEST PORTABLE   Final Result      Development of mild diffuse bilateral ground glass opacities may relate to atelectasis pneumonitis or edema. CT ORBITS WO CONTRAST   Final Result      Left periorbital preseptal cellulitis; no post septal abnormality            CT Head WO Contrast   Final Result      1. No intracranial hemorrhage, mass effect or large acute territorial infarction   2. Mild generalized atrophy and chronic small vessel ischemia      XR HAND LEFT (MIN 3 VIEWS)   Final Result      Favor chronic deformities of the left fourth and fifth proximal interphalangeal joints; no definite acute fracture      XR HAND RIGHT (MIN 3 VIEWS)   Final Result      No fracture      XR CHEST PORTABLE   Final Result      No evidence for acute cardiopulmonary disease.                    Assessment/Plan:     Fall vs syncope, unclear etiology; hypoglycemia vs mechanical fall  Patient with fall and

## 2020-05-01 NOTE — PROGRESS NOTES
PROCEDURE:  Patient seen on hemodialysis at 10:31 AM    PHYSICIAN:  Jaxson Zelaya MD    INDICATION:  Acute renal failure    Wt Readings from Last 3 Encounters:   05/01/20 200 lb 9.9 oz (91 kg)   10/31/19 185 lb (83.9 kg)   01/19/18 185 lb (83.9 kg)     Temp Readings from Last 3 Encounters:   05/01/20 98.3 °F (36.8 °C) (Oral)   10/31/19 98.4 °F (36.9 °C) (Oral)   03/14/19 97.8 °F (36.6 °C) (Oral)     BP Readings from Last 3 Encounters:   05/01/20 (!) 164/76   10/31/19 (!) 167/75   03/14/19 (!) 184/85     Pulse Readings from Last 3 Encounters:   05/01/20 75   10/31/19 79   03/14/19 88      In: 680 [P.O.:680]  Out: 26 [Urine:25]     CBC:   Recent Labs     04/29/20  0415 04/30/20  0442 05/01/20  0515   WBC 17.8* 18.1* 12.4*   HGB 12.8* 12.0* 12.0*    175 162     BMP:    Recent Labs     04/30/20  0024 04/30/20  0442 04/30/20 2000   * 133* 133*  134*   K 5.1 5.2*  5.2* 4.9  4.9   CL 96* 97* 90*  91*   CO2 20* 21 16*  18*   BUN 57* 65* 47*  47*   CREATININE 4.1* 4.8* 3.9*  3.7*   GLUCOSE 220* 213* 249*  250*     BMD:   Lab Results   Component Value Date    CALCIUM 8.3 04/30/2020    CALCIUM 8.2 (L) 04/30/2020    CAION 4.5 08/01/2014    PHOS 4.7 04/30/2020       Iron:   No results found for: IRON, TIBC, FERRITIN         RX:  See dialysis flowsheet for specifics on access, blood flow rate, dialysate baths, duration of dialysis, anticoagulation and other technical information.     COMMENTS:      Jaxson Zelaya MD  Cell - [phone]  Pager -  [phone]

## 2020-05-01 NOTE — PROGRESS NOTES
Attempted to call daughter Dillon Rios) on cell phone however it states phone not in service at this time. L/M on home phone asking for a return call so I can advise of pt's transfer to room 6316. I also L/M for Zaina.  Rosalinda Carvajal

## 2020-05-02 NOTE — PROGRESS NOTES
Resident Progress Note  PGY-3  Admit Date: 2020    PCP: Roselyn Goodell                  : 1950  MRN: [id number]    Subjective: Interval History:     No acute events overnight. Patient seen at bedside this morning. Patient wanting to go home this morning, was very adamant. Explained to him that he may need HD access and that would likely be on Monday. He was not happy, but agreed to stay till then. He denies fevers, chills, chest pain, shortness of breath, nausea, vomiting, diarrhea, or constipation. Diet: DIET CARB CONTROL; Low Potassium    Data:     Scheduled Meds:   insulin glargine  25 Units Subcutaneous Nightly    doxycycline monohydrate  100 mg Oral 2 times per day    NIFEdipine  60 mg Oral Daily    heparin (porcine)  5,000 Units Subcutaneous 3 times per day    heparin (porcine)  10,000 Units Intracatheter Once    trimethoprim-polymyxin b  2 drop Left Eye 4 times per day    aspirin  81 mg Oral Daily    atorvastatin  10 mg Oral Daily    insulin lispro  0-18 Units Subcutaneous TID WC    insulin lispro  0-9 Units Subcutaneous Nightly    sodium chloride flush  10 mL Intravenous 2 times per day    PARoxetine  40 mg Oral QAM    carbidopa-levodopa  2 tablet Oral TID    primidone  50 mg Oral BID    rOPINIRole  3 mg Oral TID     Continuous Infusions:   dextrose       PRN Meds:calcium carbonate, hydrALAZINE, albuterol, heparin (porcine), sodium chloride flush, acetaminophen **OR** acetaminophen, polyethylene glycol, promethazine **OR** ondansetron, glucose, dextrose, glucagon (rDNA), dextrose, perflutren lipid microspheres  I/O last 3 completed shifts: In: 200 [P.O.:200]  Out: 150 [Urine:150]  No intake/output data recorded.     Intake/Output Summary (Last 24 hours) at 2020 0903  Last data filed at 2020 7432  Gross per 24 hour   Intake --   Output 150 ml   Net -150 ml       Objective:     Vitals: BP (!) 138/93   Pulse 76   Temp 98.2 °F (36.8 °C) (Oral)   Resp 20   Ht I/O     Fall vs syncope, unclear etiology; hypoglycemia vs mechanical fall  Patient with fall and found down for 24h. Unclear etiology of fall- possibilities include hypoglycemia, mechanical fall, cardiac arrhythmia, infection. CTH, CT Orbits unremarkable. -Repeat EKG stable  -CT C-spine, C/A/P unremarkable  -Drug screen pending  -Echo with normal LVEF and DD, no evidence of valvular abn     Sepsis possibly 2/2 preseptal cellulitis  Unclear source- CXR, UA unremarkable. Possibly preseptal cellulitis. Presenting with HR 109, Temp 101.1, WBC 21.7, Lactic acid 2. Given 30cc/kg bolus. -D/c vanc, cefepime. Switch to doxycycline to complete 7 day course  -CT C/A/P showing band-like opacities in LLL; procal 3.23  -Resp panel negative    Hypertension  Patient requiring labetalol x 1 for SBP>180.  -cont nifedipine; can give hydralazine 25 TID if SBP>180     Hyperkalemia, resolved  Patient with hyperkalemia 4/29 to 6.1. EKG stable. Received calcium gluconate. Resolved with CRRT. -Monitor daily BMP     Diabetes Mellitus 2  -Lantus 20, increase to 25, HDSSI  -Can start scheduled premeal insulin if remains high     NSTEMI 2  Patient with troponin 0.15, EKG without ST changes. Denies chest pain.  -Troponins stable x 3  -Repeat EKG stable     COVID-19 negative  Given fevers, COVID-19 sent. Patient denies URI symptoms including cough, fever, shortness of breath, myalgias.   -COVID negative     Chronic Medical Diagnoses:  Parkinson disease: Continue home sinemet, primidone, ropinirole       Code Status: Full Code   FEN: DIET CARB CONTROL; Low Potassium  PPx: SQH  Dispo: Floor    Patient will be discussed with attending, MD Ismael Shipley MD, PGY-3  5/2/2020  9:03 AM

## 2020-05-02 NOTE — PROGRESS NOTES
Occupational Therapy   Occupational Therapy Initial Assessment  Date: 2020   Patient Name: Federico Hauser  MRN: [id number]     : 1950    Date of Service: 2020    Discharge Recommendations:Ruddy Franco scored a 13/24 on the AM-PAC ADL Inpatient form. Current research shows that an AM-PAC score of 17 or less is typically not associated with a discharge to the patient's home setting. Based on the patients AM-PAC score and their current ADL deficits, it is recommended that the patient have 3-5 sessions per week of Occupational Therapy at d/c to increase the patients independence. If patient discharges prior to next session this note will serve as a discharge summary. Please see below for the latest assessment towards goals. OT Equipment Recommendations  Equipment Needed: No  Other: pt reports having all eqpt, cont to assess    Assessment   Performance deficits / Impairments: Decreased functional mobility ; Decreased ADL status; Decreased posture;Decreased endurance  Assessment: Pt presents after fall at home, and down >24hr. Prior to admission pt was IND, active  and working part time. Pt would benefit from cont OT during inpatient stay, and after discharge. Prognosis: Good  Decision Making: Medium Complexity  OT Education: OT Role;Plan of Care;ADL Adaptive Strategies  REQUIRES OT FOLLOW UP: Yes  Activity Tolerance  Activity Tolerance: Patient limited by fatigue(pt became short of break while completing grooming)  Activity Tolerance: fair  Safety Devices  Safety Devices in place: Yes  Type of devices: Nurse notified;Call light within reach; Chair alarm in place; Left in chair           Patient Diagnosis(es): The primary encounter diagnosis was Traumatic rhabdomyolysis, initial encounter ([facility] [address] Diagnoses of Preseptal cellulitis of left eye, Fever, unspecified fever cause, and Fall, initial encounter were also pertinent to this visit.      has a past medical history of Asthma, Diabetes mellitus ([facility] [address] Hypertension, and [facility] has no past surgical history on file. Restrictions  Position Activity Restriction  Other position/activity restrictions: up with assist    Subjective   General  Chart Reviewed: Yes  Additional Pertinent Hx: Admit 4/28 s/p fall at home and found down >24 hrs. Head CT (-) acute,  CT orbits= Left periorbital preseptal cellulitis     x-ray Rt hand (-)     x-ray Lft hand= Favor chronic deformities of the left fourth and fifth proximal interphalangeal joints; no definite acute fracture            PMH includes: asthma, DM, HTN, Parkinson's disease  Referring Practitioner: Dr. Rosalynn Buerger  Diagnosis: Sepsis  Patient Currently in Pain: Denies  Vital Signs  Temp: 97.6 °F (36.4 °C)  Temp Source: Oral  Pulse: 84  Resp: 20  BP: 122/68  BP Location: Left upper arm  MAP (mmHg): 86  Patient Position: Semi fowlers  Level of Consciousness: Alert  MEWS Score: 1  Patient Currently in Pain: Denies  Oxygen Therapy  SpO2: 95 %  O2 Device: None (Room air)  Social/Functional History  Social/Functional History  Lives With: Alone  Type of Home: House  Home Layout: Two level, Laundry in basement, Bed/Bath upstairs(only bathroom is on main level )  Home Access: Stairs to enter with rails  Entrance Stairs - Number of Steps: 4 ANNMARIE  Entrance Stairs - Rails: Both  Bathroom Shower/Tub: Tub/Shower unit  Bathroom Toilet: Standard  Bathroom Equipment: Grab bars in shower, Shower chair, Grab bars around toilet  Home Equipment: Cane, Rolling walker, Standard walker, Crutches  ADL Assistance: Independent  Homemaking Assistance: Independent  Homemaking Responsibilities: Yes  Ambulation Assistance: Independent(without AD)  Transfer Assistance: Independent  Active : Yes  Mode of Transportation: Truck  Occupation: Part time employment([employer])  Type of occupation: [employer]  Leisure & Hobbies: Cutting Grass  Additional Comments: Pt reports no recent falls.  Pt

## 2020-05-02 NOTE — PROGRESS NOTES
PROCEDURE:  Patient seen on hemodialysis   PHYSICIAN:  Beth Irwin MD    INDICATION:  Acute tubular necrosis    Wt Readings from Last 3 Encounters:   05/02/20 205 lb 0.4 oz (93 kg)   10/31/19 185 lb (83.9 kg)   01/19/18 185 lb (83.9 kg)     Temp Readings from Last 3 Encounters:   05/02/20 97.6 °F (36.4 °C)   10/31/19 98.4 °F (36.9 °C) (Oral)   03/14/19 97.8 °F (36.6 °C) (Oral)     BP Readings from Last 3 Encounters:   05/02/20 134/66   10/31/19 (!) 167/75   03/14/19 (!) 184/85     Pulse Readings from Last 3 Encounters:   05/02/20 77   10/31/19 79   03/14/19 88      In: -   Out: 150 [Urine:150]     CBC:   Recent Labs     04/30/20  0442 05/01/20  0515 05/02/20  0815   WBC 18.1* 12.4* 9.2   HGB 12.0* 12.0* 12.7*    162 163     BMP:    Recent Labs     04/30/20  0442 04/30/20  2000 05/02/20  0815   * 133*  134* 126*  127*   K 5.2*  5.2* 4.9  4.9 4.3  4.4   CL 97* 90*  91* 91*  90*   CO2 21 16*  18* 20*  21   BUN 65* 47*  47* 54*  53*   CREATININE 4.8* 3.9*  3.7* 4.8*  4.7*   GLUCOSE 213* 249*  250* 272*  287*     BMD:   Lab Results   Component Value Date    CALCIUM 8.2 (L) 05/02/2020    CALCIUM 8.2 (L) 05/02/2020    CAION 4.5 08/01/2014    PHOS 3.5 05/02/2020       Iron:   No results found for: IRON, TIBC, FERRITIN         RX:  See dialysis flowsheet for specifics on access, blood flow rate, dialysate baths, duration of dialysis, anticoagulation and other technical information.     COMMENTS:      Beth Irwin MD  Cell - [phone]  Pager -  [phone]

## 2020-05-02 NOTE — PROGRESS NOTES
Physical Therapy/Occupational Therapy  Hold Note     Attempted to see pt for PT/OT evaluations. Pt currently off floor in dialysis . Will return later as schedule permits.     [address] [facility]  Ismael Jeans OTR/L  [id number]

## 2020-05-03 NOTE — PROGRESS NOTES
nephrology, appreciate recs  -Strict I/O  -For tunneled line placement 5/4     Fall vs syncope, unclear etiology; hypoglycemia vs mechanical fall  Patient with fall and found down for 24h. Unclear etiology of fall- possibilities include hypoglycemia, mechanical fall, cardiac arrhythmia, infection. CTH, CT Orbits unremarkable. -Repeat EKG stable  -CT C-spine, C/A/P unremarkable  -Drug screen negative  -Echo with normal LVEF and DD, no evidence of valvular abn     Sepsis possibly 2/2 preseptal cellulitis  Unclear source- CXR, UA unremarkable. Possibly preseptal cellulitis. Presenting with HR 109, Temp 101.1, WBC 21.7, Lactic acid 2. Given 30cc/kg bolus. -D/c vanc, cefepime. Switch to doxycycline to complete 7 day course  -CT C/A/P showing band-like opacities in LLL; procal 3.23  -Resp panel negative    Hypertension  Patient requiring labetalol x 1 for SBP>180.  -cont nifedipine; can give hydralazine 25 TID if SBP>180     Hyperkalemia, resolved  Patient with hyperkalemia 4/29 to 6.1. EKG stable. Received calcium gluconate. Resolved with CRRT. -Monitor daily BMP     Diabetes Mellitus 2  -Lantus 25, increase to 30, HDSSI, increase premeal insulin to 6     NSTEMI 2  Patient with troponin 0.15, EKG without ST changes. Denies chest pain.  -Troponins stable x 3  -Repeat EKG stable     COVID-19 negative  Given fevers, COVID-19 sent. Patient denies URI symptoms including cough, fever, shortness of breath, myalgias.   -COVID negative     Chronic Medical Diagnoses:  Parkinson disease: Continue home sinemet, primidone, ropinirole       Code Status: Full Code   FEN: DIET CARB CONTROL; Daily Fluid Restriction: 1500 ml; Low Potassium  PPx: SQH  Dispo: Floor    Patient will be discussed with attending, MD Juancarlos Augustine MD, PGY-1  5/3/2020  8:19 AM

## 2020-05-03 NOTE — PROGRESS NOTES
Office : [phone]     Fax :[phone]         Renal Progress Note  Subjective:   Admit Date: 4/28/2020     HPI      Interval History:     Feels much better   CK better   On HD as needed       DIET DIET CARB CONTROL; Daily Fluid Restriction: 1500 ml; Low Potassium  Diet NPO, After Midnight  Medications:   Scheduled Meds:   insulin lispro  6 Units Subcutaneous TID WC    insulin glargine  30 Units Subcutaneous Nightly    insulin lispro  0-12 Units Subcutaneous TID WC    insulin lispro  0-6 Units Subcutaneous Nightly    doxycycline monohydrate  100 mg Oral 2 times per day    NIFEdipine  60 mg Oral Daily    heparin (porcine)  5,000 Units Subcutaneous 3 times per day    heparin (porcine)  10,000 Units Intracatheter Once    trimethoprim-polymyxin b  2 drop Left Eye 4 times per day    aspirin  81 mg Oral Daily    atorvastatin  10 mg Oral Daily    sodium chloride flush  10 mL Intravenous 2 times per day    PARoxetine  40 mg Oral QAM    carbidopa-levodopa  2 tablet Oral TID    primidone  50 mg Oral BID    rOPINIRole  3 mg Oral TID     Continuous Infusions:   dextrose         Labs:  CBC:   Recent Labs     05/01/20 0515 05/02/20 0815 05/03/20  0711   WBC 12.4* 9.2 12.6*   HGB 12.0* 12.7* 13.6    163 151     BMP:    Recent Labs     04/30/20 2000 05/02/20 0815 05/03/20  0711   *  134* 126*  127* 129*   K 4.9  4.9 4.3  4.4 4.7   CL 90*  91* 91*  90* 94*   CO2 16*  18* 20*  21 20*   BUN 47*  47* 54*  53* 53*   CREATININE 3.9*  3.7* 4.8*  4.7* 5.3*   GLUCOSE 249*  250* 272*  287* 290*     Ca/Mg/Phos:   Recent Labs     04/30/20 2000

## 2020-05-04 NOTE — DISCHARGE INSTR - COC
Continuity of Care Form    Patient Name: Carito Bloom   :  1950  MRN:  [id number]    Admit date:  2020  Discharge date:  2020    Code Status Order: Full Code   Advance Directives:   Advance Care Flowsheet Documentation     Date/Time Healthcare Directive Type of Healthcare Directive Copy in [address]  [address] Agent's Name Healthcare Agent's Phone Number    [phone]  Yes, patient has an advance directive for healthcare treatment  --  Other (Comment) no one able to bring  Healthcare power of   Ramy Overall  [phone]          Admitting Physician:  Angel Park MD  PCP: Mary Wilkerson    Discharging Nurse: Jaiden Pillai [facility] Unit/Room#: 8869/1523-43  Discharging Unit Phone Number: [phone]    Emergency Contact:   Extended Emergency Contact Information  Primary Emergency Contact: [facility] Phone: [phone]  Relation: Child  Secondary Emergency Contact: [facility] Phone: [phone]  Relation: Child    Past Surgical History:  History reviewed. No pertinent surgical history.     Immunization History:   Immunization History   Administered Date(s) Administered    Tdap (Boostrix, Adacel) 2017, 2019       Active Problems:  Patient Active Problem List   Diagnosis Code    Hypoglycemia E16.2    EKG abnormalities R94.31    Syncope R55    Sepsis ([facility] [address] A41.9       Isolation/Infection:   Isolation          No Isolation        Patient Infection Status     Infection Onset Added Last Indicated Last Indicated By Review Planned Expiration Resolved Resolved By    None active    Resolved    COVID-19 Rule Out 20 COVID-19 (Ordered)   20 Rule-Out Test Resulted          Nurse Assessment:  Last Vital Signs: BP (!) 157/89   Pulse 93   Temp 98.1 °F (36.7 °C)   Resp 18   Ht 5' 8\" (1.727 m)   Wt 195 lb 1.7 oz (88.5 kg)   SpO2 96%   BMI 29.67 kg/m²     Last documented pain score (0-10 scale): Pain Level: 0  Last Weight:   Wt Readings from Last 1 Encounters:   05/04/20 195 lb 1.7 oz (88.5 kg)     Mental Status:  oriented and alert    IV Access:  - Dialysis Catheter  - site  right, insertion date: 5/4/2020    Nursing Mobility/ADLs:  Walking   Assisted  Transfer  Assisted  Bathing  Assisted  Dressing  Assisted -- Dependent  Toileting  Assisted  Feeding  Assisted  Med Admin  Assisted  Med Delivery   whole    Wound Care Documentation and Therapy:        Elimination:  Continence:   · Bowel: No  · Bladder: Yes  Urinary Catheter: None   Colostomy/Ileostomy/Ileal Conduit: No       Date of Last BM: 5/3    Intake/Output Summary (Last 24 hours) at 5/4/2020 1420  Last data filed at 5/4/2020 1105  Gross per 24 hour   Intake 810 ml   Output 3690 ml   Net -2880 ml     I/O last 3 completed shifts: In: 480 [P.O.:480]  Out: 0     Safety Concerns:     History of Falls (last 30 days) and At Risk for Falls    Impairments/Disabilities:      Vision and wears glasses and has tremors (Parkinson's) and is weak    Nutrition Therapy:  Current Nutrition Therapy:   - Oral Diet:  Carb Control 4 carbs/meal (1800kcals/day) and Low potassium    Routes of Feeding: Oral  Liquids: Thin Liquids  Daily Fluid Restriction: yes - amount 1200  Last Modified Barium Swallow with Video (Video Swallowing Test):     Treatments at the Time of Hospital Discharge:   Respiratory Treatments:   Oxygen Therapy:  is not on home oxygen therapy.   Ventilator:    - No ventilator support    Rehab Therapies: Physical Therapy and Occupational Therapy  Weight Bearing Status/Restrictions: No weight bearing restirctions  Other Medical Equipment (for information only, NOT a DME order):  walker  Other Treatments:     Patient's personal belongings (please select all that are sent with patient):  Glasses, wallet, keys and watch     RN SIGNATURE:  Electronically signed by Alex Melendez RN on 5/5/20 at 5:10 PM EDT    CASE MANAGEMENT/SOCIAL Thanh  66.

## 2020-05-04 NOTE — PROGRESS NOTES
Physical Therapy    Daily Treatment Note      Discharge Recommendations:  Mar Grossman scored a 12/24 on the AM-PAC short mobility form. Current research shows that an AM-PAC score of 17 or less is typically not associated with a discharge to the patient's home setting. Based on the patients AM-PAC score and their current functional mobility deficits, it is recommended that the patient have 3-5 sessions per week of Physical Therapy at d/c to increase the patients independence. Assessment:  Pt with improved bed mobility and sit to stand today, but still requiring Max A to transfer to the chair. Pt unable to ambulate this date. Pt demonstrates decreased insight into limitations. Discussed at length concerns for pt returning home alone given his current level of mobility. Pt agreeable for SNF for 1 week by end of session. Will continue to follow. Equipment Needs:  Defer    Chart Reviewed: Yes     Other position/activity restrictions: up with assist   Additional Pertinent Hx: 78 yo admitted 4/28/20 for sepsis/fall. B hand XR negative; Head and orbit CT negative; CXR negative; COVID negative. Pmhx: Parkinsons, HTN, DM, asthma. Diagnosis: sepsis/fall   Treatment Diagnosis: Decreased functional mobility       Subjective:  Pt found supine. \"It's because I've been in bed so long, and I have Parkinson's. I will be ok when I go home. \"    Pain:  Denies      Objective:    Bed mobility  Supine to sit:  Min A (HOB raised, pt pulling from rail with max effort and increased time to get scooted out to EOB, max verbal cues to complete task)    Transfers  Sit to stand: Mod A (from EOB to walker. Pt performed 3x total.  Pt with strong posterior lean upon standing, with walker wheels popping up off floor and pt needing Mod-Max A for balance in stance)  Stand to sit:  Mod A (decreased control to sit)  Bed <> chair:  Max A x1 and Min A x1 (stand step pivot with rolling walker)    Ambulation  Assistance Level:   Max

## 2020-05-04 NOTE — DISCHARGE SUMMARY
69 05/05/2020    CREATININE 6.3 05/05/2020    CALCIUM 9.6 05/05/2020    PHOS 3.5 05/02/2020       Consults: PHARMACY TO DOSE VANCOMYCIN  IP CONSULT TO HOSPITALIST  IP CONSULT TO PHARMACY  IP CONSULT TO SOCIAL WORK  IP CONSULT TO NEPHROLOGY  PHARMACY TO DOSE VANCOMYCIN  IP CONSULT TO SOCIAL WORK  IP CONSULT TO DIABETES EDUCATOR  IP CONSULT TO INTERVENTIONAL RADIOLOGY  Disposition: home; patient refused SNF  Discharged Condition: Stable  Follow Up: Primary Care Physician in 1 week    DISCHARGE MEDICATION:     Medication List      START taking these medications    * insulin glargine 100 UNIT/ML injection pen  Commonly known as:  LANTUS;BASAGLAR  Inject 40 Units into the skin nightly     * Lantus SoloStar 100 UNIT/ML injection pen  Generic drug:  insulin glargine  Inject 10 Units into the skin every morning     insulin lispro (1 Unit Dial) 100 UNIT/ML Sopn  Inject 10 Units into the skin 3 times daily (with meals)     NIFEdipine 60 MG extended release tablet  Commonly known as:  ADALAT CC  Take 1 tablet by mouth daily         * This list has 2 medication(s) that are the same as other medications prescribed for you. Read the directions carefully, and ask your doctor or other care provider to review them with you. CONTINUE taking these medications    acetaminophen 500 MG tablet  Commonly known as:  TYLENOL  Take 1 tablet by mouth 4 times daily as needed for Pain     albuterol sulfate  (90 Base) MCG/ACT inhaler     aspirin 81 MG tablet     atorvastatin 10 MG tablet  Commonly known as:  LIPITOR     carbidopa-levodopa  MG per tablet  Commonly known as:  SINEMET     PARoxetine 40 MG tablet  Commonly known as:  PAXIL     primidone 50 MG tablet  Commonly known as:   MYSOLINE     rOPINIRole 3 MG tablet  Commonly known as:  REQUIP        STOP taking these medications    losartan 100 MG tablet  Commonly known as:  COZAAR     NovoLIN N 100 UNIT/ML injection vial  Generic drug:  insulin NPH           Where to

## 2020-05-04 NOTE — PROGRESS NOTES
observer. Moderate sedation start time : 1253   Moderate sedation end time : 1304   Blood loss : minimal ( less than 5 cc )      VL Extremity Venous Bilateral   Final Result      XR CHEST PORTABLE   Final Result   Impression: Interval placement of a right internal jugular approach dialysis catheter terminating at the distal superior vena cava. No pneumothorax. US RENAL COMPLETE   Final Result      No hydronephrosis. CT CERVICAL SPINE WO CONTRAST   Final Result   1. No acute abnormality the cervical spine, chest, abdomen, or pelvis. CT CHEST WO CONTRAST   Final Result   1. No acute abnormality the cervical spine, chest, abdomen, or pelvis. CT ABDOMEN PELVIS WO CONTRAST Additional Contrast? None   Final Result   1. No acute abnormality the cervical spine, chest, abdomen, or pelvis. XR CHEST PORTABLE   Final Result      Development of mild diffuse bilateral ground glass opacities may relate to atelectasis pneumonitis or edema. CT ORBITS WO CONTRAST   Final Result      Left periorbital preseptal cellulitis; no post septal abnormality            CT Head WO Contrast   Final Result      1. No intracranial hemorrhage, mass effect or large acute territorial infarction   2. Mild generalized atrophy and chronic small vessel ischemia      XR HAND LEFT (MIN 3 VIEWS)   Final Result      Favor chronic deformities of the left fourth and fifth proximal interphalangeal joints; no definite acute fracture      XR HAND RIGHT (MIN 3 VIEWS)   Final Result      No fracture      XR CHEST PORTABLE   Final Result      No evidence for acute cardiopulmonary disease.                  Assessment/Plan     Ofelia sec to rhabdo - Likely ESRD   - on HD   - Anuria     Rhado   - better     Hyponatremia   - fluid restriction     HTN - controlled     Anemia   - EPO with HD             Kymberly Artis MD  [facility] -[phone]

## 2020-05-04 NOTE — CARE COORDINATION
TOM received referral per  re: needs SNF placement at [facility] for new HD; [facility] [place] placement this AM.    SW is aware of Pt's low AMPAC scores on 5/2/20. SW met with Pt this afternoon to discuss above/DC plan. Pt stated, \"the only place I am going is home and I'm leaving tonight! \" Pt stated his girlfriend will come and stay with him 24/7 and drive him to/from HD or his daughter if needed. Pt agreed to work with therapy again this afternoon. SW spoke with PT this afternoon after working with Pt re: Pt still very unsteady on feet, low AMPAC scores, and now Pt is agreeable to a short SNF stay at [facility]. TOM called Maria Fernanda/Admissions at [facility] with referral but went to  so left info and faxed a facesheet and asked for a return call. Will follow and advise. TOM sent a PerfectServe to Dr. Efren Dunlap to advise of above plan. [facility]  Case Management  [phone]-Addendum  Maria Fernanda/Admissions from [facility] called SW back - she received referral on Pt and is working on HD for Pt at [facility] but needs more info on Pt's renal DX. TOM sent a PerfectServe to Dr. Efren Dunlap to at  and still waiting a return text or call. Maria Fernanda/Admissions can accept Pt once this is finalized. TOM also sent a PerfectServe to Dr. Jesse Jennings to advise him of DC plan.      [facility]  Case Management  [phone]

## 2020-05-05 NOTE — PROGRESS NOTES
PROCEDURE:  Patient seen on hemodialysis at 10:39 AM    PHYSICIAN:  Vamshi Matute MD    INDICATION:  NABIL - likely ESRD     Wt Readings from Last 3 Encounters:   05/04/20 195 lb 1.7 oz (88.5 kg)   10/31/19 185 lb (83.9 kg)   01/19/18 185 lb (83.9 kg)     Temp Readings from Last 3 Encounters:   05/05/20 98.1 °F (36.7 °C) (Oral)   10/31/19 98.4 °F (36.9 °C) (Oral)   03/14/19 97.8 °F (36.6 °C) (Oral)     BP Readings from Last 3 Encounters:   05/05/20 (!) 163/77   10/31/19 (!) 167/75   03/14/19 (!) 184/85     Pulse Readings from Last 3 Encounters:   05/05/20 82   10/31/19 79   03/14/19 88      In: 140 [P.O.:120; I.V.:20]  Out: -      CBC:   Recent Labs     05/03/20  0711 05/04/20  0525 05/05/20  0457   WBC 12.6* 12.4* 14.3*   HGB 13.6 14.0 14.1    172 178     BMP:    Recent Labs     05/03/20  0711 05/04/20  0525 05/05/20  0457   * 126* 125*   K 4.7 5.0 4.6   CL 94* 87* 88*   CO2 20* 19* 20*   BUN 53* 79* 69*   CREATININE 5.3* 6.9* 6.3*   GLUCOSE 290* 325* 240*     BMD:   Lab Results   Component Value Date    CALCIUM 9.6 05/05/2020    CAION 4.5 08/01/2014    PHOS 3.5 05/02/2020       Iron:   No results found for: IRON, TIBC, FERRITIN         RX:  See dialysis flowsheet for specifics on access, blood flow rate, dialysate baths, duration of dialysis, anticoagulation and other technical information.     COMMENTS:      Vamshi Matute MD  Cell - [phone]  Pager -  [phone]

## 2020-05-05 NOTE — PLAN OF CARE
Problem: Falls - Risk of:  Goal: Will remain free from falls  Description: Will remain free from falls  5/5/2020 0245 by Shayne Cooper RN  Outcome: Ongoing  Note: Patient at risk for falls. Patient resting quietly in bed. Side rails up x 3. Bed locked in lowest position. Bed alarm on. Bedside table and call light within reach. Patient instructed to call for assistance. Patient verbalized understanding. Will continue to monitor. Problem: Skin Integrity:  Goal: Absence of new skin breakdown  Description: Absence of new skin breakdown  5/5/2020 0245 by Shayne Cooper RN  Outcome: Ongoing  Note: Pt at risk for skin breakdown. Skin assessment as documented. Pts skin cleansed with inc cleanser as needed. Pt repositioned in bed with pillow support as needed. Call light within reach. Will continue to monitor.

## 2020-05-14 PROBLEM — K92.2 GI BLEED: Status: ACTIVE | Noted: 2020-01-01

## 2020-05-14 NOTE — ED NOTES
Bed: B14-14  Expected date: 5/14/20  Expected time: 4:57 PM  Means of arrival:   Comments:  [facility]  05/14/20 7640

## 2020-05-14 NOTE — H&P
None    Family History:  History reviewed. No pertinent family history. MEDICATIONS:     No current facility-administered medications on file prior to encounter. Current Outpatient Medications on File Prior to Encounter   Medication Sig Dispense Refill    insulin glargine (LANTUS;BASAGLAR) 100 UNIT/ML injection pen Inject 40 Units into the skin nightly 5 pen 3    insulin glargine (LANTUS SOLOSTAR) 100 UNIT/ML injection pen Inject 10 Units into the skin every morning 5 pen 0    NIFEdipine (ADALAT CC) 60 MG extended release tablet Take 1 tablet by mouth daily 30 tablet 3    PARoxetine (PAXIL) 40 MG tablet Take 40 mg by mouth every morning      rOPINIRole (REQUIP) 3 MG tablet Take 3 mg by mouth 3 times daily      primidone (MYSOLINE) 50 MG tablet Take 50 mg by mouth 2 times daily      carbidopa-levodopa (SINEMET)  MG per tablet Take 2 tablets by mouth 3 times daily       aspirin 81 MG tablet Take 81 mg by mouth daily      atorvastatin (LIPITOR) 10 MG tablet Take 10 mg by mouth daily      albuterol (PROVENTIL HFA;VENTOLIN HFA) 108 (90 BASE) MCG/ACT inhaler Inhale 2 puffs into the lungs every 6 hours as needed.  acetaminophen (TYLENOL) 500 MG tablet Take 1 tablet by mouth 4 times daily as needed for Pain 360 tablet 1         Scheduled Meds:   sodium chloride  20 mL Intravenous Once      Continuous Infusions:  PRN Meds:    Allergies: Allergies   Allergen Reactions    Erythromycin        REVIEW OF SYSTEMS:       History obtained from the patient    Review of Systems   Constitutional: Negative for activity change, appetite change, chills, diaphoresis, fever and unexpected weight change. HENT: Negative for congestion and sore throat. Eyes: Negative for visual disturbance. Respiratory: Negative for apnea, cough, chest tightness and shortness of breath. Cardiovascular: Negative for chest pain, palpitations and leg swelling. Gastrointestinal: Positive for blood in stool and nausea. Janett Bolanos is a 77year-old male with a PMHx of Parkinson's disease, insulin dependent diabetes, hypertension, hyperlipidemia, and ESRD [on HD] who presents to the [facility] ED with suspected concern for gastrointestinal bleed    Blood loss anemia likely 2/2 GI bleed  Patient with dark tarry stools. Found to have hemoglobin of 6.2 down from a baseline of 13-14. Transfused 2 units upon arrival and GI was consulted. Found to have a stools in the ED. · GI consulted, likely to scope  · Protonix drip  · Monitor H&H, transfuse less than 7  · Have 2 additional units prepped  · Hold home aspirin. · Avoid NSAIDs and anticoagulation in the setting of acute bleed    Parkinson's disease  Patient has longstanding history of Parkinson's disease. He currently resides in a nursing home. · Continue Sinemet 25/100 mg 3 times daily  · Continue Requip 3 mg 3 times daily  · Continue primidone 50 mg twice daily  · Part continue paroxetine 40 mg nightly    ESRD (on HD)  Patient really started hemodialysis after suffering kidney injury 2/2 rhabdomyolysis. Patient of Dr. Zoraida Diaz. Presents with elevated potassium [5.8] and sodium of [125]. Currently receiving HD 4-5 times per week  · Nephrology consulted for inpatient HD    Insulin-dependent diabetes mellitus  Patient currently takes 40 mg Lantus nightly, 10 mg Lantus in the morning, 10 units lispro 3 times daily with meals. Presents with blood glucose greater than 300. · Continue 40 mg Lantus nightly  · Low-dose sliding scale  · Hypoglycemia protocols  · Patient may require further coverage. Monitor blood glucose levels and increase insulin doses when patient no longer n.p.o. Hypertension  Patient with labile blood pressure upon arrival did have a reading in the 90s.   · Hold home nifedipine 60 mg  · Continue to monitor blood pressure in the setting of acute bleed    Hyperlipidemia  · Hold home Lipitor 10 mg    Code Status:Full  FEN: NPO, Sips with meds  PPX: Protonix, SCDs, avoid anticoagulation in the setting of bleed  DISPO: ICU    This patient has been staffed and discussed with Dr. Sharla Prajapati.  -----------------------------  Kerri Collins MD, PGY-1  5/14/2020  8:54 PM

## 2020-05-14 NOTE — ED PROVIDER NOTES
LABS:   Results for orders placed or performed during the hospital encounter of 05/14/20   CBC Auto Differential   Result Value Ref Range    WBC 13.3 (H) 4.0 - 11.0 K/uL    RBC 2.23 (L) 4.20 - 5.90 M/uL    Hemoglobin 6.2 (LL) 13.5 - 17.5 g/dL    Hematocrit 18.7 (LL) 40.5 - 52.5 %    MCV 84.2 80.0 - 100.0 fL    MCH 27.9 26.0 - 34.0 pg    MCHC 33.1 31.0 - 36.0 g/dL    RDW 16.2 (H) 12.4 - 15.4 %    Platelets 620 775 - 159 K/uL    MPV 7.2 5.0 - 10.5 fL    PLATELET SLIDE REVIEW Adequate     SLIDE REVIEW see below     Path Consult No     Neutrophils % 63.0 %    Lymphocytes % 11.0 %    Monocytes % 3.0 %    Eosinophils % 0.0 %    Basophils % 0.0 %    Neutrophils Absolute 11.4 (H) 1.7 - 7.7 K/uL    Lymphocytes Absolute 1.5 1.0 - 5.1 K/uL    Monocytes Absolute 0.4 0.0 - 1.3 K/uL    Eosinophils Absolute 0.0 0.0 - 0.6 K/uL    Basophils Absolute 0.0 0.0 - 0.2 K/uL    Bands Relative 7 0 - 7 %    Metamyelocytes Relative 5 (A) %    Myelocyte Percent 4 (A) %    Promyelocytes Percent 7 (A) %   Comprehensive Metabolic Panel w/ Reflex to MG   Result Value Ref Range    Sodium 125 (L) 136 - 145 mmol/L    Potassium reflex Magnesium 5.8 (H) 3.5 - 5.1 mmol/L    Chloride 87 (L) 99 - 110 mmol/L    CO2 26 21 - 32 mmol/L    Anion Gap 12 3 - 16    Glucose 344 (H) 70 - 99 mg/dL    BUN 82 (HH) 7 - 20 mg/dL    CREATININE 3.6 (H) 0.8 - 1.3 mg/dL    GFR Non-African American 17 (A) >60    GFR  20 (A) >60    Calcium 8.1 (L) 8.3 - 10.6 mg/dL    Total Protein 5.1 (L) 6.4 - 8.2 g/dL    Alb 3.0 (L) 3.4 - 5.0 g/dL    Albumin/Globulin Ratio 1.4 1.1 - 2.2    Total Bilirubin 0.5 0.0 - 1.0 mg/dL    Alkaline Phosphatase 102 40 - 129 U/L    ALT 12 10 - 40 U/L    AST 32 15 - 37 U/L    Globulin 2.1 g/dL   Protime-INR   Result Value Ref Range    Protime 11.5 10.0 - 13.2 sec    INR 0.99 0.86 - 1.14   APTT   Result Value Ref Range    aPTT 23.6 (L) 24.2 - 36.2 sec   POCT Blood Occult   Result Value Ref Range    POC Occult Blood Stool Positive

## 2020-05-15 NOTE — PROGRESS NOTES
Pt very sleepy after endo. VS remaining stable, sp02 on 2L WNL. Pt wakes with verbal stimuli. Resident informed and to BS to assess pt for lethargy. No further orders at this time. Will cont to monitor.

## 2020-05-15 NOTE — CONSULTS
display     Assessment/Plan:     68M w/ PMH of Parkinson disease, T2DM, HTN, HLD, ESRD (on HD) who presented 05/14/20 w/ melena. 1. Blood loss anemia  - Likely 2/2 to GI bleed (dark tarry stools). - HgB 6.2 > 6.4 > PRBCx2 > 7.4.  - Baseline HgB 14.3 (05/05/20). - BUN/Cr ratio = 95/4.1 = 23.1 (> 36 sensitive for UGIB). - Plan: Protonix gtt. NPO w/  ml/hr. H/H q4h. Transfuse for HgB < 7.0. GI following. Plan for EGD today. 2. T2DM  - A1c 6.8 (04/29/20). - BS [id number] since admit.  - Plan: Lantus 40 units qPM. MDSSI TID, qPM.     3. ESRD  - GFR 15, Cr 4.1.  - Recent renal injury 2/2 rhabdomyolysis. - Plan: Inpatient HD. Monitor electrolytes. Nephrology following. 4. Parkinson disease  - Sinemet 2 tablet TID.   - Ropinirole 3 mg TID. - Primidone 50 mg BID.  - Paroxetine 40 mg qPM.    5. DVT PPX  - Holding heparin for GI bleed. - SCDs. 6. Discharge plan  - Plan for d/c to nursing home. Code Status: Full code  FEN: PATRICIA Chung, DO, PGY-1  05/15/20  11:58 AM    This patient has been staffed and discussed with Dr. Tomy Keen.       Patient was seen, examined and discussed with Dr. Mckayla Garcia and the ICU staff. I agree with the history of present illness, past medical/surgical histories, family history, social history, medication list and allergies as listed. The review of systems is as noted above. My physical exam confirms the findings listed above. Chart was reviewed including labs EKG and medical records confirm the findings noted above. I edited the note where appropriate. Recent hx of fall and rhabdo, recently started on HD. Presented with dark stools and hemoglobin of 6.4. (baseline Hgb is 13) Received two units of PRBC. HGB is up to 7.4  Hemodynamically stable. He has 4-5 bloody bowel movements since the morning. GI bleed, unclear upper or lower - maroon in color.     Hyperkalemia   Hyponatremia      GI planning upper and lower endoscopies today  Continue PPI  Serial hemoglobin  Closely monitor vitals in ICU.   Consider transfusions accordingly  HD per nephrology

## 2020-05-15 NOTE — H&P
History and Physical / Pre-Sedation Assessment    Tab Rene is a 77 y.o. male who presents today for EGD procedure. PMHx:    Past Medical History:   Diagnosis Date    Asthma     Diabetes mellitus ([facility]     Hypertension     Parkinson disease ([facility]        Medications:    Prior to Admission medications    Medication Sig Start Date End Date Taking? Authorizing Provider   insulin glargine (LANTUS;BASAGLAR) 100 UNIT/ML injection pen Inject 40 Units into the skin nightly 5/5/20  Yes Donovan Hernadez MD   insulin glargine (LANTUS SOLOSTAR) 100 UNIT/ML injection pen Inject 10 Units into the skin every morning 5/5/20  Yes Donovan Hernadez MD   NIFEdipine (ADALAT CC) 60 MG extended release tablet Take 1 tablet by mouth daily 5/4/20  Yes Rubens Falcon MD   PARoxetine (PAXIL) 40 MG tablet Take 40 mg by mouth every morning   Yes Historical Provider, MD   rOPINIRole (REQUIP) 3 MG tablet Take 3 mg by mouth 3 times daily   Yes Historical Provider, MD   primidone (MYSOLINE) 50 MG tablet Take 50 mg by mouth 2 times daily   Yes Historical Provider, MD   carbidopa-levodopa (SINEMET)  MG per tablet Take 2 tablets by mouth 3 times daily  10/24/19  Yes Historical Provider, MD   aspirin 81 MG tablet Take 81 mg by mouth daily   Yes Historical Provider, MD   atorvastatin (LIPITOR) 10 MG tablet Take 10 mg by mouth daily   Yes Historical Provider, MD   albuterol (PROVENTIL HFA;VENTOLIN HFA) 108 (90 BASE) MCG/ACT inhaler Inhale 2 puffs into the lungs every 6 hours as needed. Yes Historical Provider, MD   acetaminophen (TYLENOL) 500 MG tablet Take 1 tablet by mouth 4 times daily as needed for Pain 10/31/19   Candance Cram, MD       Allergies: Allergies   Allergen Reactions    Erythromycin        PSHx:  History reviewed. No pertinent surgical history.     Social Hx:    Social History     Socioeconomic History    Marital status:      Spouse name: Not on file    Number of children: Not on file    Years of education: Not on file

## 2020-05-15 NOTE — PLAN OF CARE
Problem: Falls - Risk of:  Goal: Will remain free from falls  Description: Will remain free from falls  Note: No attempts to get out of bed without assistance. Pt reminded to call for assist before ambulating. Nonskid socks on. Bed locked and in lowest position. Side rails up x3. Exit alarm in use. Call light in reach. Remains free from injury. Will cont to monitor safety. Problem: Bowel Function - Altered:  Goal: Bowel elimination is within specified parameters  Description: Bowel elimination is within specified parameters  Note: Dark bloody stools noted x2 today. Pt has dry heaving, nausea at times. No emesis. Denies abdominal pain. Will cont to monitor GI status.

## 2020-05-15 NOTE — PROGRESS NOTES
POSITIVE 04/29/2020    WBCUA 11-20 04/29/2020    BACTERIA 2+ 04/29/2020    RBCUA see below 04/29/2020    BLOODU LARGE 04/29/2020    SPECGRAV 1.025 04/29/2020    GLUCOSEU 500 04/29/2020       Radiology:  No orders to display       Assessment/Plan:    Active Hospital Problems    Diagnosis Date Noted    GI bleed [K92.2] 05/14/2020       Acute Medical Issues Being Addressed:    60-year-old gentleman admitted to the hospital with GI bleed    Acute GI bleed suspected upper GI  IV PPI  Gastroenterology consult    Anemia acute blood loss  Status post 2 units of blood transfusion  Monitor H&H every 6 hours  Continue IV fluids  Strict intake and output    End-stage renal disease on hemodialysis  Nephrology consulted    Known Parkinson's disease  Continue Sinemet        DVT Prophylaxis:scd  Diet: Diet NPO Effective Now Exceptions are: Sips with Meds  Code Status: Full Code      Dispo - once acute medical processes have resolved    Manuelito Paul MD

## 2020-05-16 NOTE — PROGRESS NOTES
Natalio Benavides   VSS  Patient without complaints; eating  Hgb has drifted down to 7.7  On pantoprazole, but only 40 mg qd    REC:  Continue to monitor the H/H with transfusion as necessary  Increase pantoprazole to bid  If any evidence of recurrent gross bleeding, will need to back off on po intake

## 2020-05-16 NOTE — CONSULTS
Nephrology Consult Note                                                                                                                                                                                                                                                                                                                                                               Office : [phone]     Fax :[phone]              Patient's Name: Martin Medina  6:24 AM  5/16/2020    Reason for Consult: NABIL   Requesting Physician:  Jesika MOYER      Chief Complaint:  GIB     History of Present Ilness:    Mr. Harvinder Pratt is a 77year-old male with a PMHx of Parkinson's disease, insulin dependent diabetes, hypertension, hyperlipidemia, and ESRD [on HD] who presents to the [facility] ED with suspected concern for gastrointestinal bleed. Mr. Marcos Ribeiro resides at [address] [place] [address] and was found to have dark-colored stools today. He was sent to the [facility] ED for further evaluation. The patient himself endorses nausea, but denies abdominal pain, chest pain, shortness of breath, and states he is otherwise in his normal state of health. Pt seen on HD - love well     Past Medical History:   Diagnosis Date    Asthma     Diabetes mellitus ([facility] [address] [facility]     Hypertension     Parkinson disease ([facility] [address]        History reviewed. No pertinent surgical history. History reviewed. No pertinent family history. reports that he has never smoked. He has never used smokeless tobacco. He reports that he does not drink alcohol or use drugs.     Allergies:  Erythromycin    Current Medications:    pantoprazole (PROTONIX) tablet 40 mg, QAM AC  0.9 % sodium chloride bolus, Once  0.9 % sodium chloride bolus, Once  albuterol (PROVENTIL) nebulizer solution 2.5 mg, Q4H PRN  insulin glargine (LANTUS;BASAGLAR) injection pen 40 Units, Nightly  glucose (GLUTOSE) 40 % oral gel 15 g, PRN  dextrose 50 % IV solution, PRN  glucagon (rDNA) injection 1 mg, PRN  dextrose 5 % solution, PRN  sodium chloride flush 0.9 % injection 10 mL, 2 times per day  sodium chloride flush 0.9 % injection 10 mL, PRN  acetaminophen (TYLENOL) tablet 650 mg, Q4H PRN  promethazine (PHENERGAN) tablet 12.5 mg, Q6H PRN    Or  ondansetron (ZOFRAN) injection 4 mg, Q6H PRN  0.9 % sodium chloride infusion, Continuous  carbidopa-levodopa (SINEMET)  MG per tablet 2 tablet, TID  primidone (MYSOLINE) tablet 50 mg, BID  PARoxetine (PAXIL) tablet 40 mg, QAM  rOPINIRole (REQUIP) tablet 3 mg, TID  insulin lispro (1 Unit Dial) 0-12 Units, TID WC  insulin lispro (1 Unit Dial) 0-6 Units, Nightly        Review of Systems:   14 point ROS obtained but were negative except mentioned in HPI      Physical exam:     Vitals:  /64   Pulse 76   Temp 98.7 °F (37.1 °C) (Oral)   Resp 15   Ht 5' 8\" (1.727 m)   Wt 189 lb 2.5 oz (85.8 kg)   SpO2 98%   BMI 28.76 kg/m²   Constitutional:  OAA X3 NAD  Skin: no rash, turgor wnl  Heent:  eomi, mmm  Neck: no bruits or jvd noted  Cardiovascular:  S1, S2 without m/r/g  Respiratory: CTA B without w/r/r  Abdomen:  +bs, soft, nt, nd  Ext: + lower extremity edema  Psychiatric: mood and affect appropriate  Musculoskeletal:  Rom, muscular strength intact    Data:   Labs:  CBC:   Recent Labs     05/14/20  1741  05/15/20  0420  05/15/20  1648 05/15/20  2306 05/16/20  0502   WBC 13.3*  --  14.9*  --   --   --  12.9*   HGB 6.2*   < > 7.4*   < > 6.7* 9.8* 8.8*     --  183  --   --   --  130*    < > = values in this interval not displayed.      BMP:    Recent Labs     05/15/20  1156 05/15/20  1648 05/16/20  0502   * 130* 134*   K 5.9* 5.2*  5.3* 4.3   CL 95* 96* 100   CO2 25 23 24   BUN 99* 116* 52*   CREATININE 4.2* 4.3* 2.7*   GLUCOSE 188* 224* 139*     Ca/Mg/Phos:   Recent Labs     05/14/20  2334  05/15/20  1156 05/15/20  1648 05/16/20  0502   CALCIUM 7.8*   < > 7.5* 7.0* 7.6*   PHOS 3.8  --  4.3 4.7  --     < > = values in this interval family/SO at bedside

## 2020-05-16 NOTE — PROGRESS NOTES
ICU Progress Note    Admit Date: 5/14/2020  Day: 3  Vent Day: None  IV Access:Peripheral, R IJ HD access  IV Fluids: 100cc/hr NS  Vasopressors:None                Antibiotics: None  Diet: DIET GENERAL;    CC: Melena    Interval history: EGD yesterday evening w/ GI, 2 duodenal ulcers clipped. Diet resumed this AM, will need to be on protonix 40mg qd x8 weeks, no NSAIDs or ASA. Awaiting pathology results    This AM he reports he is feeling better. Some nausea w/ dry heaves but otherwise no complaints this AM. Reports he had BM overnight w/o evident blood.  Reports he has not eaten yet since his EGD    Medications:     Scheduled Meds:   pantoprazole  40 mg Oral QAM AC    sodium chloride  20 mL Intravenous Once    sodium chloride  20 mL Intravenous Once    insulin glargine  40 Units Subcutaneous Nightly    sodium chloride flush  10 mL Intravenous 2 times per day    carbidopa-levodopa  2 tablet Oral TID    primidone  50 mg Oral BID    PARoxetine  40 mg Oral QAM    rOPINIRole  3 mg Oral TID    insulin lispro  0-12 Units Subcutaneous TID WC    insulin lispro  0-6 Units Subcutaneous Nightly     Continuous Infusions:   dextrose      sodium chloride 100 mL/hr at 05/15/20 1705     PRN Meds:albuterol, glucose, dextrose, glucagon (rDNA), dextrose, sodium chloride flush, acetaminophen, promethazine **OR** ondansetron    Objective:   Vitals:   T-max:  Patient Vitals for the past 8 hrs:   BP Temp Temp src Pulse Resp SpO2   05/16/20 0600 128/62 -- -- 75 13 97 %   05/16/20 0500 135/68 -- -- 81 19 100 %   05/16/20 0400 139/64 98.7 °F (37.1 °C) Oral 76 15 98 %   05/16/20 0300 112/80 -- -- 77 14 100 %   05/16/20 0200 (!) 132/107 -- -- 79 20 100 %   05/16/20 0100 (!) 136/58 -- -- 79 17 --   05/16/20 0020 -- -- -- -- 18 100 %   05/16/20 0000 (!) 137/58 98.5 °F (36.9 °C) Oral 83 17 100 %       Intake/Output Summary (Last 24 hours) at 5/16/2020 0751  Last data filed at 5/16/2020 0600  Gross per 24 hour   Intake 3777.83 ml transfer to floors if tolerating PO    Sherman Morales MD, PGY-1  05/16/20  7:59 AM    This patient has been staffed and discussed with Dr. Magi Kwok MD.     Patient was seen, and discussed with Dr. Yakelin Birch. I agree with the interval history. My physical exam confirms the findings listed below  Chart was reviewed including labs and medical records confirm the findings noted    Upper GI bleed. Had two duodenal ulcers and grade 3 esophagitis   Hyperkalemia - resolved. Hyponatremia - improving. ESRD     Continue PPI  No signs of active bleeding  Ok to transfer to the floor.     HD per nephrology

## 2020-05-16 NOTE — PROGRESS NOTES
Hospitalist Progress Note      PCP: Clearance Leonarda    Date of Admission: 5/14/2020    Chief Complaint: Anemia    Hospital Course:     No chest pain shortness of breath  No abdominal pain nausea vomiting  Po resumed     Medications:  Reviewed      Exam:    /61   Pulse 78   Temp 97.6 °F (36.4 °C) (Oral)   Resp 18   Ht 5' 8\" (1.727 m)   Wt 189 lb 2.5 oz (85.8 kg)   SpO2 97%   BMI 28.76 kg/m²     General appearance: No apparent distress, appears stated age and cooperative. Pallor positive  HEENT: Pupils equal, round, and reactive to light. Conjunctivae/corneas clear. Neck: Supple, with full range of motion. No jugular venous distention. Trachea midline. Respiratory:  Normal respiratory effort. Clear to auscultation, bilaterally without RALES/WHEEZES/Rhonchi. Cardiovascular: Regular rate and rhythm with normal S1/S2 without MURMURS, rubs or gallops. Abdomen: Soft, non-tender, non-distended with normal bowel sounds. Musculoskeletal: No clubbing, cyanosis or EDEMA bilaterally. Full range of motion without deformity. Skin: Skin color, texture, turgor normal.  No rashes or lesions. Neurologic:  Neurovascularly intact without any focal sensory/motor deficits. Cranial nerves: II-XII intact, grossly non-focal.        Labs:   Recent Labs     05/14/20  1741  05/15/20  0420  05/15/20  1648 05/15/20  2306 05/16/20  0502   WBC 13.3*  --  14.9*  --   --   --  12.9*   HGB 6.2*   < > 7.4*   < > 6.7* 9.8* 8.8*   HCT 18.7*   < > 21.9*   < > 19.3* 29.1* 25.5*     --  183  --   --   --  130*    < > = values in this interval not displayed.      Recent Labs     05/14/20  2334  05/15/20  1156 05/15/20  1648 05/16/20  0502   *   < > 132* 130* 134*   K 5.8*   < > 5.9* 5.2*  5.3* 4.3   CL 91*   < > 95* 96* 100   CO2 25   < > 25 23 24   BUN 92*   < > 99* 116* 52*   CREATININE 3.9*   < > 4.2* 4.3* 2.7*   CALCIUM 7.8*   < > 7.5* 7.0* 7.6*   PHOS 3.8  --  4.3 4.7  --     < > = values in this interval not displayed. Recent Labs     05/14/20  1741   AST 32   ALT 12   BILITOT 0.5   ALKPHOS 102     Recent Labs     05/14/20  1741   INR 0.99     No results for input(s): Dee Newer in the last 72 hours.     Urinalysis:      Lab Results   Component Value Date    NITRU POSITIVE 04/29/2020    WBCUA 11-20 04/29/2020    BACTERIA 2+ 04/29/2020    RBCUA see below 04/29/2020    BLOODU LARGE 04/29/2020    SPECGRAV 1.025 04/29/2020    GLUCOSEU 500 04/29/2020       Radiology:  No orders to display       Assessment/Plan:    Active Hospital Problems    Diagnosis Date Noted    GI bleed [K92.2] 05/14/2020       Acute Medical Issues Being Addressed:    18-year-old gentleman admitted to the hospital with GI bleed    Acute GI bleed status post EGD and clipping to duodenal ulcers on 5/15  Gastroenterology consult  Change PPI to twice daily    Anemia acute blood loss  Status post 2 units of blood transfusion  Monitor H&H every 6 hours  Continue IV fluids  Strict intake and output  Test as above    End-stage renal disease on hemodialysis  Nephrology consulted    Known Parkinson's disease  Continue Sinemet    DVT Prophylaxis:scd  Diet: DIET GENERAL; Carb Control: 4 carb choices (60 gms)/meal  Code Status: Full Code    Dispo -inpatient    James Salmeron MD

## 2020-05-16 NOTE — PROGRESS NOTES
RESPIRATORY THERAPY ASSESSMENT    Name:  Martha [address] Record Number:  [id number]  Age: 77 y.o. Gender: male  : 1950  Today's Date:  2020  Room:  4522/4522-01    Assessment     Is the patient being admitted for a COPD or Asthma exacerbation? No   (If yes the patient will be seen every 4 hours for the first 24 hours and then reassessed)    Patient Admission Diagnosis      Allergies  Allergies   Allergen Reactions    Erythromycin          Pulmonary History:Asthma  Home Oxygen Therapy:  room air  Home Respiratory Therapy:Albuterol   Current Respiratory Therapy:  Albuterol          Respiratory Severity Index(RSI)   Patients with orders for inhalation medications, oxygen, or any therapeutic treatment modality will be placed on Respiratory Protocol. They will be assessed with the first treatment and at least every 72 hours thereafter. The following severity scale will be used to determine frequency of treatment intervention. Smoking History: Pulmonary Disease or Smoking History, Greater than 15 pack year = 2    Social History  Social History     Tobacco Use    Smoking status: Never Smoker    Smokeless tobacco: Never Used   Substance Use Topics    Alcohol use: No    Drug use: No       Recent Surgical History: None = 0  Past Surgical History  History reviewed. No pertinent surgical history.     Level of Consciousness: Alert, Oriented, and Cooperative = 0    Level of Activity: Walking with assistance = 1    Respiratory Pattern: Regular Pattern; RR 8-20 = 0    Breath Sounds: Diminished unilaterally = 1    Sputum   ,  ,    Cough: Strong, spontaneous, non-productive = 0    Vital Signs   /87   Pulse 83   Temp 98.6 °F (37 °C)   Resp 18   Ht 5' 8\" (1.727 m)   Wt 189 lb 2.5 oz (85.8 kg)   SpO2 100%   BMI 28.76 kg/m²   SPO2 (COPD values may differ): 90-91% on room air or greater than 92% on FiO2 24- 28% = 1    Peak Flow (asthma only): not applicable = 0    RSI: 5-6 = Q4hr PRN (every

## 2020-05-16 NOTE — PROGRESS NOTES
Resident Transfer Acceptance Note    Admit Date: 2020    PCP: Roselyn Goodell                  : 1950  MRN: [id number]    Subjective:   Briefly, this is a 77year-old male with a PMHx of Parkinson's disease, insulin dependent diabetes, hypertension, hyperlipidemia, and ESRD [on HD] who presents to the [facility] ED with melena. Hemoglobin was noted to be 6.2 (baseline 13-14) with associated soft blood pressures. GI consulted and proceeded with EGD which demonstrated two duodenal ulcers s/p clipping. Hemoglobin stable s/p 4 units p RBCs. Today, patient doing well. Tolerating diet. No acute complaints. Data:   Scheduled Meds:   pantoprazole  40 mg Oral BID AC    sodium chloride  20 mL Intravenous Once    sodium chloride  20 mL Intravenous Once    insulin glargine  40 Units Subcutaneous Nightly    sodium chloride flush  10 mL Intravenous 2 times per day    carbidopa-levodopa  2 tablet Oral TID    primidone  50 mg Oral BID    PARoxetine  40 mg Oral QAM    rOPINIRole  3 mg Oral TID    insulin lispro  0-12 Units Subcutaneous TID WC    insulin lispro  0-6 Units Subcutaneous Nightly     Continuous Infusions:   dextrose       PRN Meds:albuterol, glucose, dextrose, glucagon (rDNA), dextrose, sodium chloride flush, acetaminophen, promethazine **OR** ondansetron  I/O last 3 completed shifts: In: 3777.8 [P.O.:60; I.V.:2307.8; Blood:610]  Out: 1884   No intake/output data recorded. Intake/Output Summary (Last 24 hours) at 2020 1454  Last data filed at 2020 0600  Gross per 24 hour   Intake 3137.83 ml   Output 1884 ml   Net 1253.83 ml           Objective:     Vitals: BP (!) 133/57   Pulse 82   Temp 98 °F (36.7 °C) (Oral)   Resp 19   Ht 5' 8\" (1.727 m)   Wt 189 lb 2.5 oz (85.8 kg)   SpO2 95%   BMI 28.76 kg/m²     Physical Exam  Constitutional:       General: He is not in acute distress. HENT:      Head: Normocephalic.       Nose: Nose normal.      Mouth/Throat:

## 2020-05-16 NOTE — CONSULTS
[address]               [address] [facility], [address] [place] [address]                                  CONSULTATION    PATIENT NAME: Jean Albarado                     :        1950  MED REC NO:   [id number]                          ROOM:       4522  ACCOUNT NO:   [de-identified]                           ADMIT DATE: 2020  PROVIDER:     Goyo Hodges MD    CONSULT DATE:  05/15/2020    HISTORY OF PRESENT ILLNESS:  A 52-year-old white male with history of  Parkinson's, insulin-dependent diabetes, hypertension, hyperlipidemia,  end-stage renal disease on hemodialysis, presents with acute GI  bleeding. Hemoglobin dropped from 14 to 8. He received several units  of blood. No recent known COVID exposure. The patient denies any  dysphagia. No abdominal pain. No prior history of liver or pancreatic  disorders. He recently was admitted for rhabdomyolysis. This  precipitated a kidney disease requiring hemodialysis. He does take  aspirin. No prior history of peptic ulcer disease. Denies any chronic  diarrhea, constipation, or hematochezia. PAST MEDICAL HISTORY:    ALLERGIES:  ERYTHROMYCIN. HOME MEDICATIONS:  Insulin, nifedipine, Paxil, Requip, Mysoline,  aspirin, Lipitor, Proventil. SOCIAL HISTORY:  Noncontributory. PAST SURGICAL HISTORY:  None reported. REVIEW OF SYSTEMS:  Noncontributory. PHYSICAL EXAMINATION:  VITAL SIGNS:  Stable. Afebrile. HEENT:  No conjunctival icterus. CHEST:  Clear. CARDIOVASCULAR:  Regular rate and rhythm. No murmur, gallop, or click. ABDOMEN:  Bowel sounds present. Soft, nontender, nondistended. No  masses. No hepatosplenomegaly. RECTAL:  Deferred. EXTREMITIES:  No clubbing or edema. SKIN:  No pallor. NODES:  No adenopathy. LABORATORY DATA:  White count 13,300, hemoglobin 6.2, MCV normal,  platelets adequate. BUN 82, creatinine 3.6. Liver tests normal.  INR  0.99.     IMPRESSION AND PLAN:  Acute upper GI bleeding. We will proceed with  diagnostic EGD. He has been kept n.p.o. He will receive 2 units of  packed red blood cells. Monitor H and H.  IV fluid resuscitation. Protonix infusion. Hold aspirin.         Onesimo Tapia MD    D: 05/15/2020 19:48:82       T: 05/15/2020 22:39:38     [email or web address]  Job#: [id number]     Doc#: [id number]    CC:

## 2020-05-17 NOTE — PROGRESS NOTES
Progress Note    Admit Date: 5/14/2020  Day: 3  Diet: DIET GENERAL; Carb Control: 4 carb choices (60 gms)/meal    CC: Melena    Interval history: No acute events overnight. Pt examined bedside this am. States he is feeling well and denies any complaints including abd pain, nausea, emesis, melena, SOB, CP, palpitations. Patient states that he does not want to go back to his nursing facility and would like to go home with home health. PT/OT jordan ordered. CM working on HD setup. HPI: Briefly, this is a 77year-old male with a PMHx of Parkinson's disease, insulin dependent diabetes, hypertension, hyperlipidemia, and ESRD [on HD] who presents to the [facility] ED with melena. Hemoglobin was noted to be 6.2 (baseline 13-14) with associated soft blood pressures. GI consulted and proceeded with EGD which demonstrated two duodenal ulcers s/p clipping. Hemoglobin stable s/p 4 units p RBCs.      Medications:     Scheduled Meds:   insulin glargine  10 Units Subcutaneous Once    pantoprazole  40 mg Oral BID AC    sodium chloride flush  10 mL Intravenous 2 times per day    carbidopa-levodopa  2 tablet Oral TID    primidone  50 mg Oral BID    PARoxetine  40 mg Oral QAM    rOPINIRole  3 mg Oral TID    insulin lispro  0-12 Units Subcutaneous TID WC    insulin lispro  0-6 Units Subcutaneous Nightly     Continuous Infusions:   dextrose       PRN Meds:albuterol, glucose, dextrose, glucagon (rDNA), dextrose, sodium chloride flush, acetaminophen, promethazine **OR** ondansetron    Objective:   Vitals:   T-max:  Patient Vitals for the past 8 hrs:   BP Temp Temp src Pulse Resp SpO2   05/17/20 1200 130/66 99 °F (37.2 °C) Oral 84 18 100 %   05/17/20 0733 (!) 122/55 98.8 °F (37.1 °C) Oral 78 18 99 %       Intake/Output Summary (Last 24 hours) at 5/17/2020 1352  Last data filed at 5/17/2020 0930  Gross per 24 hour   Intake 1330 ml   Output --   Net 1330 ml       Review of Systems   All other systems reviewed and are

## 2020-05-17 NOTE — DISCHARGE INSTR - COC
/66   Pulse 84   Temp 99 °F (37.2 °C) (Oral)   Resp 18   Ht 5' 8\" (1.727 m)   Wt 189 lb 2.5 oz (85.8 kg)   SpO2 100%   BMI 28.76 kg/m²     Last documented pain score (0-10 scale): Pain Level: 0  Last Weight:   Wt Readings from Last 1 Encounters:   05/15/20 189 lb 2.5 oz (85.8 kg)     Mental Status:  oriented and alert    IV Access:  - Dialysis Catheter  - site  right, insertion date: 5/15/20    Nursing Mobility/ADLs:  Walking   Dependent  Transfer  Dependent  Bathing  Assisted  Dressing  Assisted  Toileting  dependent  Feeding  Assisted  Med Admin  Assisted  Med Delivery   whole    Wound Care Documentation and Therapy:        Elimination:  Continence:   · Bowel: No  · Bladder: No  Urinary Catheter: None   Colostomy/Ileostomy/Ileal Conduit: No       Date of Last BM: 5/17 per pt      Intake/Output Summary (Last 24 hours) at 5/17/2020 1300  Last data filed at 5/17/2020 0930  Gross per 24 hour   Intake 1330 ml   Output --   Net 1330 ml     I/O last 3 completed shifts: In: 56 [P.O.:600; I.V.:10]  Out: -     Safety Concerns: At Risk for Falls    Impairments/Disabilities:      Vision and Hearing    Nutrition Therapy:  Current Nutrition Therapy:   - Oral Diet:  Carb Control 4 carbs/meal (1800kcals/day)    Routes of Feeding: Oral  Liquids: No Restrictions  Daily Fluid Restriction: no  Last Modified Barium Swallow with Video (Video Swallowing Test): not done    Treatments at the Time of Hospital Discharge:   Respiratory Treatments: ***  Oxygen Therapy:  is not on home oxygen therapy. Ventilator:    - No ventilator support    Rehab Therapies: Physical Therapy and Occupational Therapy  Weight Bearing Status/Restrictions: No weight bearing restirctions  Other Medical Equipment (for information only, NOT a DME order):  wheelchair, walker, bedside commode and hospital bed. ....  TWO PERSON EDIE GARVIN   Other Treatments: ***    Patient's personal belongings (please select all that are sent with patient):  cell phone    RN SIGNATURE:  Electronically signed by Karl White RN on 5/19/20 at 11:39 AM EDT    CASE MANAGEMENT/SOCIAL WORK SECTION    Inpatient Status Date: 5/14/2020    Readmission Risk Assessment Score:  Readmission Risk              Risk of Unplanned Readmission:        21           Discharging to Facility/ [address]   [address] [place] [address]   Phone: [phone]  Fax: [phone]    Dialysis Facility (if applicable)   [address]   Phone: [phone]  Fax: [phone]    / signature: Electronically signed by ANGLE Zapata on 5/17/20 at 1:00 PM EDT    PHYSICIAN SECTION    Prognosis: Fair    Condition at Discharge: Stable    Rehab Potential (if transferring to Rehab): Fair    Recommended Labs or Other Treatments After Discharge: PT OT nursing  Hemodialysis    Physician Certification: I certify the above information and transfer of Tab Rene  is necessary for the continuing treatment of the diagnosis listed and that he requires [facility] for less 30 days.       Update Admission H&P: No change in H&P    PHYSICIAN SIGNATURE:  Electronically signed by Angelica Chan MD on 05/19/20 10:50 AM

## 2020-05-17 NOTE — CARE COORDINATION
Case Management            Discharge Note                    Date / Time of Note: 5/17/2020 12:56 PM                  Discharge Note Completed by: April Leggett    Patient Name: Ree Cardenas   YOB: 1950  Diagnosis: GI bleed [K92.2]  GI bleed [K92.2]   Date / Time: 5/14/2020  5:02 PM    Current PCP: [address] patient: No    Hospitalization in the last 30 days: Yes    Advance Directives:  Code Status: Full Code  [place] DNR form completed and on chart: Not Indicated    Financial:  Payor: MEDICARE / Plan: MEDICARE PART A AND B / Product Type: *No Product type* /      Pharmacy:    [facility] [address], [address] [facility] [phone] Scarlet Abdirahman [phone]  [address] [facility] [address]  Phone: [phone] Fax: [address] [facility] [place], [place] [address] [phone] Scarlet Abdirahman [phone]  [address] [place] [address]. [address] [address]  Phone: [phone] Fax: [phone]      Assistance purchasing medications?:    Assistance provided by Case Management: None at this time    Does patient want to participate in local refill/ meds to beds program?:      Meds To Beds General Rules:  1. Can ONLY be done Monday- Friday between 8:30am-5pm  2. Prescription(s) must be in pharmacy by 3pm to be filled same day  3. Copy of patient's insurance/ prescription drug card and patient face sheet must be sent along with the prescription(s)  4. Cost of Rx cannot be added to hospital bill. If financial assistance is needed, please contact unit  or ;  or  CANNOT provide pharmacy voucher for patients co-pays  5.  Patients can then  the prescription on their way out of the hospital at discharge, or pharmacy can deliver to the bedside if staff is available. (payment due at time of pick-up or delivery - Hospital - Weekend Coverage   Case Management Department  Ph: [phone]

## 2020-05-17 NOTE — CARE COORDINATION
After discharge arranged. Patient informed staff he did not wish to return to [facility]. Team contacted patient's daughter who was in agreement with plan. She attempted to speak with patient. However, patient is still adamant to not discharge to a skilled nursing facility. Patient will requires PT/OT assessment as he as not yet been seen in house for any type of new placement or to review ability to return home. Patient will also require new HD set up in community and insurance approval of new set up along with a nephrologist  to follow patient post discharge. SW entered discharge delay.      ANGLE Lezama, [place]   - Weekend Coverage   [facility]   [phone]

## 2020-05-17 NOTE — PROGRESS NOTES
Resp 18   Ht 5' 8\" (1.727 m)   Wt 189 lb 2.5 oz (85.8 kg)   SpO2 100%   BMI 28.76 kg/m²   Constitutional:  OAA X3 NAD  Skin: no rash, turgor wnl  Heent:  eomi, mmm  Neck: no bruits or jvd noted  Cardiovascular:  S1, S2 without m/r/g  Respiratory: CTA B without w/r/r  Abdomen:  +bs, soft, nt, nd  Ext: + lower extremity edema  Psychiatric: mood and affect appropriate  Musculoskeletal:  Rom, muscular strength intact    Data:   Labs:  CBC:   Recent Labs     05/16/20  0502  05/16/20  1822 05/17/20  0226 05/17/20  0538   WBC 12.9*  --  12.5*  --  8.7   HGB 8.8*   < > 8.4* 7.9* 7.4*   *  --  135  --  134*    < > = values in this interval not displayed. BMP:    Recent Labs     05/15/20  1648 05/16/20  0502 05/17/20  0538   * 134* 132*   K 5.2*  5.3* 4.3 4.1   CL 96* 100 99   CO2 23 24 22   * 52* 60*   CREATININE 4.3* 2.7* 3.5*   GLUCOSE 224* 139* 97     Ca/Mg/Phos:   Recent Labs     05/14/20  2334  05/15/20  1156 05/15/20  1648 05/16/20  0502 05/17/20  0538   CALCIUM 7.8*   < > 7.5* 7.0* 7.6* 7.4*   PHOS 3.8  --  4.3 4.7  --   --     < > = values in this interval not displayed. Hepatic:   Recent Labs     05/14/20  1741   AST 32   ALT 12   BILITOT 0.5   ALKPHOS 102     Troponin: No results for input(s): TROPONINI in the last 72 hours. BNP: No results for input(s): BNP in the last 72 hours. Lipids: No results for input(s): CHOL, TRIG, HDL, LDLCALC, LABVLDL in the last 72 hours. ABGs: No results for input(s): PHART, PO2ART, TMX7JRM in the last 72 hours. INR:   Recent Labs     05/14/20  1741   INR 0.99     UA:No results for input(s): Junior Christen, GLUCOSEU, BILIRUBINUR, [place], BLOODU, PHUR, PROTEINU, UROBILINOGEN, NITRU, LEUKOCYTESUR, Bowden Button in the last 72 hours. Urine Microscopic: No results for input(s): LABCAST, BACTERIA, COMU, HYALCAST, WBCUA, RBCUA, EPIU in the last 72 hours. Urine Culture: No results for input(s): LABURIN in the last 72 hours.   Urine

## 2020-05-17 NOTE — DISCHARGE SUMMARY
Hospital Medicine Discharge Summary    Patient ID: Randolph Duran      Patient's PCP: Red Mcgowan    Admit Date: 5/14/2020     Discharge Date: 5/19/2020    Disposition:  Nursing home    Admitting Physician: Malou Mcknight MD     Discharge Physician: Sparkle Ortiz MD     Admission Diagnoses:  Present on Admission:   GI bleed       Discharge Diagnoses:  Acute Blood Loss Anemia 2/2 Duodenal Ulcers (biopsy with no evidence of H-pylori or malignancy)  Type 2 Diabetes  ESRD on HD  Parkinson's disease    The patient was seen and examined on day of discharge and this discharge summary is in conjunction with any daily progress note from day of discharge. Hospital Course: Erin Nayak is a 77year-old male with a PMHx of Parkinson's disease, insulin dependent diabetes, hypertension, hyperlipidemia, and ESRD [on HD] who presented to the [facility] ED with melena. Hemoglobin was noted to be 6.2 (baseline 13-14) with associated soft blood pressures. GI consulted and proceeded with EGD which demonstrated two duodenal ulcers s/p clipping. Hemoglobin stable s/p 4 units p RBCs. After clipping, patient had no more melena and remained stable. He was discharged on protonix 40 mg BID. His ASA was discontinued until he can follow up with PCP in 1 week. Biopsy Results:  FINAL DIAGNOSIS:    Gastric biopsy:     - Gastric mucosa with mild chronic inactive gastritis.     - H.  Pylori immunoperoxidase stain shows no evidence of Helicobacter       forms.     - No evidence of malignancy. Physical Exam Performed:     /74   Pulse 74   Temp 97.7 °F (36.5 °C) (Axillary)   Resp 18   Ht 5' 8\" (1.727 m)   Wt 187 lb 6.4 oz (85 kg)   SpO2 98%   BMI 28.49 kg/m²       Physical Exam  Constitutional:       Appearance: Normal appearance. HENT:      Mouth/Throat:      Mouth: Mucous membranes are moist.   Cardiovascular:      Rate and Rhythm: Normal rate and regular rhythm. Pulses: Normal pulses. discharge plan and follow ups with resident, along with performing independent review of discharge medicines along with counseling the patient exceeds 20 minutes.     [facility]  Attending Physician

## 2020-05-18 NOTE — PROGRESS NOTES
Tolerance  Activity Tolerance: Patient limited by fatigue  Activity Tolerance: Pt unable to tolerate stance > 90 sec x 2 /2 fatigue. Pt needing to sit freq. Safety Devices  Safety Devices in place: Yes  Type of devices: Call light within reach; Chair alarm in place;Nurse notified; Left in chair(RN aware no alarm box in room )           Patient Diagnosis(es): The primary encounter diagnosis was Gastrointestinal hemorrhage, unspecified gastrointestinal hemorrhage type. A diagnosis of Acute blood loss anemia was also pertinent to this visit. has a past medical history of Asthma, Diabetes mellitus ([facility] [address] Hypertension, and Parkinson disease ([facility] [address] has no past surgical history on file. Treatment Diagnosis: impaired ADLs / functional mobility / decreased insight         Restrictions  Position Activity Restriction  Other position/activity restrictions: Up with assist; Activity as tolerated    Subjective   General  Chart Reviewed:  Yes  Additional Pertinent Hx: Admit 5/14 with GI Bleed at SNF (recent fall / rhab)    GI consult    s/p EGD 5/15 Tone ulcers /clipped           PMHX: Parkinson's disease, insulin dependent diabetes, hypertension, hyperlipidemia, and ESRD on HD  Diagnosis: GI Bleed   Patient Currently in Pain: Denies    Social/Functional History  Social/Functional History  Lives With: Alone  Type of Home: House  Home Layout: Two level, Laundry in basement, Bed/Bath upstairs, 1/2 bath on main level  Home Access: Stairs to enter with rails(4 steps)  Bathroom Shower/Tub: Tub/Shower unit  Bathroom Toilet: Standard  Bathroom Equipment: Grab bars in shower, Shower chair, Grab bars around toilet  Home Equipment: Cane, Rolling walker, Standard walker, Crutches  ADL Assistance: Independent  Homemaking Assistance: Independent  Ambulation Assistance: Independent(without assistive device prior to 5/4)  Transfer Assistance: Independent  Occupation: Part time employment([id number])  [address]: cut

## 2020-05-18 NOTE — PROGRESS NOTES
Appearance: Normal appearance. HENT:      Mouth/Throat:      Mouth: Mucous membranes are moist.   Cardiovascular:      Rate and Rhythm: Normal rate and regular rhythm. Pulses: Normal pulses. Heart sounds: Normal heart sounds. Pulmonary:      Effort: Pulmonary effort is normal.      Breath sounds: Normal breath sounds. Abdominal:      General: Abdomen is flat. Bowel sounds are normal.      Palpations: Abdomen is soft. Musculoskeletal:      Right lower leg: Edema (+1) present. Left lower leg: Edema (+1) present. Skin:     General: Skin is warm. Capillary Refill: Capillary refill takes less than 2 seconds. Neurological:      General: No focal deficit present. Mental Status: He is alert. LABS:    CBC:   Recent Labs     05/16/20  1822 05/17/20  0226 05/17/20  0538 05/18/20  0522   WBC 12.5*  --  8.7 6.7   HGB 8.4* 7.9* 7.4* 7.0*   HCT 24.9* 23.5* 21.2* 20.1*     --  134* 144   MCV 91.7  --  87.9 89.7     Renal:    Recent Labs     05/15/20  1648 05/16/20  0502 05/17/20  0538 05/18/20  0522   * 134* 132* 130*   K 5.2*  5.3* 4.3 4.1 4.8   CL 96* 100 99 96*   CO2 23 24 22 22   * 52* 60* 74*   CREATININE 4.3* 2.7* 3.5* 3.9*   GLUCOSE 224* 139* 97 225*   CALCIUM 7.0* 7.6* 7.4* 7.4*   PHOS 4.7  --   --   --    ANIONGAP 11 10 11 12     Hepatic:   Recent Labs     05/15/20  1648   LABALBU 2.3*     Troponin: No results for input(s): TROPONINI in the last 72 hours. BNP: No results for input(s): BNP in the last 72 hours. Lipids: No results for input(s): CHOL, HDL in the last 72 hours. Invalid input(s): LDLCALCU, TRIGLYCERIDE  ABGs:  No results for input(s): PHART, FYX5PES, PO2ART, YYJ0QUM, BEART, THGBART, Z5YFWTTF, DJE8VLZ in the last 72 hours. INR:   No results for input(s): INR in the last 72 hours. Lactate: No results for input(s): LACTATE in the last 72 hours.   Cultures:  -----------------------------------------------------------------  RAD:   No orders

## 2020-05-18 NOTE — PLAN OF CARE
Problem: Falls - Risk of:  Goal: Will remain free from falls  Description: Will remain free from falls  5/18/2020 1023 by Terrence Marquez RN  Outcome: Ongoing  Note: Patient is a fall risk. See Fall Risk assessment for details. Bed is in low, lock position; call light/belongings within reach. No attempts to get out of bed have been made, calls appropriately when assistance is needed. Bed alarm and hourly rounds in place for safety. Will continue to monitor and reassess throughout shift. Problem: Pain:  Goal: Pain level will decrease  Description: Pain level will decrease  5/18/2020 1023 by Terrence Marquez RN  Outcome: Ongoing  Note: Patient has had no complaints of pain this shift, repositioning as needed. Will monitor. Problem: Nausea/Vomiting:  Goal: Absence of nausea/vomiting  Description: Absence of nausea/vomiting  5/18/2020 1023 by Terrence Marquez RN  Outcome: Ongoing  Note: Patient has had no complaints of nausea this shift.  Resting in chair, tolerating meals and medications

## 2020-05-19 NOTE — PROGRESS NOTES
Pt d/c to SNF. Discharge instructions with new medications and side effects explained to pt thoroughly with no further questions. Pt left floor via stretcher with all personal belongings.

## 2020-05-19 NOTE — FLOWSHEET NOTE
Treatment time: 4 hours    Net UF: 3.5 liters    Pre weight: 98.1 kg bed scale   Post weight: 94.2 kg  EDW: tbd    Access used: R TDC   Access function: good     Medications or blood products given: Aranesp 100 mcg     Regular outpatient schedule: NABIL     Summary of response to treatment: 3.5 Liters net fluid removed. Pt tolerated tx well. Aranesp 100 mcg given. Post VSS  Report to primary nurse Yaya Kasper RN    Copy of dialysis treatment record placed in chart, to be scanned into EMR.     05/19/20 0949 05/19/20 1350   Vital Signs   BP (!) 151/70 (!) 151/64   Temp 98.2 °F (36.8 °C) 97.9 °F (36.6 °C)   Pulse 77 82   Weight 216 lb 4.3 oz (98.1 kg)  (bed lowered, one sheet, 2 pillows ) 207 lb 10.8 oz (94.2 kg)   Weight Method Bed scale Bed scale   Treatment Initiation   Dialyze Hours 4  --    Treatment  Initiation Universal Precautions maintained;Lines secured to patient; Connections secured;Prime given;Venous Parameters set; Arterial Parameters set; Air foam detector engaged; Hemosafe Device; Dialysate;Saline line double clamped; Hemo-Safe Applied;F180  --    Post-Hemodialysis Assessment   Hemodialysis Intake (ml)  --  500 ml   Hemodialysis Output (ml)  --  4000 ml   NET Removed (ml)  --  3500 ml   Tolerated Treatment  --  Good
Dressing Intervention  --  Dressing reinforced Dressing reinforced   Dressing Status  --  Clean;Dry; Intact Clean;Dry; Intact   Post-Hemodialysis Assessment   Post-Treatment Procedures  --   --  Blood returned;Catheter Capped, clamped with Saline x2 ports   Machine Disinfection Process  --   --  Acid/Vinegar Clean;Heat Disinfect; Exterior Machine Disinfection   Rinseback Volume (ml)  --   --  400 ml   Total Liters Processed (l/min)  --   --  66.8 l/min   Dialyzer Clearance  --   --  Lightly streaked   Duration of Treatment (minutes)  --   --  240 minutes   Hemodialysis Intake (ml)  --   --  800 ml   Hemodialysis Output (ml)  --   --  1884 ml   NET Removed (ml)  --   --  1000 ml   Tolerated Treatment  --   --  Antoinette Reynoso

## 2020-05-19 NOTE — PROGRESS NOTES
Nephrology  Note                                                                                                                                                                                                                                                                                                                                                               Office : [phone]     Fax :[phone]              Patient's Name: Dieter Colmenares worse   HD in am       Past Medical History:   Diagnosis Date    Asthma     Diabetes mellitus ([facility] [address]     Hypertension     Parkinson disease ([facility] [address]        Past Surgical History:   Procedure Laterality Date    UPPER GASTROINTESTINAL ENDOSCOPY N/A 5/15/2020    EGD CONTROL HEMORRHAGE performed by Cookie Stevenson MD at [address] [facility] 5/15/2020    EGD BIOPSY performed by Cookie Stevenson MD at [address] [facility] reviewed. No pertinent family history. reports that he has never smoked. He has never used smokeless tobacco. He reports that he does not drink alcohol or use drugs.     Allergies:  Erythromycin    Current Medications:    insulin lispro (1 Unit Dial) 0-18 Units, TID WC  insulin lispro (1 Unit Dial) 0-9 Units, Nightly  insulin NPH (HUMULIN N;NOVOLIN N) injection pen 30 Units, BID AC  pantoprazole (PROTONIX) tablet 40 mg, BID AC  albuterol (PROVENTIL) nebulizer solution 2.5 mg, Q4H PRN  glucose (GLUTOSE) 40 % oral gel 15 g, PRN  dextrose 50 % IV solution, PRN  glucagon (rDNA) injection 1 mg, PRN  dextrose 5 % solution, PRN  sodium chloride flush 0.9 % injection 10 mL, 2 times per day  sodium chloride flush 0.9 % injection 10 mL, PRN  acetaminophen (TYLENOL) tablet 650 mg, Q4H PRN  promethazine (PHENERGAN) tablet 12.5 mg, Q6H PRN    Or  ondansetron (ZOFRAN) injection 4 mg, Q6H PRN  carbidopa-levodopa (SINEMET)  MG per tablet 2 tablet, TID  primidone (MYSOLINE) tablet 50 mg, BID  PARoxetine

## 2020-05-19 NOTE — PROGRESS NOTES
Notified Dr. Patricio Page of blood glucose level of 57. Pt is alert and eating and drinking. BG is now 71. Will continue to monitor.

## 2020-05-19 NOTE — PLAN OF CARE
Problem: Falls - Risk of:  Goal: Will remain free from falls  Description: Will remain free from falls  Outcome: Ongoing  Note: Pt is free from falls at this time. Bed is in lowest position, wheels are locked and bed alarm is set. Call light and belongings are within reach. Visual fall sign/blanket are posted. Will continue to monitor. Problem: Falls - Risk of:  Goal: Absence of physical injury  Description: Absence of physical injury  Outcome: Ongoing  Note: Pt is free from accidental physical injury at this time. Pt is AxOx4. Fall Risk assessed per shift. ID band in place. Bed in lowest position, wheels locked and alarm is set. Call light and belongings are within reach. Maintaining a safe environment. Will continue to assess and monitor. Problem: Bowel Function - Altered:  Goal: Bowel elimination is within specified parameters  Description: Bowel elimination is within specified parameters  Outcome: Ongoing  Note: No BM this shift. Will continue to assess and monitor. Problem: Nausea/Vomiting:  Goal: Able to drink  Description: Able to drink  Outcome: Ongoing  Note: Pt able to tolerate PO fluids this shift. Pt instructed to inform staff if nausea, vomiting or GI upset occurs. Will continue to monitor. Problem: Pain:  Goal: Control of acute pain  Description: Control of acute pain  Outcome: Ongoing  Note: Pt has no c/o pain this shift. Pt instructed to inform staff if signs of pain occur. Will continue to assess and monitor.

## 2020-05-20 NOTE — PROGRESS NOTES
PROCEDURE:  Patient seen on hemodialysis   PHYSICIAN:  Dillon Prieto MD    INDICATION:  Acute renal failure    Wt Readings from Last 3 Encounters:   05/19/20 207 lb 10.8 oz (94.2 kg)   05/05/20 193 lb 5.5 oz (87.7 kg)   10/31/19 185 lb (83.9 kg)     Temp Readings from Last 3 Encounters:   05/19/20 96.9 °F (36.1 °C) (Oral)   05/05/20 97.7 °F (36.5 °C) (Oral)   10/31/19 98.4 °F (36.9 °C) (Oral)     BP Readings from Last 3 Encounters:   05/19/20 (!) 156/73   05/05/20 (!) 147/74   10/31/19 (!) 167/75     Pulse Readings from Last 3 Encounters:   05/19/20 84   05/05/20 91   10/31/19 79      In: 980 [P.O.:480]  Out: 9740 [Urine:50]     CBC:   Recent Labs     05/17/20  0538 05/18/20  0522 05/19/20  0544   WBC 8.7 6.7 6.8   HGB 7.4* 7.0* 7.4*   * 144 150     BMP:    Recent Labs     05/17/20  0538 05/18/20  0522 05/19/20  0544   * 130* 129*   K 4.1 4.8 4.8   CL 99 96* 95*   CO2 22 22 21   BUN 60* 74* 83*   CREATININE 3.5* 3.9* 3.9*   GLUCOSE 97 225* 85     BMD:   Lab Results   Component Value Date    CALCIUM 7.4 (L) 05/19/2020    CAION 4.5 08/01/2014    PHOS 4.7 05/15/2020       Iron:   No results found for: IRON, TIBC, FERRITIN         RX:  See dialysis flowsheet for specifics on access, blood flow rate, dialysate baths, duration of dialysis, anticoagulation and other technical information.     COMMENTS:      Dillon Prieto MD  Cell - [phone]  Pager -  [phone]

## 2020-05-27 NOTE — ED PROVIDER NOTES
Pulse: 93, Resp: 20, SpO2: 98 %     Procedures         ED Course     Nursing Notes, Past Medical Hx, Past Surgical Hx, Social Hx,Allergies, and Family Hx were reviewed. patient was given the following medications:  Orders Placed This Encounter   Medications    DISCONTD: insulin NPH (HUMULIN N;NOVOLIN N) 100 UNIT/ML injection pen     Sig: Inject 25 Units into the skin 2 times daily (before meals)     Dispense:  1 pen     Refill:  0    insulin NPH (HUMULIN N;NOVOLIN N) 100 UNIT/ML injection pen     Sig: Inject 30 Units into the skin 2 times daily (before meals)     Dispense:  1 pen     Refill:  0       CONSULTS:  None    MEDICAL DECISIONMAKING / ASSESSMENT / [place] is a 77 y.o. male ending with hyperglycemia now improved with a combination of D 25 from EMS and oral intake here. He is requesting to return to his rehab facility I believe this is appropriate. We will decrease his NPH from 40 units twice daily to 30 twice daily and leave further titration to the care of his primary care physician. Stable for discharge no sign of acute infection, and a normal mental status. Clinical Impression     1. Hypoglycemia        Disposition     PATIENT REFERRED TO:  No follow-up provider specified.     DISCHARGE MEDICATIONS:  Current Discharge Medication List          DISPOSITION Decision To Discharge 05/27/2020 06:20:09 AM          Marysol Richard MD  05/27/20 5795

## 2020-06-17 NOTE — ED NOTES
Peripheral IV removed without complications. Bleeding controlled and bandage applied. Patient tolerated well.       Nikhil Castillo RN  06/17/20 3875

## 2020-06-17 NOTE — ED PROVIDER NOTES
([facility] 76.). He has a past surgical history that includes Upper gastrointestinal endoscopy (N/A, 5/15/2020) and Upper gastrointestinal endoscopy (N/A, 5/15/2020). His family history is not on file. He reports that he has never smoked. He has never used smokeless tobacco. He reports that he does not drink alcohol or use drugs. Medications     Current Discharge Medication List      CONTINUE these medications which have NOT CHANGED    Details   insulin NPH (HUMULIN N;NOVOLIN N) 100 UNIT/ML injection pen Inject 30 Units into the skin 2 times daily (before meals)  Qty: 1 pen, Refills: 0      pantoprazole (PROTONIX) 40 MG tablet Take 1 tablet by mouth 2 times daily (before meals)  Qty: 30 tablet, Refills: 3      NIFEdipine (ADALAT CC) 60 MG extended release tablet Take 1 tablet by mouth daily  Qty: 30 tablet, Refills: 3      PARoxetine (PAXIL) 40 MG tablet Take 40 mg by mouth every morning      rOPINIRole (REQUIP) 3 MG tablet Take 3 mg by mouth 3 times daily      primidone (MYSOLINE) 50 MG tablet Take 50 mg by mouth 2 times daily      carbidopa-levodopa (SINEMET)  MG per tablet Take 2 tablets by mouth 3 times daily       acetaminophen (TYLENOL) 500 MG tablet Take 1 tablet by mouth 4 times daily as needed for Pain  Qty: 360 tablet, Refills: 1      atorvastatin (LIPITOR) 10 MG tablet Take 10 mg by mouth daily      albuterol (PROVENTIL HFA;VENTOLIN HFA) 108 (90 BASE) MCG/ACT inhaler Inhale 2 puffs into the lungs every 6 hours as needed. Allergies     He is allergic to erythromycin. Physical Exam     INITIAL VITALS: BP: (!) 138/56, Temp: 98.2 °F (36.8 °C), Pulse: 92, Resp: 16, SpO2: 98 %  Physical Exam  Vitals signs and nursing note reviewed. Constitutional:       General: He is not in acute distress. Appearance: Normal appearance. He is well-developed. HENT:      Head: Normocephalic and atraumatic.       Right Ear: External ear normal.      Left Ear: External ear normal.      Nose: Nose normal. Mouth/Throat:      Mouth: Mucous membranes are moist.   Eyes:      General:         Right eye: No discharge. Left eye: No discharge. Extraocular Movements: Extraocular movements intact. Pupils: Pupils are equal, round, and reactive to light. Neck:      Musculoskeletal: Normal range of motion and neck supple. Cardiovascular:      Rate and Rhythm: Normal rate and regular rhythm. Pulses: Normal pulses. Heart sounds: Normal heart sounds. No murmur. Pulmonary:      Effort: Pulmonary effort is normal.      Breath sounds: Normal breath sounds. No wheezing, rhonchi or rales. Abdominal:      General: Bowel sounds are normal.      Tenderness: There is no abdominal tenderness. There is no right CVA tenderness, left CVA tenderness or guarding. Musculoskeletal: Normal range of motion. Skin:     General: Skin is warm and dry. Capillary Refill: Capillary refill takes less than 2 seconds. Neurological:      General: No focal deficit present. Mental Status: He is alert and oriented to person, place, and time. Psychiatric:         Mood and Affect: Mood normal.         Behavior: Behavior normal.         Thought Content:  Thought content normal.         Judgment: Judgment normal.         Diagnostic Results       RADIOLOGY:  No orders to display       LABS:   Results for orders placed or performed during the hospital encounter of 78/93/47   Basic Metabolic Panel   Result Value Ref Range    Sodium 140 136 - 145 mmol/L    Potassium 3.9 3.5 - 5.1 mmol/L    Chloride 102 99 - 110 mmol/L    CO2 24 21 - 32 mmol/L    Anion Gap 14 3 - 16    Glucose 118 (H) 70 - 99 mg/dL    BUN 15 7 - 20 mg/dL    CREATININE 1.3 0.8 - 1.3 mg/dL    GFR Non-African American 55 (A) >60    GFR African American >60 >60    Calcium 8.5 8.3 - 10.6 mg/dL   POCT Glucose   Result Value Ref Range    POC Glucose 74 70 - 99 mg/dl    Performed on ACCU-CHEK    POCT Glucose   Result Value Ref Range    POC Glucose 89 70 - 99 mg/dl    Performed on ACCU-CHEK    POCT Glucose   Result Value Ref Range    POC Glucose 152 (H) 70 - 99 mg/dl    Performed on ACCU-CHEK    POCT Glucose   Result Value Ref Range    POC Glucose 140 (H) 70 - 99 mg/dl    Performed on ACCU-CHEK            RECENT VITALS:  BP: (!) 149/61, Temp: 98.2 °F (36.8 °C), Pulse: 80, Resp: 20, SpO2: 97 %     Procedures         ED Course     Nursing Notes, Past Medical Hx,Past Surgical Hx, Social Hx, Allergies, and Family Hx were reviewed. The patient was given the following medications:  No orders of the defined types were placed in this encounter. CONSULTS:  None    MEDICAL DECISION MAKING / ASSESSMENT / Keila Worrell is a 77 y.o. male who presented to the emergency department after an episode of lethargy and hypoglycemia. On examination here the patient is alert and oriented x4. He has no complaints. His initial glucose here was 74. He was given an apple juice and was rechecked at 89. His BMP shows a glucose of 118. To that glucose he had a second apple juice. He was observed for an hour after his labs were resulted. His repeat glucose prior to discharge is 140. He is tolerating PO's without difficulty. He will be discharged home to follow up with his PCP for re-evaluation and to discuss his insulin regimen. He is to return for worsening symptoms or concerns. This patient was also evaluated by the attending physician. All care plans were discussed and agreed upon. Clinical Impression     1. Nausea    2.  Hypoglycemia        Disposition     PATIENT REFERRED TO:  Sarika Cuello [place] [address]  [phone]    Call   for follow up and to discuss insulin dosing      DISCHARGE MEDICATIONS:  Current Discharge Medication List          DISPOSITION Decision To Discharge 06/17/2020 12:46:36 PM     HERMINIO Garcia  06/17/20 6153

## 2020-06-17 NOTE — ED NOTES
Straight stick for labs to right wrist. Patient tolerated well. Blood sent to lab. Patient continues to deny pain, issues or concerns. Resting comfortably in bed. Drank 2 cups of apple juice after arriving to ED. Patient refusing more at this time, \"I will drink it if I start to feel badly. \" Juice at bedside. Patient updated on plan of care.       Nikhil Castillo RN  06/17/20 2705

## 2020-06-17 NOTE — ED NOTES
[facility] ETA 2553. Patient aware, resting comfortably in bed. Eating a boxed lunch at this time. Patient denies needs, updated on plan of care.       Ramon Mayer RN  06/17/20 4294

## 2020-06-17 NOTE — ED NOTES
Peripheral IV removed without complication. Bleeding controlled and bandage applied. Patient tolerated well.       Emi Shannon RN  06/17/20 5788

## 2020-06-18 PROBLEM — E10.10 DKA, TYPE 1, NOT AT GOAL (HCC): Status: ACTIVE | Noted: 2020-01-01

## 2020-06-18 NOTE — ED PROVIDER NOTES
gastrointestinal endoscopy (N/A, 5/15/2020). His family history is not on file. He reports that he has never smoked. He has never used smokeless tobacco. He reports that he does not drink alcohol or use drugs. Medications     Previous Medications    ACETAMINOPHEN (TYLENOL) 500 MG TABLET    Take 1 tablet by mouth 4 times daily as needed for Pain    ALBUTEROL (PROVENTIL HFA;VENTOLIN HFA) 108 (90 BASE) MCG/ACT INHALER    Inhale 2 puffs into the lungs every 6 hours as needed. ATORVASTATIN (LIPITOR) 10 MG TABLET    Take 10 mg by mouth daily    CARBIDOPA-LEVODOPA (SINEMET)  MG PER TABLET    Take 2 tablets by mouth 3 times daily     INSULIN NPH (HUMULIN N;NOVOLIN N) 100 UNIT/ML INJECTION PEN    Inject 30 Units into the skin 2 times daily (before meals)    LOSARTAN (COZAAR) 100 MG TABLET    Take 100 mg by mouth daily    NIFEDIPINE (ADALAT CC) 60 MG EXTENDED RELEASE TABLET    Take 1 tablet by mouth daily    PANTOPRAZOLE (PROTONIX) 40 MG TABLET    Take 1 tablet by mouth 2 times daily (before meals)    PAROXETINE (PAXIL) 40 MG TABLET    Take 40 mg by mouth every morning    PRIMIDONE (MYSOLINE) 50 MG TABLET    Take 50 mg by mouth 2 times daily    ROPINIROLE (REQUIP) 3 MG TABLET    Take 3 mg by mouth 3 times daily       Allergies     He is allergic to erythromycin. Physical Exam     INITIAL VITALS: BP: (!) 146/113, Temp: 98.2 °F (36.8 °C), Pulse: 102, Resp: 16, SpO2: 100 %   Physical Exam  Vitals signs reviewed. Constitutional:       Appearance: He is well-developed. He is not ill-appearing or diaphoretic. HENT:      Head: Normocephalic and atraumatic. Nose: Nose normal.      Mouth/Throat:      Mouth: Mucous membranes are moist.   Eyes:      Pupils: Pupils are equal, round, and reactive to light. Neck:      Musculoskeletal: Neck supple. Cardiovascular:      Rate and Rhythm: Normal rate and regular rhythm. Pulses: Normal pulses. Heart sounds: Murmur present.    Pulmonary:      Effort: established %    Carboxyhemoglobin 2.7 (H) 0.0 - 1.5 %    MetHgb, Sonido 0.4 0.0 - 1.5 %    TC02 (Calc), Sonido 21 mmol/L    Hemoglobin, Sonido, Reduced 23.80 %   Microscopic Urinalysis   Result Value Ref Range    WBC, UA None seen 0 - 5 /HPF    RBC, UA None seen 0 - 4 /HPF   POCT Glucose   Result Value Ref Range    POC Glucose >600 (AA) 70 - 99 mg/dl    Performed on ACCU-CHEK    POCT Glucose   Result Value Ref Range    POC Glucose 527 (H) 70 - 99 mg/dl    Performed on ACCU-CHEK    POCT Glucose   Result Value Ref Range    POC Glucose 343 (H) 70 - 99 mg/dl    Performed on ACCU-CHEK    POCT Glucose   Result Value Ref Range    POC Glucose 266 (H) 70 - 99 mg/dl    Performed on ACCU-CHEK    EKG 12 Lead   Result Value Ref Range    Ventricular Rate 98 BPM    Atrial Rate 98 BPM    P-R Interval 176 ms    QRS Duration 90 ms    Q-T Interval 360 ms    QTc Calculation (Bazett) 459 ms    P Axis 91 degrees    R Axis -8 degrees    T Axis 42 degrees    Diagnosis       EKG performed in ER and to be interpreted by ER physician. Confirmed by MD, ER ([id number]),  Viet Cook (TITO SAUCEDO (9) on 6/18/2020 12:38:13 PM       RECENT VITALS:  BP: 116/60, Temp: 98.2 °F (36.8 °C), Pulse: 82,Resp: 18, SpO2: 94 %     Procedures       ED Course     Nursing Notes, Past Medical Hx, Past Surgical Hx, Social Hx, Allergies, and Family Hx were reviewed. The patient was given the followingmedications:  Orders Placed This Encounter   Medications    0.9 % sodium chloride bolus    insulin lispro (1 Unit Dial) 10 Units     Order Specific Question:   Please select a reason the therapeutic interchange was not accepted:      Answer:   Cruzito Plasencia for Pharmacy to Substitute    ondansetron [facility]) injection 4 mg    glucose (GLUTOSE) 40 % oral gel 15 g    dextrose 50 % IV solution    glucagon (rDNA) injection 1 mg    dextrose 5 % solution    insulin regular (HUMULIN R;NOVOLIN R) 100 Units in sodium chloride 0.9 % 100 mL infusion    0.9 % sodium chloride bolus CONSULTS:  Cale Madrigal HOSPITALIST  IP CONSULT TO CRITICAL CARE  IP CONSULT  [address] / ASSESSMENT / Keila Worrell is a 77 y.o. male with a history of diabetes who is noncompliant on his medications is presenting now with hyperglycemia. Previous records for the patient was seen here in the hospital yesterday after having an episode of hypoglycemia. He was given some glucose and had a renal panel obtained which was unremarkable. The patient upon arrival had a undetectable glucose reading he was given 10 units of short acting insulin. The patient also had a renal panel obtained which revealed findings consistent with diabetes ketoacidosis. At this point the patient was started on an insulin drip. His chest x-ray and CBC showed no findings of infectious etiology. His urinalysis did not show any evidence of infection. His LFTs and lipase were normal as well. His EKG was non-concerning for ACS or electrolyte derangements. It is unchanged from his previous EKG. Given his clinical findings the patient will need to be admitted to the ICU for management of his DKA. This patient was also evaluated by the attending physician. All care plans werediscussed and agreed upon. Clinical Impression     1.  Diabetic ketoacidosis without coma associated with type 1 diabetes mellitus [facility])        Disposition     PATIENT REFERRED TO:  Sarika Badillo [place] [address]  [phone]          [facility] [place] [address]  [phone]            DISCHARGE MEDICATIONS:  New Prescriptions    No medications on file       DISPOSITION Admitted 06/18/2020 02:28:50 PM        Tracie Issa MD  Resident  06/18/20 6343

## 2020-06-18 NOTE — CARE COORDINATION
Received return call from [facility] for private duty HHAs for patient. Patient has again refused. Adult Protective Services updated. Case with APS has been assigned to Job Sinha at [phone].   Electronically signed by ANGLE Haskins, WANG on 6/18/2020 at 11:52 AM

## 2020-06-18 NOTE — ED NOTES
Bed: B19-19  Expected date:   Expected time:   Means of arrival:   Comments:  Darlene Alexander RN  06/18/20 8509

## 2020-06-18 NOTE — H&P
mg by mouth daily      insulin NPH (HUMULIN N;NOVOLIN N) 100 UNIT/ML injection pen Inject 30 Units into the skin 2 times daily (before meals) 1 pen 0    pantoprazole (PROTONIX) 40 MG tablet Take 1 tablet by mouth 2 times daily (before meals) 30 tablet 3    NIFEdipine (ADALAT CC) 60 MG extended release tablet Take 1 tablet by mouth daily 30 tablet 3    PARoxetine (PAXIL) 40 MG tablet Take 40 mg by mouth every morning      rOPINIRole (REQUIP) 3 MG tablet Take 3 mg by mouth 3 times daily      primidone (MYSOLINE) 50 MG tablet Take 50 mg by mouth 2 times daily      carbidopa-levodopa (SINEMET)  MG per tablet Take 2 tablets by mouth 3 times daily       atorvastatin (LIPITOR) 10 MG tablet Take 10 mg by mouth daily      acetaminophen (TYLENOL) 500 MG tablet Take 1 tablet by mouth 4 times daily as needed for Pain 360 tablet 1    albuterol (PROVENTIL HFA;VENTOLIN HFA) 108 (90 BASE) MCG/ACT inhaler Inhale 2 puffs into the lungs every 6 hours as needed. Scheduled Meds:   Continuous Infusions:   dextrose      insulin 0.05 Units/kg/hr (06/18/20 1630)     PRN Meds:glucose, dextrose, glucagon (rDNA), dextrose    Allergies: Allergies   Allergen Reactions    Erythromycin        REVIEW OF SYSTEMS:   History obtained from the patient    ROS: Pertinent positives and negatives as noted in the HPI    PHYSICAL EXAM:   Vitals: BP (!) 146/60   Pulse 92   Temp 98.2 °F (36.8 °C) (Oral)   Resp 18   Ht 5' 8\" (1.727 m)   Wt 185 lb (83.9 kg)   SpO2 98%   BMI 28.13 kg/m²     I/O:  No intake or output data in the 24 hours ending 06/18/20 1655  No intake/output data recorded. No intake/output data recorded. Physical Exam  Constitutional:       General: He is not in acute distress. Appearance: He is not toxic-appearing. Cardiovascular:      Rate and Rhythm: Normal rate and regular rhythm. Pulses: Normal pulses. Heart sounds: Normal heart sounds. No murmur. No friction rub. No gallop. daily      Code Status: Full Code  FEN: NPO (sips with meds)  PPX: Lovenox  DISPO: ICU     This patient has been staffed and discussed with Elieser Diaz MD  -----------------------------  Francisco Hart MD, PGY-1  6/18/2020  4:55 PM    Pulm/CC    Patient seen and examined. I agree with Dr. Tomasz Lopes history, physical, lab findings, assessment and plan. DKA -potentially difficulty compliance with insulin pen which he recently has been switched to. No evidence of infection or ischemia.   Continue IV fluids, insulin drip, and frequent monitoring of blood sugar    Elieser Diaz MD

## 2020-06-18 NOTE — CARE COORDINATION
of : No    Financial  Payor: MEDICARE / Plan: MEDICARE PART A AND B / Product Type: *No Product type* /     Pre-cert required for SNF: Yes    Pharmacy    [facility] [address] [facility], [address] [facility] [phone] Mello Gonzalez [phone]  [address] [facility] [address]  Phone: [phone] Fax: [phone] [facility] Maribel Pérez [address] [phone] Mello Lisa [phone]  [address] [address]  Phone: [phone] Fax: [phone]      Potential assistance Purchasing Medications:    Does Patient want to participate in local refill/ meds to beds program?:      Meds To Beds General Rules:  1. Can ONLY be done Monday- Friday between 8:30am-5pm  2. Prescription(s) must be in pharmacy by 3pm to be filled same day  3. Copy of patient's insurance/ prescription drug card and patient face sheet must be sent along with the prescription(s)  4. Cost of Rx cannot be added to hospital bill. If financial assistance is needed, please contact unit  or ;  or  CANNOT provide pharmacy voucher for patients co-pays  5.  Patients can then  the prescription on their way out of the hospital at discharge, or pharmacy can deliver to the bedside if staff is available. (payment due at time of pick-up or delivery - cash, check, or card accepted)     Able to afford home medications/ co-pay costs: Yes    ADLS  Support Systems:      PT AM-PAC:   /24  OT AM-PAC:   /24    [place]: home alone  Steps:     Plans to RETURN to current housing: Yes  Barriers to RETURNING to current housing: medical complications, unable to care for self, falls/weakness, unable to manage DM    Home Care Information  Currently ACTIVE with [address]: Yes  Home Care Agency: [facility]  Phone: [phone]  Fax: [phone]    Currently ACTIVE with [place] [facility]: No      Durable Medical Equipment  DME Provider:

## 2020-06-18 NOTE — PROGRESS NOTES
RESPIRATORY THERAPY ASSESSMENT    Name:  [address] Record Number:  [id number]  Age: 77 y.o. Gender: male  : 1950  Today's Date:  2020  Room:  [facility]5884-88    Assessment     Is the patient being admitted for a COPD or Asthma exacerbation? No   (If yes the patient will be seen every 4 hours for the first 24 hours and then reassessed)    Patient Admission Diagnosis      Allergies  Allergies   Allergen Reactions    Erythromycin        Minimum Predicted Vital Capacity:     1020          Actual Vital Capacity:      500              Pulmonary History:Asthma  Home Oxygen Therapy:  room air  Home Respiratory Therapy:Albuterol   Current Respiratory Therapy:  Albuterol HHN Q 4 prn          Respiratory Severity Index(RSI)   Patients with orders for inhalation medications, oxygen, or any therapeutic treatment modality will be placed on Respiratory Protocol. They will be assessed with the first treatment and at least every 72 hours thereafter. The following severity scale will be used to determine frequency of treatment intervention.     Smoking History: No Smoking History = 0    Social History  Social History     Tobacco Use    Smoking status: Never Smoker    Smokeless tobacco: Never Used   Substance Use Topics    Alcohol use: No    Drug use: No       Recent Surgical History: None = 0  Past Surgical History  Past Surgical History:   Procedure Laterality Date    UPPER GASTROINTESTINAL ENDOSCOPY N/A 5/15/2020    EGD CONTROL HEMORRHAGE performed by Brody Lay MD at [address] [place] [address] 5/15/2020    EGD BIOPSY performed by Brody Lay MD at [facility] ENDOSCOPY       Level of Consciousness: Alert, Oriented, and Cooperative = 0    Level of Activity: Walking with assistance = 1    Respiratory Pattern: Regular Pattern; RR 8-20 = 0    Breath Sounds: Clear = 0    Sputum   ,  ,    Cough: Strong, spontaneous, non-productive = 0    Vital Signs   BP (!) 5. Bronchodilators will be delivered via Metered Dose Inhaler (MDI), HHN, Aerogen to intubated patients on mechanical ventilation. 6. Inhalation medication orders will be delivered and/or substituted as outlined below. Aerosolized Medications Ordering and Administration Guidelines:    1. All Medications will be ordered by a physician, and their frequency and/or modality will be adjusted as defined by the patients Respiratory Severity Index (RSI) score. 2. If the patient does not have documented COPD, consider discontinuing anticholinergics when RSI is less than 9.  3. If the bronchospasm worsens (increased RSI), then the bronchodilator frequency can be increased to a maximum of every 4 hours. If greater than every 4 hours is required, the physician will be contacted. 4. If the bronchospasm improves, the frequency of the bronchodilator can be decreased, based on the patient's RSI, but not less than home treatment regimen frequency. 5. Bronchodilator(s) will be discontinued if patient has a RSI less than 9 and has received no scheduled or as needed treatment for 72  Hrs. Patients Ordered on a Mucolytic Agent:    1. Must always be administered with a bronchodilator. 2. Discontinue if patient experiences worsened bronchospasm, or secretions have lessened to the point that the patient is able to clear them with a cough. Anti-inflammatory and Combination Medications:    1. If the patient lacks prior history of lung disease, is not using inhaled anti-inflammatory medication at home, and lacks wheezing by examination or by history for at least 24 hours, contact physician for possible discontinuation.

## 2020-06-18 NOTE — ED NOTES
1510 repeat accucheck by glucometer reading 527,  Insulin gtt started at 8.4 units /hr as ordered. Patient incont of large amount urine. Bed linens and brief changed.   Patient able to urinate in urinal for urine specimen which was sent to lab via tube system      Hawa Long RN  06/18/20 0306

## 2020-06-19 NOTE — PLAN OF CARE
Problem: Falls - Risk of:  Goal: Will remain free from falls  Description: Will remain free from falls  Outcome: Ongoing  Note: Patient remains unstable when walking , bed and chair alarm in place, safety rounds continue , no s/s of injury noted

## 2020-06-19 NOTE — PROGRESS NOTES
Normal mood  Peripheral Pulses: +2 palpable, equal bilaterally       Labs:   Recent Labs     06/18/20  1249   WBC 6.1   HGB 9.6*   HCT 30.8*        Recent Labs     06/18/20 2000 06/19/20  0002 06/19/20  0415   * 139 137   K 3.7 3.5 3.6    105 105   CO2 22 21 23   BUN 19 16 16   CREATININE 1.2 1.2 1.1   CALCIUM 8.5 8.1* 8.5   PHOS 2.7 2.9 3.2     Recent Labs     06/18/20  1249   AST 26   ALT <5*   BILIDIR 0.3   BILITOT 1.1*   ALKPHOS 108     No results for input(s): INR in the last 72 hours. Recent Labs     06/18/20  1249   TROPONINI 0.12*       Urinalysis:      Lab Results   Component Value Date    NITRU Negative 06/18/2020    WBCUA None seen 06/18/2020    BACTERIA 2+ 04/29/2020    RBCUA None seen 06/18/2020    BLOODU TRACE-INTACT 06/18/2020    SPECGRAV 1.010 06/18/2020    GLUCOSEU >=1000 06/18/2020       Radiology:  XR CHEST STANDARD (2 VW)   Final Result   1. No acute disease. Assessment/Plan:    Active Hospital Problems    Diagnosis    DKA, type 1, not at goal [facility]) [E10.10]     Patient presented to hospital with ongoing for diabetes    Assessment  Diabetic ketoacidosis-resolved  Hypertension  CKD stage III  Parkinson's disease  Uncontrolled diabetes  Hyperlipidemia  Gastritis  Depression    Plan  Patient is started on insulin sliding scale, long-acting  Continue to monitor blood glucose  Continue Procardia, continue losartan  Continue to monitor creatinine  Continue home statin, ropinirole, Protonix, paroxetine  PT OT consult  DVT Prophylaxis: Subcutaneous heparin  Diet: DIET GENERAL; Carb Control: 4 carb choices (60 gms)/meal  Code Status: Full Code    PT/OT Eval Status: ordered    Dispo -likely discharge tomorrow after stabilizing blood sugars, okay to transfer out of ICU.     Molly Coppola MD

## 2020-06-19 NOTE — CONSULTS
procardia XL 60 mg daily po  - Continue Losartan 100 mg daily     CKD IIIA  - Appears to be at new baseline after episode of rhabdo in May 2020  - Patient appears volume overloaded on exam.  Was on HD during prior admission.   Known to Dr. Tana Lai.  - Strict I/O      Other Chronic Issues  Parkinson's Disease  - Continue Carbidopa/Levadopa  mg TID po  - Continue home Primidone 50 mg BID  HLD  - Continue Atorvastatin 10 mg daily po  Restless Leg Syndrome  - resume home ropinirole 3 mg TID  Gastritis   - Continue home protonix 40 mg BID  Depression  - Paroxetine 40 mg daily    Code Status:Full Code  FEN: DIET GENERAL; Carb Control: 4 carb choices (60 gms)/meal  PPX:  Lovenox  DISPO: F    Nemesio Sargent DO, PGY-1  06/19/20  8:11 AM    This patient has been staffed and discussed with Gumaro Flores MD.

## 2020-06-19 NOTE — PROGRESS NOTES
ICU Progress Note    Admit Date: 6/18/2020  Day: 2  Vent Day: None  IV Access: Peripheral  IV Fluids:None  Vasopressors:None                Antibiotics: None  Diet: DIET GENERAL; Carb Control: 4 carb choices (60 gms)/meal    CC: Nausea    Interval history: Pt seen and examined this morning. Resting comfortably with no new complaints. Gap closed, doing well, tolerating PO intake. Probable transfer out of ICU today. Denies any fevers, chills, chest pain, palpitations, leg swelling, cough, shortness of breath, difficulty breathing, wheezing, abdominal pain, nausea, vomiting, diarrhea. HPI:    Patient is a 78 yo male with a history of Type 1 Diabetes, Parkinson's Disease, HTN, HLD, and CKDIII who presents with a one-day history of nausea / \"dry heaving\" and dry cough. Patient presented to the ED yesterday for hypoglycemia. At that time refused nursing home placement. Was discharged home with home health. Reports that during the afternoon on the day prior to admission, he started to feel nauseous / dry heave. Also developed a dry cough. Denies any infectious symptoms before that. No fever. No diarrhea. No sick contacts. Patient reports that he missed an insulin dose because he came to the ED. Says he took his regular evening insulin when he got home. Ate an unremarkable meal.  The morning of presentation, patient took a double dose of his insulin. Denies chest pain / sob.     Medications:     Scheduled Meds:   insulin lispro  0-6 Units Subcutaneous TID WC    insulin lispro  0-3 Units Subcutaneous Nightly    insulin lispro  5 Units Subcutaneous TID WC    insulin glargine  15 Units Subcutaneous Nightly    magnesium sulfate  2 g Intravenous Once    enoxaparin  40 mg Subcutaneous Daily    atorvastatin  10 mg Oral Daily    carbidopa-levodopa  2 tablet Oral TID    losartan  100 mg Oral Daily    primidone  50 mg Oral BID    pantoprazole  40 mg Intravenous BID    PARoxetine  40 mg Oral QAM    rOPINIRole  3 mg Oral TID     Continuous Infusions:   dextrose      sodium chloride 75 mL/hr at 06/19/20 0610    dextrose 5% and 0.45% NaCl with KCl 20 mEq 150 mL/hr at 06/18/20 1855     PRN Meds:glucose, dextrose, glucagon (rDNA), dextrose, potassium chloride, dextrose 5% and 0.45% NaCl with KCl 20 mEq, albuterol    Objective:   Vitals:   T-max:  Patient Vitals for the past 8 hrs:   BP Temp Temp src Pulse Resp SpO2 Weight   06/19/20 0900 (!) 143/70 -- -- 86 19 -- --   06/19/20 0800 (!) 124/55 98.1 °F (36.7 °C) Axillary 73 18 96 % --   06/19/20 0700 (!) 142/75 -- -- 70 20 -- --   06/19/20 0600 (!) 117/58 -- -- 84 15 94 % 207 lb 14.3 oz (94.3 kg)   06/19/20 0500 125/62 -- -- 66 20 93 % --   06/19/20 0400 115/60 98.2 °F (36.8 °C) Oral 67 18 92 % --   06/19/20 0300 121/63 -- -- 71 20 93 % --   06/19/20 0200 117/61 -- -- 68 19 91 % --       Intake/Output Summary (Last 24 hours) at 6/19/2020 0912  Last data filed at 6/19/2020 1976  Gross per 24 hour   Intake 3746 ml   Output 550 ml   Net 3196 ml       Access:   -Peripheral Access Day#:1  -Central Access Day #:                                   -Arterial line Day#:                                  Jeffrey Day#:  NGT Day#:                                             ETT Day#:  Vent Settings:    / / /       Physical Exam  Constitutional:       Appearance: Normal appearance. HENT:      Mouth/Throat:      Mouth: Mucous membranes are moist.   Eyes:      General: No scleral icterus. Pupils: Pupils are equal, round, and reactive to light. Cardiovascular:      Rate and Rhythm: Normal rate and regular rhythm. Pulses: Normal pulses. Heart sounds: Normal heart sounds. No murmur. Pulmonary:      Effort: Pulmonary effort is normal. No respiratory distress. Breath sounds: Normal breath sounds. No wheezing or rales. Abdominal:      General: Abdomen is flat. Bowel sounds are normal.      Palpations: Abdomen is soft. Musculoskeletal: Normal range of motion. Right lower leg: Edema (+1) present. Left lower leg: Edema (+1) present. Skin:     General: Skin is warm. Capillary Refill: Capillary refill takes less than 2 seconds. Neurological:      General: No focal deficit present. Mental Status: He is alert and oriented to person, place, and time. Sensory: No sensory deficit. Comments: Pill-rolling tremor L hand noted. Masked facies, voice tremulous. LABS:    CBC:   Recent Labs     06/18/20  1249   WBC 6.1   HGB 9.6*   HCT 30.8*      MCV 86.1     Renal:    Recent Labs     06/18/20 2000 06/19/20  0002 06/19/20  0415   * 139 137   K 3.7 3.5 3.6    105 105   CO2 22 21 23   BUN 19 16 16   CREATININE 1.2 1.2 1.1   GLUCOSE 176* 95 125*   CALCIUM 8.5 8.1* 8.5   MG 1.80 1.70* 1.70*   PHOS 2.7 2.9 3.2   ANIONGAP 11 13 9     Hepatic:   Recent Labs     06/18/20  1249   AST 26   ALT <5*   BILITOT 1.1*   BILIDIR 0.3   PROT 6.7   LABALBU 3.9   ALKPHOS 108     Troponin:   Recent Labs     06/18/20  1249   TROPONINI 0.12*     BNP: No results for input(s): BNP in the last 72 hours. Lipids: No results for input(s): CHOL, HDL in the last 72 hours. Invalid input(s): LDLCALCU, TRIGLYCERIDE  ABGs:  No results for input(s): PHART, JEU9GXE, PO2ART, PXD8GLL, BEART, THGBART, V8WLFBRF, UVM1MUB in the last 72 hours. INR: No results for input(s): INR in the last 72 hours. Lactate: No results for input(s): LACTATE in the last 72 hours. Cultures:  -----------------------------------------------------------------  RAD:   XR CHEST STANDARD (2 VW)   Final Result   1. No acute disease. Assessment/Plan:     Diabetic Ketoacidosis (RESOLVED)  - Patient reports that he takes 25 units of insulin in the morning and 15 units before dinner  - Ketones in urine  - Glucose 627 on presentation.   AG 22.  - DC Insulin drip, as AG closed x3  - BMP BID  - Lantus 15U HS  - Lispro 5U TID AC  - Med dose SSI  - Hypoglycemia protocol  - Telemetry  - probable transfer out of ICU today     HTN  - Continue home procardia XL 60 mg daily po  - Continue Losartan 100 mg daily     CKD IIIA  - Appears to be at new baseline after episode of rhabdo in May 2020  - Patient appears volume overloaded on exam.  Was on HD during prior admission. Known to Dr. Anuja Mclain.  - Strict I/O      Other Chronic Issues  Parkinson's Disease  - Continue Carbidopa/Levadopa  mg TID po  - Continue home Primidone 50 mg BID  HLD  - Continue Atorvastatin 10 mg daily po  Restless Leg Syndrome  - resume home ropinirole 3 mg TID  Gastritis   - Continue home protonix 40 mg BID  Depression  - Paroxetine 40 mg daily    Code Status:Full Code  FEN: DIET GENERAL; Carb Control: 4 carb choices (60 gms)/meal  PPX:  Lovenox  DISPO: GMF    Ct Smalls DO, PGY-1  06/19/20  9:12 AM    This patient has been staffed and discussed with Arun Amado MD.     [facility]     Patient seen and examined. I agree with Dr. Villavicencio Plant history, physical, lab findings, assessment and plan.     DKA - potentially due to compliance with insulin pen which he recently has been switched to and finds hard to use. No evidence of infection or ischemia. Anion gap is resolved and he has been transitioned to lantus 15 units daily and lispro SSI. Nifedipine stopped due to peripheral edema.  Resume home losartan    Ok to TXF to floor     Arun Amado MD

## 2020-06-20 NOTE — PROGRESS NOTES
Patient blood sugar 88, secure message sent to Dr Kathy Flood to advise whether to administer scheduled 5 units of insulin. Awaiting response.

## 2020-06-20 NOTE — CONSULTS
Endocrinology Consult Note      Reason for consult    Poorly controlled DM /DKA      HPI    This 77 yrs old male was admitted to [facility] on 06/18/20 with nausea, abdominal discomfort and was found to be in DKA. He had presented a day earlier to ED with hypoglycemia    Also presented to ER on 05/27/20 after he was found unresponsive and had sugar in 30s at Nursing home. He has a history of Type 1 Diabetes, Parkinson's Disease, HTN, HLD, and CKD. Was diagnosed with Type 1 DM at the age of 30  Was on oral medications for a year and then switched to insulin     Currently uses NPH 40 units in A.M and 5-10 units at night  Was on lantus in the past    Has never been on fast acting insulin     No retinopathy, nephropathy, neuropathy as per patient    He prefers to use insulin via vials and syringe and does not like pens      Denies nausea , vomiting, fever, abdominal pian, chest pain , SOB. PMH    Past Medical History:   Diagnosis Date    Asthma     [facility] [facility]     Hypertension     Parkinson disease ([facility] [address] [facility]    ]      PSH    Past Surgical History:   Procedure Laterality Date    UPPER GASTROINTESTINAL ENDOSCOPY N/A 5/15/2020    EGD CONTROL HEMORRHAGE performed by Francine Spencer MD at [address] 5/15/2020    EGD BIOPSY performed by Francine Spencer MD at [address] [place] [address] History        Review of systems. As per HPI. 10 system reviewed. Physical Exam    AA0 x 3    Vitals:    06/20/20 1820   BP: (!) 163/86   Pulse: 86   Resp:    Temp:    SpO2:        Neck:  No thromegaly    CVSL:  S1+S2+0    Lungs:  CTAB    Abdomen :  Soft, Non-tender    Limbs: No edema      Labs          Assessment/Plan      1.  Type 1 DM    This 77 yrs old male has long standing h/o type 1 DM     No endocrine evaluation in the past     Will check c-peptide, AGUILAR , anti-islet cell antibodies to confirm the diagnosis of Type 1 DM    Had significant hypoglycemic episodes in 05/20 and then again 06/20     This time presented with DKA which has resolved with IV insulin and hydration. Given h/o type 1 DM and episodes of hypoglycemia, will recommend avoiding NPH insulin. Will recommend basal bolus insulin with Lantus and humalog    Patient agrees to go home on lantus and humalog but prefers insulin vials and syringe . Told him that both these insulins are available in a vial.    Sugar was normal this A.M   Breakfast time humalog was held due to concern of hypoglycemia  sugar high at lunch and dinner    -Will recommend Lantus 12 units QHS    -Will recommend Humalog 4 units TID with meals. Advised nursing not to hold meal time insulin unless patient not eating or sugar less than 70.     -Continue sliding scale. Will adjust dose based on sugars  Should be discharged home on Lantus and Humalog insulin via vials. Should have outpatient follow-up with endocrinology.

## 2020-06-21 NOTE — PLAN OF CARE
Problem: Falls - Risk of:  Goal: Will remain free from falls  Description: Will remain free from falls  6/21/2020 1810 by Jaguar Velásquez RN  Outcome: Ongoing  Note: Pt is at risk for falls. Fall precautions in place. Call light in reach, bed alarm is on, bed locked and in the lowest position. Will continue to monitor. Problem: Fluid Volume:  Goal: Will show no signs or symptoms of fluid imbalance  Description: Will show no signs or symptoms of fluid imbalance  6/21/2020 1810 by Jaguar Velásquez RN  Outcome: Ongoing  Note: Patient has some bilateral leg swelling. Will monitor.

## 2020-06-21 NOTE — CONSULTS
Nephrology Consult Note                                                                                                                                                                                                                                                                                                                                                               Office : [phone]     Fax :[phone]              Patient's Name: Jake Mckeon  2:00 AM  6/21/2020    Reason for Consult: NABIL   Requesting Physician:  Jesus MOYER      Chief Complaint:  DKA     History of Present Ilness:    Patient is a 76 yo male with a history of Type 1 Diabetes, Parkinson's Disease, HTN, HLD, and CKDIII who presents with a one-day history of nausea / \"dry heaving\" and dry cough. Patient presented to the ED yesterday for hypoglycemia. At that time refused nursing home placement. Was discharged home with home health. Reports that during the afternoon on the day prior to admission, he started to feel nauseous / dry heave. Also developed a dry cough. Denies any infectious symptoms before that. No fever. No diarrhea. No sick contacts    Past Medical History:   Diagnosis Date    Asthma     [facility] [address]     Hypertension     Parkinson disease ([facility] [address]        Past Surgical History:   Procedure Laterality Date    UPPER GASTROINTESTINAL ENDOSCOPY N/A 5/15/2020    EGD CONTROL HEMORRHAGE performed by Angelika Fung MD at [address] [facility] 5/15/2020    EGD BIOPSY performed by Angelika Fung MD at [address] [facility] reviewed. No pertinent family history. reports that he has never smoked. He has never used smokeless tobacco. He reports that he does not drink alcohol or use drugs.     Allergies:  Erythromycin    Current Medications:    insulin glargine (LANTUS;BASAGLAR) injection pen 12 Units, Nightly  insulin lispro (1 Unit Dial) 4 Units, PHOS 2.7 2.9 3.2  --   --   --      Hepatic:   Recent Labs     06/18/20  1249   AST 26   ALT <5*   BILITOT 1.1*   ALKPHOS 108     Troponin:   Recent Labs     06/18/20  1249   TROPONINI 0.12*     BNP: No results for input(s): BNP in the last 72 hours. Lipids: No results for input(s): CHOL, TRIG, HDL, LDLCALC, LABVLDL in the last 72 hours. ABGs: No results for input(s): PHART, PO2ART, WXL2CVG in the last 72 hours. INR: No results for input(s): INR in the last 72 hours. UA:  Recent Labs     06/18/20  1500 06/18/20  2036   COLORU Yellow  --    CLARITYU Clear  --    GLUCOSEU >=1000*  --    BILIRUBINUR Negative  --    KETUA 15*  --    SPECGRAV 1.010  --    BLOODU TRACE-INTACT*  --    PHUR 6.5 6.0   PROTEINU Negative  --    UROBILINOGEN 0.2  --    NITRU Negative  --    LEUKOCYTESUR Negative  --    LABMICR YES  --    URINETYPE NotGiven  --       Urine Microscopic:   Recent Labs     06/18/20  1500   WBCUA None seen   RBCUA None seen     Urine Culture: No results for input(s): LABURIN in the last 72 hours. Urine Chemistry: No results for input(s): Alexis Hasting, PROTEINUR, NAUR in the last 72 hours. IMAGING:  XR CHEST STANDARD (2 VW)   Final Result   1. No acute disease. Assessment/Plan   1. NABIL sec to rhabdo - off HD     2. HTN    3. Anemia    4. Acid- base/ Electrolyte imbalance     5.  Hypoglycemia     Plan   - remove HD cath Monday   - Monitor BS   - Monitor UO and renal function   - monitor lytes   - d.w pt in length                 Thank you for allowing us to participate in care of [address] free to contact me   [facility] [address]  Office : [phone]  Fax :[phone]

## 2020-06-21 NOTE — PROGRESS NOTES
Nephrology Note                                                                                                                                                                                                                                                                                                                                                               Office : [phone]     Fax :[phone]              Patient's Name: Ingrid Elaine    BS better   MS at baseline   Good UO     Past Medical History:   Diagnosis Date    Asthma     Diabetes mellitus ([facility] [address] [facility]     Hypertension     Parkinson disease ([facility] [address]        Past Surgical History:   Procedure Laterality Date    UPPER GASTROINTESTINAL ENDOSCOPY N/A 5/15/2020    EGD CONTROL HEMORRHAGE performed by Dyllan Andujar MD at [address] [facility] 5/15/2020    EGD BIOPSY performed by Dyllan Andujar MD at [address] [facility] reviewed. No pertinent family history. reports that he has never smoked. He has never used smokeless tobacco. He reports that he does not drink alcohol or use drugs.     Allergies:  Erythromycin    Current Medications:    insulin glargine (LANTUS;BASAGLAR) injection pen 12 Units, Nightly  insulin lispro (1 Unit Dial) 4 Units, TID WC  calcium carbonate (TUMS) chewable tablet 1,000 mg, TID PRN  glucose (GLUTOSE) 40 % oral gel 15 g, PRN  dextrose 50 % IV solution, PRN  glucagon (rDNA) injection 1 mg, PRN  dextrose 5 % solution, PRN  insulin lispro (1 Unit Dial) 0-6 Units, TID WC  insulin lispro (1 Unit Dial) 0-3 Units, Nightly  pantoprazole (PROTONIX) tablet 40 mg, BID AC  enoxaparin (LOVENOX) injection 40 mg, Daily  potassium chloride 10 mEq/100 mL IVPB (Peripheral Line), PRN  dextrose 5 % and 0.45 % NaCl with KCl 20 mEq infusion, Continuous PRN  albuterol (PROVENTIL) nebulizer solution 2.5 mg, Q4H PRN  atorvastatin (LIPITOR) tablet 10 mg, Daily  carbidopa-levodopa (SINEMET)  MG per tablet 2 tablet, TID  losartan (COZAAR) tablet 100 mg, Daily  primidone (MYSOLINE) tablet 50 mg, BID  PARoxetine (PAXIL) tablet 40 mg, QAM  rOPINIRole (REQUIP) tablet 3 mg, TID        Review of Systems:   14 point ROS obtained but were negative except mentioned in HPI      Physical exam:     Vitals:  BP (!) 161/84   Pulse 82   Temp 97.4 °F (36.3 °C) (Oral)   Resp 20   Ht 5' 8\" (1.727 m)   Wt 209 lb 3.5 oz (94.9 kg)   SpO2 96%   BMI 31.81 kg/m²   Constitutional:  OAA X3 NAD  Skin: no rash, turgor wnl  Heent:  eomi, mmm  Neck: no bruits or jvd noted  Cardiovascular:  S1, S2 without m/r/g  Respiratory: CTA B without w/r/r  Abdomen:  +bs, soft, nt, nd  Ext: + lower extremity edema  Psychiatric: mood and affect appropriate  Musculoskeletal:  Rom, muscular strength intact    Data:   Labs:  CBC:   Recent Labs     06/18/20  1249   WBC 6.1   HGB 9.6*        BMP:    Recent Labs     06/19/20 1952 06/20/20  0728 06/20/20 2009   * 131* 131*   K 4.3 4.4 4.8    100 97*   CO2 22 22 24   BUN 13 11 17   CREATININE 1.1 1.1 1.3   GLUCOSE 133* 92 303*     Ca/Mg/Phos:   Recent Labs     06/18/20 2000 06/19/20  0002 06/19/20  0415 06/19/20 1952 06/20/20  0728 06/20/20 2009   CALCIUM 8.5 8.1* 8.5 8.4 8.3 9.3   MG 1.80 1.70* 1.70*  --   --   --    PHOS 2.7 2.9 3.2  --   --   --      Hepatic:   Recent Labs     06/18/20  1249   AST 26   ALT <5*   BILITOT 1.1*   ALKPHOS 108     Troponin:   Recent Labs     06/18/20  1249   TROPONINI 0.12*     BNP: No results for input(s): BNP in the last 72 hours. Lipids: No results for input(s): CHOL, TRIG, HDL, LDLCALC, LABVLDL in the last 72 hours. ABGs: No results for input(s): PHART, PO2ART, UGB7OHY in the last 72 hours. INR: No results for input(s): INR in the last 72 hours.   UA:  Recent Labs     06/18/20  1500 06/18/20 2036   COLORU Yellow  --    CLARITYU Clear  --    GLUCOSEU >=1000*  --    BILIRUBINUR Negative  --    KETUA 15*  --    SPECGRAV 1.010  --    BLOODU

## 2020-06-22 NOTE — PROGRESS NOTES
Occupational Therapy   Occupational Therapy Initial Assessment and Treatment  Possible Discharge  Date: 2020   Patient Name: Xavier Torre  MRN: [id number]     : 1950    Date of Service: 2020    Discharge Recommendations:  Xavier Torre scored a 18/24 on the AM-PAC ADL Inpatient form. Current research shows that an AM-PAC score of 18 or greater is typically associated with a discharge to the patient's home setting. Based on the patient's AM-PAC score, and their current ADL deficits, it is recommended that the patient have 2-3 sessions per week of Occupational Therapy at d/c to increase the patient's independence. At this time, this patient demonstrates the endurance and safety to discharge home with home and a follow up treatment frequency of 2-3x/wk. Please see assessment section for further patient specific details. HOME HEALTH CARE: LEVEL 3 SAFETY     - Initial home health evaluation to occur within 24-48 hours, in patient home   - Therapy evaluations in home within 24-48 hours of discharge; including DME and home safety   - Frontload therapy 5 days, then 3x a week   - Therapy to evaluate if patient has [address] needs for personal care   -  evaluation within 24-48 hours, includes evaluation of resources and insurance to determine [place], [place], LTC, and Medicaid options       If patient discharges prior to next session this note will serve as a discharge summary. Please see below for the latest assessment towards goals. OT Equipment Recommendations  Equipment Needed: Yes  Mobility Devices: ADL Assistive Devices  ADL Assistive Devices: Toilet Safety Frame;Emergency Alert System    Assessment   Performance deficits / Impairments: Decreased functional mobility ; Decreased ADL status; Decreased endurance;Decreased balance  Assessment: Pt admitted from home w/ c/o hyperglycemia and falls.  Pt attributes his falls to his previous walker (reports his walker was collapsing on I'm tired. \"    Patient Currently in Pain: Denies      Social/Functional History  Social/Functional History  Lives With: Alone  Type of Home: House  Home Layout: Multi-level, Able to Live on Main level with bedroom/bathroom, Laundry in basement  Home Access: (5 to enter with rail)  Bathroom Shower/Tub: Tub/Shower unit  Bathroom Toilet: Standard(reports he \"squats\" over toilet; has urinal for night time)  Bathroom Equipment: Grab bars in shower, Shower chair, Hand-held shower, Grab bars around toilet  Home Equipment: Rolling walker, 4 wheeled walker(getting bed rails; using rolling walker)  Receives Help From: Neighbor, Home health, Family(neighbor checks daily; [facility] 3x per week)  ADL Assistance: (assist with spongebathing recently; dresses self (pt plans to become Indep with showering))  Homemaking Assistance: (home care doing laundry/cleaning/meals; dtr helps with groceries currently)  Ambulation Assistance: Independent(uses rolling walker at all times)  Transfer Assistance: Independent  Active : Yes(states he will drive when he is back to [employer]")  Occupation: Retired  Additional Comments: Pt reports 4 recent falls due to Felecia Lara" walker. Objective   Vision: Impaired  Vision Exceptions: Wears glasses at all times  Hearing: Within functional limits      Orientation  Overall Orientation Status: Within Functional Limits        Balance  Sitting Balance: Stand by assistance  Standing Balance: Contact guard assistance(static standing at walker and for grooming task at sink level)    Functional Mobility  Functional - Mobility Device: Rolling Walker  Activity: To/from bathroom; Other(mobility to doorway > chair (~10'))  Assist Level: Contact guard assistance  Functional Mobility Comments: Note: shuffled steps     Toilet Transfers  Toilet - Technique: Ambulating(using wh walker)  Equipment Used: Standard toilet(reaching for doorframe and shower wall)  Toilet Transfer: (Min A sit-stand, Mod A stand-sit)  Toilet Transfers Comments: NOTE: discussed recommendation for toilet safety frame    ADL  Grooming: Independent(hand hygiene at sink level; SBA for balance)  Toileting: (independent using urinal)     Coordination  Movements Are Fluid And Coordinated: No  Coordination and Movement description: Right UE;Left UE;Resting tremors;Decreased speed;Decreased accuracy(Parkinson's)  Quality of Movement Other  Comment: decreased accuracy of typing on his phone 2* Parkinson's tremors (this writer assisted w/ voice activation function on phone)        Bed mobility  Supine to Sit: Stand by assistance  Scooting: Stand by assistance(to EOB)     Transfers  Sit to stand: Contact guard assistance  Stand to sit: Moderate assistance(significant LOB posterior w/ sitting down, assist required to slow descent)        Cognition  Overall Cognitive Status: Exceptions  Safety Judgement: Decreased awareness of need for safety  Insights: Decreased awareness of deficits                       LUE Strength  Gross LUE Strength: WFL  RUE Strength  Gross RUE Strength: WFL           Pt seen by OT for eval and treat.  Treatment included: bed mobility, functional transfers/mobility, ADL, pt education            Plan   Plan  Times per week: 2-5  Times per day: Daily  Current Treatment Recommendations: Balance Training, Endurance Training, Functional Mobility Training, Safety Education & Training, Self-Care / ADL                                                 AM-PAC Score        AM-[facility] Inpatient Daily Activity Raw Score: 18 (06/22/20 1115)  AM-PAC Inpatient ADL T-Scale Score : 38.66 (06/22/20 1115)  ADL Inpatient CMS 0-100% Score: 46.65 (06/22/20 1115)  ADL Inpatient CMS G-Code Modifier : CK (06/22/20 1115)    Goals  Short term goals  Time Frame for Short term goals: Discharge  Short term goal 1: LB dressing w/ SBA  Short term goal 2: toilet transfer w/ SBA  Short term goal 3: functional mobility for item transport and retrieval, SBA  Patient

## 2020-06-22 NOTE — PROGRESS NOTES
Nephrology Note                                                                                                                                                                                                                                                                                                                                                               Office : [phone]     Fax :[phone]              Patient's Name: Barry Carmona    BS better   MS at baseline   Good UO     Past Medical History:   Diagnosis Date    Asthma     Diabetes mellitus [facility] [address] [facility]     Hypertension     Parkinson disease ([facility] [address]        Past Surgical History:   Procedure Laterality Date    UPPER GASTROINTESTINAL ENDOSCOPY N/A 5/15/2020    EGD CONTROL HEMORRHAGE performed by Sera Cervantes MD at [facility] 5/15/2020    EGD BIOPSY performed by Sera Cervantes MD at [address] reviewed. No pertinent family history. reports that he has never smoked. He has never used smokeless tobacco. He reports that he does not drink alcohol or use drugs.     Allergies:  Erythromycin    Current Medications:    NIFEdipine (PROCARDIA XL) extended release tablet 60 mg, Daily  insulin glargine (LANTUS;BASAGLAR) injection pen 16 Units, Nightly  hydrALAZINE (APRESOLINE) injection 10 mg, Q4H PRN  insulin lispro (1 Unit Dial) 8 Units, TID WC  calcium carbonate (TUMS) chewable tablet 1,000 mg, TID PRN  glucose (GLUTOSE) 40 % oral gel 15 g, PRN  dextrose 50 % IV solution, PRN  glucagon (rDNA) injection 1 mg, PRN  dextrose 5 % solution, PRN  insulin lispro (1 Unit Dial) 0-6 Units, TID WC  insulin lispro (1 Unit Dial) 0-3 Units, Nightly  pantoprazole (PROTONIX) tablet 40 mg, BID AC  [Held by provider] enoxaparin (LOVENOX) injection 40 mg, Daily  potassium chloride 10 mEq/100 mL IVPB (Peripheral Line), PRN  dextrose 5 % and 0.45 % NaCl with KCl 20 mEq infusion, Continuous PRN  albuterol (PROVENTIL) nebulizer solution 2.5 mg, Q4H PRN  atorvastatin (LIPITOR) tablet 10 mg, Daily  carbidopa-levodopa (SINEMET)  MG per tablet 2 tablet, TID  losartan (COZAAR) tablet 100 mg, Daily  primidone (MYSOLINE) tablet 50 mg, BID  PARoxetine (PAXIL) tablet 40 mg, QAM  rOPINIRole (REQUIP) tablet 3 mg, TID        Review of Systems:   14 point ROS obtained but were negative except mentioned in HPI      Physical exam:     Vitals:  BP (!) 164/84   Pulse 80   Temp 96.8 °F (36 °C) (Oral)   Resp 18   Ht 5' 8\" (1.727 m)   Wt 209 lb 3.5 oz (94.9 kg)   SpO2 90%   BMI 31.81 kg/m²   Constitutional:  OAA X3 NAD  Skin: no rash, turgor wnl  Heent:  eomi, mmm  Neck: no bruits or jvd noted  Cardiovascular:  S1, S2 without m/r/g  Respiratory: CTA B without w/r/r  Abdomen:  +bs, soft, nt, nd  Ext: + lower extremity edema  Psychiatric: mood and affect appropriate  Musculoskeletal:  Rom, muscular strength intact    Data:   Labs:  CBC:   Recent Labs     06/21/20 0645   WBC 3.8*   HGB 10.1*        BMP:    Recent Labs     06/20/20 2009 06/21/20  0645 06/21/20 1946   * 134* 129*   K 4.8 4.4 4.9   CL 97* 99 93*   CO2 24 26 25   BUN 17 17 18   CREATININE 1.3 1.2 1.3   GLUCOSE 303* 246* 405*     Ca/Mg/Phos:   Recent Labs     06/19/20  0415  06/20/20 2009 06/21/20  0645 06/21/20  1946   CALCIUM 8.5   < > 9.3 8.8 9.1   MG 1.70*  --   --  1.90  --    PHOS 3.2  --   --   --   --     < > = values in this interval not displayed. Hepatic:   No results for input(s): AST, ALT, ALB, BILITOT, ALKPHOS in the last 72 hours. Troponin:   No results for input(s): TROPONINI in the last 72 hours. BNP: No results for input(s): BNP in the last 72 hours. Lipids: No results for input(s): CHOL, TRIG, HDL, LDLCALC, LABVLDL in the last 72 hours. ABGs: No results for input(s): PHART, PO2ART, YRC6NLI in the last 72 hours. INR: No results for input(s): INR in the last 72 hours.   UA:  No results for input(s): COLORU,

## 2020-06-22 NOTE — DISCHARGE INSTR - COC
97.5 °F (36.4 °C) (Oral)   Resp 18   Ht 5' 8\" (1.727 m)   Wt 206 lb 12.7 oz (93.8 kg)   SpO2 98%   BMI 31.44 kg/m²     Last documented pain score (0-10 scale): Pain Level: 0  Last Weight:   Wt Readings from Last 1 Encounters:   20 206 lb 12.7 oz (93.8 kg)     Mental Status:  {IP PT MENTAL STATUS:83598}    IV Access:  { DAT IV ACCESS:687166928}    Nursing Mobility/ADLs:  Walking   {CHP DME ZPBV:259710923}  Transfer  {CHP DME PXQL:521994021}  Bathing  {CHP DME UQXL:136849894}  Dressing  {CHP DME YMTZ:364228850}  Toileting  {CHP DME UGS}  Feeding  {CHP DME FSZF:322623977}  Med Admin  {P DME WDVR:780183962}  Med Delivery   { DAT MED Delivery:847897689}    Wound Care Documentation and Therapy:        Elimination:  Continence:   · Bowel: {YES / TH:36242}  · Bladder: {YES / CT:63096}  Urinary Catheter: {Urinary Catheter:434998398}   Colostomy/Ileostomy/Ileal Conduit: {YES / NN:33662}       Date of Last BM: ***    Intake/Output Summary (Last 24 hours) at 2020 1155  Last data filed at 2020 0845  Gross per 24 hour   Intake 790 ml   Output 3000 ml   Net -2210 ml     I/O last 3 completed shifts: In: 26 [P.O.:1260;  I.V.:10]  Out: 3075 [Urine:3075]    Safety Concerns:     [address] Safety Concerns:239193086}    Impairments/Disabilities:      [address] Impairments/Disabilities:446363227}    Nutrition Therapy:  Current Nutrition Therapy:   [address] Diet List:701128488}    Routes of Feeding: {CHP DME Other Feedings:749218262}  Liquids: {Slp liquid thickness:49341}  Daily Fluid Restriction: {CHP DME Yes amt example:396931930}  Last Modified Barium Swallow with Video (Video Swallowing Test): {Done Not Done CONZ:168996301}    Treatments at the Time of Hospital Discharge:   Respiratory Treatments: ***  Oxygen Therapy:  {Therapy; copd oxygen:79662}  Ventilator:    {BASSAM JIMÉNEZ Vent TCYU:940481800}    Rehab Therapies: {THERAPEUTIC INTERVENTION:4893151149}  Weight Bearing Status/Restrictions: {BASSAM JIMÉNEZ Weight

## 2020-06-22 NOTE — PROGRESS NOTES
Physical Therapy/Occupational Therapy    Hold note    Referral received and chart reviewed. Pt currently off floor. Will f/u later pm as time allows or 6/23/20.        Vern Kaye, PT  Nicole Burgess OTR/L #[id number]

## 2020-06-22 NOTE — PROGRESS NOTES
1114)  AM-PAC Inpatient T-Scale Score : 42.13 (06/22/20 1114)  Mobility Inpatient CMS 0-100% Score: 50.57 (06/22/20 1114)  Mobility Inpatient CMS G-Code Modifier : CK (06/22/20 1114)          Goals  Short term goals  Time Frame for Short term goals: discharge  Short term goal 1: sit to/from stand supervision  Short term goal 2: ambulate 75 ft with rolling walker supervision  Short term goal 3: up/down 5 steps with rail supervision  Patient Goals   Patient goals : return home       Therapy Time   Individual Concurrent Group Co-treatment   Time In 1032         Time Out 1110         Minutes 38           Timed Code Treatment Minutes: 28      Total Treatment Minutes:  1463 Janneth Matute PT

## 2020-06-22 NOTE — DISCHARGE SUMMARY
rhythm with Normal S1/S2 without  murmurs, rubs or gallops, point of maximum impulse non-displaced  Abdomen: Soft, non-tender or non-distended without rigidity or guarding and positive bowel sounds all four quadrants. Extremities: No clubbing, cyanosis, chronic leg edema bilaterally. Skin: Skin color, texture, turgor normal.    Neurologic: Alert and oriented X 3,   grossly non-focal.  Mental status: Alert, oriented, thought content appropriate      Labs: For convenience and continuity at follow-up the following most recent labs are provided:    CBC:   Lab Results   Component Value Date    WBC 3.8 06/21/2020    HGB 10.1 06/21/2020    HCT 31.5 06/21/2020     06/21/2020       RENAL:   Lab Results   Component Value Date     06/22/2020    K 4.2 06/22/2020    K 4.8 06/18/2020    CL 97 06/22/2020    CO2 26 06/22/2020    BUN 20 06/22/2020    CREATININE 1.2 06/22/2020           Discharge Medications:   Discharge Medication List as of 6/22/2020  1:45 PM           Details   insulin glargine (LANTUS) 100 UNIT/ML injection vial Inject 20 Units into the skin nightly, Disp-1 vial, R-2Print      !! insulin lispro (HUMALOG) 100 UNIT/ML injection vial Inject 8 Units into the skin 3 times daily (before meals), Disp-1 vial, R-2Print      !! insulin lispro (HUMALOG) 100 UNIT/ML injection vial Use together with meal insulin 3 times a day with meals per Corrective Low Dose Algorithm  Glucose: Dose:               No Insulin  140-199 1 Unit  200-249 2 Units  250-299 3 Units  300-349 4 Units  350-399 5 Units  Over 399 6 Units, Disp-1 vial, R -2Print      Insulin Syringe-Needle U-100 ([employer] INSULIN SYRINGE) 31G X 5/16\" 0.5 ML MISC 4 TIMES DAILY Starting Mon 6/22/2020, Disp-100 each, R-3, Print       !! - Potential duplicate medications found. Please discuss with provider.            Details   losartan (COZAAR) 100 MG tablet Take 100 mg by mouth dailyHistorical Med      pantoprazole (PROTONIX) 40 MG tablet Take 1 tablet by mouth 2 times daily (before meals), Disp-30 tablet, R-3Normal      NIFEdipine (ADALAT CC) 60 MG extended release tablet Take 1 tablet by mouth daily, Disp-30 tablet, R-3Normal      PARoxetine (PAXIL) 40 MG tablet Take 40 mg by mouth every morningHistorical Med      rOPINIRole (REQUIP) 3 MG tablet Take 3 mg by mouth 3 times dailyHistorical Med      primidone (MYSOLINE) 50 MG tablet Take 50 mg by mouth 2 times dailyHistorical Med      carbidopa-levodopa (SINEMET)  MG per tablet Take 2 tablets by mouth 3 times daily Historical Med      acetaminophen (TYLENOL) 500 MG tablet Take 1 tablet by mouth 4 times daily as needed for Pain, Disp-360 tablet, R-1Print      atorvastatin (LIPITOR) 10 MG tablet Take 10 mg by mouth daily      albuterol (PROVENTIL HFA;VENTOLIN HFA) 108 (90 BASE) MCG/ACT inhaler Inhale 2 puffs into the lungs every 6 hours as needed. Time Spent on discharge is more than 30 minutes in the examination, evaluation, counseling and review of medications and discharge plan. Signed:  Opal Parra MD   6/22/2020      Thank you Deborah Tamayo for the opportunity to be involved in this patient's care. If you have any questions or concerns please feel free to contact me at [phone].

## 2020-06-22 NOTE — PROGRESS NOTES
1015- Pt returned from cath lab. A&Ox4, no sedation required during removal. VSS. Site clean dry and intact. 1030- Dressing noted to have increase drainage, with small amounts coming through gown. Dressing reinforced with gauze and tegaderm. Will continue to monitor.

## 2020-07-15 NOTE — DISCHARGE INSTR - COC
Continuity of Care Form    Patient Name: Azalea Brown   :  1950  MRN:  [id number]    Admit date:  7/15/2020  Discharge date:  ***    Code Status Order: Prior   Advance Directives:     Admitting Physician:  No admitting provider for patient encounter. PCP: Mason Pedersen    Discharging Nurse: [facility] Unit/Room#: D58/W92-11  Discharging Unit Phone Number: ***    Emergency Contact:   Extended Emergency Contact Information  Primary Emergency Contact: Colleen Phone: [phone]  Relation: Child  Secondary Emergency Contact: Neville Martinez  Mobile Phone: [phone]  Relation: Domestic Partner  Preferred language: English   needed?  No    Past Surgical History:  Past Surgical History:   Procedure Laterality Date    UPPER GASTROINTESTINAL ENDOSCOPY N/A 5/15/2020    EGD CONTROL HEMORRHAGE performed by Sultana Posey MD at [address] [facility] 5/15/2020    EGD BIOPSY performed by Sultana Posey MD at [address] ENDOSCOPY       Immunization History:   Immunization History   Administered Date(s) Administered    Tdap (Boostrix, Adacel) 2017, 2019       Active Problems:  Patient Active Problem List   Diagnosis Code    Hypoglycemia E16.2    EKG abnormalities R94.31    Syncope R55    Sepsis ([facility] [address] A41.9    GI bleed K92.2    DKA, type 1, not at goal [facility]) E10.10    Type 1 diabetes mellitus with hypoglycemia and without coma ([facility] [address] E10.649       Isolation/Infection:   Isolation          No Isolation        Patient Infection Status     Infection Onset Added Last Indicated Last Indicated By Review Planned Expiration Resolved Resolved By    None active    Resolved    COVID-19 Rule Out 20 COVID-19 (Ordered)   20 Rule-Out Test Resulted    COVID-19 Rule Out 20 COVID-19 (Ordered)   20 Rule-Out Test Resulted          Nurse Assessment:  Last Vital Signs: BP (!) 185/80   Pulse 94   Temp 97.8 °F (36.6 °C) (Oral)   Resp 20   Ht 5' 8\" (1.727 m)   Wt 190 lb (86.2 kg)   SpO2 99%   BMI 28.89 kg/m²     Last documented pain score (0-10 scale): Pain Level: 6  Last Weight:   Wt Readings from Last 1 Encounters:   07/15/20 190 lb (86.2 kg)     Mental Status:  {IP PT MENTAL STATUS:18917}    IV Access:  { DAT IV ACCESS:201624716}    Nursing Mobility/ADLs:  Walking   {CHP DME FZVF:594061301}  Transfer  {CHP DME CEIJ:639329616}  Bathing  {CHP DME VONM:244274410}  Dressing  {CHP DME EPCN:707282104}  Toileting  {CHP DME CVXU:149289206}  Feeding  {P DME KKZ}  Med Admin  {[facility] DME UOIB:543721716}  Med Delivery   { DAT MED Delivery:922235833}    Wound Care Documentation and Therapy:        Elimination:  Continence:   · Bowel: {YES / JR:74502}  · Bladder: {YES / B}  Urinary Catheter: {Urinary Catheter:499197005}   Colostomy/Ileostomy/Ileal Conduit: {YES / WM:53984}       Date of Last BM: ***  No intake or output data in the 24 hours ending 07/15/20 1616  No intake/output data recorded.     Safety Concerns:     [address] Safety Concerns:427376053}    Impairments/Disabilities:      [address] Impairments/Disabilities:502025476}    Nutrition Therapy:  Current Nutrition Therapy:   [address] Diet List:477166091}    Routes of Feeding: {P DME Other Feedings:863018834}  Liquids: {Slp liquid thickness:13065}  Daily Fluid Restriction: {CHP DME Yes amt example:906980564}  Last Modified Barium Swallow with Video (Video Swallowing Test): {Done Not Done SZME:689259425}    Treatments at the Time of Hospital Discharge:   Respiratory Treatments: ***  Oxygen Therapy:  {Therapy; copd oxygen:44310}  Ventilator:    { CC Vent RIZU:026273051}    Rehab Therapies: {THERAPEUTIC INTERVENTION:9174491667}  Weight Bearing Status/Restrictions: [address] JIM[address]NEZ Weight Bearin}  Other Medical Equipment (for information only, NOT a DME order):  {EQUIPMENT:209333222}  Other Treatments: ***    Patient's personal belongings (please select all that are sent with patient):  {CHP DME Belongings:497527296}    RN SIGNATURE:  {Esignature:025088165}    CASE MANAGEMENT/SOCIAL WORK SECTION    Inpatient Status Date: ***    Readmission Risk Assessment Score:  Readmission Risk              Risk of Unplanned Readmission:        0           Discharging to Facility/ Agency   · Name: [facility]  · Phone:[phone]  · Fax: [phone]        / signature: Electronically signed by ANGLE Mora on 7/15/20 at 4:16 PM EDT    PHYSICIAN SECTION    Prognosis: {Prognosis:2227416753}    Condition at Discharge: Blossom Matute Patient Condition:068009404}    Rehab Potential (if transferring to Rehab): {Prognosis:7729991728}    Recommended Labs or Other Treatments After Discharge: RN, PT/OT, HHA    Physician Certification: I certify the above information and transfer of Juarez Suero  is necessary for the continuing treatment of the diagnosis listed and that he requires {Admit to Appropriate Level of Care:00398} for {GREATER/LESS:321689114} 30 days.      Update Admission H&P: {CHP DME Changes in JLJZ:253229099}    PHYSICIAN SIGNATURE:  {Esignature:718407062}

## 2020-07-15 NOTE — CARE COORDINATION
[facility]     Patient aware and agreeable to services. Faxed orders to [facility].     Lupe Hoover LPN  Care Transition Nurse  [address]  [phone]

## 2020-07-15 NOTE — ED TRIAGE NOTES
Patient presents via [facility] for fall. Per EMS, patient fell at 0500 this AM and was found on the floor by his HHA at around . Patient endorses left rib pain and lower back pain.

## 2020-07-15 NOTE — CARE COORDINATION
Referred to patient per home care. Spoke to Chuy ajy at [facility], [phone]. Patient fell and was on the floor for 12 hours. Patient found by RN. Per Chuy jay, they will be d/c'ing patient as they do not feel he is safe to be at home alone. They also feel unsafe as patient has knives and gun in the home. Patient is current with Adult Protective Services. Call placed to patient's , Elen Anaya, [phone]. Message left. Spoke to patient. He continues to refuse SNF or Assisted Living. States he is not moving. Patient states he has not had the time to arrange private duty home care. States he still has the brochures. Patient is interested in [facility], states he has contacted [place] [facility], states he has not gotten a response. Patient given [place] [facility] information to contact them again. Patient notified [facility] will not be returning   Patient agreeable to another home care agency. Referral made to [facility]. Patient agreeable. Patient denies other needs at this time. Patient will need stretcher transport on d/c.  MD and RN updated.   Electronically signed by ANGLE Jorge, WANG on 7/15/2020 at 4:55 PM

## 2020-07-15 NOTE — ED PROVIDER NOTES
[address] ENCOUNTER          ATTENDING PHYSICIAN NOTE       Date of evaluation: 7/15/2020    Chief Complaint     Fall      History of Present Illness     Ab Granados is a 77 y.o. male with history of type 1 diabetes, Parkinson's disease, recurrent falls, multiple other comorbidities presenting to the emergency department today for a fall from bed. Patient states early this morning, he rolled out of bed and awoke later on the floor. He notes pain in his left ribs and lower back. Is uncertain if he hit his head. Is not on blood thinners. Was unable to get up and was found by his home health care agency. They have since fired the patient due to recurrent falls and lack of adherence to their care recommendations. He denies any recent fevers, cough, shortness of breath, abdominal pain, nausea/vomiting/diarrhea. Review of Systems     Pertinent positive and negative findings as documented in the HPI. Otherwise all other systems were reviewed and were negative. Physical Exam     INITIAL VITALS: BP: (!) 185/80, Temp: 97.8 °F (36.6 °C), Pulse: 94, Resp: 20, SpO2: 99 %     Nursing note and vitals reviewed. General:  Adult male, alert and appropriately interactive. In no distress. HENT: Normocephalic and atraumatic. External ears normal. Nose appears normal externally. Eyes: Conjunctivae normal. No scleral icterus. PERRL  Neck: Neck supple. No tracheal deviation present. CV: Normal rate. Regular rhythm. S1/S2 auscultated. No murmurs, gallops or rubs. Chest: Effort normal. Breath sounds clear to auscultation bilaterally. No wheezes. No rales or rhonchi. Mild tenderness to left lateral ribs. No paradoxical movement or flail chest.  Abdominal: Soft. No distension. No tenderness. No rebound or guarding. No masses. No peritoneal signs. Musculoskeletal: Mild midline lumbar spinous tenderness without step-offs. No C or T-spine tenderness.   No bony tenderness of the extremities. Pelvis stable, no hip pain with logroll of legs. No edema. No gross deformities. Neurological: Alert and appropriately interactive. Face symmetric, speech without dysarthria or obvious aphasia. Moving all extremities spontaneously. Parkinsonian tremor noted. Skin: Warm, dry. No rash. No diaphoresis or erythema. Procedures   Procedures    MEDICAL DECISION MAKING     MDM: Margo Millan is a 77 y.o. male with history of Parkinson's disease, diabetes, recurrent falls presenting for a fall from bed earlier today. On arrival, patient is in no distress, vital signs notable for hypertension in the 180s. Physical examination reveals some mild lumbar spine and left lateral rib tenderness without gross deformities or significantly concerning findings. Labs reassuring with stable anemia from previous. CT of the head, chest, and lumbar spine obtained and demonstrated left-sided rib fractures x3 as well as an L1 compression fracture that was acute. He has some possibly subacute to chronic thoracic compression fractures and also small amount of posterior mediastinal hemorrhage without evidence of active extravasation. Given these traumatic findings, feel the patient requires evaluation by a trauma surgery service. As we do not have that available here, discussed transfer to the [facility] ED with the patient who is amenable and is well with the accepting emergency room physician, Dr. Deepak Patel. Patient will be transported by ground ambulance  For further care and management. Remained hemodynamically stable in the emergency department until time of departure. Clinical Impression     1. Fracture of ribs, three, closed, left, initial encounter    2.  Compression fracture of L1 vertebra, initial encounter [facility])        Disposition     DISPOSITION Decision To Transfer 07/15/2020 06:33:19 PM        Rachel Clements MD  7:06 PM                     Past Medical, Surgical, Family, and Social History     He has a past medical history of Asthma, Diabetes mellitus ([facility] [address] Hypertension, and Parkinson disease ([facility] [address] He has a past surgical history that includes Upper gastrointestinal endoscopy (N/A, 5/15/2020) and Upper gastrointestinal endoscopy (N/A, 5/15/2020). His family history is not on file. He reports that he has never smoked. He has never used smokeless tobacco. He reports that he does not drink alcohol or use drugs. Medications     Previous Medications    ACETAMINOPHEN (TYLENOL) 500 MG TABLET    Take 1 tablet by mouth 4 times daily as needed for Pain    ALBUTEROL (PROVENTIL HFA;VENTOLIN HFA) 108 (90 BASE) MCG/ACT INHALER    Inhale 2 puffs into the lungs every 6 hours as needed.       ATORVASTATIN (LIPITOR) 10 MG TABLET    Take 10 mg by mouth daily    CARBIDOPA-LEVODOPA (SINEMET)  MG PER TABLET    Take 2 tablets by mouth 3 times daily     INSULIN GLARGINE (LANTUS) 100 UNIT/ML INJECTION VIAL    Inject 20 Units into the skin nightly    INSULIN LISPRO (HUMALOG) 100 UNIT/ML INJECTION VIAL    Inject 8 Units into the skin 3 times daily (before meals)    INSULIN LISPRO (HUMALOG) 100 UNIT/ML INJECTION VIAL    Use together with meal insulin 3 times a day with meals per Corrective Low Dose Algorithm  Glucose: Dose:               No Insulin  140-199 1 Unit  200-249 2 Units  250-299 3 Units  300-349 4 Units  350-399 5 Units  Over 399 6 Units    INSULIN SYRINGE-NEEDLE U-100 ([employer] INSULIN SYRINGE) 31G X 5/16\" 0.5 ML MISC    1 each by Does not apply route 4 times daily    LOSARTAN (COZAAR) 100 MG TABLET    Take 100 mg by mouth daily    NIFEDIPINE (ADALAT CC) 60 MG EXTENDED RELEASE TABLET    Take 1 tablet by mouth daily    PANTOPRAZOLE (PROTONIX) 40 MG TABLET    Take 1 tablet by mouth 2 times daily (before meals)    PAROXETINE (PAXIL) 40 MG TABLET    Take 40 mg by mouth every morning    PRIMIDONE (MYSOLINE) 50 MG TABLET    Take 50 mg by mouth 2 times daily    ROPINIROLE (REQUIP) 3 MG TABLET    Take 3 mg by mouth 3 times daily       Allergies     He is allergic to erythromycin. ED Course     Nursing Notes, Past Medical Hx, Past Surgical Hx, Social Hx,Allergies, and Family Hx were reviewed. Patient was given the following medications:  Orders Placed This Encounter   Medications    acetaminophen (TYLENOL) tablet 650 mg    carbidopa-levodopa (SINEMET)  MG per tablet 2 tablet    iopamidol (ISOVUE-370) 76 % injection 80 mL       Diagnostic Results         RECENT VITALS:  BP: (!) 176/85,Temp: 97.8 °F (36.6 °C), Pulse: 91, Resp: 18, SpO2: 97 %     RADIOLOGY:  CT LUMBAR SPINE WO CONTRAST   Final Result      Head:   1. No acute intracranial hemorrhage or mass effect. 2. Severe chronic small vessel ischemic white matter disease with remote lacunar infarcts in the left caudate head, left putamen, and bilateral thalami. 3. Moderate enlargement of the ventricles out of proportion to enlargement of the extra-axial spaces which could be related to communicating hydrocephalus or normal pressure hydrocephalus. Lumbar spine:   1. Acute compression fracture of L1 with 30% height loss and no retropulsion. 2. Age-indeterminate mild compression deformities of T11 and T12 without definite acute fracture plane. This could be further evaluated with MRI to assess for acuity. 3. Multilevel degenerative disc disease, severe at L4-5 without evidence of bony spinal stenosis. CHEST:   1. Small amount of hemorrhage within the posterior mediastinum/retrocrural region, indeterminant but possibly related to the compression fractures. Other traumatic etiology is not excluded. The aorta appears normal.   2. Acute fractures of the left anterior seventh through ninth ribs. 3. Extensive coronary artery calcifications and mild cardiomegaly. CT CHEST W CONTRAST   Final Result      Head:   1. No acute intracranial hemorrhage or mass effect.    2. Severe chronic small vessel ischemic white matter disease with remote lacunar infarcts in the left caudate head, left putamen, and bilateral thalami. 3. Moderate enlargement of the ventricles out of proportion to enlargement of the extra-axial spaces which could be related to communicating hydrocephalus or normal pressure hydrocephalus. Lumbar spine:   1. Acute compression fracture of L1 with 30% height loss and no retropulsion. 2. Age-indeterminate mild compression deformities of T11 and T12 without definite acute fracture plane. This could be further evaluated with MRI to assess for acuity. 3. Multilevel degenerative disc disease, severe at L4-5 without evidence of bony spinal stenosis. CHEST:   1. Small amount of hemorrhage within the posterior mediastinum/retrocrural region, indeterminant but possibly related to the compression fractures. Other traumatic etiology is not excluded. The aorta appears normal.   2. Acute fractures of the left anterior seventh through ninth ribs. 3. Extensive coronary artery calcifications and mild cardiomegaly. CT Head WO Contrast   Final Result      Head:   1. No acute intracranial hemorrhage or mass effect. 2. Severe chronic small vessel ischemic white matter disease with remote lacunar infarcts in the left caudate head, left putamen, and bilateral thalami. 3. Moderate enlargement of the ventricles out of proportion to enlargement of the extra-axial spaces which could be related to communicating hydrocephalus or normal pressure hydrocephalus. Lumbar spine:   1. Acute compression fracture of L1 with 30% height loss and no retropulsion. 2. Age-indeterminate mild compression deformities of T11 and T12 without definite acute fracture plane. This could be further evaluated with MRI to assess for acuity. 3. Multilevel degenerative disc disease, severe at L4-5 without evidence of bony spinal stenosis.       CHEST:   1. Small amount of hemorrhage within the posterior mediastinum/retrocrural region, indeterminant but possibly related to the compression fractures. Other traumatic etiology is not excluded. The aorta appears normal.   2. Acute fractures of the left anterior seventh through ninth ribs. 3. Extensive coronary artery calcifications and mild cardiomegaly.           LABS:   Results for orders placed or performed during the hospital encounter of 07/15/20   CBC Auto Differential   Result Value Ref Range    WBC 8.1 4.0 - 11.0 K/uL    RBC 4.01 (L) 4.20 - 5.90 M/uL    Hemoglobin 10.6 (L) 13.5 - 17.5 g/dL    Hematocrit 33.4 (L) 40.5 - 52.5 %    MCV 83.1 80.0 - 100.0 fL    MCH 26.3 26.0 - 34.0 pg    MCHC 31.7 31.0 - 36.0 g/dL    RDW 19.4 (H) 12.4 - 15.4 %    Platelets 466 744 - 098 K/uL    MPV 8.2 5.0 - 10.5 fL    Neutrophils % 78.8 %    Lymphocytes % 14.0 %    Monocytes % 6.3 %    Eosinophils % 0.2 %    Basophils % 0.7 %    Neutrophils Absolute 6.4 1.7 - 7.7 K/uL    Lymphocytes Absolute 1.1 1.0 - 5.1 K/uL    Monocytes Absolute 0.5 0.0 - 1.3 K/uL    Eosinophils Absolute 0.0 0.0 - 0.6 K/uL    Basophils Absolute 0.1 0.0 - 0.2 K/uL   Basic Metabolic Panel w/ Reflex to MG   Result Value Ref Range    Sodium 137 136 - 145 mmol/L    Potassium reflex Magnesium 5.3 (H) 3.5 - 5.1 mmol/L    Chloride 103 99 - 110 mmol/L    CO2 23 21 - 32 mmol/L    Anion Gap 11 3 - 16    Glucose 142 (H) 70 - 99 mg/dL    BUN 28 (H) 7 - 20 mg/dL    CREATININE 1.1 0.8 - 1.3 mg/dL    GFR Non-African American >60 >60    GFR African American >60 >60    Calcium 8.9 8.3 - 10.6 mg/dL   POCT Glucose   Result Value Ref Range    POC Glucose 133 (H) 70 - 99 mg/dl    Performed on ACCU-CHEK        CONSULTS:  IP CONSULT TO HOME CARE NEEDS    PATIENT REFERRED TO:  [address]  [address] [address]  [phone]            DISCHARGE MEDICATIONS:  New Prescriptions    No medications on file        Luba Rey MD  07/15/20 2698
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
## 2020-07-21 NOTE — ED NOTES
D/C summary given, pt verb understanding, transport present for d/c home, stable, no s/s of acute distress, easy WOB belongings with pt, d/c 20 g piv in left hand prior with pressure to site for 5mins     Leonora Huff RN  07/21/20 1342

## 2020-07-21 NOTE — ED NOTES
Patient brought by EMS after waking up on the floor this am. EMS noted patient's BS to be 52, gave oral glucose, noted to be 45 after recheck. Patient is alert, oriented x4. Believes he slid off of the cough last night. Recent fall with rib fractures. EKG and BS obtained on arrival to ED. Noted to be 41. MD aware. Patient provided with juice and added sugar packets as well as dawna crackers.       Brea Bonilla RN  07/21/20 3011

## 2020-07-21 NOTE — ED PROVIDER NOTES
ED Attending Attestation Note     Date of evaluation: 7/21/2020    This patient was seen by the advance practice provider. I have seen and examined the patient, agree with the workup, evaluation, management and diagnosis. The care plan has been discussed. My assessment reveals adult male, currently at his baseline mental status, alert and oriented able to provide a good history and without current complaints who was reported to have some hypoglycemia this morning. Patient tells methat he was recently started on a nighttime dose of insulin, this is the third night of taking it after being discharged from the hospital, and that he is never taken insulin the night before. Last night he fell asleep after dinner, did not wake up until this morning, and says he would usually eat a nighttime snack as well but did not last night. He felt somewhat woozy this morning checked his blood sugar found to be in the 50s. He is coming up with oral glucose administered via EMS, as well as with oral glucose intake here; of note, initial lab draw was performed prior to any substantial oral intake here. He does have recent fall with rib fractures but appears to be oxygenating well, in no acute distress, no evidence of sepsis or infection otherwise, and chest x-ray is clear. We will speak to his PCP to potentially modify his insulin regimen, and will observe in the ED for several hours to make sure that he has no rebound hypoglycemia.         Demarcus Payan MD  07/21/20 8771

## 2020-07-21 NOTE — ED PROVIDER NOTES
[address] [facility] [address] ENCOUNTER          PHYSICIAN ASSISTANT NOTE       Date of evaluation: 7/21/2020    Chief Complaint     Hypoglycemia      History of Present Illness     Rigoberto Huggins is a 77 y.o. male who presents with complaints of hypoglycemia. Patient reports having dinner with friends last night and falling asleep early. Patient took his night time insulin but did not eat his usual night time snack. Patient awoke early this morning feeling \"off. \" He checked his sugar and notes it was in the low 50s. Patient later called EMS who gave him a glucose packet. Upon arrival; POC glucose was 41. Patient states he feels better than he did this morning. He denies any fever, chills, headache, fatigue, confusion, chest pain, shortness of breath, nausea, vomiting, abdominal pain, diarrhea. Patient does reports he usually only takes morning insulin then during the day as needed but was recently started on night time insulin as well. Patient sustained a fall on 07/15 resulting in an L1 compression fracture and multiple left rib fractures. Review of Systems     Review of Systems   Constitutional: Negative for chills, fatigue and fever. Respiratory: Negative for shortness of breath. Cardiovascular: Negative for chest pain. Gastrointestinal: Negative for abdominal pain, diarrhea, nausea and vomiting. Neurological: Negative for weakness and headaches. Past Medical, Surgical, Family, and Social History     He has a past medical history of Asthma, Diabetes mellitus ([address] Hypertension, and Parkinson disease ([facility] [address] He has a past surgical history that includes Upper gastrointestinal endoscopy (N/A, 5/15/2020) and Upper gastrointestinal endoscopy (N/A, 5/15/2020). His family history is not on file. He reports that he has never smoked. He has never used smokeless tobacco. He reports that he does not drink alcohol or use drugs.     Medications     Previous Medications    ACETAMINOPHEN (TYLENOL) 500 MG TABLET    Take 1 tablet by mouth 4 times daily as needed for Pain    ALBUTEROL (PROVENTIL HFA;VENTOLIN HFA) 108 (90 BASE) MCG/ACT INHALER    Inhale 2 puffs into the lungs every 6 hours as needed. ATORVASTATIN (LIPITOR) 10 MG TABLET    Take 10 mg by mouth daily    CARBIDOPA-LEVODOPA (SINEMET)  MG PER TABLET    Take 2 tablets by mouth 3 times daily     INSULIN GLARGINE (LANTUS) 100 UNIT/ML INJECTION VIAL    Inject 20 Units into the skin nightly    INSULIN LISPRO (HUMALOG) 100 UNIT/ML INJECTION VIAL    Inject 8 Units into the skin 3 times daily (before meals)    INSULIN LISPRO (HUMALOG) 100 UNIT/ML INJECTION VIAL    Use together with meal insulin 3 times a day with meals per Corrective Low Dose Algorithm  Glucose: Dose:               No Insulin  140-199 1 Unit  200-249 2 Units  250-299 3 Units  300-349 4 Units  350-399 5 Units  Over 399 6 Units    INSULIN SYRINGE-NEEDLE U-100 ([employer] INSULIN SYRINGE) 31G X 5/16\" 0.5 ML MISC    1 each by Does not apply route 4 times daily    LOSARTAN (COZAAR) 100 MG TABLET    Take 100 mg by mouth daily    NIFEDIPINE (ADALAT CC) 60 MG EXTENDED RELEASE TABLET    Take 1 tablet by mouth daily    PANTOPRAZOLE (PROTONIX) 40 MG TABLET    Take 1 tablet by mouth 2 times daily (before meals)    PAROXETINE (PAXIL) 40 MG TABLET    Take 40 mg by mouth every morning    PRIMIDONE (MYSOLINE) 50 MG TABLET    Take 50 mg by mouth 2 times daily    ROPINIROLE (REQUIP) 3 MG TABLET    Take 3 mg by mouth 3 times daily       Allergies     He is allergic to erythromycin. Physical Exam     INITIAL VITALS: BP: 131/88, Temp: 98.1 °F (36.7 °C), Pulse: 88, Resp: 18, SpO2: 99 %    General: 77 y.o. male in no apparent distress, well developed, well nourished, non-toxic appearance. HEENT: Atraumatic, normocephalic. EOMs intact. Neck:  Full range of motion. Chest/pulm: Respiratory rate normal. Speaks in complete sentences, no respiratory distress, scattered rhonchi. Cardiovascular: Heart rate normal. RRR, no murmurs, rubs, gallops appreciated. Abdomen: No gross distension. Soft, non-tender, non-peritoneal. No suprapubic or CVAT. : Deferred. Musculoskeletal: No evident long bone or joint deformity. Neuro: A&O x 4. Normal speech without dysarthria or aphasia. Moves all extremities spontaneously and symmetrically. Movements normal without ataxia. Resting tremor of hands. Skin: Warm, dry. No obvious rashes, petechiae, or purpura. Psych: Appropriate mood and affect, normal interaction. Diagnostic Results     EKG   Interpreted in conjunction with emergency department physician Kenya Arenas, *  Rhythm: normal sinus   Rate: normal  Axis: normal  : none  Conduction: normal  ST Segments: no acute change  T Waves: no acute change  Q Waves:none  Clinical Impression: no acute changes  Comparison:  06/18/2020    RADIOLOGY:  XR CHEST STANDARD (2 VW)   Final Result      Minimal bibasilar linear atelectasis or scarring.           LABS:   Results for orders placed or performed during the hospital encounter of 07/21/20   CBC Auto Differential   Result Value Ref Range    WBC 7.2 4.0 - 11.0 K/uL    RBC 3.90 (L) 4.20 - 5.90 M/uL    Hemoglobin 9.9 (L) 13.5 - 17.5 g/dL    Hematocrit 31.0 (L) 40.5 - 52.5 %    MCV 79.7 (L) 80.0 - 100.0 fL    MCH 25.5 (L) 26.0 - 34.0 pg    MCHC 32.0 31.0 - 36.0 g/dL    RDW 18.8 (H) 12.4 - 15.4 %    Platelets 384 211 - 887 K/uL    MPV 7.5 5.0 - 10.5 fL    Neutrophils % 73.0 %    Lymphocytes % 15.7 %    Monocytes % 9.7 %    Eosinophils % 1.0 %    Basophils % 0.6 %    Neutrophils Absolute 5.2 1.7 - 7.7 K/uL    Lymphocytes Absolute 1.1 1.0 - 5.1 K/uL    Monocytes Absolute 0.7 0.0 - 1.3 K/uL    Eosinophils Absolute 0.1 0.0 - 0.6 K/uL    Basophils Absolute 0.0 0.0 - 0.2 K/uL   Comprehensive Metabolic Panel w/ Reflex to MG   Result Value Ref Range    Sodium 139 136 - 145 mmol/L    Potassium reflex Magnesium 4.5 3.5 - 5.1 mmol/L Chloride 103 99 - 110 mmol/L    CO2 25 21 - 32 mmol/L    Anion Gap 11 3 - 16    Glucose 24 (LL) 70 - 99 mg/dL    BUN 29 (H) 7 - 20 mg/dL    CREATININE 1.2 0.8 - 1.3 mg/dL    GFR Non-African American >60 >60    GFR African American >60 >60    Calcium 9.2 8.3 - 10.6 mg/dL    Total Protein 7.2 6.4 - 8.2 g/dL    Alb 4.2 3.4 - 5.0 g/dL    Albumin/Globulin Ratio 1.4 1.1 - 2.2    Total Bilirubin 0.5 0.0 - 1.0 mg/dL    Alkaline Phosphatase 103 40 - 129 U/L    ALT 23 10 - 40 U/L    AST 51 (H) 15 - 37 U/L    Globulin 3.0 g/dL   Basic Metabolic Panel w/ Reflex to MG   Result Value Ref Range    Sodium 137 136 - 145 mmol/L    Potassium reflex Magnesium 4.3 3.5 - 5.1 mmol/L    Chloride 107 99 - 110 mmol/L    CO2 20 (L) 21 - 32 mmol/L    Anion Gap 10 3 - 16    Glucose 203 (H) 70 - 99 mg/dL    BUN 25 (H) 7 - 20 mg/dL    CREATININE 1.0 0.8 - 1.3 mg/dL    GFR Non-African American >60 >60    GFR African American >60 >60    Calcium 7.7 (L) 8.3 - 10.6 mg/dL   POCT Glucose   Result Value Ref Range    POC Glucose 41 (LL) 70 - 99 mg/dl    Performed on ACCU-CHEK    POCT Glucose   Result Value Ref Range    POC Glucose 74 70 - 99 mg/dl    Performed on ACCU-CHEK    POCT Glucose   Result Value Ref Range    POC Glucose 177 (H) 70 - 99 mg/dl    Performed on ACCU-CHEK    POC Glucose   Result Value Ref Range    Glucose 253 mg/dL   POCT Glucose   Result Value Ref Range    POC Glucose 253 (H) 70 - 99 mg/dl    Performed on ACCU-CHEK    EKG 12 Lead   Result Value Ref Range    Ventricular Rate 88 BPM    Atrial Rate 88 BPM    P-R Interval 180 ms    QRS Duration 92 ms    Q-T Interval 356 ms    QTc Calculation (Bazett) 430 ms    P Axis 51 degrees    R Axis 2 degrees    T Axis 45 degrees    Diagnosis       EKG performed in ER and to be interpreted by ER physician. Confirmed by MD, ER ([id number]),  Rosalie Hicks ([email or web address]), TITO (Jolene) on 7/21/2020 12:42:29 PM         RECENT VITALS:  BP: (!) 147/57, Temp: 98.1 °F (36.7 °C), Pulse: 88, Resp: 18, SpO2: 100 %       ED Course     Nursing Notes, Past Medical Hx,Past Surgical Hx, Social Hx, Allergies, and Family Hx were reviewed. The patient was given the following medications:  Orders Placed This Encounter   Medications    dextrose 50 % solution     [employer]: cabinet override    methocarbamol (ROBAXIN) tablet 750 mg    oxyCODONE (ROXICODONE) immediate release tablet 5 mg       CONSULTS:  Julieta Backer / ASSESSMENT / Sole Hilda is a 77 y.o. male presenting for hypoglycemia. On presentation, patient is well-appearing and in no acute distress. Patient is afebrile with stable VS.    Patient had labs drawn immediately upon arrival.  His glucose from the [facility] was 24. His initial point-of-care glucose was 41. Patient ate several dawna crackers and had orange juice. Approximately 30 minutes after the snacks his sugar was rechecked and was at 74. Sugars continue to slowly increase with the most recent being 253. Patient remained asymptomatic during his entire stay. Basic labs were obtained which no showed no leukocytosis, anemia, electrolyte abnormality, or renal dysfunction. Patient received his usual oxycodone and Robaxin given his recent rib fractures. I did obtain a chest x-ray to assess for possible pneumonia in the setting of recent rib fractures that may have precipitated his hypoglycemia. His chest x-ray showed atelectasis. I spoke with the patient's primary care provider and informed him of the situation. It appears a physician at the [facility] hospital was the one who started him on nighttime insulin. His primary care provider states that he should immediately discontinue this nighttime insulin. Patient is in agreement with this plan. Someone from his doctor's office will be in contact to set up a follow-up appointment within the next few days. At this point in time, patient is stable for discharge.  Patient given strict return precautions as outlined in the AVS. Patient was agreeable and understanding to this plan of care. Prior to discharge, patient was ambulatory and PO tolerant. This patient was also evaluated by the attending physician. All care plans were discussed and agreed upon. Clinical Impression     1.  Hypoglycemia        Disposition     PATIENT REFERRED TO:  [address]  [address] [facility] [place] [address]  [phone]            DISCHARGE MEDICATIONS:  New Prescriptions    No medications on file       [facility] Gee [address]  07/21/20 3342
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
## 2020-07-26 NOTE — ED NOTES
Pt concerned about not taking his Requip 3mg today prior to arrival to ED, asked that RN speak to MD about getting him a dose.       Allanesha Smith-Narcisse, RN  07/26/20 [id number]

## 2020-07-26 NOTE — PROGRESS NOTES
RESPIRATORY THERAPY ASSESSMENT    Name:  Martha [address] Record Number:  [id number]  Age: 77 y.o. Gender: male  : 1950  Today's Date:  2020  Room:  5303/5303-01    Assessment     Is the patient being admitted for a COPD or Asthma exacerbation? No   (If yes the patient will be seen every 4 hours for the first 24 hours and then reassessed)    Patient Admission Diagnosis      Allergies  Allergies   Allergen Reactions    Erythromycin                       Pulmonary History:No history  Home Oxygen Therapy:  room air  Home Respiratory Therapy:Albuterol prnCurrent Respiratory Therapy:        Respiratory Severity Index(RSI)   Patients with orders for inhalation medications, oxygen, or any therapeutic treatment modality will be placed on Respiratory Protocol. They will be assessed with the first treatment and at least every 72 hours thereafter. The following severity scale will be used to determine frequency of treatment intervention.     Smoking History: No Smoking History = 0    Social History  Social History     Tobacco Use    Smoking status: Never Smoker    Smokeless tobacco: Never Used   Substance Use Topics    Alcohol use: No    Drug use: No       Recent Surgical History: None = 0  Past Surgical History  Past Surgical History:   Procedure Laterality Date    UPPER GASTROINTESTINAL ENDOSCOPY N/A 5/15/2020    EGD CONTROL HEMORRHAGE performed by Paula Jenkins MD at [address] 5/15/2020    EGD BIOPSY performed by Paula Jenkins MD at [facility] ENDOSCOPY       Level of Consciousness: Alert, Oriented, and Cooperative = 0    Level of Activity: Walking unassisted = 0    Respiratory Pattern: Regular Pattern; RR 8-20 = 0    Breath Sounds: Clear = 0    Sputum   ,  ,    Cough: Strong, spontaneous, non-productive = 0    Vital Signs   BP (!) 150/73   Pulse 94   Temp 97.8 °F (36.6 °C) (Oral)   Resp 18   Ht 5' 8\" (1.727 m)   Wt 185 lb (83.9 kg) SpO2 95%   BMI 28.13 kg/m²   SPO2 (COPD values may differ): Greater than or equal to 92% on room air = 0    Peak Flow (asthma only): not applicable = 0    RSI: 0-4 = See once and convert to home regimen or discontinue        Plan       Goals: medication delivery, mobilize retained secretions, volume expansion and improve oxygenation      Level of patient/caregiver understanding able to:   ? Verbalize understanding   ? Demonstrate understanding       ? Teach back        ? Needs reinforcement       ? No available caregiver               ? Other:          Is patient being placed on Home Treatment Regimen? Yes     Does the patient have everything they need prior to discharge? NA     Comments: chart reviewed    Plan of Care: continue current therapy    Electronically signed by Nila Covarrubias RCP on 7/26/2020 at 6:55 PM    Respiratory Protocol Guidelines     1. Assessment and treatment by Respiratory Therapy will be initiated for medication and therapeutic interventions upon initiation of aerosolized medication. 2. Physician will be contacted for respiratory rate (RR) greater than 35 breaths per minute. Therapy will be held for heart rate (HR) greater than 140 beats per minute, pending direction from physician. 3. Bronchodilators will be administered via Metered Dose Inhaler (MDI) with spacer when the following criteria are met:  a. Alert and cooperative     b. HR < 140 bpm  c. RR < 30 bpm                d. Can demonstrate a 2-3 second inspiratory hold  4. Bronchodilators will be administered via Hand Held Nebulizer [facility]) to patients when ANY of the following criteria are met  a. Incognizant or uncooperative          b. Patients treated with HHN at Home        c. Unable to demonstrate proper use of MDI with spacer     d. RR > 30 bpm   5. Bronchodilators will be delivered via Metered Dose Inhaler (MDI), HHN, Aerogen to intubated patients on mechanical ventilation.   6. Inhalation medication orders will be delivered and/or substituted as outlined below. Aerosolized Medications Ordering and Administration Guidelines:    1. All Medications will be ordered by a physician, and their frequency and/or modality will be adjusted as defined by the patients Respiratory Severity Index (RSI) score. 2. If the patient does not have documented COPD, consider discontinuing anticholinergics when RSI is less than 9.  3. If the bronchospasm worsens (increased RSI), then the bronchodilator frequency can be increased to a maximum of every 4 hours. If greater than every 4 hours is required, the physician will be contacted. 4. If the bronchospasm improves, the frequency of the bronchodilator can be decreased, based on the patient's RSI, but not less than home treatment regimen frequency. 5. Bronchodilator(s) will be discontinued if patient has a RSI less than 9 and has received no scheduled or as needed treatment for 72  Hrs. Patients Ordered on a Mucolytic Agent:    1. Must always be administered with a bronchodilator. 2. Discontinue if patient experiences worsened bronchospasm, or secretions have lessened to the point that the patient is able to clear them with a cough. Anti-inflammatory and Combination Medications:    1. If the patient lacks prior history of lung disease, is not using inhaled anti-inflammatory medication at home, and lacks wheezing by examination or by history for at least 24 hours, contact physician for possible discontinuation.

## 2020-07-26 NOTE — ED PROVIDER NOTES
ED Attending Attestation Note     Date of evaluation: 7/26/2020    This patient was seen by the resident. I have seen and examined the patient, agree with the workup, evaluation, management and diagnosis. The care plan has been discussed. I have reviewed the ECG and concur with the resident's interpretation. My assessment reveals 72-year-old male with a history of hypertension, diabetes, and Parkinson's who presents after fall yesterday. Patient had what sounds like a possible syncopal fall yesterday, spent the evening on the floor given he was too weak to help himself up, was found by neighbors this morning at approximately 8 AM.  On exam patient is globally weak with a nonfocal exam otherwise, we will plan to obtain laboratory work, urinalysis, and CT scan of the head to assess for intracranial hemorrhage, neck was clinically cleared. Patient will likely require admission the hospital for placement given inability to care for himself in his home. He is not on blood thinning medications. Patient's laboratory work showed CK of 458 and BNP of 1493 however this is similar to patient's previously measured baseline, troponin was 0.06 initially and on repeat which is also near patient's previously measured values, white blood cell count was elevated to 11.5 otherwise laboratory work was reassuring. Urinalysis without concern for infection or bleeding. CT head without concern for intracranial hemorrhage or fracture. On repeat evaluation patient remains hemodynamically stable but continues to be significantly, diffusely weak and unable to tolerate ambulation without significant assistance, at this time we will plan on admission to medicine for continue evaluation/management and likely placement to rehab facility.      Cynthia Dey MD  07/26/20 [id number]

## 2020-07-26 NOTE — ED TRIAGE NOTES
Pt arrived to ED for weakness and frequent falls. States he blacked out last night and was on the floor from 10pm-8am. He was laying on a soft rug all night [facility] Per EMS, daughter and girlfriend called 911 wanting him to go to nursing home but they explained he would need to go to hospital first. Pt denies pain. States he was conscious all night, thinks his blood sugars may have been low and that's why he fell. Reports he feels safe at home and he is supposed to get a new wheelchair and med alert button this week.

## 2020-07-26 NOTE — H&P
Internal Medicine H&P      PCP: Ny MOYER    Date of Admission: 7/26/2020    Date of Service: Pt seen/examined on 07/26/2020 and Admitted to Inpatient with expected LOS greaterthan two midnights due to work up for fall. Placed in Observation. Chief Complaint:  Fall(down from 10pm last night to 8am)      History Of Present Illness:      Analilia Kilpatrick is a 77year old male with history of Asthma, HTN, DM1, Parkinson presented to ED today after an episode of fall overnight. He reports he fell on the floor around 10 pm last night. He couldn't recall any details before or after the fall at the moment. Reports remembering being taken to the hospital in the morning. He couldn't remember similar episodes in the past.He reported in ED that he has been at home for the past month, and has sustained 3-4 falls during that time, and was previously at an outpatient rehabilitation facility for 6 weeks because of his recurrent falls. He reports being compliant to his medications and wasn't sick recently. He endorses     He denies headaches, blurry vision, chest pain, SOB, palpitations, cough, abdominal pain, diarrhea, constipation, hematuria, or hematochezia. Past Medical History:          Diagnosis Date    Asthma     Diabetes mellitus ([address]     Hypertension     Parkinson disease ([address]        Past Surgical History:          Procedure Laterality Date    UPPER GASTROINTESTINAL ENDOSCOPY N/A 5/15/2020    EGD CONTROL HEMORRHAGE performed by Maria C Patel MD at [address] 5/15/2020    EGD BIOPSY performed by Maria C Patel MD at [address] ENDOSCOPY       Medications Prior to Admission:      Prior to Admission medications    Medication Sig Start Date End Date Taking?  Authorizing Provider   insulin glargine (LANTUS) 100 UNIT/ML injection vial Inject 20 Units into the skin nightly 6/22/20   Eric Oviedo MD   insulin lispro (HUMALOG) 100 UNIT/ML injection vial Inject 8 Units into the skin 3 times daily (before meals) 6/22/20   Mila Patel MD   insulin lispro (HUMALOG) 100 UNIT/ML injection vial Use together with meal insulin 3 times a day with meals per Corrective Low Dose Algorithm  Glucose: Dose:               No Insulin  140-199 1 Unit  200-249 2 Units  250-299 3 Units  300-349 4 Units  350-399 5 Units  Over 399 6 Units 6/22/20   Mila Patel MD   Insulin Syringe-Needle U-100 ([employer] INSULIN SYRINGE) 31G X 5/16\" 0.5 ML MISC 1 each by Does not apply route 4 times daily 6/22/20   Mila Patel MD   losartan (COZAAR) 100 MG tablet Take 100 mg by mouth daily    Historical Provider, MD   pantoprazole (PROTONIX) 40 MG tablet Take 1 tablet by mouth 2 times daily (before meals) 5/17/20   Lazaro Enamorado MD   NIFEdipine (ADALAT CC) 60 MG extended release tablet Take 1 tablet by mouth daily 5/4/20   Yissel Baron MD   PARoxetine (PAXIL) 40 MG tablet Take 40 mg by mouth every morning    Historical Provider, MD   rOPINIRole (REQUIP) 3 MG tablet Take 3 mg by mouth 3 times daily    Historical Provider, MD   primidone (MYSOLINE) 50 MG tablet Take 50 mg by mouth 2 times daily    Historical Provider, MD   carbidopa-levodopa (SINEMET)  MG per tablet Take 2 tablets by mouth 3 times daily  10/24/19   Historical Provider, MD   acetaminophen (TYLENOL) 500 MG tablet Take 1 tablet by mouth 4 times daily as needed for Pain 10/31/19   Mukesh Vargas MD   atorvastatin (LIPITOR) 10 MG tablet Take 10 mg by mouth daily    Historical Provider, MD   albuterol (PROVENTIL HFA;VENTOLIN HFA) 108 (90 BASE) MCG/ACT inhaler Inhale 2 puffs into the lungs every 6 hours as needed. Historical Provider, MD       Allergies:  Erythromycin    Social History:      The patient currently lives alone  Not asked due to somnolence    Family History:     Not asked due to somnolence      REVIEW OF SYSTEMS: Pertinent positives as noted in the HPI. All other systems reviewed and negative.   ROS: Review of Systems    PHYSICAL EXAM PERFORMED:    BP (!) 150/73   Pulse 94   Temp 97.8 °F (36.6 °C) (Oral)   Resp 18   Ht 5' 8\" (1.727 m)   Wt 185 lb (83.9 kg)   SpO2 95%   BMI 28.13 kg/m²     General appearance:  No apparent distress, appears stated age and cooperative, appears very drowsy, falls sleep during conversation, easily awaken. Psychiatric:  Alert and oriented x3, thought content appropriate  HEENT:  Normal cephalic,atraumatic without obvious deformity. Pupils equal, round, and reactive to light. Extra ocular muscles intact. Conjunctivae/corneas clear. Echymosis on the left eyelid. Neck: No jugular venous distention. Trachea midline. Respiratory:  Normal respiratory effort. Clear to auscultation, bilaterally without Rales/Wheezes/Rhonchi. Cardiovascular:  Regular rate and rhythm with normal S1/S2 without murmurs, rubs or gallops. Abdomen: Soft, non-tender, non-distended with normal bowel sounds. Musculoskeletal:  No clubbing, cyanosis bilaterally. 1+ pitting edema bilateral LE. Multiple linear abrasions on the right LE. Skin: No jaundice, warm all extremities. Neurologic:  4/5 motor strength in the lower extremity bilaterally, 5/5 in upper extremities. Sensation intact all extremities. Cranial nerves: II-XII intact. Resting tremor on left upper extremity  Peripheral Pulses: +2 palpable, equal bilaterally       Labs:     Recent Labs     07/26/20  1355   WBC 11.5*   HGB 9.7*   HCT 30.2*        Recent Labs     07/26/20  1355      K 4.3      CO2 25   BUN 16   CREATININE 1.2   CALCIUM 9.2     Recent Labs     07/26/20  1355   AST 41*   ALT <5*   BILIDIR <0.2   BILITOT 0.6   ALKPHOS 112     No results for input(s): INR in the last 72 hours.   Recent Labs     07/26/20  1355 07/26/20  1541   CKTOTAL 458*  --    TROPONINI 0.06* 0.06*       Urinalysis:      Lab Results   Component Value Date    NITRU Negative 07/26/2020    WBCUA None seen 07/26/2020    BACTERIA 2+ 04/29/2020    RBCUA None seen 07/26/2020    BLOODU TRACE-INTACT 07/26/2020    SPECGRAV 1.025 07/26/2020    GLUCOSEU 250 07/26/2020       Radiology:         CT HEAD WO CONTRAST   Final Result      No acute intracranial hemorrhage or mass effect. Mild-to-moderate atrophy and extensive chronic small vessel ischemic change with remote lacunar infarcts as detailed. XR CHEST PORTABLE    (Results Pending)       ASSESSMENT & PLAN:    Mercy Tran is a 77year old male with history of Asthma, HTN, DM1, Parkinson presented to ED today after an episode of fall overnight. Fall  -Likely secondary to parkinson's autonomic dysfunction versus seizures versus cardiogenic   -CT non contrast head negative for acute pathology  -Unclear from history if its vasovagal   -f/u orthostatic vitals  -f/u EKG  -Monitor on Telemetry      Rhabdomyolysis  -CK elevated to 458  -U/A with trace blood and no RBC  -CBC with mild leukocytosis, elevated AST  -Continue IVF   - f/u CK in AM  -Creatinine 1.2(baseline)    Leukocytosis  -Most likely secondary to rhabdo  -Afebrile, will check for source if continue to elevate  -U/A negative for bacteria, Will consider Chest Xray to r/o aspiration. Elevated troponin(stable)  - likely related to skeletal muscle injury rather than cardiac  -0.06 --> 0.06 after 2 hrs  -Will trend q6    Type 1 DM  - issues controlling in past. Episodes of hypoglycemia and DKA. -Unclear if hypoglycemic episode during fall.  But BG on admission 177  -Lantus  -Low dose sliding scale Insulin     Parkinson's Disease  -Home med Ropinirole 3mg PO given in ED  -Continue Home med  -Carbidopa-levadopa    HTN  -Hold home med losartan for potential kidney injury in setting of CK elevation  - cont Nifidipine     Asthma  - cont prn Albuterol    Anemia  -Microcytic anemia on presentation, Normocytic in May/June  -On baseline Hemoglobin here   -Will get iron studies    HLD  -Hold Statin d/y elevated CK, resume on DC    Psych  - resume home paroxetine    Hx of gastric Ulcer  - H pylori neg  - continue protonix    DVT Prophylaxis: Heparin  Diet: DIET GENERAL;  Code Status: Full Code  PT/OT Eval Status: Ordered  Dispo:[facility], pending facility placement    Saw and evaluated patient.    Stevie Neri, MS4 acted as a scribe  Edgar José PGY2  7:41 PM.  7/26/2020

## 2020-07-26 NOTE — ED PROVIDER NOTES
[id number] [facility] RESIDENT NOTE       Date of evaluation: 7/26/2020    Chief Complaint     Fall (fall last night. on floor from 10p-8am)      History of Present Illness     Azalea Brown is a 77 y.o. male with hx T1DM and parkinson's who presents with chief complaint of fall. Patient reports that he fell in his living room yesterday evening around 10 PM, and was unable to get up until he was found by neighbors this morning at 8 AM.  Patient reports that he retained consciousness throughout the majority of the evening, but cannot recall the circumstances that led to his fall. Patient is a history of type 1 diabetes and self administers insulin, reports that he has been compliant with insulin administration. Patient reports that he has been at home for the past month, and has sustained 3-4 falls during that time, and was previously at an outpatient rehabilitation facility for 6 weeks because of his recurrent falls. Patient denies headache, vision changes, numbness, tingling, focal weakness, neck pain, abdominal pain, chest pain. Review of Systems     All other systems reviewed and are negative except as mentioned in HPI. Past Medical, Surgical, Family, and Social History     He has a past medical history of Asthma, Diabetes mellitus ([facility] [address] Hypertension, and Parkinson disease ([facility] [address] He has a past surgical history that includes Upper gastrointestinal endoscopy (N/A, 5/15/2020) and Upper gastrointestinal endoscopy (N/A, 5/15/2020). His family history is not on file. He reports that he has never smoked. He has never used smokeless tobacco. He reports that he does not drink alcohol or use drugs.     Medications     Previous Medications    ACETAMINOPHEN (TYLENOL) 500 MG TABLET    Take 1 tablet by mouth 4 times daily as needed for Pain    ALBUTEROL (PROVENTIL HFA;VENTOLIN HFA) 108 (90 BASE) MCG/ACT INHALER    Inhale 2 puffs into the lungs every 6 hours as needed. ATORVASTATIN (LIPITOR) 10 MG TABLET    Take 10 mg by mouth daily    CARBIDOPA-LEVODOPA (SINEMET)  MG PER TABLET    Take 2 tablets by mouth 3 times daily     INSULIN GLARGINE (LANTUS) 100 UNIT/ML INJECTION VIAL    Inject 20 Units into the skin nightly    INSULIN LISPRO (HUMALOG) 100 UNIT/ML INJECTION VIAL    Inject 8 Units into the skin 3 times daily (before meals)    INSULIN LISPRO (HUMALOG) 100 UNIT/ML INJECTION VIAL    Use together with meal insulin 3 times a day with meals per Corrective Low Dose Algorithm  Glucose: Dose:               No Insulin  140-199 1 Unit  200-249 2 Units  250-299 3 Units  300-349 4 Units  350-399 5 Units  Over 399 6 Units    INSULIN SYRINGE-NEEDLE U-100 ([employer] INSULIN SYRINGE) 31G X 5/16\" 0.5 ML MISC    1 each by Does not apply route 4 times daily    LOSARTAN (COZAAR) 100 MG TABLET    Take 100 mg by mouth daily    NIFEDIPINE (ADALAT CC) 60 MG EXTENDED RELEASE TABLET    Take 1 tablet by mouth daily    PANTOPRAZOLE (PROTONIX) 40 MG TABLET    Take 1 tablet by mouth 2 times daily (before meals)    PAROXETINE (PAXIL) 40 MG TABLET    Take 40 mg by mouth every morning    PRIMIDONE (MYSOLINE) 50 MG TABLET    Take 50 mg by mouth 2 times daily    ROPINIROLE (REQUIP) 3 MG TABLET    Take 3 mg by mouth 3 times daily       Allergies     He is allergic to erythromycin. Physical Exam     INITIAL VITALS: BP: (!) 147/73, Temp: 98 °F (36.7 °C), Pulse: 91, Resp: 24, SpO2: 98 %     General:  Well appearing, non-toxic. Appears stated age. No acute distress  Eyes:  Pupils equal, round, and reactive to light. No discharge from eyes   ENT:  No discharge from nose. Oropharynx clear. Neck:  Supple, trachea midline  Pulmonary:   Non-labored breathing. Breath sounds clear bilaterally  Cardiac:  Regular rate and rhythm. No murmurs  Abdomen:  Soft. Non-tender. Non-distended  Musculoskeletal:  No long bone deformity. No point tenderness to extremities, pelvis stable.  No anterior chest wall pain on palpation. No C spine tenderness, full ROM. Vascular:  Extremities warm and perfused. Normal pulses in all 4 extremities. Skin:  Dry, scattered abrasions to BLE. Erythema over anterior chest, nontender to palpation, no warmth  Extremities:  1+ edema to ankles  Neuro:  Alert. Moves all four extremities to command. Tremor to LUE at rest.  Psych: Appropriate mood and affect to the clinical situation     DiagnosticResults     RADIOLOGY:  CT HEAD WO CONTRAST   Final Result      No acute intracranial hemorrhage or mass effect. Mild-to-moderate atrophy and extensive chronic small vessel ischemic change with remote lacunar infarcts as detailed.                 LABS:   Results for orders placed or performed during the hospital encounter of 07/26/20   CBC Auto Differential   Result Value Ref Range    WBC 11.5 (H) 4.0 - 11.0 K/uL    RBC 3.78 (L) 4.20 - 5.90 M/uL    Hemoglobin 9.7 (L) 13.5 - 17.5 g/dL    Hematocrit 30.2 (L) 40.5 - 52.5 %    MCV 79.9 (L) 80.0 - 100.0 fL    MCH 25.6 (L) 26.0 - 34.0 pg    MCHC 32.0 31.0 - 36.0 g/dL    RDW 18.9 (H) 12.4 - 15.4 %    Platelets 429 460 - 195 K/uL    MPV 7.0 5.0 - 10.5 fL    Neutrophils % 82.6 %    Lymphocytes % 11.1 %    Monocytes % 5.4 %    Eosinophils % 0.0 %    Basophils % 0.9 %    Neutrophils Absolute 9.5 (H) 1.7 - 7.7 K/uL    Lymphocytes Absolute 1.3 1.0 - 5.1 K/uL    Monocytes Absolute 0.6 0.0 - 1.3 K/uL    Eosinophils Absolute 0.0 0.0 - 0.6 K/uL    Basophils Absolute 0.1 0.0 - 0.2 K/uL   Basic Metabolic Panel w/ Reflex to MG   Result Value Ref Range    Sodium 138 136 - 145 mmol/L    Potassium reflex Magnesium 4.3 3.5 - 5.1 mmol/L    Chloride 101 99 - 110 mmol/L    CO2 25 21 - 32 mmol/L    Anion Gap 12 3 - 16    Glucose 177 (H) 70 - 99 mg/dL    BUN 16 7 - 20 mg/dL    CREATININE 1.2 0.8 - 1.3 mg/dL    GFR Non-African American >60 >60    GFR African American >60 >60    Calcium 9.2 8.3 - 10.6 mg/dL   Hepatic Function Panel   Result Value Ref Range    Total Protein CONSULTS:  Acacia Vasquez / DOMINICK / Lonnie Stas is a 77 y.o. male with a history of type 1 diabetes and Parkinson's disease presenting after a fall. Given the history of multiple falls in the past month, following an inpatient rehabilitation admission for recurrent falls, I am concerned that he is unable to care for himself at home. We will obtain a medical work-up to evaluate for any potential sequelae of this fall versus obvious precipitants of syncope. No concern for trauma given his presentation today, no point tenderness to palpation of the extremities, C-spine nontender with full range of motion. CT of the head was obtained given the patient's age greater than 72 in setting of trauma (per [facility] HCT rules), which was unremarkable. Labs demonstrate evidence of a slight troponin bump to 0.06 which was unchanged at recheck at 2 hours. EKG without evidence of ischemia. CK was elevated at 400, which is not unexpected given suspicion for rhabdomyolysis after being on the ground for multiple hours. No evidence of acute kidney injury as an immediate sequelae of his rhabdo. BNP elevated at 1400. Patient does not report a personal history of CHF, but does demonstrate evidence of edema to the ankles bilaterally. No signs of hemodynamic instability or respiratory compromise. The patient was started on maintenance fluids given his evidence of rhabdomyolysis, but a fluid bolus was withheld given his edema and elevated BNP. We will plan admit him to the medicine service for placement in a rehabilitation facility given his inability to care for himself independently at home in the setting of these multiple falls. This patient was also evaluated by the attending physician. All care plans were discussed and agreed upon. Clinical Impression     1.  Fall, initial encounter        Disposition     PATIENT REFERRED TO:  No follow-up provider specified.     DISCHARGE MEDICATIONS:  New Prescriptions    No medications on file       DISPOSITION  07/26/2020 03:38:10 PM       Westley Bae, MD  Resident  07/26/20 1617       Westley Bae, MD  Resident  07/26/20 1879

## 2020-07-27 NOTE — DISCHARGE SUMMARY
INTERNAL MEDICINE    RESIDENT DISCHARGE SUMMARY   Discharge Summaries      Patient ID: Martita Tejada   Gender: male      :  1950  AGE: 77 y.o. MRN:  [id number]  Code Status: Full Code  PCP: Tarah MOYER    Admit date:  2020      Discharge date:  20    Admitting Physician:  Srini Morataya MD    Discharge Physician: Raquel Villeda MD     Discharge Diagnoses:   - Fall w/ Possible LOC: Feel likely 2/2 to hypoglycemia:  - Type 1 DM:  - Rhabdomyolysis (resolved):  - Leukocytosis(Resolved): - Elevated troponin(stable):  - Parkinson's Disease:  - HTN:  - Asthma:  - Iron Deficiency Anemia:  - HLD:  - Hx of gastric Ulcer: The patient was seen and examined on day of discharge and this discharge summary is in conjunction with any daily progress note from day of discharge. Hospital Course:    Patient is a 77year old male with a history of asthma, HTN, T1DM, and Parkinson's disease who presented to the [facility] ED on  following a fall on the night of . He fell on the floor around 10pm and was down until being found around 8am in the morning. He states that he remembers feeling dizzy, shaky, and a little nauseous prior to losing balance and falling. Patient normally He also states taking 40 units of novolin around noon prior to coming to the ED. In the ED, patient had CXR which showed both acute and chronic left rib fractures, CT head without contrast which showed no acute hemorrhage. EKG was NSR. Around midnight on , patient was found unresponsive with right-sided facial droop with a POC glucose of 18. He received 1 amp dextrose and put on D5. CT head was performed and was negative. EKG was repeated and was also NSR. Patient did not remember this incident but felt well a few hours later and denied any symptoms. Orthostatics were negative. Patient had recent echo in late April which showed normal LVSF w/out evidence of valvular disease.  Endocrine was consulted. Pt is stable. SNF was recommended for him. However, Pt wants to be discharged home. Chronic conditions:    Parkinson's Disease: Carbidopa-levadopa, Ropinirole     HTN: Losartan, Nifedipine      Asthma: prn Albuterol     HLD:  atorvastatin     Psych: Paroxetine     Hx of gastric Ulcer: H pylori neg, protonix    Disposition: home    Physical Exam Performed:     BP (!) 178/79   Pulse 85   Temp 98.3 °F (36.8 °C) (Oral)   Resp 16   Ht 5' 8\" (1.727 m)   Wt 185 lb (83.9 kg)   SpO2 96%   BMI 28.13 kg/m²     Physical Exam     General appearance: Resting comfortably, no apparent distress  HEENT EOMI, PERRL, no LAD, no oropharyngeal erythema, MMM  Lungs: CTAB, no wheezes, rhonchi or rales  Heart: RRR +B3/N9, systolic ejection murmur, No rubs or gallops  Abdomen: Soft, NTND, without rigidity or guarding, normal active bowel sounds  Extremities: No peripheral edema, good peripheral pulses  Skin: No skin rashes  Neurologic: AAOx4, CNs II-XII grossly intact, Patient with resting tremor worse in the left arm. Labs: For convenience and continuity at follow-up the following most recent labs are provided:      CBC:    Lab Results   Component Value Date    WBC 6.9 07/28/2020    HGB 8.8 07/28/2020    HCT 26.5 07/28/2020     07/28/2020       Renal:    Lab Results   Component Value Date     07/28/2020    K 4.2 07/28/2020    K 4.3 07/26/2020     07/28/2020    CO2 21 07/28/2020    BUN 12 07/28/2020    CREATININE 1.1 07/28/2020    CALCIUM 8.3 07/28/2020    PHOS 3.2 07/28/2020         Significant Diagnostic Studies    Radiology:   CT HEAD WO CONTRAST   Final Result   No substantial change from the prior study. If acute    intracranial infarction remains a concern, MRI may help to further    evaluate if patient is a candidate. XR CHEST PORTABLE   Final Result      No acute pulmonary disease.    Acute mildly displaced left eighth and ninth lateral rib fractures in addition to multiple chronic left rib fractures. CT HEAD WO CONTRAST   Final Result      No acute intracranial hemorrhage or mass effect. Mild-to-moderate atrophy and extensive chronic small vessel ischemic change with remote lacunar infarcts as detailed. Consults:      IP CONSULT TO HOSPITALIST  IP CONSULT TO CASE MANAGEMENT  IP CONSULT TO ENDOCRINOLOGY  IP CONSULT TO HOME CARE NEEDS      Discharge Instructions/Follow-up: Follow up with PCP in a week. Follow up with endocrinologist in a week. - insulin glargine 20 U in the morning  - insulin aspart 8 U TID      Activity: activity as tolerated    Diet: Low Na diet. Low carb diet.     Discharge Medications:     Current Discharge Medication List           Details   vitamin D3 400 UNIT TABS tablet Take 2 tablets by mouth daily  Qty: 60 tablet, Refills: 0      insulin glargine (LANTUS;BASAGLAR) 100 UNIT/ML injection pen Inject 20 Units into the skin every morning  Qty: 5 pen, Refills: 3      insulin aspart (NOVOLOG FLEXPEN) 100 UNIT/ML injection pen Inject 8 Units into the skin 3 times daily (before meals)  Qty: 5 pen, Refills: 3      Insulin Pen Needle ([employer] P[employer] NEED[employer] 29G) 29G X 12MM MISC 1 each by Does not apply route daily  Qty: 100 each, Refills: 3              Details   losartan (COZAAR) 100 MG tablet Take 100 mg by mouth daily      pantoprazole (PROTONIX) 40 MG tablet Take 1 tablet by mouth 2 times daily (before meals)  Qty: 30 tablet, Refills: 3      NIFEdipine (ADALAT CC) 60 MG extended release tablet Take 1 tablet by mouth daily  Qty: 30 tablet, Refills: 3      PARoxetine (PAXIL) 40 MG tablet Take 40 mg by mouth every morning      rOPINIRole (REQUIP) 3 MG tablet Take 3 mg by mouth 3 times daily      primidone (MYSOLINE) 50 MG tablet Take 50 mg by mouth 2 times daily      carbidopa-levodopa (SINEMET)  MG per tablet Take 2 tablets by mouth 3 times daily       acetaminophen (TYLENOL) 500 MG tablet Take 1 tablet by mouth 4 times daily as needed for Pain  Qty: 360 tablet, Refills: 1      atorvastatin (LIPITOR) 10 MG tablet Take 10 mg by mouth daily      albuterol (PROVENTIL HFA;VENTOLIN HFA) 108 (90 BASE) MCG/ACT inhaler Inhale 2 puffs into the lungs every 6 hours as needed. Time Spent on discharge is more than 30 minutes in the examination, evaluation, counseling and review of medications and discharge plan.       Signed:    Luis Angel Jaimes MD   Internal Medicine Resident, PGY-1  7/28/2020

## 2020-07-27 NOTE — CARE COORDINATION
Case Management Assessment           Initial Evaluation                Date / Time of Evaluation: 7/27/2020 5:36 PM                 Assessment Completed by: Shiraz Cisneros    Patient Name: Dragan Edward     YOB: 1950  Diagnosis: Fall at home, sequela [W19. XXXS, W50.600]  Fall at home, sequela [W19. Howard Diaz, S05.339]     Date / Time: 7/26/2020  1:29 PM    Patient Admission Status: Inpatient    If patient is discharged prior to next notation, then this note serves as note for discharge by case management. Current PCP: [address] Patient: No    Chart Reviewed: Yes  Patient/ Family Interviewed: Yes    Initial assessment completed at bedside with: pt    Hospitalization in the last 30 days: No    Emergency Contacts:  Extended Emergency Contact Information  Primary Emergency Contact: Colleen Phone: [phone]  Relation: Child  Secondary Emergency Contact: Rose Sherice  Tapad Phone: [phone]  Relation: Domestic Partner  Preferred language: English   needed? No    Advance Directives:   Code Status: Full Code    Healthcare Power of : No      Financial  Payor: Vera Morris / Plan: MEDICARE PART A AND B / Product Type: *No Product type* /     Pre-cert required for SNF: No    Pharmacy    [facility] [address], [address] [facility] [phone] Christin Antony [phone]  [address] [address]  Phone: [phone] Fax: [phone], [place] [address] [phone] Christin Antony [phone]  [address] [place] [address]. [address] [address]  Phone: [phone] Fax: [phone]      Potential assistance Purchasing Medications:    Does Patient want to participate in local refill/ meds to beds program?:      Meds To Beds General Rules:  1. Can ONLY be done Monday- Friday between 8:30am-5pm  2. Prescription(s) must be in pharmacy by 3pm to be filled same day  3. Copy of patient's insurance/ prescription drug card SNF at [place] and pt refused will not entertain at this time. States friend will drive home. Cont working on Blood glucose management    The Plan for Transition of Care is related to the following treatment goals of Fall at home, sequela [W19. XXXS, L43.287]  Fall at home, sequela [W19. Phyllistine Omar, X87.106]    The Patient and/or patient representative Sidra Flanagan and his family were provided with a choice of provider and agrees with the discharge plan Yes    Freedom of choice list was provided with basic dialogue that supports the patient's individualized plan of care/goals and shares the quality data associated with the providers.  Yes    Care Transition patient: No    Mamie Jaramillo RN  [facility]  Case Management Department  Ph: [phone]   Fax: [phone]

## 2020-07-27 NOTE — PROGRESS NOTES
Pt is alert and oriented. bp elevated this am. Scheduled bp medications given. Pt FS currently stable at 125. Scattered bruising. Tolerating diet. No facial droop noted. Pt is positive for tremors. Will continue to monitor.

## 2020-07-27 NOTE — TELEPHONE ENCOUNTER
Pt Consult  Dr. Faisal Holman ordering  [facility] [phone]   Room 7193  Type 1 Diabetes poorly controled

## 2020-07-27 NOTE — PROGRESS NOTES
Physical Therapy    Facility/Department: [address] [place] [address] [address] [facility]  Initial Assessment and Treatment    NAME: Ashley Rodriguez  : 1950  MRN: [id number]    Date of Service: 2020    Discharge Recommendations:  Ashley Rodriguez scored a  17/24 on the AM-PAC short mobility form. Current research shows that an AM-PAC score of 17 or less is typically not associated with a discharge to the patient's home setting. Based on the patient's AM-PAC score and their current functional mobility deficits, it is recommended that the patient have 3-5 sessions per week of Physical Therapy at d/c to increase the patient's independence. Please see assessment section for further patient specific details. If patient discharges prior to next session this note will serve as a discharge summary. Please see below for the latest assessment towards goals. Patient would benefit from continued therapy after discharge   PT Equipment Recommendations  Equipment Needed: No    Assessment   Assessment: Pt is 76 yo M with decreased overall mobility and function. Pt requires cues for safe mobility. Pt loses his balance with turns and backward gait. Rec continued inpt PT at d/c. Will follow. Treatment Diagnosis: Decreased functional mobility  Prognosis: Good  Decision Making: Medium Complexity  PT Education: PT Role;General Safety  Patient Education: Pt verbalized understanding  REQUIRES PT FOLLOW UP: Yes  Activity Tolerance  Activity Tolerance: Patient Tolerated treatment well       Patient Diagnosis(es): The encounter diagnosis was Fall, initial encounter. has a past medical history of Asthma, Diabetes mellitus ([address] Hypertension, and Parkinson disease ([address] has a past surgical history that includes Upper gastrointestinal endoscopy (N/A, 5/15/2020) and Upper gastrointestinal endoscopy (N/A, 5/15/2020). Restrictions  Position Activity Restriction  Other position/activity restrictions:  Up with assist     Vision/Hearing  Vision: Impaired  Vision Exceptions: Wears glasses at all times  Hearing: Within functional limits       Subjective  General  Chart Reviewed: Yes  Additional Pertinent Hx: Pt admitted on 7/26/20 with fall at home. H/o HTN, DM, asthma, Parkinsons.   Family / Caregiver Present: No  Referring Practitioner: Dr. Arden Beck  Diagnosis: Fall at home  Subjective  Subjective: Pt supine in bed and agreeable to PT  Pain Screening  Patient Currently in Pain: Denies     Orientation  Orientation  Overall Orientation Status: Within Normal Limits     Social/Functional History  Social/Functional History  Lives With: Alone  Type of Home: House  Home Layout: Two level(lives on the first floor- everything on the first floor)  Home Access: Stairs to enter with rails  Entrance Stairs - Number of Steps: 4-5 ANNMARIE  Entrance Stairs - Rails: Both  Bathroom Shower/Tub: Walk-in shower  Bathroom Toilet: Standard  Bathroom Equipment: Shower chair, Grab bars in shower(grab bars next to toilet)  Home Equipment: [employer], 4 wheeled walker, Rolling walker, Reacher  Receives Help From: Neighbor, Family(help from [employer], daughter, neighbors)  ADL Assistance: Independent  Homemaking Assistance: Independent  Homemaking Responsibilities: Yes(Pt reported he completes the homemaking, but hasn't done it in a while.)  Ambulation Assistance: Needs assistance(RW/ rollator)  Transfer Assistance: Needs assistance  Active : Yes  Occupation: Retired  Type of occupation: GE  Additional Comments: 4-5 Falls within the last couple weeks    Objective  AROM RLE (degrees)  RLE AROM: WNL  AROM LLE (degrees)  LLE AROM : WNL     Strength RLE  Strength RLE: WFL  Strength LLE  Strength LLE: WFL     Bed mobility  Supine to Sit: Stand by assistance(HOB elevated using rail)  Scooting: Stand by assistance     Transfers  Sit to Stand: Minimal Assistance  Stand to sit: Minimal Assistance  Comment: Cues for safe and correct hand placement     Ambulation 1  Device: [address]: Contact guard assistance;Minimal assistance  Quality of Gait: Wide SHAGGY  Gait Deviations: Slow Iliana;Decreased step length  Distance: 60 feet and 12 feet x 2     Balance  Sitting - Static: Good  Standing - Static: Poor(Posterior lean at times in stance)  Standing - Dynamic: Poor;-(LOB with turns and backward gait)      Treatment:  Functional mobility training and pt education    Plan   Plan  Times per week: 2-5  Current Treatment Recommendations: Balance Training, Functional Mobility Training, Gait Training, Strengthening, ROM, Safety Education & Training  Safety Devices  Type of devices: Call light within reach, Chair alarm in place, Left in chair, Nurse notified    Goals  Short term goals  Time Frame for Short term goals: by discharge  Short term goal 1: Bed mobility with supervision  Short term goal 2: Sit to stand with CGA  Short term goal 3: Pt will ambulate 80 feet with wheeled walker and CGA     Therapy Time   Individual Concurrent Group Co-treatment   Time In 1040         Time Out 1139         Minutes 59           Timed Code Treatment Minutes:   44    Total Treatment Minutes:  [address], PT

## 2020-07-27 NOTE — PROGRESS NOTES
Pt was found diaphoretic unresponsive to voice but pain, BS double checked and it was 18 at 2354, pt was given 1 amp dextrose (see MAR) and IV D5 given at 100ml/hr, repeat  at 0010. Pt R sided facial droop noted, so STROKE ALERT called. Residents came eval. Pt responded when MD called his name. VS taken w elevated BP noted (see flowsheet), repeat  at 0021. Pt was transported to CT head. In CT, pt was alert and started talking a little. Pt came back to room, was cleaned up. New IV placed and pt was hooked back up to D5 infusion. Repeat BS 59 at 0115, pt was given glucose gel (see MAR), BS came up to 73 at 0035. Meds crushed w apple sauce and given w/o complications. Pt alert, asking to use urinal. All needs met. Pt in bed resting w bed alarm on and camera in place d/t hx falls and seizures. Will continue to monitor pt and his BS.

## 2020-07-27 NOTE — DISCHARGE SUMMARY
[address] [facility] SUMMARY   Discharge Summaries      Patient ID: Estelle Calixto   Gender: male      :  1950  AGE: 77 y.o. MRN:  [id number]  Code Status: Full Code ***  PCP: Jossy MOYER    Admit date:  2020      Discharge date:  No discharge date for patient encounter. Admitting Physician:  Richar Ortiz MD      Admission Condition:  {condition:78543}  Discharged Condition:  {condition:71623} Blossom Matute Patient Condition:[id number]}    Discharge Diagnoses: Active Hospital Problems    Diagnosis Date Noted    Fall at home, sequela [W19. Cristina Napier, A10.705] 2020       The patient was seen and examined on day of discharge and this discharge summary is in conjunction with any daily progress note from day of discharge. Hospital Course:    Patient is a 77year old male with a history of asthma, htn, T1DM, and Parkinson disease who came to the ED on  following a fall on the night of . He fell on the floor around 10pm and was down until being found around 8am in the morning. He states that he remembers feeling dizzy and shaky prior to losing balance and falling. He also states taking 40 units of novolin around noon prior to coming to the ED. Following presentation, he underwent CXR, CT head without contrast, and EKG. No signs of acute hemorrhage were detected on CT and CXR showed left 8-9th lateral rib fractures in addition to multiple chronic left rib fractures. EKG was negative. Around midnight  patient was found unresponsive with right-sided facial droop with a POC glucose of 18. He received 1 amp dextrose and put on D5. CT head was performed and was negative. EKG was performed and was also negative. Patient did not remember this incident but felt well a few hours later and denied any symptoms. Orthostatics were negative. Patient met with endocrinology and discussed switching insulin regimen.          Disposition: {DISPOSITIONS:321750916}    Physical Exam Performed:     BP (!) 163/75   Pulse 81   Temp 98.1 °F (36.7 °C) (Oral)   Resp 15   Ht 5' 8\" (1.727 m)   Wt 185 lb (83.9 kg)   SpO2 98%   BMI 28.13 kg/m²     Physical Exam    Labs: For convenience and continuity at follow-up the following most recent labs are provided:      CBC:    Lab Results   Component Value Date    WBC 6.9 07/28/2020    HGB 8.8 07/28/2020    HCT 26.5 07/28/2020     07/28/2020       Renal:    Lab Results   Component Value Date     07/28/2020    K 4.2 07/28/2020    K 4.3 07/26/2020     07/28/2020    CO2 21 07/28/2020    BUN 12 07/28/2020    CREATININE 1.1 07/28/2020    CALCIUM 8.3 07/28/2020    PHOS 3.2 07/28/2020         Significant Diagnostic Studies    Radiology:   CT HEAD WO CONTRAST   Final Result   No substantial change from the prior study. If acute    intracranial infarction remains a concern, MRI may help to further    evaluate if patient is a candidate. XR CHEST PORTABLE   Final Result      No acute pulmonary disease. Acute mildly displaced left eighth and ninth lateral rib fractures in addition to multiple chronic left rib fractures. CT HEAD WO CONTRAST   Final Result      No acute intracranial hemorrhage or mass effect. Mild-to-moderate atrophy and extensive chronic small vessel ischemic change with remote lacunar infarcts as detailed.                    Consults:     IP CONSULT TO HOSPITALIST  IP CONSULT TO CASE MANAGEMENT  IP CONSULT TO ENDOCRINOLOGY  IP CONSULT TO HOME CARE NEEDS      Discharge Instructions/Follow-up:  ***    Activity: activity as tolerated    Diet: {diet:89488}    Discharge Medications:     Current Discharge Medication List           Details   insulin lispro, 1 Unit Dial, 100 UNIT/ML SOPN Inject 7 Units into the skin 3 times daily (with meals)  Qty: 6.3 mL, Refills: 0      vitamin D3 400 UNIT TABS tablet Take 2 tablets by mouth daily  Qty: 60 tablet, Refills: 0

## 2020-07-27 NOTE — PLAN OF CARE
Problem: Falls - Risk of:  Goal: Will remain free from falls  Description: Will remain free from falls  Outcome: Ongoing  Note: Pt bed in low position and alarm on. Non skid socks on. Belongings, bedside table, and call light within reach. 2/4 side rails up. Will continue to monitor. Problem: Skin Integrity:  Goal: Will show no infection signs and symptoms  Description: Will show no infection signs and symptoms  Outcome: Ongoing  Note: Will continue monitor pt vs, labs, skin, and other indictors of infection.

## 2020-07-27 NOTE — SIGNIFICANT EVENT
- Code stroke called to room 5303 at 00:15 AM.  - ICU team bedside within 2 minute. - Mr Erick Ulloa, 78 y/o, w/ PMHx of parkinson was found after an episode of a fall 2 days ago. - Code stroke called for right sided facial droop and unresponsiveness. When we arrived the nurse reported POC glucose of 18 and she administered D 50 IV solution. The patient was laying in bed w/ his eyes closed. He did not respond while we tried to communicate w/ him (unchanged from previous mental status). (+) Babinski b/l, lungs were clear to auscultation b/l and no murmur heard. CT head w/o contrast ordered. Q1h glucose checks. Hypoglycemic protocol. Holding prandial and long acting insulin.  stroke team were contacted and they recommended spot EEG to rule out seizure activity and tPA is not appropriate at the time given most likely etiology of hypoglycemia. - Most likely: seizure 2/2 hypoglycemia. - 30 min of critical care spent. - Attending physician Dr. Samson Gallegos supervising.

## 2020-07-27 NOTE — CONSULTS
Endocrinology Consult Note      Reason for consult    Poorly controlled DM    HPI    This 77 yrs old male was admitted to [facility] on 07/26/20 after a fall. Was found to have a sugar of 18 last night. He was seen by me previously in 06/20 when he was admitted for DKA.        Had several hospitalizations for DKA, hypoglycemia.         He has a history of Type 1 Diabetes, Parkinson's Disease, HTN, HLD, and CKD.     Was diagnosed with Type 1 DM at the age of 30  Was on oral medications for a year and then switched to insulin    No retinopathy, nephropathy, neuropathy as per patient    He is on  NPH 40 units in A.M. Was discharged home on Lantus 20 units QHS and humalog 8 units with meals in 06/20    Presented to ER in 07/20 with another episode of hypoglycemia. PCP resumed Novolin N 40 units QAM.     He took 40 units yesterday afternoon . Had an episode of severe hypoglycemia last night. Sugar dropped to 18. Was given D50 , sugars improved over night.    he is eating and tolerating food well.        Denies nausea , vomiting, fever, abdominal pian, chest pain , SOB. PMH    Past Medical History:   Diagnosis Date    Asthma     Diabetes mellitus ([facility]     Hypertension     Parkinson disease ([facility]        [address]    Past Surgical History:   Procedure Laterality Date    UPPER GASTROINTESTINAL ENDOSCOPY N/A 5/15/2020    EGD CONTROL HEMORRHAGE performed by Rene Flores MD at [address] [facility] 5/15/2020    EGD BIOPSY performed by Rene Flores MD at [address] [place] [address] History    Currently living in home. Was in NH and rehab units in the past.     Review of systems. As per HPI. 10 system reviewed.        Physical Exam    AA0 x 3    Vitals    Vitals:    07/27/20 1552   BP:    Pulse:    Resp: 16   Temp:    SpO2: 96%         CVSL:  S1+S2+0    Lungs:  CTAB    Abdomen :  Soft, Non-tender    Limbs: No edema                 Assessment/Plan    1. Type 1 DM     This 77 yrs old male has long standing h/o type 1 DM     A1c 6.8 % in 04/20       c-peptide was very low in 06/20 consistent with type 1 DM/insulinopenia.      Has h/o DKA and significant hypoglycemic episodes with multiple hospitalizations.         Given h/o type 1 DM and episodes of significant hypoglycemia, will recommend avoiding NPH insulin.      It was changed during his last hospitalization in 06/20 but he resumed Novolin N again . He presented after a fall and had an episode of significant hypoglycemia last night. Sugar running high today in 200-300 range.        -Will recommend Lantus 15 units QAM ( was given first dose today )      -Will recommend Humalog 5 units TID with meals.      -Continue low dose SSI sliding scale.      Will adjust dose based on sugars. Should be discharged home on Lantus and Humalog insulin    Should have outpatient follow-up with endocrinology. May see Endocrinology at [facility] as his PCP is at [facility] . Discussed with Dr. Molly De León. He would call PCP at discharge to avoid changes in his insulin regimen in the future.

## 2020-07-27 NOTE — PROGRESS NOTES
Occupational Therapy   Occupational Therapy Initial Assessment/Treatment  Date: 2020   Patient Name: Tom Copeland  MRN: [id number]     : 1950    Date of Service: 2020    Discharge Recommendations: Tom Copeland scored a 15/24 on the AM-PAC ADL Inpatient form. Current research shows that an AM-PAC score of 17 or less is typically not associated with a discharge to the patient's home setting. Based on the patient's AM-PAC score and their current ADL deficits, it is recommended that the patient have 3-5 sessions per week of Occupational Therapy at d/c to increase the patient's independence. Please see assessment section for further patient specific details. If patient discharges prior to next session this note will serve as a discharge summary. Please see below for the latest assessment towards goals. OT Equipment Recommendations  Equipment Needed: Yes(Possible sock aid, shoe horn)    Assessment   Performance deficits / Impairments: Decreased functional mobility ; Decreased ADL status; Decreased endurance;Decreased strength;Decreased balance;Decreased posture;Decreased coordination  Assessment: Pt presents with a decline in functional independence. Pt is from home where he lives alone. He recieves help from neighbors and family, but not able to be 24/7 assist. Feel pt would benefit from further OT services. Treatment Diagnosis: Decreased functional mobility, ADL, endurance, strength, balance, posture, and coordination. Prognosis: Good  Decision Making: Medium Complexity  OT Education: OT Role;Plan of Care  Patient Education: Educated pt on OT role and POC. Pt demonstrated understanding and in agreement. May need reinforcement. REQUIRES OT FOLLOW UP: Yes  Activity Tolerance  Activity Tolerance: Patient Tolerated treatment well;Patient limited by fatigue  Activity Tolerance: Pt tolerated therapy session well.  Pt aware of fatigue and requested a seated recovery break post standing grooming routine at the sink. As pt fatigued, his posture became slouched and he sped his shuffling gait up. This makes him an increased fall risk. Safety Devices  Safety Devices in place: Yes  Type of devices: Chair alarm in place;Call light within reach; Left in chair;Nurse notified             Treatment Diagnosis: Decreased functional mobility, ADL, endurance, strength, balance, posture, and coordination. Restrictions  Position Activity Restriction  Other position/activity restrictions: Up with assist    Subjective   General  Chart Reviewed: Yes, Orders  Additional Pertinent Hx: Admit on 7/26 for a fall. Pertinent tests: CT Head (7/26): No acute intracranial hemorrhage or mass effect. Mild-to-moderate atrophy and extensive chronic small vessel ischemic change with remote lacunar infarcts as detailed. CT Head (7/27): No substantial change from the prior study. If acute intracranial infarction remains a concern, MRI may help to further evaluate if patient is a candidate. Chest XR: No acute pulmonary disease. Acute mildly displaced left eighth and ninth lateral rib fractures in addition to multiple chronic left rib fractures. EKG: Normal. PMH: Asthma, Diabetes mellitus ([address] Hypertension, and Parkinson disease ([address] Past surgrical hx: Upper gastrointestinal endoscopy (5/15/2020)  Referring Practitioner: Jose Juan Abraham MD  Diagnosis: Fall at home, sequela  Subjective  Subjective: Pt found supine in bed upon entry.  Pt reported he has had several falls in the past couple weeks  Vital Signs  Temp: 98.3 °F (36.8 °C)  Temp Source: Oral  Pulse: 72  Heart Rate Source: Monitor  Resp: 16  BP: (!) 160/76  BP Location: Right upper arm  BP Upper/Lower: Upper  MAP (mmHg): 104  Patient Position: Sitting  Level of Consciousness: Alert  Oxygen Therapy  SpO2: 96 %  O2 Device: None (Room air)  Social/Functional History  Social/Functional History  Lives With: Alone  Type of Home: House  Home Layout: Two level(lives on the first floor- everything on the first floor)  Home Access: Stairs to enter with rails  Entrance Stairs - Number of Steps: 4-5 ANNMARIE  Entrance Stairs - Rails: Both  Bathroom Shower/Tub: Walk-in shower  Bathroom Toilet: Standard  Bathroom Equipment: Shower chair, Grab bars in shower(grab bars next to toilet)  Home Equipment: Starks Luis, 4 wheeled walker, Rolling walker, Reacher  Receives Help From: Neighbor, Family(help from [employer], daughter, neighbors)  ADL Assistance: Independent  Homemaking Assistance: Independent  Homemaking Responsibilities: Yes(Pt reported he completes the homemaking, but hasn't done it in a while.)  Ambulation Assistance: Needs assistance(RW/ rollator)  Transfer Assistance: Needs assistance  Active : Yes  Occupation: Retired  Type of occupation: GE  Additional Comments: 4-5 Falls within the last couple weeks       Objective   Vision: Impaired  Vision Exceptions: Wears glasses at all times  Hearing: Within functional limits    Orientation  Overall Orientation Status: Impaired  Orientation Level: Oriented to person;Oriented to time;Disoriented to place;Oriented to situation  Observation/Palpation  Posture: Fair  Balance  Sitting Balance: Contact guard assistance  Standing Balance: Minimal assistance(use of grab bar, RW, sink)  Standing Balance  Time: ~10 secs x4, ~4 mins  Activity: Toileting Activity, Grooming Activity  Comment: Pt demonstrated 2 LOB posterior leans during toileting activity (pulling up brief) and during grooming activity at the sink.   Functional Mobility  Functional - Mobility Device: Rolling Walker  Activity: To/from bathroom  Assist Level: Minimal assistance(to CGA)  Functional Mobility Comments: Pt demonstrated wide SHAGGY and slow shuffle with decreased step length  Toilet Transfers  Toilet - Technique: Ambulating  Equipment Used: Standard toilet(grab bars)  Toilet Transfer: Minimal assistance(cues for handplacement, use of grab bars, RW)  ADL  Feeding: None  Grooming: Minimal assistance(Min A- brushing teeth - assist with opening toothpaste, applying to brush. CGA - wash hands, wash face with wash cloth, rinse mouth out with water.)  UE Dressing: Minimal assistance(Doff/don gown)  LE Dressing:  Moderate assistance(to Min - Min - fixing socks, Min-Mod - doff diaper while seated on toilet, don brief while standing by toilet)  Toileting: Minimal assistance(BM and urinate in toilet, Min A to wipe after BM)  Tone RUE  RUE Tone: Hyperreflexive  Tone LUE  LUE Tone: Hyperreflexive  Coordination  Movements Are Fluid And Coordinated: No  Coordination and Movement description: Intention tremors     Bed mobility  Supine to Sit: Stand by assistance(HOB elevated using rail)  Scooting: Stand by assistance  Transfers  Stand Step Transfers: Minimal assistance  Sit to stand: Minimal assistance(Cues for safe and correct hand placement)  Stand to sit: Minimal assistance(Cues for safe and correct hand placement, slow descent)     Cognition  Overall Cognitive Status: WFL                 LUE AROM (degrees)  LUE AROM : WFL  Left Hand AROM (degrees)  Left Hand AROM: WFL  RUE AROM (degrees)  RUE AROM : WFL  Right Hand AROM (degrees)  Right Hand AROM: WFL  LUE Strength  Gross LUE Strength: WFL  RUE Strength  Gross RUE Strength: WFL     Hand Dominance  Hand Dominance: Right(says he is ambidextrous)             Plan   Plan  Times per week: 2-5  Current Treatment Recommendations: Strengthening, Balance Training, Functional Mobility Training, Endurance Training, Self-Care / ADL      AM-PAC Score        AM-[facility] Inpatient Daily Activity Raw Score: 15 (07/27/20 1338)  AM-PAC Inpatient ADL T-Scale Score : 34.69 (07/27/20 1338)  ADL Inpatient CMS 0-100% Score: 56.46 (07/27/20 1338)  ADL Inpatient CMS G-Code Modifier : CK (07/27/20 1338)    Goals  Short term goals  Time Frame for Short term goals: by discharge  Short term goal 1: Transfer to/from toilet with CGA  Short term goal 2: Stance with CGA for 6 mins while engaging in ADL/functional task  Short term goal 3: Bed mobility with Supervision       Therapy Time   Individual Concurrent Group Co-treatment   Time In 1039         Time Out 1132         Minutes 53         Timed Code Treatment Minutes: 38 Minutes   Total Treatment Time Minutes: Cony, S/OT

## 2020-07-27 NOTE — PROGRESS NOTES
Internal Medicine  Progress Note      Hospital Day: 2                                                      Admit Date: 7/26/2020                                     PCP: Kapil MOYER      CC: Fall    Interval Hx:     Patient is a 77year old male with a history of asthma, htn, type I diabetes, and Parkinson disease who came to the ED after a fall on the night of 7/25. After being admitted, he was found unresponsive around midnight 7/27 and was found to have a POC glucose of 18. He states that during the overnight hypoglycemic episode he did not experience any shaking or dizziness that preceded his fall. Today he states that he feels well ever since his hypoglycemic episode. He does not report any pain from the fall. Review of Systems   Constitutional: Negative for fatigue. HENT: Negative for hearing loss. Eyes: Positive for visual disturbance (when his sugars get high). Endocrine: Negative for polydipsia. Genitourinary: Negative for difficulty urinating. Musculoskeletal: Negative for myalgias. Neurological: Positive for dizziness (during fall) and weakness. Seizures: during fall.          Medications:    Scheduled Meds:   insulin lispro  5 Units Subcutaneous TID     [START ON 7/28/2020] ferrous sulfate  325 mg Oral Daily with breakfast    insulin glargine  15 Units Subcutaneous QAM    vitamin D3  1,000 Units Oral Daily    carbidopa-levodopa  2 tablet Oral TID    PARoxetine  40 mg Oral QAM    primidone  50 mg Oral BID    rOPINIRole  3 mg Oral TID    sodium chloride flush  10 mL Intravenous 2 times per day    enoxaparin  40 mg Subcutaneous Daily    insulin lispro  0-3 Units Subcutaneous Nightly    NIFEdipine  60 mg Oral Daily    pantoprazole  40 mg Oral BID AC    insulin lispro  0-6 Units Subcutaneous TID     dextrose          Continuous Infusions:   dextrose 5 % and 0.45 % NaCl 100 mL/hr at 07/27/20 0241    dextrose      sodium chloride       PRN Meds:albuterol, sodium chloride flush, acetaminophen **OR** acetaminophen, polyethylene glycol, glucose, dextrose, glucagon (rDNA), dextrose    Allergies: Allergies   Allergen Reactions    Erythromycin        Vitals:   Vitals:    07/27/20 0345 07/27/20 0751 07/27/20 1136 07/27/20 1205   BP: (!) 155/70 (!) 169/83 (!) 175/86    Pulse: 77 81 83    Resp: 18 18 18 16   Temp: 97.9 °F (36.6 °C) 97.9 °F (36.6 °C) 98 °F (36.7 °C)    TempSrc: Oral Oral Oral    SpO2: 97%   96%   Weight:       Height:            I/O:      Intake/Output Summary (Last 24 hours) at 7/27/2020 1121  Last data filed at 7/27/2020 1006  Gross per 24 hour   Intake 360 ml   Output 275 ml   Net 85 ml         Physical Exam  Constitutional:       General: He is not in acute distress. Cardiovascular:      Rate and Rhythm: Normal rate and regular rhythm. Heart sounds: Murmur (systolic) present. Abdominal:      Palpations: Abdomen is soft. Musculoskeletal:      Right lower leg: Edema (1+) present. Left lower leg: Edema (1 +) present. Neurological:      Mental Status: He is alert. Cranial Nerves: No cranial nerve deficit. Motor: Weakness (5/5 bilateral upper extremities, 4/5 bilateral lower extremities) present. Coordination: Finger-Nose-Finger Test abnormal and Heel to RUST Test abnormal.         No results for input(s): PHART, HYT5PMS, PO2ART in the last 72 hours. DATA:       Labs  CBC:   Recent Labs     07/26/20  1355 07/27/20  0417   WBC 11.5* 7.9   HGB 9.7* 8.7*   HCT 30.2* 26.4*    265       BMP:   Recent Labs     07/26/20  1355 07/27/20  0417    144   K 4.3 4.1    107   CO2 25 23   BUN 16 13   CREATININE 1.2 1.0   GLUCOSE 177* 130*     LFT's:   Recent Labs     07/26/20  1355   AST 41*   ALT <5*   BILITOT 0.6   ALKPHOS 112     Troponin:   Recent Labs     07/26/20  1355 07/26/20  1541 07/26/20  2232   TROPONINI 0.06* 0.06* 0.06*     BNP: No results for input(s): BNP in the last 72 hours.   INR: No results for daily      Code Status:Full Code  FEN: DIET GENERAL;  PPX: ***  DISPO: ***  PT/OT Eval Status: ***    I will discuss the patient with Cece Rodriguez MD  -----------------------------  Joe Gomez  MS3

## 2020-07-27 NOTE — PROGRESS NOTES
Resident Progress Note      Admit Date: 2020    PCP: Robin Liriano                  : 1950  MRN: [id number]    CC:   Chief Complaint   Patient presents with    Fall     fall last night. on floor from 10p-8am       Subjective:     Patient further describes the fall prior to his admission. Reports that he felt shakiness, a little lightheaded, and nauseous and then he went down. Patient has offered differing answers as to whether he lost consciousness. He has had similar episodes in the past. Denies tongue-biting, urinary or fecal incontinence (except once), denies confusion after the episode. Patient was found diaphoretic and unresponsive overnight. His Blood sugar was 18. Received 1 amp dextrose and put on D5. Right-Sided facial droop was noted. CT head was negative. Patient reports that he does not remember this incident and that he did not feel any symptoms prior. Patient reports that PCP typically follows his blood sugars. Patient reports that his blood sugars at home \"tend to be on the low side\", reports that they are typically in the 60s and 70s, but occasionally drop into the 20s. However, they are also sometimes high. He reports blurry vision when his blood sugars get high. This morning the patient feels well. Denies any symptoms. Orthostatics today were negative. Review of Systems   Constitutional: Negative for chills, diaphoresis and fever. Respiratory: Negative for cough, chest tightness, shortness of breath and wheezing. Cardiovascular: Negative for chest pain and palpitations. Gastrointestinal: Negative for abdominal pain, constipation, diarrhea, nausea and vomiting. Genitourinary: Negative for dysuria and frequency. Neurological: Negative for dizziness and light-headedness. HPI:  You Dejesus is a 77year old male with history of Asthma, HTN, DM1, Parkinson presented to ED today after an episode of fall overnight.  He reports he fell on the floor around 10 pm last night. He couldn't recall any details before or after the fall at the moment. Reports remembering being taken to the hospital in the morning. He couldn't remember similar episodes in the past.He reported in ED that he has been at home for the past month, and has sustained 3-4 falls during that time, and was previously at an outpatient rehabilitation facility for 6 weeks because of his recurrent falls. He reports being compliant to his medications and wasn't sick recently. He endorses      He denies headaches, blurry vision, chest pain, SOB, palpitations, cough, abdominal pain, diarrhea, constipation, hematuria, or hematochezia. Data:   Scheduled Meds:   insulin lispro  5 Units Subcutaneous TID WC    [START ON 7/28/2020] ferrous sulfate  325 mg Oral Daily with breakfast    insulin glargine  15 Units Subcutaneous QAM    carbidopa-levodopa  2 tablet Oral TID    PARoxetine  40 mg Oral QAM    primidone  50 mg Oral BID    rOPINIRole  3 mg Oral TID    sodium chloride flush  10 mL Intravenous 2 times per day    enoxaparin  40 mg Subcutaneous Daily    insulin lispro  0-3 Units Subcutaneous Nightly    NIFEdipine  60 mg Oral Daily    pantoprazole  40 mg Oral BID AC    insulin lispro  0-6 Units Subcutaneous TID WC    dextrose         Continuous Infusions:   dextrose 5 % and 0.45 % NaCl 100 mL/hr at 07/27/20 0241    dextrose      sodium chloride       PRN Meds:albuterol, sodium chloride flush, acetaminophen **OR** acetaminophen, polyethylene glycol, glucose, dextrose, glucagon (rDNA), dextrose  I/O last 3 completed shifts: In: 240 [P.O.:240]  Out: 275 [Urine:275]  No intake/output data recorded.     Intake/Output Summary (Last 24 hours) at 7/27/2020 1102  Last data filed at 7/27/2020 6373  Gross per 24 hour   Intake 240 ml   Output 275 ml   Net -35 ml       Objective:     Vitals: BP (!) 169/83   Pulse 81   Temp 97.9 °F (36.6 °C) (Oral)   Resp 18   Ht 5' 8\" (1.727 m)   Wt 185 lb (83.9 kg)   SpO2 97%   BMI 28.13 kg/m²     Physical Exam  General appearance: Resting comfortably, no apparent distress  HEENT EOMI, PERRL, no LAD, no oropharyngeal erythema, MMM  Lungs: CTAB, no wheezes, rhonchi or rales  Heart: RRR +O5/F1, systolic ejection murmur, No rubs or gallops  Abdomen: Soft, NTND, without rigidity or guarding, normal active bowel sounds  Extremities: No peripheral edema, good peripheral pulses  Skin: No skin rashes  Neurologic: AAOx4, CNs II-XII grossly intact, Patient with resting tremor worse in the left arm. LABS:    CBC:   Recent Labs     07/26/20  1355 07/27/20  0417   WBC 11.5* 7.9   HGB 9.7* 8.7*   HCT 30.2* 26.4*   MCV 79.9* 78.9*    265                                                                BMP:    Recent Labs     07/26/20  1355 07/27/20  0417    144   K 4.3 4.1    107   CO2 25 23   BUN 16 13   CREATININE 1.2 1.0   GLUCOSE 177* 130*       LFT's:   Recent Labs     07/26/20  1355   AST 41*   ALT <5*   BILITOT 0.6   ALKPHOS 112       Troponin:   Recent Labs     07/26/20  1355 07/26/20  1541 07/26/20  2232   TROPONINI 0.06* 0.06* 0.06*     ABGs:   Lab Results   Component Value Date    PHART 7.328 04/29/2020    CSL2ECS 25.5 04/29/2020    PO2ART 123.0 04/29/2020     U/A:  Recent Labs     07/26/20  1505   COLORU Yellow   PHUR 6.0   WBCUA None seen   RBCUA None seen   CLARITYU Clear   SPECGRAV 1.025   LEUKOCYTESUR Negative   UROBILINOGEN 0.2   BILIRUBINUR Negative   BLOODU TRACE-INTACT*   GLUCOSEU 250*      -----------------------------------------------------------------  RAD:   CT HEAD WO CONTRAST   Final Result   No substantial change from the prior study. If acute    intracranial infarction remains a concern, MRI may help to further    evaluate if patient is a candidate. XR CHEST PORTABLE   Final Result      No acute pulmonary disease. Acute mildly displaced left eighth and ninth lateral rib fractures in addition to multiple chronic left rib fractures. CT HEAD WO CONTRAST   Final Result      No acute intracranial hemorrhage or mass effect. Mild-to-moderate atrophy and extensive chronic small vessel ischemic change with remote lacunar infarcts as detailed. EKG: NSR  Echo: 4/28/20 EF 55%    Assessment/Plan:     Ava Edward is a 77year old male with history of Asthma, HTN, DM1, Parkinson presented to ED today after an episode of fall overnight. Fall w/ Possible LOC: Feel likely 2/2 to hypoglycemia given patient's history; Possibly syncope (Pt w/ Hx of T1DM and Parkinson's)  - EKG normal, patient with recent echo 04/28/20 normal EF  - CT non-contrast head negative for acute pathology  - Orthostatics Negative  - Monitor on Telemetry    Type 1 DM: Patient seems to have frequent episodes of hypoglycemia and a recent admission for DKA. - Suspect Hypoglycemia was likely source of fall given patient's symptoms at the time.   - Lantus  - Low dose sliding scale Insulin   - Hypoglycemia protocol  - Off D5  - Endocrine consult     Rhabdomyolysis - improved: 2/2 to fall  -CK initially elevated to 458, Repeat Normal @ 259  -U/A with trace blood and no RBC  - Creatinine 1.0     Leukocytosis - improved:  - Most likely secondary to rhabdo  - Afebrile  - U/A negative for bacteria, CXR w/ no acute cardiopulmonary disease     Elevated troponin(stable)  - likely related to skeletal muscle injury rather than cardiac  - 0.06 --> 0.06 after 2 hrs  -Will trend q6     Parkinson's Disease  -Home med Ropinirole 3mg PO given in ED  -Continue Home med  -Carbidopa-levadopa     HTN  - Restarting losartan now that Creatinine down to 1.0 and improved CK;  - cont Nifedipine      Asthma  - cont prn Albuterol     Iron Deficiency Anemia: Iron 21;  - Microcytic anemia on presentation, Normocytic in May/June  - Iron supplementation     HLD: CK normalized  - Restarting Statin     Psych  - resume home paroxetine     Hx of gastric Ulcer  - H pylori neg  - continue protonix     DVT Prophylaxis: Heparin  Diet: General  Code Status: Full Code  PT/OT Eval Status: Ordered  Dispo:  Marvin Pierson MD  7/27/2020  11:02 AM

## 2020-07-28 NOTE — CARE COORDINATION
Called and faxed home care orders to [facility]. Alysha Joiner [facility]          [address] [address]  Phone: [phone]  Fax: [phone]  Told them that patient is most likely discharging todayand this CM will call to let them know confirmation of discharge. Electronically signed by Zain Cho RN on 7/28/2020 at 1:58 PM       Addendum: [facility] could not take patient's insurance. [facility] (they can take case) and faxed orders and notes to them. [address]  [address] [facility], [address] [place] [address]  Phone: [phone]  Fax: [phone]  Awaiting reading of EEG before discharge today.    Electronically signed by Zain Cho RN on 7/28/2020 at 3:28 PM

## 2020-07-28 NOTE — PROGRESS NOTES
Pt a/o x4, VSS. Glucose checks have been in the 200s and rising. Medicine aware. One time dose of 5 units of regular insulin ordered and given. Fall precautions in place. Will continue to monitor.

## 2020-07-28 NOTE — DISCHARGE INSTR - COC
Continuity of Care Form    Patient Name: Reji Cunningham   :  1950  MRN:  [id number]    Admit date:  2020  Discharge date:  ***    Code Status Order: Full Code   Advance Directives:     Admitting Physician:  Danita Manzo MD  PCP: Brittany Brown    Discharging Nurse: [facility] Unit/Room#: 3965/0562-22  Discharging Unit Phone Number: ***    Emergency Contact:   Extended Emergency Contact Information  Primary Emergency Contact: Colleen Phone: [phone]  Relation: Child  Secondary Emergency Contact: Mickey Lerner  Mobile Phone: [phone]  Relation: Domestic Partner  Preferred language: English   needed? No    Past Surgical History:  Past Surgical History:   Procedure Laterality Date    UPPER GASTROINTESTINAL ENDOSCOPY N/A 5/15/2020    EGD CONTROL HEMORRHAGE performed by Mai Castano MD at [address] [place] [address] 5/15/2020    EGD BIOPSY performed by Mai Castano MD at [facility] ENDOSCOPY       Immunization History:   Immunization History   Administered Date(s) Administered    Tdap (Boostrix, Adacel) 2017, 2019       Active Problems:  Patient Active Problem List   Diagnosis Code    Hypoglycemia E16.2    EKG abnormalities R94.31    Syncope R55    Sepsis ([facility] A41.9    GI bleed K92.2    DKA, type 1, not at goal [facility]) E10.10    Type 1 diabetes mellitus with hypoglycemia and without coma ([facility] E10.649    Fall at home, sequela W19. Alysia Durandw, Y92.009       Isolation/Infection:   Isolation          No Isolation        Patient Infection Status     Infection Onset Added Last Indicated Last Indicated By Review Planned Expiration Resolved Resolved By    None active    Resolved    COVID-19 Rule Out 20 COVID-19 (Ordered)   20 Rule-Out Test Resulted    COVID-19 Rule Out 20 COVID-19 (Ordered)   20 Rule-Out Test Resulted          Nurse Assessment:  Last Vital Signs: BP (!) 178/79   Pulse 85   Temp 98.3 °F (36.8 °C) (Oral)   Resp 16   Ht 5' 8\" (1.727 m)   Wt 185 lb (83.9 kg)   SpO2 96%   BMI 28.13 kg/m²     Last documented pain score (0-10 scale): Pain Level: 0  Last Weight:   Wt Readings from Last 1 Encounters:   07/26/20 185 lb (83.9 kg)     Mental Status:  {IP PT MENTAL STATUS:20030}    IV Access:  { DAT IV ACCESS:846343442}    Nursing Mobility/ADLs:  Walking   {CHP DME RPEZ:317297258}  Transfer  {CHP DME MJPN:213267234}  Bathing  {CHP DME VPRE:274675501}  Dressing  {CHP DME QGJD:502660021}  Toileting  {CHP DME QQFV:427796663}  Feeding  {CHP DME GDVY:997566921}  Med Admin  {P DME UVGA:231838320}  Med Delivery   { DAT MED Delivery:208499362}    Wound Care Documentation and Therapy:        Elimination:  Continence:   · Bowel: {YES / WE:08629}  · Bladder: {YES / LA:44834}  Urinary Catheter: {Urinary Catheter:968626180}   Colostomy/Ileostomy/Ileal Conduit: {YES / WC:75267}       Date of Last BM: ***    Intake/Output Summary (Last 24 hours) at 7/28/2020 1400  Last data filed at 7/28/2020 1350  Gross per 24 hour   Intake 730 ml   Output 1075 ml   Net -345 ml     I/O last 3 completed shifts:   In: 360 [P.O.:360]  Out: 500 [Urine:500]    Safety Concerns:     [address] Safety Concerns:227290067}    Impairments/Disabilities:      [address] Impairments/Disabilities:033218872}    Nutrition Therapy:  Current Nutrition Therapy:   [address] Diet List:287265112}    Routes of Feeding: {CHP DME Other Feedings:447915951}  Liquids: {Slp liquid thickness:09156}  Daily Fluid Restriction: {CHP DME Yes amt example:100073382}  Last Modified Barium Swallow with Video (Video Swallowing Test): {Done Not Done CQKD:027199493}    Treatments at the Time of Hospital Discharge:   Respiratory Treatments: ***  Oxygen Therapy:  {Therapy; copd oxygen:26522}  Ventilator:    {MH CC Vent UYQV:870430061}    Rehab Therapies: {THERAPEUTIC INTERVENTION:1415571785}  Weight Bearing Status/Restrictions: [address] CC Weight Bearin}  Other Medical Equipment (for information only, NOT a DME order):  {EQUIPMENT:011323067}  Other Treatments: ***    Patient's personal belongings (please select all that are sent with patient):  {CHP DME Belongings:441481160}    RN SIGNATURE:  {Esignature:863735099}    CASE MANAGEMENT/SOCIAL WORK SECTION    Inpatient Status Date: ***    Readmission Risk Assessment Score:  Readmission Risk              Risk of Unplanned Readmission:        31           Discharging to Facility/ [facility] [address] [place], [place] [address] [facility] [address]       Phone: [phone]       Fax: [phone]                  / signature: Electronically signed by Rod Cobb RN on 20 at 3:26 PM EDT    PHYSICIAN SECTION    Prognosis: {Prognosis:5105529839}    Condition at Discharge: [address] Patient Condition:152278435}    Rehab Potential (if transferring to Rehab): {Prognosis:5133015072}    Recommended Labs or Other Treatments After Discharge: ***    Physician Certification: I certify the above information and transfer of Roya Washington  is necessary for the continuing treatment of the diagnosis listed and that he requires {Admit to Appropriate Level of Care:43361} for {GREATER/LESS:049780135} 30 days.      Update Admission H&P: {CHP DME Changes in KNYTI:979330133}    PHYSICIAN SIGNATURE:  {Esignature:005458773}

## 2020-07-28 NOTE — PROGRESS NOTES
Discharge Progress Note  7/28/2020 4:56 PM    Data:  Discharge order received. Action:  IV D/C'd without difficulty. See Doc Flowsheets for assessment. Patient given discharge instructions with prescriptions. Response:  Patient verbalized understanding of discharge instructions. Patient left with all belongings. Cassandra here to take him home. The first Lyft was cancelled bc daughter having concerns about pt's discharge. Daughter was contacted earlier in the day regarding if she could give pt a ride home. Did not answer phone. Pt girlfriend and neighbor contacted as well w/out answer. Lyft was last resort for pt transport. Daughter called back RN upon Lyft arrival asking why he was not getting rehab. Pt refusing going to rehab facility or nursing home and very much wanting to go home. Pt A+Ox4 w/out signs of confusion. Given education on discharge instructions w/ understanding. Daughter stated it was negligent to let him go home. Stated she would call her . Charge RN and Case Mgt contacted. Adult protective services contacted per daughter's request. Determined pt ok to go home. Pt compliant w/ COA. Cassandra arrived ~1700 to take pt home (blue [facility]-- was named Devon).      Harmony Bonilla RN  ________________________________________________________________________

## 2020-07-28 NOTE — PLAN OF CARE
Problem: Falls - Risk of:  Goal: Will remain free from falls  Description: Will remain free from falls  7/28/2020 1024 by Gualberto Saldaña RN  Outcome: Ongoing  Note: Bed alarm on. 2/4rails up. Call light and bedside table w/ belongings w/in reach. Pt calls appropriately for needs. Bed in lowest position, wheels locked. 7/28/2020 0234 by Tanner Martinez RN  Outcome: Ongoing  Note: Pt a/o x4. Pt remains free from falls. Bed locked in lowest position, 3/4 rails up, bed alarm on, call light/table within reach, non-skid socks on. Will continue to monitor. Problem: Skin Integrity:  Goal: Will show no infection signs and symptoms  Description: Will show no infection signs and symptoms  7/28/2020 1024 by Gualberto Saldaña RN  Outcome: Ongoing  Note: RLE scabs appear to be healing. VSS (systolic bp elevated). No other signs of infection at this time  7/28/2020 0234 by Tanner Martinez RN  Outcome: Ongoing  Note: Pt continent and encouraged to change positions in bed often. No skin breakdown noted, just scratches on legs. Problem: Discharge Planning:  Goal: Ability to perform activities of daily living will improve  Description: Ability to perform activities of daily living will improve  Outcome: Ongoing  Note: Pt refusing SNF at this time. States he has people to help him at home. Problem: Injury - Risk of, Physical Injury:  Goal: Will remain free from falls  Description: Will remain free from falls  7/28/2020 1024 by Gualberto Saldaña RN  Outcome: Ongoing  Note: Bed alarm on. 2/4rails up. Call light and bedside table w/ belongings w/in reach. Pt calls appropriately for needs. Bed in lowest position, wheels locked. 7/28/2020 0234 by Tanner Martinez RN  Outcome: Ongoing  Note: Pt a/o x4. Pt remains free from falls. Bed locked in lowest position, 3/4 rails up, bed alarm on, call light/table within reach, non-skid socks on. Will continue to monitor.

## 2020-07-28 NOTE — PROCEDURES
normal routine EEG does not rule out a diagnosis of epilepsy.     Electronically signed by Anali Mattson MD on 7/28/2020 at 4:31 PM

## 2020-07-28 NOTE — PROGRESS NOTES
VSS w/ exception to high end systolic bp. A+Ox4. Blood sugar cont's to trend in 200s. Pt also cont's to have tremor in BUE. No c/o pain. Tolerating meals. Voiding appropriate level of urine in bedside urinal. Pt eager to get home.        Electronically signed by Dustin John RN on 7/28/2020 at 10:41 AM

## 2020-07-30 NOTE — ED PROVIDER NOTES
obvious aphasia. Moving all extremities spontaneously. Skin: Scattered abrasions and ecchymoses. Warm, dry. No rash. No diaphoresis or erythema. Psychiatric: Calm and cooperative with appropriate mood and affect. Procedures   Procedures    MEDICAL DECISION MAKING     MDM: Willy Jimenez is a 77 y.o. male with history of Parkinson's disease, frequent falls, insulin-dependent diabetes presenting for hyperglycemia. On arrival, patient is in no distress and vital signs are notable for borderline tachycardia. He has no infectious symptoms and overall his exam is reassuring. Abdomen is soft and benign. Glucose is greater than 600 on point-of-care testing. He has an anion gap acidosis consistent with DKA on basic metabolic panel. Potassium is elevated, likely secondary to his DKA and treatment was initiated with lactated Ringer's and insulin infusion. Patient remained hemodynamically stable, does not have a clear infectious trigger, though chest x-ray and urinalysis are pending. Will admit to the ICU, discussed with intensivist and hospitalist.    Critical Care:  Due to the immediate potential for life-threatening deterioration due to severe acid-base disturbance, DKA, I spent 35 minutes providing critical care. Thistime excludes time spent performing procedures but includes time spent on direct patient care, history retrieval, review of the chart, and discussions with patient, family, and consultant(s). Clinical Impression     1. Diabetic ketoacidosis without coma associated with type 1 diabetes mellitus ([address]    2. Parkinson disease ([address]        Disposition     DISPOSITION          Mehdi Linton MD  4:16 PM                     Past Medical, Surgical, Family, and Social History     He has a past medical history of Asthma, Diabetes mellitus ([address] Hypertension, and Parkinson disease ([address]   He has a past surgical history that includes Upper gastrointestinal endoscopy (N/A, 5/15/2020) and Upper gastrointestinal endoscopy (N/A, 5/15/2020). His family history is not on file. He reports that he has never smoked. He has never used smokeless tobacco. He reports that he does not drink alcohol or use drugs. Medications     Previous Medications    ACETAMINOPHEN (TYLENOL) 500 MG TABLET    Take 1 tablet by mouth 4 times daily as needed for Pain    ALBUTEROL (PROVENTIL HFA;VENTOLIN HFA) 108 (90 BASE) MCG/ACT INHALER    Inhale 2 puffs into the lungs every 6 hours as needed. ATORVASTATIN (LIPITOR) 10 MG TABLET    Take 10 mg by mouth daily    CARBIDOPA-LEVODOPA (SINEMET)  MG PER TABLET    Take 2 tablets by mouth 3 times daily     INSULIN ASPART (NOVOLOG FLEXPEN) 100 UNIT/ML INJECTION PEN    Inject 8 Units into the skin 3 times daily (before meals)    INSULIN GLARGINE (LANTUS;BASAGLAR) 100 UNIT/ML INJECTION PEN    Inject 20 Units into the skin every morning    INSULIN PEN NEEDLE ([employer] PEN NEEDLES 29G) 29G X 12MM MISC    1 each by Does not apply route daily    LOSARTAN (COZAAR) 100 MG TABLET    Take 100 mg by mouth daily    NIFEDIPINE (ADALAT CC) 60 MG EXTENDED RELEASE TABLET    Take 1 tablet by mouth daily    PANTOPRAZOLE (PROTONIX) 40 MG TABLET    Take 1 tablet by mouth 2 times daily (before meals)    PAROXETINE (PAXIL) 40 MG TABLET    Take 40 mg by mouth every morning    PRIMIDONE (MYSOLINE) 50 MG TABLET    Take 50 mg by mouth 2 times daily    ROPINIROLE (REQUIP) 3 MG TABLET    Take 3 mg by mouth 3 times daily    VITAMIN D3 400 UNIT TABS TABLET    Take 2 tablets by mouth daily       Allergies     He is allergic to erythromycin. ED Course     Nursing Notes, Past Medical Hx, Past Surgical Hx, Social Hx,Allergies, and Family Hx were reviewed.     Patient was given the following medications:  Orders Placed This Encounter   Medications    lactated ringers infusion 1,000 mL    glucose (GLUTOSE) 40 % oral gel 15 g    dextrose 50 % IV solution    glucagon (rDNA) injection 1 mg    dextrose 5 % solution  insulin regular (HUMULIN R;NOVOLIN R) 100 Units in sodium chloride 0.9 % 100 mL infusion       Diagnostic Results       RECENT VITALS:  BP: (!) 153/69,Temp: 98.3 °F (36.8 °C), Pulse: 101, Resp: 30, SpO2: 100 %     RADIOLOGY:  XR CHEST PORTABLE   Final Result   1. Left basilar atelectasis, airspace disease or pneumonia with small effusion, new from prior study.    2. Mild cardiac enlargement          LABS:   Results for orders placed or performed during the hospital encounter of 07/30/20   CBC Auto Differential   Result Value Ref Range    WBC 7.4 4.0 - 11.0 K/uL    RBC 3.82 (L) 4.20 - 5.90 M/uL    Hemoglobin 9.9 (L) 13.5 - 17.5 g/dL    Hematocrit 31.3 (L) 40.5 - 52.5 %    MCV 81.8 80.0 - 100.0 fL    MCH 25.8 (L) 26.0 - 34.0 pg    MCHC 31.5 31.0 - 36.0 g/dL    RDW 18.9 (H) 12.4 - 15.4 %    Platelets 009 658 - 727 K/uL    MPV 7.6 5.0 - 10.5 fL    Neutrophils % 78.5 %    Lymphocytes % 12.6 %    Monocytes % 7.6 %    Eosinophils % 0.2 %    Basophils % 1.1 %    Neutrophils Absolute 5.8 1.7 - 7.7 K/uL    Lymphocytes Absolute 0.9 (L) 1.0 - 5.1 K/uL    Monocytes Absolute 0.6 0.0 - 1.3 K/uL    Eosinophils Absolute 0.0 0.0 - 0.6 K/uL    Basophils Absolute 0.1 0.0 - 0.2 K/uL   Basic Metabolic Panel w/ Reflex to MG   Result Value Ref Range    Sodium 124 (L) 136 - 145 mmol/L    Potassium reflex Magnesium 5.9 (H) 3.5 - 5.1 mmol/L    Chloride 85 (L) 99 - 110 mmol/L    CO2 13 (LL) 21 - 32 mmol/L    Anion Gap 26 (H) 3 - 16    Glucose 698 (HH) 70 - 99 mg/dL    BUN 40 (H) 7 - 20 mg/dL    CREATININE 1.2 0.8 - 1.3 mg/dL    GFR Non-African American >60 >60    GFR African American >60 >60    Calcium 9.2 8.3 - 10.6 mg/dL   Blood Gas, Venous   Result Value Ref Range    pH, [place] 7.318 (L) 7.350 - 7.450    pCO2, [place] 27.8 (L) 41.0 - 51.0 mmHg    pO2, [place] 59.1 (H) 25.0 - 40.0 mmHg    HCO3, Venous 13.9 (L) 24.0 - 28.0 mmol/L    Base Excess, Son[place] -10.8 (L) -2.0 - 3.0 mmol/L    O2 Sat, Son[place] 84 Not established %    Carboxyhemoglobin 2.1 (H) 0.0 - 1.5 %    MetHgb, [place] 0.4 0.0 - 1.5 %    TC02 (Calc), Son[place] 15 mmol/L    Hemoglobin, [place], Reduced 16.10 %   POC Glucose   Result Value Ref Range    QC OK? yes    POCT Glucose   Result Value Ref Range    POC Glucose >600 (AA) 70 - 99 mg/dl    Performed on ACCU-CHEK    POCT Glucose   Result Value Ref Range    POC Glucose 596 (H) 70 - 99 mg/dl    Performed on ACCU-CHEK        CONSULTS:  IP CONSULT TO HOSPITALIST  IP CONSULT TO CRITICAL CARE    PATIENT REFERRED TO:  No follow-up provider specified.     DISCHARGE MEDICATIONS:  New Prescriptions    No medications on file        Dorian Locke MD  07/30/20 5587

## 2020-07-30 NOTE — ED TRIAGE NOTES
Pt arrived to ED from home via EMS. Pt states that his blood sugar was high, reading 600 at home. EMS state that their reading for sugar was 545, upon arrival to ED POC glucose reading \"high. \"     Pt states that he called doctor and asked about his blood sugar being high and they reviewed medication dosing with him over the phone. Pt then realized that he has been taking medications improperly.

## 2020-07-30 NOTE — H&P
to Encounter   Medication Sig Dispense Refill    vitamin D3 400 UNIT TABS tablet Take 2 tablets by mouth daily 60 tablet 0    insulin glargine (LANTUS;BASAGLAR) 100 UNIT/ML injection pen Inject 20 Units into the skin every morning 5 pen 3    insulin aspart (NOVOLOG FLEXPEN) 100 UNIT/ML injection pen Inject 8 Units into the skin 3 times daily (before meals) 5 pen 3    Insulin Pen Needle ([employer] PEN NEEDLES 29G) 29G X 12MM MISC 1 each by Does not apply route daily 100 each 3    losartan (COZAAR) 100 MG tablet Take 100 mg by mouth daily      pantoprazole (PROTONIX) 40 MG tablet Take 1 tablet by mouth 2 times daily (before meals) 30 tablet 3    NIFEdipine (ADALAT CC) 60 MG extended release tablet Take 1 tablet by mouth daily 30 tablet 3    PARoxetine (PAXIL) 40 MG tablet Take 40 mg by mouth every morning      rOPINIRole (REQUIP) 3 MG tablet Take 3 mg by mouth 3 times daily      primidone (MYSOLINE) 50 MG tablet Take 50 mg by mouth 2 times daily      carbidopa-levodopa (SINEMET)  MG per tablet Take 2 tablets by mouth 3 times daily       acetaminophen (TYLENOL) 500 MG tablet Take 1 tablet by mouth 4 times daily as needed for Pain 360 tablet 1    atorvastatin (LIPITOR) 10 MG tablet Take 10 mg by mouth daily      albuterol (PROVENTIL HFA;VENTOLIN HFA) 108 (90 BASE) MCG/ACT inhaler Inhale 2 puffs into the lungs every 6 hours as needed. Scheduled Meds:   Continuous Infusions:   dextrose      insulin 0.1 Units/kg/hr (07/30/20 1808)     PRN Meds:glucose, dextrose, glucagon (rDNA), dextrose    Allergies: Allergies   Allergen Reactions    Erythromycin        REVIEW OF SYSTEMS:       History obtained from the patient and chart review     Pertinent findings documented in the HPI , otherwise all other systems reviewed were negative.      PHYSICAL EXAM:       Vitals: BP (!) 146/118   Pulse 99   Temp 98.3 °F (36.8 °C) (Oral)   Resp 30   Wt 197 lb (89.4 kg)   SpO2 100%   BMI 29.95 kg/m² I/O:  No intake or output data in the 24 hours ending 07/30/20 1905  No intake/output data recorded. No intake/output data recorded. Physical Examination:     General:  Adult male, alert and appropriately interactive. In no distress. HENT: Normocephalic and atraumatic. External ears normal. Nose appears normal externally. Eyes: Conjunctivae normal. No scleral icterus. Neck: Neck supple. No tracheal deviation present. CV: Borderline tachycardic rate. Regular rhythm. S1/S2 auscultated. No murmurs, gallops or rubs. Chest: Effort normal. Breath sounds clear to auscultation bilaterally. No wheezes. No rales or rhonchi. Abdominal: Soft. No distension. No tenderness. No rebound or guarding. No masses. No peritoneal signs. Musculoskeletal: No edema. No gross deformities. Neurological: Chronic tremor noted. Alert and appropriately interactive. Face symmetric, speech without dysarthria or obvious aphasia. Moving all extremities spontaneously. Skin: Scattered abrasions and ecchymoses. Warm, dry. No rash. No diaphoresis or erythema. Psychiatric: Calm and cooperative with appropriate mood and affect. No results for input(s): PHART, LOK5UDD, PO2ART in the last 72 hours. DATA:       Labs:  CBC:   Recent Labs     07/28/20  0434 07/30/20  1418   WBC 6.9 7.4   HGB 8.8* 9.9*   HCT 26.5* 31.3*    302       BMP:   Recent Labs     07/28/20  0434 07/30/20  1418   * 124*   K 4.2 5.9*    85*   CO2 21 13*   BUN 12 40*   CREATININE 1.1 1.2   GLUCOSE 275* 698*   PHOS 3.2  --      LFT's: No results for input(s): AST, ALT, ALB, BILITOT, ALKPHOS in the last 72 hours. Troponin: No results for input(s): TROPONINI in the last 72 hours. BNP:No results for input(s): BNP in the last 72 hours. ABGs: No results for input(s): PHART, QWA1XZH, PO2ART in the last 72 hours. INR: No results for input(s): INR in the last 72 hours.     U/A:No results for input(s): NITRITE, COLORU, PHUR, LABCAST, [address] [place], RBCUA, MUCUS, TRICHOMONAS, YEAST, BACTERIA, CLARITYU, SPECGRAV, LEUKOCYTESUR, UROBILINOGEN, BILIRUBINUR, BLOODU, GLUCOSEU, AMORPHOUS in the last 72 hours. Invalid input(s): KETONESU    XR CHEST PORTABLE   Final Result   1. Left basilar atelectasis, airspace disease or pneumonia with small effusion, new from prior study. 2. Mild cardiac enlargement          EKG:   Echo:  Micro:     ASSESSMENT AND PLAN:   Rohit Aj is a 77 y.o. male with PMHx of insulin dependent DM , parkinsons and HTN , presenting with hyperglycemia. 1.DKA   -2L bolus given in ED  -insulin ggt started in ED  DKA protocol   -insulin bolus  -insulin ggt within parameters   -IVF   -monitor BMP   -trend anion gap  - trend potassium , replace as needed    2. HTN  -hold losartan for potential kidney injury due to dehydration   -continue nifedipine       3. Parkinson's disease   -continue home meds   -Carbidopa-levodopa  -Ropinirole 3 mg TID         Code Status:Full Code  FEN: NPO  PPX:  Lovenox   DISPO: ICU    -----------------------------  Baldev Angulo MD, PGY-1  7/30/2020  7:05 PM

## 2020-07-30 NOTE — CARE COORDINATION
Referred to patient for d/c planning. Patient well known from previous admissions. Patient is a 77year old male readmitted for hyperglycemia. Patient lives at home alone. Patient is unable to care for self. However, patient refuses ECF or Assisted Living. Patient is ambulatory with walker. Patient is current with [facility]. Patient is current with  [place] on Aging, they are to arrange [facility]. Several referrals to Adult Protective. Has been seen several times. Patient is interested in wheelchair on d/c. Patient denies other needs at this time. Case Management Assessment           Initial Evaluation                Date / Time of Evaluation: 7/30/2020 5:43 PM                 Assessment Completed by: Jon Henry    Patient Name: Farhat Ding     YOB: 1950  Diagnosis: DKA, type 1, not at goal [facility]) [E10.10]     Date / Time: 7/30/2020  2:02 PM    Patient Admission Status: Inpatient    If patient is discharged prior to next notation, then this note serves as note for discharge by case management. Current PCP: [address] [place] [address] Road Patient: No    Chart Reviewed: Yes  Patient/ Family Interviewed: Yes    Initial assessment completed at bedside with: Patient    Hospitalization in the last 30 days: Yes    Emergency Contacts:  Extended Emergency Contact Information  Primary Emergency Contact: [facility] Phone: [phone]  Relation: Child  Secondary Emergency Contact: Delta Courser  [place] Phone: [phone]  Relation: Domestic Partner  Preferred language: English   needed?  No    Advance Directives:   Code Status: Prior    [address]: No    Financial  Payor: Tomasz Gould / Plan: MEDICARE PART A AND B / Product Type: *No Product type* /     Pre-cert required for SNF: No    Pharmacy    Dory Broderick [address], [address] [facility] [phone] - F [phone]  [facility] [place] [address]  Phone: [phone] Fax: [address] [facility] Maribel Pérez [phone] Akil Escobar [phone]  [address] [place] [address]. [address] [address]  Phone: [phone] Fax: [phone]      Potential assistance Purchasing Medications:    Does Patient want to participate in local refill/ meds to beds program?:      Meds To Beds General Rules:  1. Can ONLY be done Monday- Friday between 8:30am-5pm  2. Prescription(s) must be in pharmacy by 3pm to be filled same day  3. Copy of patient's insurance/ prescription drug card and patient face sheet must be sent along with the prescription(s)  4. Cost of Rx cannot be added to hospital bill. If financial assistance is needed, please contact unit  or ;  or  CANNOT provide pharmacy voucher for patients co-pays  5.  Patients can then  the prescription on their way out of the hospital at discharge, or pharmacy can deliver to the bedside if staff is available. (payment due at time of pick-up or delivery - cash, check, or card accepted)     Able to afford home medications/ co-pay costs: Yes    ADLS  Support Systems:      PT AM-PAC:   /24  OT AM-PAC:   /24    [place]: home alone  Steps:     Plans to RETURN to current housing: Yes  Barriers to RETURNING to current housing: medical complications,     Home Care Information  Currently ACTIVE with [address]: Yes  [address]: [address]  Phone: [phone]  Fax: [phone]    Currently ACTIVE with [place] on Aging: Yes  Passport/ Waiver: Yes  Passport/ Waiver Services: Emergency Response System    : Kimberli Smallwood Phone: [phone]  [facility] Provider:   Equipment: walker    Home Oxygen and Respiratory Equipment  Has HOME OXYGEN prior to admission: No  [address]: Not Applicable    Dialysis  Active with HD/PD prior to admission: No  Nephrologist:     HD Center:  Not Applicable    DISCHARGE PLAN:  Disposition: Home with [address]: [address] [address] for discharge: friend     Factors facilitating achievement of predicted outcomes: Pleasant and Has needed Durable Medical Equipment at home    Barriers to discharge: Limited family support, No caregiver support, Limited insight into deficits, Unrealistic expectations and Medical complications    Additional Case Management Notes: see above    The Plan for Transition of Care is related to the following treatment goals of DKA, type 1, not at goal [facility]) [E10.10]    The Patient and/or patient representative Nancy Morgan and his family were provided with a choice of provider and agrees with the discharge plan Not Indicated    Freedom of choice list was provided with basic dialogue that supports the patient's individualized plan of care/goals and shares the quality data associated with the providers.  Not Indicated    Care Transition patient: No    ANGLE Soria, WANG  [facility] [employer]  Case Management Department  Ph: [phone]

## 2020-07-30 NOTE — ED NOTES
Report given to ROXANNA Sena. All questions asked, answered. Awaiting ICU .       Allanesha Smith-Narcisse, RN  07/30/20 5190

## 2020-07-31 NOTE — PROGRESS NOTES
Pt finished breakfast tray. Awaiting Lantus pen from pharmacy. Called them. They stated they are sending in 5 min. Will given Lantus once pen arrives.

## 2020-07-31 NOTE — PROGRESS NOTES
Hospitalist Progress Note      PCP: An Pretty    Chief Complaint. Presented to hospital for DKA    Date of Admission: 7/30/2020      Subjective:   denies chest pain, nausea, vomiting, shortness of breath, fever or chills. mention feels overall better    Medications:  Reviewed    Infusion Medications    dextrose      insulin 0.024 Units/kg/hr (07/31/20 1000)    sodium chloride      dextrose 5 % and 0.45 % NaCl 150 mL/hr at 07/31/20 0449    lactated ringers       Scheduled Medications    [START ON 8/1/2020] NIFEdipine  60 mg Oral Daily    losartan  100 mg Oral Daily    potassium phosphate (monobasic)  250 mg Oral Q2H    potassium chloride  10 mEq Intravenous Q2H    insulin lispro  0-6 Units Subcutaneous TID WC    insulin lispro  0-3 Units Subcutaneous Nightly    insulin lispro  8 Units Subcutaneous TID WC    insulin glargine  50 Units Subcutaneous Nightly    carbidopa-levodopa  2 tablet Oral TID    atorvastatin  10 mg Oral Daily    PARoxetine  40 mg Oral QAM    primidone  50 mg Oral BID    rOPINIRole  3 mg Oral TID    enoxaparin  40 mg Subcutaneous Daily     PRN Meds: glucose, dextrose, glucagon (rDNA), dextrose, acetaminophen, albuterol, dextrose, potassium chloride, magnesium sulfate, sodium phosphate IVPB **OR** sodium phosphate IVPB **OR** sodium phosphate IVPB, dextrose 5 % and 0.45 % NaCl      Intake/Output Summary (Last 24 hours) at 7/31/2020 1240  Last data filed at 7/31/2020 1100  Gross per 24 hour   Intake 4447.47 ml   Output --   Net 4447.47 ml       Physical Exam Performed:    BP (!) 152/83   Pulse 89   Temp 97.1 °F (36.2 °C) (Axillary)   Resp 16   Ht 5' 8\" (1.727 m)   Wt 182 lb 15.7 oz (83 kg)   SpO2 98%   BMI 27.82 kg/m²     General appearance: No apparent distress, patient has pill-rolling tremor in left hand  HEENT:  Conjunctivae/corneas clear. Neck: Supple, with full range of motion. Respiratory:  Normal respiratory effort.  Clear to auscultation, bilaterally without Rales/Wheezes/Rhonchi. Cardiovascular: Regular rate and rhythm with normal S1/S2 without murmurs or rubs  Abdomen: Soft, non-tender, non-distended, normal bowel sounds. Musculoskeletal: No cyanosis or edema bilaterally  Neurologic:  without any focal sensory/motor deficits. grossly non-focal.  Psychiatric: Alert and oriented, Normal mood  Peripheral Pulses: +2 palpable, equal bilaterally       Labs:   Recent Labs     07/30/20  1418 07/31/20  0502   WBC 7.4 8.0   HGB 9.9* 9.4*   HCT 31.3* 28.9*    289     Recent Labs     07/31/20  0120 07/31/20  0502 07/31/20  0854   * 133* 131*   K 3.9 4.0 3.3*    100 99   CO2 20* 20* 21   BUN 37* 33* 28*   CREATININE 1.1 1.0 0.9   CALCIUM 8.8 8.8 8.6   PHOS 2.7 3.1 2.2*     No results for input(s): AST, ALT, BILIDIR, BILITOT, ALKPHOS in the last 72 hours. No results for input(s): INR in the last 72 hours. No results for input(s): Amaryllis Goodell in the last 72 hours. Urinalysis:      Lab Results   Component Value Date    NITRU Negative 07/26/2020    WBCUA None seen 07/26/2020    BACTERIA 2+ 04/29/2020    RBCUA None seen 07/26/2020    BLOODU TRACE-INTACT 07/26/2020    SPECGRAV 1.025 07/26/2020    GLUCOSEU 250 07/26/2020       Radiology:  XR CHEST PORTABLE   Final Result   1. Left basilar atelectasis, airspace disease or pneumonia with small effusion, new from prior study.    2. Mild cardiac enlargement            Assessment/Plan:    Active Hospital Problems    Diagnosis    DKA, type 1, not at goal [facility]) [E10.10]       Patient is a 61-year-old male with past medical history of diabetes type 1, Parkinson's disease, hypertension who presented to hospital with DKA    Assessment  Diabetic ketoacidosis-improving  Diabetes type 1  Hypertension  Parkinson's disease    Plan  BMP x2, anion gap within normal range, okay to transition to long-acting and diabetic diet  Continue insulin sliding scale  Continue home carbidopa levodopa, losartan, Procardia, ropinirole  DVT Prophylaxis: Lovenox  Diet: DIET CARB CONTROL;  Code Status: Full Code    PT/OT Eval Status: ordered    Dispo -pending clinical improvement, okay to transfer from ICU to floor, likely discharge tomorrow  Yanni Morgan MD

## 2020-07-31 NOTE — PLAN OF CARE
Problem: Falls - Risk of:  Goal: Will remain free from falls  Description: Will remain free from falls  Outcome: Ongoing   Socks on, bed alarm  Problem: Serum Glucose Level - Abnormal:  Goal: Ability to maintain appropriate glucose levels will improve  Description: Ability to maintain appropriate glucose levels will improve  Outcome: Ongoing   Blood glucose normalizing

## 2020-07-31 NOTE — CONSULTS
Clinical Pharmacy Progress Note  Pharmacy to assist with Home Medication information    Admit date: 7/30/2020     Subjective/Objective:  Patient is a 76yr old male from home admitted with DKA. Pharmacy has been asked by Dr. Latha Fish to assist with obtaining home medication information. Assessment/Plan:    1. Home Medication information:    · Patient was admitted here 7/26-7/28. At that time, Hospitalist spoke with patient' Endocrinologist, and decision was made to change from NPH insulin to Lantus + Novolog.      - Removed NPH from Home Med List.     - Confirmed Lantus 20 units QHS (taken at night per patient preference)    - Confirmed Novolog 8 units TID before meals  · RN reviewed meds with patient upon admission overnight. Also used Pharmacy fills to confirm medications and dosages. · Please note it is possible that some OTC meds are missing as unable to speak with patient today. · Home Medication List in [address] [place] [address] Rd is up to date. Changes made to medication list:   Medications removed (no longer taking):  · NPH insulin  · Valsartan -- pt seems to change from valsartan to losartan. Most recently filled is Losartan for a 90 day supply. · APAP    Medication doses / instructions adjusted:   · Lantus -- patient takes QHS (not in the AM)  · Nifedipine ER -- patient takes QHS (not in the AM)  · Vitamin D -- patient stated to Rn that he takes 400 units (not 800 units)        A Perfect Serve message has been sent to Dr. Latha Fish to discuss the updated [address] List.      Thank you, please call with questions.    Shea Cheney PharmD., [facility]   7/31/2020 12:53 PM  Wireless: [phone]

## 2020-07-31 NOTE — PROGRESS NOTES
spontaneous, non-productive = 0    Vital Signs   BP (!) 116/93   Pulse 83   Temp 98.4 °F (36.9 °C) (Oral)   Resp 21   Ht 5' 8\" (1.727 m)   Wt 181 lb 3.5 oz (82.2 kg)   SpO2 100%   BMI 27.55 kg/m²   SPO2 (COPD values may differ): Greater than or equal to 92% on room air = 0    Peak Flow (asthma only): not applicable = 0    RSI: 0-4 = See once and convert to home regimen or discontinue        Plan       Goals: medication delivery    Patient/caregiver was educated on the proper method of use for Respiratory Care Devices:  Yes      Level of patient/caregiver understanding able to:   ? Verbalize understanding   ? Demonstrate understanding       ? Teach back        ? Needs reinforcement       ? No available caregiver               ? Other:     Response to education:  Good     Is patient being placed on Home Treatment Regimen? Yes     Does the patient have everything they need prior to discharge? NA     Comments: Patient is on equivalent to home regimen. Plan of Care: Continue Albuterol [facility] Q4HPRN    Electronically signed by Melissa Malcolm RCP on 7/30/2020 at 11:24 PM    Respiratory Protocol Guidelines     1. Assessment and treatment by Respiratory Therapy will be initiated for medication and therapeutic interventions upon initiation of aerosolized medication. 2. Physician will be contacted for respiratory rate (RR) greater than 35 breaths per minute. Therapy will be held for heart rate (HR) greater than 140 beats per minute, pending direction from physician. 3. Bronchodilators will be administered via Metered Dose Inhaler (MDI) with spacer when the following criteria are met:  a. Alert and cooperative     b. HR < 140 bpm  c. RR < 30 bpm                d. Can demonstrate a 2-3 second inspiratory hold  4. Bronchodilators will be administered via Hand Held Nebulizer [facility]) to patients when ANY of the following criteria are met  a. Incognizant or uncooperative          b.  Patients treated with HHN at Home

## 2020-07-31 NOTE — CONSULTS
ICU HISTORY AND PHYSICAL       Hospital Day:   ICU Day:                                                          Code:Full Code  Admit Date: 7/30/2020  PCP: Eugenie MOYER                                  CC: DKA     HISTORY OF PRESENT ILLNESS:   Con Loza is a 77year old male with past medical history of Insulin dependent diabetes type 1 ,Asthma, HTN, Parkinson presenting to the ED today with high blood sugars. Patient states that he read his blood sugar at 600 at home. Per EMS his sugars were at 545. Pateient then called his PCP about high reading and after reviewing medications patient realized he was taking his medications incorrectly. In ED blood glucose was at 698 and has an anion gap consistent with DKA. Patient was recently discharged from the hospital after a fall and had his insulin dosing regiment adjusted at that time. Per chart review patient has difficulty managing insulin regiment due to previous admissions for hypoglycemia and DKA. Patient denies any fevers , coughs , shortness of breath, abdominal pain, nausea/vomiting, dysuria. ED: Initiated on lactated ringers and insulin infusion     PAST HISTORY:     Past Medical History:   Diagnosis Date    Asthma     Diabetes [facility] ([facility]     Hypertension     Parkinson disease ([facility]        Past Surgical History:   Procedure Laterality Date    UPPER GASTROINTESTINAL ENDOSCOPY N/A 5/15/2020    EGD CONTROL HEMORRHAGE performed by Margie Dorsey MD at [facility] N/A 5/15/2020    EGD BIOPSY performed by Margie Dorsey MD at [facility]:   The patient lives at    Alcohol:  Illicit drugs: no use  Tobacco:      Family History:  History reviewed. No pertinent family history. MEDICATIONS:     No current facility-administered medications on file prior to encounter.       Current Outpatient Medications on File Prior to Encounter   Medication Sig Dispense Refill    vitamin D3 (CHOLECALCIFEROL) 10 MCG (400 UNIT) TABS tablet Take 400 Units by mouth daily      Cinnamon 500 MG CAPS Take 1 capsule by mouth daily      fluticasone (FLONASE) 50 MCG/ACT nasal spray 2 sprays by Nasal route daily      carbidopa-levodopa (SINEMET)  MG per tablet Take 2 tablets by mouth 3 times daily      insulin glargine (LANTUS;BASAGLAR) 100 UNIT/ML injection pen Inject 20 Units into the skin nightly      losartan (COZAAR) 100 MG tablet Take 100 mg by mouth daily      NIFEdipine (PROCARDIA XL) 60 MG extended release tablet Take 60 mg by mouth nightly      pantoprazole (PROTONIX) 40 MG tablet Take 40 mg by mouth 2 times daily      PARoxetine (PAXIL) 40 MG tablet Take 40 mg by mouth every morning      primidone (MYSOLINE) 50 MG tablet Take 50 mg by mouth 2 times daily      insulin aspart (NOVOLOG FLEXPEN) 100 UNIT/ML injection pen Inject 8 Units into the skin 3 times daily (before meals) 5 pen 3    rOPINIRole (REQUIP) 3 MG tablet Take 3 mg by mouth 3 times daily      atorvastatin (LIPITOR) 10 MG tablet Take 10 mg by mouth nightly       albuterol (PROVENTIL HFA;VENTOLIN HFA) 108 (90 BASE) MCG/ACT inhaler Inhale 2 puffs into the lungs every 6 hours as needed.         Insulin Pen Needle ([employer] PEN NEEDLES 29G) 29G X 12MM MISC 1 each by Does not apply route daily 100 each 3         Scheduled Meds:   [START ON 8/1/2020] NIFEdipine  60 mg Oral Daily    losartan  100 mg Oral Daily    potassium phosphate (monobasic)  250 mg Oral Q2H    potassium chloride  10 mEq Intravenous Q2H    insulin lispro  0-6 Units Subcutaneous TID WC    insulin lispro  0-3 Units Subcutaneous Nightly    insulin lispro  8 Units Subcutaneous TID WC    insulin glargine  50 Units Subcutaneous Nightly    carbidopa-levodopa  2 tablet Oral TID    atorvastatin  10 mg Oral Daily    PARoxetine  40 mg Oral QAM    primidone  50 mg Oral BID    rOPINIRole  3 mg Oral TID    enoxaparin  40 mg Subcutaneous Daily      Continuous Infusions:   dextrose      sodium chloride      sodium chloride      dextrose 5 % and 0.45 % NaCl Stopped (07/31/20 1309)    lactated ringers       PRN Meds:glucose, dextrose, glucagon (rDNA), dextrose, acetaminophen, albuterol, dextrose, potassium chloride, magnesium sulfate, sodium phosphate IVPB **OR** sodium phosphate IVPB **OR** sodium phosphate IVPB, dextrose 5 % and 0.45 % NaCl    Allergies: Allergies   Allergen Reactions    Erythromycin Nausea And Vomiting       REVIEW OF SYSTEMS:       History obtained from the patient    History obtained from the patient and chart review      Pertinent findings documented in the HPI , otherwise all other systems reviewed were negative. PHYSICAL EXAM:       Vitals: BP (!) 144/69   Pulse 81   Temp 97.1 °F (36.2 °C) (Axillary)   Resp 23   Ht 5' 8\" (1.727 m)   Wt 182 lb 15.7 oz (83 kg)   SpO2 98%   BMI 27.82 kg/m²     I/O:      Intake/Output Summary (Last 24 hours) at 7/31/2020 1437  Last data filed at 7/31/2020 1100  Gross per 24 hour   Intake 4447.47 ml   Output --   Net 4447.47 ml     I/O this shift:  In: 1153 [P.O.:450; I.V.:703]  Out: -   I/O last 3 completed shifts: In: 3294.5 [I.V.:3294.5]  Out: -      Physical Examination:      General:  Adult male, alert and appropriately interactive. In no distress. HENT: Normocephalic and atraumatic. External ears normal. Nose appears normal externally. Eyes: Conjunctivae normal. No scleral icterus. Neck: Neck supple. No tracheal deviation present. CV: Borderline tachycardic rate. Regular rhythm. S1/S2 auscultated. No murmurs, gallops or rubs. Chest: Effort normal. Breath sounds clear to auscultation bilaterally. No wheezes. No rales or rhonchi. Abdominal: Soft. No distension. No tenderness. No rebound or guarding. No masses. No peritoneal signs.    Musculoskeletal: No edema. No gross deformities. Neurological: Chronic tremor noted. Alert and appropriately interactive.  Face symmetric, speech without 2.HTN  -continue losartan   -continue nifedipine      3.Parkinson's disease   -continue home meds   -Carbidopa-levodopa  -Ropinirole 3 mg TID      Code Status:Full Code  FEN: Diet Carb Control   PPX: Lovenox  DISPO: ICU    This patient has been staffed and discussed with Darling Barreto MD.   -----------------------------  Rod Coon MD, PGY-1  7/31/2020  2:37 PM   Patient examined, findings as discussed with Dr. Indu Cesar. Agree with assessment and plan as above. Diabetic ketoacidosis episode has resolved with IV fluids and insulin. Transitioning to a home plan with long-acting insulin once daily and additional short acting insulin. He appears to require much closer monitoring of his home situation to prevent this type of event from recurring so frequently.   Transfer to medical floor

## 2020-07-31 NOTE — PROGRESS NOTES
Admitted to [address] from ED. Bathed with chlorhexidine wipes. Abrasions, bruising and a skin tear present. Mepilex placed to coccyx.  Four eyes assessment with Nelson Vaughn RN

## 2020-07-31 NOTE — CARE COORDINATION
Case Management Assessment           Daily Note                 Date/ Time of Note: 7/31/2020 4:17 PM         Note completed by: Elle Alexander    Patient Name: Willy Jimenez  YOB: 1950    Diagnosis:DKA, type 1, not at goal [facility]) [E10.10]  DKA, type 1, not at goal [facility]) [E10.10]  DKA, type 1, not at goal [facility]) [E10.10]  Patient Admission Status: Inpatient    Date of Admission:7/30/2020  2:02 PM Length of Stay: 1 GLOS:        Current Plan of Care: DKA, likely downgrade out of ICU today. Home   ________________________________________________________________________________________  PT AM-PAC:   / 24 per last evaluation on: Pending    OT AM-PAC:   / 24 per last evaluation on: Pending     DME Needs for discharge: wheelchair (order started with [facility] with goal of delivery Saturday for patient)   ________________________________________________________________________________________  Discharge Plan: Home with [address]: [facility]    Tentative discharge date: 8/1     Current barriers to discharge: Medical Clearance,     Referrals completed: [address] [facility]: [facility]     Resources/ information provided: [address] List  ________________________________________________________________________________________  Case Management Notes: SW met with patient at bedside. Patient asked about HCPOA paperwork for his significant other. SW provided papers. Patient refusing SNF placement still wanting to go home. HE is [facility] with [facility].  staff to send home care orders at discharge for (PT/OT/RN/TOM)    TOM spoke with [facility]. They are already working on getting patient a wheelchair from another admissions. Their goal is to have it delivered this weekend. CM staff following. He will need assistance with transportation at discharge.      Anali Campa and his family were provided with choice of provider; he and his family are in agreement with the discharge

## 2020-08-01 NOTE — PLAN OF CARE
Problem: Falls - Risk of:  Goal: Will remain free from falls  Description: Will remain free from falls  Patient is free from falls. Fall precautions are in place: bed is locked and in lowest position, side rails are up x 3, bed alarm is on, nonskid socks are on, bedside table and call light are within reach. Will continue to monitor. 8/1/2020 1333 by Ana Issa RN  Outcome: Ongoing    Problem: Serum Glucose Level - Abnormal:  Goal: Ability to maintain appropriate glucose levels will improve  Description: Ability to maintain appropriate glucose levels will improve  Continuing to monitor BG levels Q 4 hrs per orders, BG stable this shift controlled with scheduled Humalog sliding scale. Will continue to monitor.   8/1/2020 1333 by Ana Issa RN  Outcome: Ongoing

## 2020-08-01 NOTE — PROGRESS NOTES
Hospitalist Progress Note      PCP: Jeanne Phoenix    Chief Complaint. Presented to hospital for DKA    Date of Admission: 7/30/2020      Subjective:     Feels well  No chest pain shortness of breath  No abdominal pain nausea vomiting    Medications:  Reviewed    Infusion Medications    dextrose      dextrose 5 % and 0.45 % NaCl Stopped (07/31/20 1309)    lactated ringers       Scheduled Medications    insulin lispro  0-6 Units Subcutaneous TID WC    insulin lispro  0-3 Units Subcutaneous Nightly    insulin lispro  3 Units Subcutaneous TID WC    losartan  100 mg Oral Daily    insulin lispro  8 Units Subcutaneous TID WC    insulin glargine  50 Units Subcutaneous Nightly    NIFEdipine  60 mg Oral Nightly    carbidopa-levodopa  2 tablet Oral TID    atorvastatin  10 mg Oral Daily    PARoxetine  40 mg Oral QAM    primidone  50 mg Oral BID    rOPINIRole  3 mg Oral TID    enoxaparin  40 mg Subcutaneous Daily     PRN Meds: glucose, dextrose, glucagon (rDNA), dextrose, acetaminophen, albuterol, dextrose, potassium chloride, magnesium sulfate, sodium phosphate IVPB **OR** sodium phosphate IVPB **OR** sodium phosphate IVPB, dextrose 5 % and 0.45 % NaCl      Intake/Output Summary (Last 24 hours) at 8/1/2020 1415  Last data filed at 7/31/2020 2000  Gross per 24 hour   Intake 240 ml   Output --   Net 240 ml       Physical Exam Performed:    /64   Pulse 81   Temp 97.8 °F (36.6 °C) (Oral)   Resp 18   Ht 5' 8\" (1.727 m)   Wt 190 lb 14.7 oz (86.6 kg)   SpO2 98%   BMI 29.03 kg/m²     General appearance: No apparent distress, patient has pill-rolling tremor in left hand  HEENT:  Conjunctivae/corneas clear. Neck: Supple, with full range of motion. Respiratory:  Normal respiratory effort. Clear to auscultation, bilaterally without Rales/Wheezes/Rhonchi.   Cardiovascular: Regular rate and rhythm with normal S1/S2 without murmurs or rubs  Abdomen: Soft, non-tender, non-distended, normal bowel sounds. Musculoskeletal: No cyanosis or edema bilaterally  Neurologic:  without any focal sensory/motor deficits. grossly non-focal.  Psychiatric: Alert and oriented, Normal mood  Peripheral Pulses: +2 palpable, equal bilaterally       Labs:   Recent Labs     07/30/20  1418 07/31/20  0502   WBC 7.4 8.0   HGB 9.9* 9.4*   HCT 31.3* 28.9*    289     Recent Labs     07/31/20  2116 08/01/20  0038 08/01/20  0450 08/01/20  0930   * 130* 133* 132*   K 4.1 4.0 3.7 3.8   CL 98* 98* 99 99   CO2 22 22 22 19*   BUN 23* 22* 18 16   CREATININE 1.0 0.9 0.8 0.7*   CALCIUM 8.7 8.6 8.7 9.0   PHOS 2.5 2.9 3.0  --      No results for input(s): AST, ALT, BILIDIR, BILITOT, ALKPHOS in the last 72 hours. No results for input(s): INR in the last 72 hours. No results for input(s): Daleen Cocks in the last 72 hours. Urinalysis:      Lab Results   Component Value Date    NITRU Negative 07/26/2020    WBCUA None seen 07/26/2020    BACTERIA 2+ 04/29/2020    RBCUA None seen 07/26/2020    BLOODU TRACE-INTACT 07/26/2020    SPECGRAV 1.025 07/26/2020    GLUCOSEU 250 07/26/2020       Radiology:  XR CHEST PORTABLE   Final Result   1. Left basilar atelectasis, airspace disease or pneumonia with small effusion, new from prior study.    2. Mild cardiac enlargement            Assessment/Plan:    Active Hospital Problems    Diagnosis    DKA, type 1, not at goal [facility]) [E10.10]       Patient is a 23-year-old male with past medical history of diabetes type 1, Parkinson's disease, hypertension who presented to hospital with DKA    Diabetic ketoacidosis on background of type 1 diabetes mellitus poorly controlled  Per patient got confused because recently his Humulin and changed to Lantus and basal bolus regimen  Treated appropriately for DKA and now back on Lantus 50 units which is his home dose  Morning sugars look good  Continue Humalog at 8 units with sliding scale  Will need some sort of supervision to ensure that he does not end up in a similar situation again  Diabetes education      Parkinson's disease  Continue carbidopa levodopa    Hypertension  Continue Procardia and losartan  dopa, losartan, Procardia, ropinirole  DVT Prophylaxis: Lovenox  Diet: DIET CARB CONTROL;  Code Status: Full Code    PT/OT Eval Status: ordered    Please see how his blood sugars do over the next 24 hours

## 2020-08-01 NOTE — PLAN OF CARE
Problem: Falls - Risk of:  Goal: Will remain free from falls  8/1/2020 1904 by Mary Telles RN  Outcome: Ongoing  Received from ICU per wheelchair. Alert and oriented. Able to follow commands and use call light to express needs.

## 2020-08-01 NOTE — PLAN OF CARE
Problem: Falls - Risk of:  Goal: Absence of physical injury    8/1/2020 1904 by Gerri Robb RN  Outcome: Ongoing  Alert and oriented. Uses call light to call for assistance. Ion bed with alarm activated and call light in reach. Will monitor. Problem: Serum Glucose Level - Abnormal:  Goal: Ability to maintain appropriate glucose levels will improve  8/1/2020 1952 by Gerri Robb RN  Outcome: Ongoing  FSBS now being done every 4 hours. Last level was 149. Insulin coverage as ordered. Plans to continue to monitor blood sugar levels as ordered and treat as needed.

## 2020-08-01 NOTE — PLAN OF CARE
Problem: Falls - Risk of:  Goal: Will remain free from falls  Description: Will remain free from falls  8/1/2020 0129 by Jorje Hill  Outcome: Ongoing  Note: Pt is a High fall risk. See Martpastoraa Madura Fall Score and ABCDS Injury Risk assessments. + Screening for Orthostasis and/or + High Fall Risk per YNUG/ABCDS: Explained fall risk precautions to pt and family and rationale behind their use to keep the patient safe. Pt bed is in low position, side rails up, call light and belongings are in reach. Fall wristband applied and present on pts wrist.  Bed alarm on. Pt encouraged to call for assistance. Will continue with hourly rounds for PO intake, pain needs, toileting and repositioning as needed. Problem: Fluid Volume - Imbalance:  Goal: Will remain free of signs and symptoms of dehydration  Description: Will remain free of signs and symptoms of dehydration  8/1/2020 0129 by Jorje Hill  Outcome: Ongoing  Note: No signs and symptoms of dehydration noted upon assessment. Patient tolerating PO intake, voiding adequately, denies any vomiting or diarrhea. Continue with serial BMP's q4h. Problem: Serum Glucose Level - Abnormal:  Goal: Ability to maintain appropriate glucose levels will improve  Description: Ability to maintain appropriate glucose levels will improve  8/1/2020 0129 by Jorje Hill  Outcome: Ongoing  Note: Patient remains q4h POCT glucose monitoring. Insulin gtt dc'd prior shift, patient started on medium dose SSI and Lantus 50 u subq HS. Continue to monitor.

## 2020-08-01 NOTE — PROGRESS NOTES
Patient is alert and oriented x 4. Vitals are WNL. Patient is tolerating carb control diet, denies nausea/vomiting. Lung sounds are clear bilaterally, diminished at bases. Skin is intact with scattered bruising and abrasions. Blood glucose levels controlled with scheduled and sliding scale Humalog per orders. Patient is resting comfortably in bed, denies any needs at this time. Will continue to monitor.

## 2020-08-02 NOTE — DISCHARGE INSTR - COC
Isolation/Infection:   Isolation          No Isolation        Patient Infection Status     Infection Onset Added Last Indicated Last Indicated By Review Planned Expiration Resolved Resolved By    None active    Resolved    COVID-19 Rule Out 05/14/20 05/14/20 05/14/20 COVID-19 (Ordered)   05/14/20 Rule-Out Test Resulted    COVID-19 Rule Out 04/28/20 04/28/20 04/28/20 COVID-19 (Ordered)   04/29/20 Rule-Out Test Resulted          Nurse Assessment:  Last Vital Signs: BP (!) 170/73   Pulse 85   Temp 97.3 °F (36.3 °C) (Oral)   Resp 18   Ht 5' 8\" (1.727 m)   Wt 190 lb 14.7 oz (86.6 kg)   SpO2 95%   BMI 29.03 kg/m²     Last documented pain score (0-10 scale): Pain Level: 0  Last Weight:   Wt Readings from Last 1 Encounters:   08/01/20 190 lb 14.7 oz (86.6 kg)     Mental Status:  {IP PT MENTAL STATUS:20030}    IV Access:  { DAT IV ACCESS:450006874}    Nursing Mobility/ADLs:  Walking   {P DME ZQTZ:549895435}  Transfer  {P DME MSHD:430157396}  Bathing  {P DME ZHES:371690841}  Dressing  {P DME IWKL:980503169}  Toileting  {P DME ZSXB:085448250}  Feeding  {[facility] DME MJYF:218287508}  Med Admin  {[facility] DME HRVV:681816173}  Med Delivery   { DAT MED Delivery:151107874}    Wound Care Documentation and Therapy:        Elimination:  Continence:   · Bowel: {YES / DO:07415}  · Bladder: {YES / FE:96287}  Urinary Catheter: {Urinary Catheter:244343248}   Colostomy/Ileostomy/Ileal Conduit: {YES / MO:26125}       Date of Last BM: ***    Intake/Output Summary (Last 24 hours) at 8/2/2020 1006  Last data filed at 8/2/2020 2346  Gross per 24 hour   Intake --   Output 400 ml   Net -400 ml     I/O last 3 completed shifts:  In: -   Out: 400 [Urine:400]    Safety Concerns:     [address] DAT Safety Concerns:097990253}    Impairments/Disabilities:      [address] DAUGH[address] Impairments/Disabilities:947111524}    Nutrition Therapy:  Current Nutrition Therapy:   [address] Diet List:256549485}    Routes of Feeding: {P DME Other Feedings:655632854}  Liquids: {Slp liquid thickness:64551}  Daily Fluid Restriction: {CHP DME Yes amt example:456028671}  Last Modified Barium Swallow with Video (Video Swallowing Test): {Done Not Done YCXI:486902938}    Treatments at the Time of Hospital Discharge:   Respiratory Treatments: ***  Oxygen Therapy:  {Therapy; copd oxygen:26966}  Ventilator:    { CC Vent HMNO:420081783}    Rehab Therapies: {THERAPEUTIC INTERVENTION:5694674227}  Weight Bearing Status/Restrictions: { CC Weight Bearin}  Other Medical Equipment (for information only, NOT a DME order):  {EQUIPMENT:532428808}  Other Treatments: ***    Patient's personal belongings (please select all that are sent with patient):  {CHP DME Belongings:780816884}    RN SIGNATURE:  {Esignature:606103188}    CASE MANAGEMENT/SOCIAL WORK SECTION    Inpatient Status Date: 2020    Readmission Risk Assessment Score:  Readmission Risk              Risk of Unplanned Readmission:        36           Discharging to Facility/  Katelyn Brooks Amanda  Phone: [phone]  Fax: [phone]    / signature: {Esignature:129827659}    PHYSICIAN SECTION    Prognosis: Fair    Condition at Discharge: Stable    Rehab Potential (if transferring to Rehab): Fair    Recommended Labs or Other Treatments After Discharge: PT/OT/RN/SW consult. Physician Certification: I certify the above information and transfer of Roya Washington  is necessary for the continuing treatment of the diagnosis listed and that he requires [address] for greater 30 days.      Update Admission H&P: No change in H&P    PHYSICIAN SIGNATURE:  Electronically signed by Curtis Cisneros MD on 20 at 10:06 AM EDT

## 2020-08-02 NOTE — DISCHARGE SUMMARY
Hospital Medicine Discharge Summary    Patient ID: Damion Mahan      Patient's PCP: Ivana Booth    Admit Date: 7/30/2020     Discharge Date:   8/2/2020    Admitting Physician: Erin Beebe MD     Discharge Physician: Jovana Leon MD     Discharge Diagnoses: Active Hospital Problems    Diagnosis Date Noted    DKA, type 1, not at goal [facility]) [E10.10] 06/18/2020       The patient was seen and examined on day of discharge and this discharge summary is in conjunction with any daily progress note from day of discharge. Hospital Course: 60-year-old gentleman with history of diabetes mellitus type 1, asthma, hypertension, Parkinson disease presented to ER with hyperglycemia noted to be diabetic ketoacidosis. He was treated as per DKA protocol. Patient reports that he has never been hospitalized for diabetic ketoacidosis in the past.  Recently he is Humulin has been changed with Lantus and he got confused. Patient is refusing to go to the nursing home. He lives at home reported that his daughter live nearby. Seen and examined on day of discharge. Denies any nausea, vomiting, fever, chills, chest pain, shortness of breath. He was noted to have a hypoglycemic episode overnight blood sugars in 60s. Will adjust insulin regimen. Patient will be discharged on Lantus 45 units. And 10 units of NovoLog with meals. He was asked to check his blood sugars 3-4 times daily and make a blood sugar log. Bring to PCP on next visit to adjust insulin. Patient verbalizes instructions back. Final diagnosis  #Diabetic ketoacidosis resolved. #Diabetes mellitus type 1. #Hypertension  #Parkinson's. Physical Exam Performed:     BP (!) 170/73   Pulse 85   Temp 97.3 °F (36.3 °C) (Oral)   Resp 18   Ht 5' 8\" (1.727 m)   Wt 190 lb 14.7 oz (86.6 kg)   SpO2 95%   BMI 29.03 kg/m²       General appearance:  No apparent distress, appears stated age and cooperative.   HEENT:  Normal cephalic, Full Code     Activity: activity as tolerated    Diet: diabetic diet      Discharge Medications:     Current Discharge Medication List           Details   insulin aspart (NOVOLOG FLEXPEN) 100 UNIT/ML injection pen Inject 10 Units into the skin 3 times daily (before meals)  Qty: 5 pen, Refills: 3      insulin glargine (LANTUS;BASAGLAR) 100 UNIT/ML injection pen Inject 45 Units into the skin nightly  Qty: 5 pen, Refills: 3              Details   vitamin D3 (CHOLECALCIFEROL) 10 MCG (400 UNIT) TABS tablet Take 400 Units by mouth daily      Cinnamon 500 MG CAPS Take 1 capsule by mouth daily      fluticasone (FLONASE) 50 MCG/ACT nasal spray 2 sprays by Nasal route daily      carbidopa-levodopa (SINEMET)  MG per tablet Take 2 tablets by mouth 3 times daily      losartan (COZAAR) 100 MG tablet Take 100 mg by mouth daily      NIFEdipine (PROCARDIA XL) 60 MG extended release tablet Take 60 mg by mouth nightly      pantoprazole (PROTONIX) 40 MG tablet Take 40 mg by mouth 2 times daily      PARoxetine (PAXIL) 40 MG tablet Take 40 mg by mouth every morning      primidone (MYSOLINE) 50 MG tablet Take 50 mg by mouth 2 times daily      rOPINIRole (REQUIP) 3 MG tablet Take 3 mg by mouth 3 times daily      atorvastatin (LIPITOR) 10 MG tablet Take 10 mg by mouth nightly       albuterol (PROVENTIL HFA;VENTOLIN HFA) 108 (90 BASE) MCG/ACT inhaler Inhale 2 puffs into the lungs every 6 hours as needed. Insulin Pen Needle ([employer] PEN NEEDLES 29G) 29G X 12MM MISC 1 each by Does not apply route daily  Qty: 100 each, Refills: 3             Time Spent on discharge is more than 30 minutes in the examination, evaluation, counseling and review of medications and discharge plan. Signed:    Gia Serrato MD   8/2/2020      Thank you Fran Gagnon for the opportunity to be involved in this patient's care. If you have any questions or concerns please feel free to contact me at [phone].

## 2020-08-02 NOTE — CARE COORDINATION
Case Management            Discharge Note                    Date / Time of Note: 8/2/2020 11:42 AM                  Discharge Note Completed by: Brianna Katz Day    Patient Name: Martita Tejada   YOB: 1950  Diagnosis: DKA, type 1, not at goal [facility]) [E10.10]  DKA, type 1, not at goal [facility]) [E10.10]  DKA, type 1, not at goal [facility]) [E10.10]   Date / Time: 7/30/2020  2:02 PM    Current PCP: [address] patient: Yes    Hospitalization in the last 30 days: No    Advance Directives:  Code Status: Full Code  [place] DNR form completed and on chart: Not Indicated    Financial:  Payor: MEDICARE / Plan: MEDICARE PART A AND B / Product Type: *No Product type* /      Pharmacy:    [facility] [address], [address] [facility] [phone] [place] [phone]  [address] [place] [address]  Phone: [phone] Fax: [address] [facility] [address] [phone] [place] [phone]  [address] [place] [address]  Phone: [phone] Fax: [phone]      Assistance purchasing medications?: Potential Assistance Purchasing Medications: No  Assistance provided by Case Management: None at this time    Does patient want to participate in local refill/ meds to beds program?: No    Meds To Beds General Rules:  1. Can ONLY be done Monday- Friday between 8:30am-5pm  2. Prescription(s) must be in pharmacy by 3pm to be filled same day  3. Copy of patient's insurance/ prescription drug card and patient face sheet must be sent along with the prescription(s)  4. Cost of Rx cannot be added to hospital bill. If financial assistance is needed, please contact unit  or ;  or  CANNOT provide pharmacy voucher for patients co-pays  5.  Patients can then  the prescription on their way out of the hospital at discharge, or pharmacy can deliver to the bedside if staff is available. (payment due at time of pick-up or delivery - cash, check, or card accepted)     Able to afford home medications/ co-pay costs: No    ADLS:  Current PT AM-PAC Score:   /24  Current OT AM-PAC Score:   /24      Discharge Disposition: Home with [address]: [address] [place] [address] (resumption)      LOC at discharge: Skilled  [address] Completed: Yes    Notification completed in HENS/PAS?:  Not Applicable    IMM Completed:   Not Indicated    Transportation:  Transportation Plan for discharge: EMS transportation   Mode of Transport: Ambulance stretcher - BLS    Reason for medical transport: Other: Parkinson's Disease, Weakness, High fall risk   Name of [address] [place] [address],[address]: [facility]  Phone: [phone]  Transport Time: 12:45 pm     Transportation form completed: Yes    Home Care:  Home Care ordered at discharge: Yes  [address]: [address] [place] [address]   Phone: [phone]  Fax: [phone]  Orders faxed: Yes    Durable Medical Equipment:  DME Provider: Shantanu Hill. obtained during hospitalization: wheelchair (was working on from previous OSH discharge)    Home Oxygen and Respiratory Equipment:  Oxygen needed at discharge?: No    Dialysis:  Dialysis patient: No    Dialysis Center:  Not Applicable    Referrals made at [facility] for outpatient continued care:   Not Applicable    Additional CM Notes:   SW met with patient at bedside. Patient wants to discharge home today. [facility] already working on a wheelchair to assist patient at home. Patient unable to ambulate steps safely and has no family support. [address] care resumption orders faxed. Transport arranged through [employer] for 12:45 pm. Bedside RN aware and said she will update patient. No further needs at this time.      The Plan for Transition of Care is related to the following treatment goals DKA, type 1, not at goal [facility]) [E10.10]  DKA, type 1, not at goal [facility]) [E10.10]  DKA, type 1, not at goal [facility]) [E10.10]      The Patient and/or patient representative Patient  was provided with a choice of provider and agrees with the discharge plan Yes    Freedom of choice list was provided with basic dialogue that supports the patient's individualized plan of care/goals and shares the quality data associated with the providers. Yes    Care Transitions patient: No    William Leggett, ANGLE  [facility] [employer] - Weekend Coverage.    Case Management Department  Ph: [phone]

## 2020-08-02 NOTE — PROGRESS NOTES
Pt discharged to home. Both IV's removed with no complications noted. New medications and discharge instructions reviewed with patient. All of personal belongings sent home with patient.

## 2020-08-04 NOTE — ED NOTES
Report called to [facility], receiving RN for ICU     Christian Trujillo, ROXANNA  08/04/20 8916

## 2020-08-04 NOTE — CARE COORDINATION
Referred to patient for readmissions. Spoke to patient. Patient well known from multiple readmissions. Patient is a 77year old male readmitted for hyperglycemia. Patient lives at home. Patient not safe at home. Patient refuses SNF or Assisted Living. Patient refuses to pay for private duty assistance in the home. Patient unable to tell MD what medications he is supposed to be on or his insulin regimen. Patient reports nurse came to his home yesterday to assist with medications. Call placed to [facility], they have been unable to establish patient and have NEVER seen him due to patient not returning calls. Spoke to patient, patient states, Nadeen Clemens are you being so mean? \"      Discussed with patient that we are trying to plan so that he can remain at home. Discussed possible assistance from his girlfriend. Patient agreeable and states she does help with medications. Patient agreeable for this LISW to contact girlfriend, Camille West. Call placed to girlfriend and message left to see if she is agreeable to assist with medications and possible education on medications. Message left. Patient is interested in obtaining wheelchair. Patient again reports he will get more help at home, but refuses assistance when offered and arranged, he declines. Consider possible referral again to Adult Protective Services, patient's case has been closed. Spoke to [employer] on [address], last admission, she was to arrange patient obtaining [employer]. Unsure if that has been able to occur as patient just d/c'd. UPDATE: received return call from friend, Camille West. She is agreeable to learn about patient's medications, however, is only able to see patient one to two times a week. She was unaware that nursing has been unable to see patient and that he has not been returning calls. Friend with the [facility] for home care agency, [facility] care.   If patient d/c'd home recommend [facility] also contact friend to arrange visit F [phone]  [address]  Phone: [phone] Fax: [phone]    [facility] Christos Mueller, [place] Sophie Paiz [phone] Connie Becerra [phone]  [address]  [address] [address]  Phone: [phone] Fax: [phone]      Potential assistance Purchasing Medications:    Does Patient want to participate in local refill/ meds to beds program?:      Meds To Beds General Rules:  1. Can ONLY be done Monday- Friday between 8:30am-5pm  2. Prescription(s) must be in pharmacy by 3pm to be filled same day  3. Copy of patient's insurance/ prescription drug card and patient face sheet must be sent along with the prescription(s)  4. Cost of Rx cannot be added to hospital bill. If financial assistance is needed, please contact unit  or ;  or  CANNOT provide pharmacy voucher for patients co-pays  5.  Patients can then  the prescription on their way out of the hospital at discharge, or pharmacy can deliver to the bedside if staff is available. (payment due at time of pick-up or delivery - cash, check, or card accepted)     Able to afford home medications/ co-pay costs: Yes    ADLS  Support Systems:      PT AM-PAC:   /24  OT AM-PAC:   /24    [place]: home alone  Steps:     Plans to RETURN to current housing: Yes  Barriers to RETURNING to current housing: medical complications, weakness, inability to care for self    Home Care Information  Currently ACTIVE with [address]: No  Home Care Agency: Not Applicable    Currently ACTIVE with [place] on Aging: Yes  Passport/ Waiver: No  Passport/ Waiver Services: Emergency Response System    : Celina Hwang Phone: [address]  [facility] Provider:   Equipment: walker    Home Oxygen and Respiratory Equipment  Has HOME OXYGEN prior to admission: No  Laurie [address]: Not Applicable    Dialysis  Active with HD/PD prior to admission: No  Nephrologist:     HD Center:  Not Applicable    DISCHARGE PLAN:  Disposition: [place] (SNF): [employer] Phone: [phone] Fax: [phone]    Transportation PLAN for discharge: EMS transportation     Factors facilitating achievement of predicted outcomes: Friend support    Barriers to discharge: Impulsivity, Limited safety awareness, Decreased endurance, Lower extremity weakness and Medical complications    Additional Case Management Notes: see above    The Plan for Transition of Care is related to the following treatment goals of DKA, type 1, not at goal [facility]) [E10.10]  DKA, type 1, not at goal [facility]) [E10.10]    The Patient and/or patient representative Carroll Florian and his family were provided with a choice of provider and agrees with the discharge plan Yes    Freedom of choice list was provided with basic dialogue that supports the patient's individualized plan of care/goals and shares the quality data associated with the providers.  Yes    Care Transition patient: No    ANGLE Burgess, WANG  [facility]  Case Management Department  Ph: [phone]

## 2020-08-04 NOTE — ED NOTES
Patient states usually able to tell when he has to urinate,  Assisted with urinal and unable to go, denies urege to go at present.   Will continue to monitor     Juan M Lee RN  08/04/20 8038

## 2020-08-04 NOTE — H&P
ICU History and Physical  PGY-1    PCP: Carson MOYER    Code:Prior  Admit Date: 8/4/2020  IV Access:  PIV Duration:  <1 day   IV Fluids: LR bolus  Diet: NPO Effective now    CC: Hyperglycemia    HISTORY OF PRESENT ILLNESS:    Patient is a 77 y.o. male with a history of T1DM, Parkinson's disease, Asthma, and Hypertension presenting with Hyperglycemia. Patient's blood sugar was in the 600s at home. He was confused as to his insulin dose. He reports taking 50U of lantus in the AM and 10U rapid with meals and then 20 units at night. He told the ED physician that he was taking 45U at night. Patient reports that he tries to follow a carb-controlled diet, but when asked what exactly he was eating, he reports [employer] meals and donuts. Patient believes that home health is coming to help him. Patient denies any falls. Reports fall during his last admission when he got up without assistance. Patient has had numerous admissions for DKA and hypoglycemia, most recently discharged 8/2/20. He was discharged on 45U of lantus nightly, and 10U rapid w/ meals. Notably, his recollection of this insulin regimen is improved compared to prior presentations. He was discharged with home health, however, per ED patient was not answering their calls. In ED patient was afebrile, tachycardic, satting well on RA. Mild Leukocytosis at 12.8. VBG: pH 7.192, PCO2 33.4, PO2 36.3, HCO3 12.3. CXR w/ no acute CP disease. Na 127, K 5.8, HCO3 11, Cr 1.5, Glu 774. Patient given 2L LR and started on insulin GTT in ED.     Past Medical /Surgical History:    Past Medical History:   Diagnosis Date    Asthma     Diabetes mellitus ([facility] [address]     Hypertension     Parkinson disease ([facility] [address]      Past Surgical History:   Procedure Laterality Date    UPPER GASTROINTESTINAL ENDOSCOPY N/A 5/15/2020    EGD CONTROL HEMORRHAGE performed by Brandon Mas MD at [facility] 5/15/2020    EGD BIOPSY performed by Carroll Myers MD at [facility] ENDOSCOPY       Medications Prior to Admission:    No current facility-administered medications on file prior to encounter. Current Outpatient Medications on File Prior to Encounter   Medication Sig Dispense Refill    insulin aspart (NOVOLOG FLEXPEN) 100 UNIT/ML injection pen Inject 10 Units into the skin 3 times daily (before meals) 5 pen 3    insulin glargine (LANTUS;BASAGLAR) 100 UNIT/ML injection pen Inject 45 Units into the skin nightly 5 pen 3    vitamin D3 (CHOLECALCIFEROL) 10 MCG (400 UNIT) TABS tablet Take 400 Units by mouth daily      Cinnamon 500 MG CAPS Take 1 capsule by mouth daily      fluticasone (FLONASE) 50 MCG/ACT nasal spray 2 sprays by Nasal route daily      carbidopa-levodopa (SINEMET)  MG per tablet Take 2 tablets by mouth 3 times daily      losartan (COZAAR) 100 MG tablet Take 100 mg by mouth daily      NIFEdipine (PROCARDIA XL) 60 MG extended release tablet Take 60 mg by mouth nightly      pantoprazole (PROTONIX) 40 MG tablet Take 40 mg by mouth 2 times daily      PARoxetine (PAXIL) 40 MG tablet Take 40 mg by mouth every morning      primidone (MYSOLINE) 50 MG tablet Take 50 mg by mouth 2 times daily      Insulin Pen Needle ([employer] PEN NEEDLES 29G) 29G X 12MM MISC 1 each by Does not apply route daily 100 each 3    rOPINIRole (REQUIP) 3 MG tablet Take 3 mg by mouth 3 times daily      atorvastatin (LIPITOR) 10 MG tablet Take 10 mg by mouth nightly       albuterol (PROVENTIL HFA;VENTOLIN HFA) 108 (90 BASE) MCG/ACT inhaler Inhale 2 puffs into the lungs every 6 hours as needed. Allergies:  Erythromycin    SocialHistory:   TOBACCO:   reports that he has never smoked. He has never used smokeless tobacco.     ETOH:   reports no history of alcohol use. Patient currently lives alone    Family History:   History reviewed. No pertinent family history.     ROS:   Review of Systems   Constitutional: Negative for chills, fatigue and fever. HENT: Negative for congestion, sinus pressure and sore throat. Eyes: Negative for photophobia and visual disturbance. Respiratory: Negative for cough, chest tightness, shortness of breath and wheezing. Cardiovascular: Negative for chest pain, palpitations and leg swelling. Gastrointestinal: Positive for nausea and vomiting. Negative for abdominal distention, abdominal pain, constipation and diarrhea. Genitourinary: Negative for dysuria, frequency and urgency. Skin: Negative for color change and rash. Neurological: Positive for tremors (resting). Negative for dizziness, syncope, weakness and headaches. All other systems reviewed and are negative. PHYSICAL EXAM:  BP (!) 145/124   Pulse 121   Temp 98.1 °F (36.7 °C) (Oral)   Resp 26   Ht 5' 8\" (1.727 m)   Wt 185 lb (83.9 kg)   SpO2 100%   BMI 28.13 kg/m²   Recent Labs     08/02/20  0335 08/02/20  0616 08/02/20  0829 08/04/20  1143 08/04/20  1413   POCGLU 65* 136* 132* >600* >600*     Physical Exam  Constitutional:       Appearance: He is not ill-appearing. HENT:      Head: Normocephalic. Comments: Bruise over bridge of nose and above left eye     Mouth/Throat:      Comments: Acetone breath  Eyes:      Extraocular Movements: Extraocular movements intact. Pupils: Pupils are equal, round, and reactive to light. Cardiovascular:      Rate and Rhythm: Regular rhythm. Tachycardia present. Pulses: Normal pulses. Heart sounds: Normal heart sounds. No murmur. No friction rub. No gallop. Pulmonary:      Breath sounds: Normal breath sounds. No wheezing, rhonchi or rales. Comments: Borderline tachypneic  Abdominal:      Palpations: Abdomen is soft. Tenderness: There is no abdominal tenderness. There is no guarding or rebound. Skin:     General: Skin is warm and dry. Neurological:      Mental Status: He is alert and oriented to person, place, and time.       Comments: Chronic LUE resting tremor; unchanged from previous exam.         LABS:  Recent Labs     08/02/20  0521 08/04/20  1148   WBC 4.6 12.8*   HGB 10.1* 10.9*   HCT 30.7* 35.8*    361                                                                  Recent Labs     08/02/20  0521 08/04/20  1148   * 127*   K 4.4 5.8*    84*   CO2 24 11*   BUN 16 42*   CREATININE 0.9 1.5*   GLUCOSE 140* 774*     No results for input(s): AST, ALT, ALB, BILITOT, ALKPHOS in the last 72 hours. No results for input(s): TROPONINI in the last 72 hours. No results for input(s): BNP in the last 72 hours. Lab Results   Component Value Date    PHART 7.328 04/29/2020    TJB1UYA 25.5 04/29/2020    PO2ART 123.0 04/29/2020     No results for input(s): INR in the last 72 hours. No results for input(s): NITRITE, COLORU, PHUR, LABCAST, WBCUA, RBCUA, MUCUS, TRICHOMONAS, YEAST, BACTERIA, CLARITYU, SPECGRAV, LEUKOCYTESUR, UROBILINOGEN, BILIRUBINUR, BLOODU, GLUCOSEU, AMORPHOUS in the last 72 hours. Invalid input(s): KETONESU   No results for input(s): LACTA, LACTATE in the last 72 hours. IMAGING:  XR CHEST PORTABLE   Final Result   1. No findings for acute cardiopulmonary disease. Assessment & Plan:   Matthias Lombardo is a 77 y.o. male with PMH of T1DM, Parkinson's disease, asthma, and HTN who was admitted with DKA. Endocrine:  Diabetic Ketoacidosis: likely 2/2 to noncompliance with insulin + poor dietary habits  - Received 2L LR in ED  - Insulin Drip  - NS IV fluids 250ml/hr, switch to D5 0.5 NS 150ml/hr at glucose <250 per DKA protocol  - Renal function panel, Magnesium Q4H, replete lytes as needed  - NPO  - Hypoglycemia protocol  - Glucose Q1h  - Social work consulted for repeated admissions with DKA and hypoglycemia;  Patient needs more support  - F/U on UA    Renal:  NABIL: likely prerenal in setting of DKA; Cr 1.5  - IVF  - Holding home losartan  - Strict I/Os    Hyperkalemia: K 5.8  - Holding losartan as above;   - Hold off on K supplementation with insulin for now;    CV:  HTN:  - Continue home nifedipine; holding losartan as above    Neuro  Parkinson's Disease:  - Continue carbidopa/levidopa, primidone    Code Status: Full  F/E/N:  NPO  GI / DVT Prophylaxis: SubQ Hep  Disposition: Admit to ICU    The patient and / or the family were informed of the results of any tests, a time was given to answer questions, a plan was proposed and they agreed with plan. Patient discussed with Dr. Damian Guadalupe and plan was agreed upon.      Rachel Jackson MD  Internal Medicine Resident, PGY-1  8/4/2020  2:21 PM

## 2020-08-04 NOTE — ED NOTES
1524 attempts X 2 to call report to receiving ICU RN unanswered.   Unable to reach ICU charge, Fried[address] Charge aware     Nik Muñoz RN  08/04/20 0419

## 2020-08-04 NOTE — ED TRIAGE NOTES
Patient is a 77year old male who presents to the ED from home via EMS with c/o hyperglycemia. FSBS is 600's. Patient is alert and oriented with even and unlabored respirations.

## 2020-08-04 NOTE — PROGRESS NOTES
HHN at Home        c. Unable to demonstrate proper use of MDI with spacer     d. RR > 30 bpm   5. Bronchodilators will be delivered via Metered Dose Inhaler (MDI), HHN, Aerogen to intubated patients on mechanical ventilation. 6. Inhalation medication orders will be delivered and/or substituted as outlined below. Aerosolized Medications Ordering and Administration Guidelines:    1. All Medications will be ordered by a physician, and their frequency and/or modality will be adjusted as defined by the patients Respiratory Severity Index (RSI) score. 2. If the patient does not have documented COPD, consider discontinuing anticholinergics when RSI is less than 9.  3. If the bronchospasm worsens (increased RSI), then the bronchodilator frequency can be increased to a maximum of every 4 hours. If greater than every 4 hours is required, the physician will be contacted. 4. If the bronchospasm improves, the frequency of the bronchodilator can be decreased, based on the patient's RSI, but not less than home treatment regimen frequency. 5. Bronchodilator(s) will be discontinued if patient has a RSI less than 9 and has received no scheduled or as needed treatment for 72  Hrs. Patients Ordered on a Mucolytic Agent:    1. Must always be administered with a bronchodilator. 2. Discontinue if patient experiences worsened bronchospasm, or secretions have lessened to the point that the patient is able to clear them with a cough. Anti-inflammatory and Combination Medications:    1. If the patient lacks prior history of lung disease, is not using inhaled anti-inflammatory medication at home, and lacks wheezing by examination or by history for at least 24 hours, contact physician for possible discontinuation.

## 2020-08-04 NOTE — ED NOTES
Attempt call report to RN, unavailable at this time. Awaiting call back.       Breanne Beard RN  08/04/20 3560

## 2020-08-04 NOTE — ED PROVIDER NOTES
[facility]          ATTENDING PHYSICIAN NOTE       Date of evaluation: 8/4/2020    Chief Complaint     Hyperglycemia      History of Present Illness     Ted Rahman is a 77 y.o. male with history of Parkinson's disease, type 1 diabetes, frequent falls who is well-known to the emergency department resenting today for hyperglycemia. Patient was discharged 2 days ago from the inpatient setting due to DKA. Since that time he has had worsening control of his blood sugars. He states he is using Lantus 50 units in the morning and 10 units of fast acting insulin with meals and another 45 units of fast acting at night. Despite this his sugars have gradually increased and are now measuring high on his glucometer. He denies any falls, loss of consciousness. No infectious symptoms such as fevers, cough, shortness of breath, dysuria, diarrhea. He did have 2 episodes of vomiting today. Review of Systems     Pertinent positive and negative findings as documented in the HPI. Otherwise all other systems were reviewed and were negative. Physical Exam     INITIAL VITALS: BP: (!) 154/57, Temp: 98.1 °F (36.7 °C), Pulse: 115, Resp: 18, SpO2: 100 %     Nursing note and vitals reviewed. General:  Adult male, alert and appropriately interactive. In no distress. HENT: Normocephalic and atraumatic. External ears normal. Nose appears normal externally. Eyes: Conjunctivae normal. No scleral icterus. Neck: Neck supple. No tracheal deviation present. CV: Tachycardic rate. Regular rhythm. S1/S2 auscultated. No murmurs, gallops or rubs. Chest: Effort normal. Breath sounds clear to auscultation bilaterally. No wheezes. No rales or rhonchi. Abdominal: Soft. No distension. No tenderness. No rebound or guarding. No masses. No peritoneal signs. Musculoskeletal: No edema. No gross deformities. Neurological: Alert and appropriately interactive.  Face symmetric, speech without dysarthria or obvious aphasia. Moving all extremities spontaneously. Resting tremor noted. Skin: Warm, dry. No rash. No diaphoresis or erythema. Procedures   Procedures    MEDICAL DECISION MAKING     MDM: Donna Burgess is a 77 y.o. male with history of frequent presentations to the emergency department for falls and hyperglycemia who presents emergency department today with hyperglycemia. On arrival, patient is in no distress, vital signs are notable for tachycardia in the 110s. Glucometer reads \"high. \" His exam is overall reassuring. He has no clear infectious symptoms or trigger for his hyperglycemia. Suspect that the patient is once again in DKA, likely due to nonadherence to his insulin regimen or confusion about the dosing. The insulin regimen he reports to me does not align with his discharge instructions. Labs consistent with DKA with acidosis, anion gap. No clear infectious etiology. Potassium is slightly elevated without hemolysis, likely in the setting of acidosis. Will initiate insulin infusion and continue IV fluids. Vital signs remained reassuring in the emergency department. Will be admitted to the ICU on insulin infusion for further care and management. Critical Care:  Due to the immediate potential for life-threatening deterioration due to diabetic ketoacidosis, severe acid-base disturbance, I spent 35 minutes providing critical care. Thistime excludes time spent performing procedures but includes time spent on direct patient care, history retrieval, review of the chart, and discussions with patient, family, and consultant(s). Clinical Impression     1. Diabetic ketoacidosis without coma associated with type 1 diabetes mellitus ([facility] [address]    2.  Nonadherence to medication        Disposition     DISPOSITION Decision To Admit 08/04/2020 01:01:48 PM        Otf Gasca MD  1:48 PM                     Past Medical, Surgical, Family, and Social History     He has a past medical history Potassium reflex Magnesium 5.8 (H) 3.5 - 5.1 mmol/L    Chloride 84 (L) 99 - 110 mmol/L    CO2 11 (LL) 21 - 32 mmol/L    Anion Gap 32 (H) 3 - 16    Glucose 774 (HH) 70 - 99 mg/dL    BUN 42 (H) 7 - 20 mg/dL    CREATININE 1.5 (H) 0.8 - 1.3 mg/dL    GFR Non- 46 (A) >60    GFR  56 (A) >60    Calcium 9.6 8.3 - 10.6 mg/dL   POCT Glucose   Result Value Ref Range    POC Glucose >600 (AA) 70 - 99 mg/dl    Performed on ACCU-CHEK        CONSULTS:  IP CONSULT TO HOSPITALIST  IP CONSULT TO CRITICAL CARE    PATIENT REFERRED TO:  No follow-up provider specified.     DISCHARGE MEDICATIONS:  New Prescriptions    No medications on file        Moe Garcia MD  08/04/20 9061

## 2020-08-04 NOTE — CONSULTS
Critical Care Consult Note   PGY-3    PCP: Brayan MOYER    Code:Prior  Admit Date: 8/4/2020  Vent Settings: NA  IV Access:  PIV Central Line: no   IV Fluids:  mL/hr  Diet: No diet orders on file    CC: Hyperglycemia     HISTORY OF PRESENT ILLNESS:    Patient is a 77 y.o. male with a  PMH T1DM, Parkinson's disease, HTN presenting with DKA. Patient states he had blood glucose in 600s today. He reports taking 50 units lantus in the morning and 10 units rapid acting with meals and another 20 units at night, however he told the ED he was taking 45 units rapid acting at night. He does endorse nausea and two episodes of emesis today. He states he tries to follow a carb controlled diet, but when asked what he eats on a daily basis he states he eats [employer] meals and donuts. He denies any fevers, chills, chest pain, SOB, abdominal pain, constipation, diarrhea, dysuria. Of note patient was just discharged on 8/2/2020 after admission for DKA. Patient was discharged on lantus 45 units nightly and insulin aspart 10 units TID with meals. He was supposed to have home care on discharge, but per ED report he was not answering their calls. Patient believes he has been receiving outside assistance however. In ED, afebrile, RR 18, , Bp 154/57, satting 100% on room air. CXR with no acute disease. Leukocytosis at 12.8. VBG:  pH 7.192, pCO2 33.4, pO2 36.3, HCO3 12.3. Na 127 (corrected 137), K 5.8, CO2 11, GAP 32, glucose 774. Patient given 2 L LR in ED and started on insulin gtt.      Past Medical /Surgical History:    Past Medical History:   Diagnosis Date    Asthma     [facility] [address]     Hypertension     Parkinson disease ([facility] [address]      Past Surgical History:   Procedure Laterality Date    UPPER GASTROINTESTINAL ENDOSCOPY N/A 5/15/2020    EGD CONTROL HEMORRHAGE performed by Roya Treviño MD at [facility] 5/15/2020    EGD BIOPSY Palpations: Abdomen is soft. Tenderness: There is no abdominal tenderness. There is no guarding or rebound. Musculoskeletal:      Comments: Trace pitting edema bilateral lower extremities   Skin:     General: Skin is warm and dry. Coloration: Skin is not jaundiced. Neurological:      Mental Status: He is alert. Cranial Nerves: No cranial nerve deficit. Motor: No weakness. Comments: Awake and alert but does seem to have some confusion          LABS:  Recent Labs     08/02/20  0521 08/04/20  1148   WBC 4.6 12.8*   HGB 10.1* 10.9*   HCT 30.7* 35.8*    361                                                                  Recent Labs     08/02/20  0521 08/04/20  1148   * 127*   K 4.4 5.8*    84*   CO2 24 11*   BUN 16 42*   CREATININE 0.9 1.5*   GLUCOSE 140* 774*       Lab Results   Component Value Date    PHART 7.328 04/29/2020    QFA6VOC 25.5 04/29/2020    PO2ART 123.0 04/29/2020       IMAGING:  XR CHEST PORTABLE   Final Result   1. No findings for acute cardiopulmonary disease. Assessment & Plan:   Rohit Aj is a 77 y.o. male with PMH of Parkison's disease, HTN, T1DM who was admitted with DKA. DKA - likely 2/2 noncompliance with insulin. pH 7.192. Na 127 (corrected 137), K 5.8, CO2 11, GAP 32, glucose 774. Patient has had multiple recent admissions for DKA and hypoglycemia. Last A1c 7.8 on 7/30/2020.   - insulin gtt  -  mL/hr, once glucose < 250 will switch to D5 1/2 NS at 150 mL/hr   - RFP + Mg f2pftuk, replace electrolytes as needed   - glucose checks q1hr  - hypoglycemia protocol   - NPO  - social work consulted, patient needs further support for medication management   - follow up UA    NABIL - likely prerenal 2/2 dehydration in setting of DKA.  Creatinine 1.5 with baseline 0.9-1.0.   - IVF as above   - hold losartan  - strict I/O    Mild Hyperkalemia - K 5.8  - management as above   - holding Losartan     HTN  - continue nifedipine  - holding losartan due to NABIL    Parkinson's Disease   - continue sinemet   - continue primidone     HLD  - continue statin     Code Status:  FULL  F/E/N:  NPO,  mL/hr   GI / DVT Prophylaxis:  SubQ heparin   Disposition:  ICU    The patient and / or the family were informed of the results of any tests, a time was given to answer questions, a plan was proposed and they agreed with plan. Patient discussed with attending and plan was agreed upon. Bhargav Childress MD  Internal Medicine Resident, PGY-3  8/4/2020  1:33 PM       Patient was seen, examined and discussed with Dr. Sujatha Hall. I agree with the history of present illness, past medical/surgical histories, family history, social history, medication list and allergies as listed. The review of systems is as noted above. My physical exam confirms the findings listed   Chart was reviewed including labs, CXR and medical records confirm the findings noted      DKA, brittle diabetes and non compliance. Recurrent admissions   NABIL  Hyperkalemia   Pseudohyponatremia   Leukocytosis with left shift: No focus of infection     Insulin drip  IVF  Agree on the A/P noted above.

## 2020-08-05 NOTE — PROGRESS NOTES
120/62   Pulse 76   Temp 98.2 °F (36.8 °C) (Axillary)   Resp 19   Ht 5' 8\" (1.727 m)   Wt 184 lb 8.4 oz (83.7 kg)   SpO2 97%   BMI 28.06 kg/m²     General not in apparent distress  HEENT head atraumatic, eyes PERRLA, neck supple. Cardiac S1, S2 audible, tachycardia, no murmur appreciated   Respiratory bilateral clear to auscultate, no wheeze no rhonchi   Abdomen soft, nontender, bowel sounds present  Neuro alert and x3, no focal neural deficit noted. Mild tremulousness. MSK trace pitting edema. Labs:   Recent Labs     08/04/20  1148 08/05/20  0420   WBC 12.8* 12.7*   HGB 10.9* 9.4*   HCT 35.8* 28.8*    274     Recent Labs     08/04/20  1840 08/05/20  0035 08/05/20  0420    134* 138   K 4.5 3.7 3.8   CL 98* 101 104   CO2 19* 23 22   BUN 49* 46* 45*   CREATININE 1.7* 1.4* 1.3   CALCIUM 9.7 8.9 9.0   PHOS 3.0 3.4 3.4     No results for input(s): AST, ALT, BILIDIR, BILITOT, ALKPHOS in the last 72 hours. No results for input(s): INR in the last 72 hours. No results for input(s): Kenji Keel in the last 72 hours. Urinalysis:      Lab Results   Component Value Date    NITRU Negative 07/26/2020    WBCUA None seen 07/26/2020    BACTERIA 2+ 04/29/2020    RBCUA None seen 07/26/2020    BLOODU TRACE-INTACT 07/26/2020    SPECGRAV 1.025 07/26/2020    GLUCOSEU 250 07/26/2020       Radiology:  XR CHEST PORTABLE   Final Result   1. No findings for acute cardiopulmonary disease. Assessment/Plan:    Active Hospital Problems    Diagnosis Date Noted    DKA, type 1, not at goal [facility]) [E10.10] 06/18/2020       Assessment   #Diabetic ketoacidosis  #Diabetes mellitus type 1  #Noncompliance with medication. #NABIL likely prerenal  #Mild hyperkalemia  #Pseudohyponatremia in setting of hyperglycemia. #Parkinson's disease      Plan  Patient has been started on Lantus. Continue sliding scale. We did adjust insulin as per requirement.   Discussed with patient regarding placement he is agreeable to go tomorrow. Continue home medications Sinemet, nifedipine, quinapril, Requip, Lipitor. DVT Prophylaxis: Lovenox  Diet: DIET CARB CONTROL;  Code Status: Full Code    PT/OT Eval Status: Consult    Dispo -inpatient.   Okay to transfer out of ICU    Ugo Wells MD

## 2020-08-05 NOTE — PLAN OF CARE
Problem: Falls - Risk of:  Goal: Will remain free from falls  Description: Will remain free from falls  Outcome: Ongoing   Pt is free of falls at this time. Bed wheels are locked, bed alarm is on, bed is in lowest position. Call light is within reach. Will cont to monitor. Problem: Skin Integrity:  Goal: Will show no infection signs and symptoms  Description: Will show no infection signs and symptoms  Outcome: Ongoing   Pt at risk for skin breakdown. Pt turned and checked for incont q2h. Heels suspended, skin care as ordered. Monitor skin integrity.

## 2020-08-05 NOTE — PLAN OF CARE
Problem: Falls - Risk of:  Goal: Will remain free from falls  Description: Will remain free from falls  8/5/2020 1759 by Tay Small RN  Outcome: Ongoing  8/5/2020 1759 by Tay Small RN  Outcome: Ongoing  Goal: Absence of physical injury  Description: Absence of physical injury  8/5/2020 1759 by Tay Small RN  Outcome: Ongoing  8/5/2020 1759 by Tay Small RN  Outcome: Ongoing     Problem: Skin Integrity:  Goal: Will show no infection signs and symptoms  Description: Will show no infection signs and symptoms  8/5/2020 1759 by Tay Small RN  Outcome: Ongoing  8/5/2020 1759 by Tay Small RN  Outcome: Ongoing  Goal: Absence of new skin breakdown  Description: Absence of new skin breakdown  8/5/2020 1759 by Tay Small RN  Outcome: Ongoing  8/5/2020 1759 by Tay Small RN  Outcome: Ongoing     Problem: Discharge Planning:  Goal: Discharged to appropriate level of care  Description: Discharged to appropriate level of care  Outcome: Ongoing     Problem: Fluid Volume - Imbalance:  Goal: Will remain free of signs and symptoms of dehydration  Description: Will remain free of signs and symptoms of dehydration  Outcome: Ongoing  Goal: Absence of imbalanced fluid volume signs and symptoms  Description: Absence of imbalanced fluid volume signs and symptoms  Outcome: Ongoing     Problem: Serum Glucose Level - Abnormal:  Goal: Ability to maintain appropriate glucose levels will improve  Description: Ability to maintain appropriate glucose levels will improve  Outcome: Ongoing     Problem: Injury - Acid Base Imbalance:  Goal: Acid-base balance  Description: Acid-base balance  Outcome: Ongoing

## 2020-08-05 NOTE — PROGRESS NOTES
calls.      Medications:     Scheduled Meds:   insulin NPH  25 Units Subcutaneous Once    insulin glargine  50 Units Subcutaneous Nightly    insulin lispro  10 Units Subcutaneous TID WC    insulin lispro  0-6 Units Subcutaneous TID WC    insulin lispro  0-3 Units Subcutaneous Nightly    carbidopa-levodopa  2 tablet Oral TID    atorvastatin  10 mg Oral Nightly    NIFEdipine  60 mg Oral Nightly    pantoprazole  40 mg Oral BID    rOPINIRole  3 mg Oral TID    primidone  50 mg Oral BID    PARoxetine  40 mg Oral QAM    vitamin D3  400 Units Oral Daily    heparin (porcine)  5,000 Units Subcutaneous 3 times per day     Continuous Infusions:   dextrose      insulin 4.5 Units/hr (08/05/20 0525)    sodium chloride 250 mL/hr at 08/04/20 1712    dextrose 5 % and 0.45 % NaCl 150 mL/hr at 08/05/20 0329     PRN Meds:glucose, dextrose, glucagon (rDNA), dextrose, albuterol sulfate HFA, dextrose 5 % and 0.45 % NaCl    Objective:   Vitals:   T-max:  Patient Vitals for the past 8 hrs:   BP Temp Temp src Pulse Resp SpO2 Weight   08/05/20 0600 133/66 -- -- 74 16 (!) 89 % --   08/05/20 0551 -- -- -- -- -- -- 184 lb 8.4 oz (83.7 kg)   08/05/20 0500 (!) 90/57 -- -- 72 14 96 % --   08/05/20 0400 (!) 119/57 97.6 °F (36.4 °C) Oral 74 17 97 % --   08/05/20 0300 (!) 116/54 -- -- 73 21 97 % --   08/05/20 0200 (!) 111/52 -- -- 73 17 97 % --   08/05/20 0100 (!) 119/55 -- -- 75 17 98 % --       Intake/Output Summary (Last 24 hours) at 8/5/2020 0811  Last data filed at 8/5/2020 9074  Gross per 24 hour   Intake 3196 ml   Output 600 ml   Net 2596 ml       Physical Exam  Constitutional:       General: He is not in acute distress. Appearance: Normal appearance. He is not ill-appearing. Eyes:      Extraocular Movements: Extraocular movements intact. Pupils: Pupils are equal, round, and reactive to light. Cardiovascular:      Rate and Rhythm: Normal rate and regular rhythm. Pulses: Normal pulses.       Heart sounds: Normal heart sounds. Pulmonary:      Effort: Pulmonary effort is normal.      Breath sounds: Rhonchi present. No wheezing or rales. Abdominal:      General: Bowel sounds are normal.      Palpations: Abdomen is soft. Tenderness: There is no abdominal tenderness. There is no guarding or rebound. Skin:     General: Skin is warm and dry. Neurological:      Mental Status: He is alert and oriented to person, place, and time. Mental status is at baseline. Comments: Resting tremor in LUE, at baseline       LABS:    CBC:   Recent Labs     08/04/20  1148 08/05/20  0420   WBC 12.8* 12.7*   HGB 10.9* 9.4*   HCT 35.8* 28.8*    274   MCV 83.6 78.1*     Renal:    Recent Labs     08/04/20  1840 08/05/20  0035 08/05/20  0420    134* 138   K 4.5 3.7 3.8   CL 98* 101 104   CO2 19* 23 22   BUN 49* 46* 45*   CREATININE 1.7* 1.4* 1.3   GLUCOSE 290* 175* 113*   CALCIUM 9.7 8.9 9.0   MG 2.10 2.20 2.30   PHOS 3.0 3.4 3.4   ANIONGAP 22* 10 12     Hepatic:   Recent Labs     08/04/20  1840 08/05/20  0035 08/05/20  0420   LABALBU 4.1 3.5 3.6     Troponin: No results for input(s): TROPONINI in the last 72 hours. BNP: No results for input(s): BNP in the last 72 hours. Lipids: No results for input(s): CHOL, HDL in the last 72 hours. Invalid input(s): LDLCALCU, TRIGLYCERIDE  ABGs:  No results for input(s): PHART, MEA3VAO, PO2ART, FDK1TOW, BEART, THGBART, L9BMOHKU, BJT4AOU in the last 72 hours. INR: No results for input(s): INR in the last 72 hours. Lactate: No results for input(s): LACTA, LACTATE in the last 72 hours. Cultures:  -----------------------------------------------------------------  RAD:   XR CHEST PORTABLE   Final Result   1. No findings for acute cardiopulmonary disease.         Assessment/Plan:     Dg Gomes is a 77 y.o. male with PMH of T1DM, Parkinson's disease, asthma, and HTN who was admitted with DKA.     Endocrine:  Diabetic Ketoacidosis: likely 2/2 to noncompliance with insulin + poor dietary habits  - Received 2L LR in ED  - Off Insulin Drip  - Finishing bag of D5 0.5 NS 150ml/hr then off IV fluids.  - Renal function panel, Magnesium daily, replete lytes as needed  - Carb Controlled diet  - Hypoglycemia protocol  - Glucose QAC&HS  - Social work consulted for repeated admissions with DKA and hypoglycemia;  Patient needs more support  - F/U on UA    Renal:  NABIL - improving: likely prerenal in setting of DKA; Cr 1.3  - Holding home losartan  - Strict I/Os     Hyperkalemia: K 3.8  - Holding losartan as above;   - Daily Renal panel     CV:  HTN:  - Continue home nifedipine; holding losartan as above     Neuro  Parkinson's Disease:  - Continue carbidopa/levidopa, primidone    Code Status: Full  FEN: Carb-Controlled  PPX: SCDs  DISPO: Transfer to Lakshmi Dick MD  Internal Medicine Resident, PGY-1  08/05/20  8:11 AM    This patient has been staffed and discussed with Dr. Janith Mortimer

## 2020-08-05 NOTE — PROGRESS NOTES
Patient repositioned. Assist with call to daughter. No needs at this time. VSS will continue to monitor.

## 2020-08-05 NOTE — PROGRESS NOTES
Shift assessment completed. Pt resting in bed requesting water at this time, RN explained NPO order. Pt exhibiting tremor in LUE at this time d/t baseline Parkinson's. VSS. Safety precautions in place. RN will monitor.

## 2020-08-05 NOTE — CARE COORDINATION
He is agreeable to short term SNF but is not open to assisted living or long term care at this point. He feels he can care for himself at home, and says he is having a wheelchair and a life alert delivered to help keep him safer at home. [facility] for SNF and [facility] for [facility] needs. Patient is doing better per [address], [place]. Patient is voiding, eating, insulin gtt d/c'd and plan is to send out to general floor sometime today. Noted that patient is unsafe to go home at this point. This CM asked that we get a functional baseline on patient by PT/OT. Patient's nurse is getting that order. Patient does not need OT/PT evals in order to go to facility, but wanted a baseline to help facility meet any needs or see if there may be a need for any therapy at [facility]. Plan: discharge to [facility] when medically stable and patient has met the 3 day stay requirement. CM will continue to follow patient until discharge.  Electronically signed by Adria Nunez RN on 8/5/2020 at 2:51 PM

## 2020-08-05 NOTE — PROGRESS NOTES
Patient was seen, examined and discussed with Dr. Caitlyn gAuillon. I agree with the interval history. My physical exam confirms the findings listed below  Chart was reviewed including labs and medical records confirm the findings noted    DKA, brittle diabetes and non compliance. Recurrent admissions. AG closed x2. Blood sugar is 153    NABIL - improving   Hyperkalemia  - resolved   Pseudohyponatremia - resolved   Leukocytosis with left shift: No focus of infection      Transitioned to subcutaneous insulin and started oral intake. Continue D5 1/2 NS to finish this liter  OK to transfer to the floor.

## 2020-08-05 NOTE — CONSULTS
[facility]  Palliative Medicine Consultation Note      Date Of Admission:8/4/2020  Date of consult: 08/05/20  Seen by [facility] in the past:  No    Recommendations:        Introduced palliative care and discussed with pt how he'd been doing at home. He is agreeable to short term SNF but is not open to assisted living or long term care at this point. He feels he can care for himself at home, and says he is having a wheelchair and a life alert delivered to help keep him safer at home. Completed health care power of  with pt. He named his significant other Mayi Whalen as his agent, and daughter Peter Koo as alternate agent. Placed a copy on chart. Called Zaina and e-mailed her a copy of the HCPOA. Discussed with Zaina how pt has been doing overall and his multiple hospitalizations. Mario Alvarez says she and daughter Peter Koo will continue to try and talk with pt about moving into an [facility] near [facility]. Also discussed advance care planning with Zaina and explained code status. She says she and Peter Koo will discuss with pt. D/w TOM Shultz and Dr. Justino Padilla. 1. Goals of Care/Advanced Care planning/Code status: Full Code, discussed with POA today. Pt is agreeable to short term SNF, but wants to return home. He is not open to assisted living/long term care at this time. He wants to return to the hospital when needed. 2. Pain: pt denies any pain/discomfort. 3. SOB: breathing comfortably on room air. 4. Hyperglycemia: pt reports he has been taking insulin, but seems to be unclear about what regimen he was taking. He attributes that to his regimen changing frequently, but says he is able to take the prescribed regimen. Noncompliance is suspected. Here with DKA, now off insulin drip. 5. Generalized weakness: pt is ambulatory and mostly independent with ADLs, but has suspected noncompliance with meds and numerous falls at home. PT/OT eval pending. Pt agreeable to SNF.   6. Disposition: d/c to [facility] possibly tomorrow    Reason for Consult:         __x___ Goals of Care  __x___ Code Status Discussion/Advanced Care Planning   _____ Psychosocial/Family Support  __x___ Symptom Management  _____ Other (Specify)    Requesting Physician: Dr. Lansing Closs: Hyperglycemia    History Obtained From:  patient, electronic medical record    History of Present Illness:         Mr. Erick Ulloa is a 72year old male with PMH of asthma, DM, HTN, Parkinson's disease who presented with hyperglycemia, BG in 600s. Patient has had numerous admissions for DKA and hypoglycemia, most recently discharged 8/2/20. He was discharged with home health, however, per ED patient was not answering their calls. He's now admitted with diabetic ketoacidosis likely secondary to noncompliance with insulin and poor dietary habits, started on insulin drip and fluid resuscitation.       Subjective:                     Past Medical History:        Diagnosis Date    Asthma     Diabetes mellitus ([facility] [address]     Hypertension     Parkinson disease ([facility] [address]        Past Surgical History:        Procedure Laterality Date    UPPER GASTROINTESTINAL ENDOSCOPY N/A 5/15/2020    EGD CONTROL HEMORRHAGE performed by Stefanie Bolaños MD at [address] N/A 5/15/2020    EGD BIOPSY performed by Stefanie Bolaños MD at [facility] ENDOSCOPY       Current Medications:    Current Facility-Administered Medications: insulin glargine (LANTUS;BASAGLAR) injection pen 50 Units, 50 Units, Subcutaneous, Nightly  insulin lispro (1 Unit Dial) 10 Units, 10 Units, Subcutaneous, TID WC  insulin lispro (1 Unit Dial) 0-6 Units, 0-6 Units, Subcutaneous, TID WC  insulin lispro (1 Unit Dial) 0-3 Units, 0-3 Units, Subcutaneous, Nightly  glucose (GLUTOSE) 40 % oral gel 15 g, 15 g, Oral, PRN  dextrose 50 % IV solution, 12.5 g, Intravenous, PRN  glucagon (rDNA) injection 1 mg, 1 mg, Intramuscular, PRN  dextrose 5 % solution, 100 mL/hr, Intravenous, PRN  albuterol sulfate  (90 Base) MCG/ACT inhaler 2 puff, 2 puff, Inhalation, Q6H PRN  carbidopa-levodopa (SINEMET)  MG per tablet 2 tablet, 2 tablet, Oral, TID  atorvastatin (LIPITOR) tablet 10 mg, 10 mg, Oral, Nightly  NIFEdipine (PROCARDIA XL) extended release tablet 60 mg, 60 mg, Oral, Nightly  pantoprazole (PROTONIX) tablet 40 mg, 40 mg, Oral, BID  rOPINIRole (REQUIP) tablet 3 mg, 3 mg, Oral, TID  primidone (MYSOLINE) tablet 50 mg, 50 mg, Oral, BID  PARoxetine (PAXIL) tablet 40 mg, 40 mg, Oral, QAM  vitamin D3 (CHOLECALCIFEROL) tablet 400 Units, 400 Units, Oral, Daily  0.9 % sodium chloride infusion, , Intravenous, Continuous  dextrose 5 % and 0.45 % sodium chloride infusion, , Intravenous, Continuous PRN  heparin (porcine) injection 5,000 Units, 5,000 Units, Subcutaneous, 3 times per day    Allergies:  Erythromycin    Social History:    MARITAL STATUS:  single  Patient currently lives alone    Review of Systems -   General ROS: positive for  - fatigue  Psychological ROS: negative  ENT ROS: negative  Hematological and Lymphatic ROS: negative  Respiratory ROS: no cough, shortness of breath, or wheezing  Cardiovascular ROS: no chest pain or dyspnea on exertion  Gastrointestinal ROS: no abdominal pain, change in bowel habits, or black or bloody stools  Genito-Urinary ROS: no dysuria, trouble voiding, or hematuria  Musculoskeletal ROS: positive for - muscular weakness, falls  Neurological ROS: no TIA or stroke symptoms    Objective:        PHYSICAL EXAM:    General appearance: alert, appears stated age and cooperative  Lungs: clear to auscultation bilaterally  Heart: regular rate and rhythm  Abdomen: soft, non-tender; bowel sounds normal; no masses,  no organomegaly  Extremities: extremities normal, atraumatic, no cyanosis or edema  Skin: Skin color, texture, turgor normal. No rashes or lesions  Neurologic: Mental status: Alert, oriented, thought content appropriate    Palliative Performance Scale:  60% ? Ambulation reduced; Significant disease; Can't do hobbies/housework; intake normal   or reduced; occasional assist; LOC full/confusion  50% ? Mainly sit/lie; Extensive disease; Can't do any work; Considerable assist; intake normal  Or reduced; LOC full/confusion  40% ? Mainly in bed; Extensive disease; Mainly assist; intake normal or reduced; occasional assist; LOC full/confusion  30% ? Bed Bound; Extensive disease; Total care; intake reduced; LOC full/confusion  20% ? Bed Bound; Extensive disease; Total care; intake minimal; Drowsy/coma  10% ? Bed Bound; Extensive disease; Total care; Mouth care only; Drowsy/coma  0 ?  Death    PPS: 50%    Vitals:    /66   Pulse 74   Temp 98.2 °F (36.8 °C) (Axillary)   Resp 16   Ht 5' 8\" (1.727 m)   Wt 184 lb 8.4 oz (83.7 kg)   SpO2 (!) 89%   BMI 28.06 kg/m²     DATA:    CBC with Differential:    Lab Results   Component Value Date    WBC 12.7 08/05/2020    RBC 3.69 08/05/2020    HGB 9.4 08/05/2020    HCT 28.8 08/05/2020     08/05/2020    MCV 78.1 08/05/2020    MCH 25.4 08/05/2020    MCHC 32.5 08/05/2020    RDW 18.3 08/05/2020    BANDSPCT 1 05/18/2020    METASPCT 3 05/19/2020    LYMPHOPCT 16.2 08/05/2020    PROMYELOPCT 7 05/14/2020    MONOPCT 7.8 08/05/2020    MYELOPCT 1 05/19/2020    BASOPCT 0.5 08/05/2020    MONOSABS 1.0 08/05/2020    LYMPHSABS 2.1 08/05/2020    EOSABS 0.1 08/05/2020    BASOSABS 0.1 08/05/2020     BMP:    Lab Results   Component Value Date     08/05/2020    K 3.8 08/05/2020    K 5.8 08/04/2020     08/05/2020    CO2 22 08/05/2020    BUN 45 08/05/2020    LABALBU 3.6 08/05/2020    CREATININE 1.3 08/05/2020    CALCIUM 9.0 08/05/2020    GFRAA >60 08/05/2020    LABGLOM 55 08/05/2020    GLUCOSE 113 08/05/2020     Albumin:    Lab Results   Component Value Date    LABALBU 3.6 08/05/2020         Conclusion/Time spent:         Recommendations see above    Pt [id number], POA [id number]  Time spent with patient and/or family: 35  Time reviewing records: 10  Time communicating with staff: 10    A total of 55 minutes spent with the patient and family on unit greater than 50% in counseling regarding palliative care and in goals of care for the patient. Thank you to Dr. Malorie Mortensen for this consultation. We will continue to follow Mr. Harding Nurse care as needed.       Ayana Lerma NP  [address] [facility]

## 2020-08-05 NOTE — PROGRESS NOTES
Pt repositioned and resting in bed with no needs at this time, rating pain 0/10. VSS. Safety precautions in place.

## 2020-08-06 NOTE — DISCHARGE INSTR - COC
Continuity of Care Form    Patient Name: Naren King   :  1950  MRN:  [id number]    Admit date:  2020  Discharge date:  20    Code Status Order: Full Code   Advance Directives:   [place] Directive Type of Healthcare Directive Copy in [address] Agent's Name Healthcare Agent's Phone Number    20 0000  Yes, patient has an advance directive for healthcare treatment -- --  -- -- --          Admitting Physician:  Bernard Molina MD  PCP: Vijay Gandhi    Discharging Nurse: [facility] Unit/Room#: 7581/7291-07  Discharging Unit Phone Number: [phone]    Emergency Contact:   Extended Emergency Contact Information  Primary Emergency Contact: Colleen Phone: [phone]  Relation: Child  Secondary Emergency Contact: Kirby Brannon  Mobile Phone: [phone]  Relation: Domestic Partner  Preferred language: English   needed? No    Past Surgical History:  Past Surgical History:   Procedure Laterality Date    UPPER GASTROINTESTINAL ENDOSCOPY N/A 5/15/2020    EGD CONTROL HEMORRHAGE performed by Cinthia Rehman MD at [address] [place] [address],[address] 5/15/2020    EGD BIOPSY performed by Cinthia Rehman MD at [facility] ENDOSCOPY       Immunization History:   Immunization History   Administered Date(s) Administered    Tdap (Boostrix, Adacel) 2017, 2019       Active Problems:  Patient Active Problem List   Diagnosis Code    Hypoglycemia E16.2    EKG abnormalities R94.31    Syncope R55    Sepsis ([facility] [address] A41.9    GI bleed K92.2    DKA, type 1, not at goal [facility]) E10.10    Type 1 diabetes mellitus with hypoglycemia and without coma ([facility] [address] E10.649    Fall at home, sequela W19. Belen Shannon, Y92.009       Isolation/Infection:   Isolation          No Isolation        Patient Infection Status     Infection Onset Added Last Indicated Last Indicated By Review Planned Expiration Resolved Resolved By    None active    Resolved    COVID-19 Rule Out 08/04/20 08/04/20 08/04/20 COVID-19 (Ordered)   08/05/20 Shefali De La Garza RN    COVID-19 Rule Out 05/14/20 05/14/20 05/14/20 COVID-19 (Ordered)   05/14/20 Rule-Out Test Resulted    COVID-19 Rule Out 04/28/20 04/28/20 04/28/20 COVID-19 (Ordered)   04/29/20 Rule-Out Test Resulted          Nurse Assessment:  Last Vital Signs: BP (!) 167/78   Pulse 90   Temp 97.5 °F (36.4 °C) (Oral)   Resp 16   Ht 5' 8\" (1.727 m)   Wt 184 lb 8.4 oz (83.7 kg)   SpO2 95%   BMI 28.06 kg/m²     Last documented pain score (0-10 scale): Pain Level: 0  Last Weight:   Wt Readings from Last 1 Encounters:   08/05/20 184 lb 8.4 oz (83.7 kg)     Mental Status:  oriented and alert    IV Access:  - None    Nursing Mobility/ADLs:  Walking   Assisted  Transfer  Assisted  Bathing  Assisted  Dressing  Assisted  Toileting  Assisted  Feeding  Independent  Med Admin  Independent  Med Delivery   whole    Wound Care Documentation and Therapy:        Elimination:  Continence:   · Bowel: Yes  · Bladder: Yes  Urinary Catheter: None   Colostomy/Ileostomy/Ileal Conduit: No       Date of Last BM:     Intake/Output Summary (Last 24 hours) at 8/6/2020 1206  Last data filed at 8/6/2020 0928  Gross per 24 hour   Intake 580 ml   Output --   Net 580 ml     I/O last 3 completed shifts: In: 480 [P.O.:480]  Out: 200 [Urine:200]    Safety Concerns: At Risk for Falls    Impairments/Disabilities:      None    Nutrition Therapy:  Current Nutrition Therapy:   - Oral Diet:  Carb Control 3 carbs/meal (1500kcals/day)    Routes of Feeding: Oral  Liquids: No Restrictions  Daily Fluid Restriction: no  Last Modified Barium Swallow with Video (Video Swallowing Test): not done    Treatments at the Time of Hospital Discharge:   Respiratory Treatments:   Oxygen Therapy:  is not on home oxygen therapy.   Ventilator:    - No ventilator support    Rehab Therapies: Physical Therapy and Occupational Therapy  Weight Bearing Status/Restrictions: No weight bearing restirctions  Other Medical Equipment (for information only, NOT a DME order):  walker  Other Treatments:     Patient's personal belongings (please select all that are sent with patient):  None    RN SIGNATURE:  Electronically signed by Prasanna Saldana RN on 8/6/20 at 1:30 PM EDT    CASE MANAGEMENT/SOCIAL WORK SECTION    Inpatient Status Date: ***    Readmission Risk Assessment Score:  Readmission Risk              Risk of Unplanned Readmission:        41           Discharging to Facility/ [address] [facility] [address] [address]       Phone: [phone]       Fax: [phone]        ·     / signature: Electronically signed by Avery Light RN on 8/6/20 at 12:25 PM EDT    PHYSICIAN SECTION    Prognosis: Fair    Condition at Discharge: Stable    Rehab Potential (if transferring to Rehab): Fair    Recommended Labs or Other Treatments After Discharge: PT/OT/RN/Dietician consult. Patient may need insulin novolog sliding scale. Check blood sugars with every meal. Adjust insulin according to requirement. Physician Certification: I certify the above information and transfer of Dg Gomes  is necessary for the continuing treatment of the diagnosis listed and that he requires [facility] for greater 30 days.      Update Admission H&P: No change in H&P    PHYSICIAN SIGNATURE:  Electronically signed by Vamshi Mir MD on 8/6/20 at 12:07 PM EDT

## 2020-08-06 NOTE — PROGRESS NOTES
Occupational Therapy   Occupational Therapy Initial Assessment/Treatment  Date: 2020   Patient Name: Ashley Rodriguez  MRN: [id number]     : 1950    Date of Service: 2020    Discharge Recommendations: Ashley Rodriguez scored a 17/24 on the AM-PAC ADL Inpatient form. Current research shows that an AM-PAC score of 17 or less is typically not associated with a discharge to the patient's home setting. Based on the patient's AM-PAC score and their current ADL deficits, it is recommended that the patient have 3-5 sessions per week of Occupational Therapy at d/c to increase the patient's independence. Please see assessment section for further patient specific details. If patient discharges prior to next session this note will serve as a discharge summary. Please see below for the latest assessment towards goals. Assessment   Performance deficits / Impairments: Decreased functional mobility ; Decreased endurance;Decreased strength;Decreased safe awareness;Decreased balance  Assessment: Pt readmitted 20 for hyperglycemia. Pt presents with a decline in function that is below his reported baseline of independent in ADL's/functional mobility. Pt reports living at home alone, with help from friends/family/and workers for his home maintenance. Pt plans for SNF at discharge. Pt would benefit from continued skilled OT to maximize independence and safety with ADLs and functional mobility. Treatment Diagnosis: Impaired functional mobility, balance, endurance, and safety awareness  Prognosis: Good  Decision Making: Medium Complexity  OT Education: OT Role;Plan of Care  Patient Education: Pt demonstrated understanding and in agreement  REQUIRES OT FOLLOW UP: Yes  Activity Tolerance  Activity Tolerance: Patient Tolerated treatment well  Activity Tolerance: Pt tolerated therapy session well. Pt requested a seated recovery break post bathroom activity.  As pt fatigued, his posture became slouched and he sped on his knee       Objective   Vision: Impaired  Vision Exceptions: Wears glasses at all times  Hearing: Within functional limits    Orientation  Overall Orientation Status: Within Functional Limits  Observation/Palpation  Posture: Fair  Balance  Sitting Balance: Contact guard assistance(EOB - CGA, chair - SBA)  Standing Balance: Contact guard assistance(RW)  Standing Balance  Time: ~2 mins  Activity: Toileting/grooming activity  Functional Mobility  Functional - Mobility Device: Rolling Walker  Activity: To/from bathroom  Assist Level: Minimal assistance((progressing to CGA inconsistently))  Functional Mobility Comments: Shuffling steps; max vc for safety/sequencr  Toilet Transfers  Toilet - Technique: Ambulating  Equipment Used: Standard toilet(grab bars)  Toilet Transfer: Minimal assistance(Min A (stand to sit)- for controlled descent/cues for handplacement. CGA (sit to stand) cues for handplacement)  ADL  Feeding: Independent(Seated in chair)  Grooming: Contact guard assistance(Standing at sink- washed hands, washed face with wash cloth)  LE Dressing: Maximum assistance(Seated on toilet: Max A - threading BLE through brief/pulling up to waist. Seated in chair: CGA- adjustment of sock during donning.)  Toileting: Maximum assistance(Pt reported voiding urine in diaper while in bed, unable to let nurse know he had to go to get up.  Pt attempted to urinate in toilet.)  Tone RUE  RUE Tone: Hyperreflexive  Tone LUE  LUE Tone: Hyperreflexive  Coordination  Coordination and Movement description: Intention tremors     Bed mobility  Supine to Sit: Supervision(HOB up and use of rail; slow and effortful)  Scooting: Supervision(to EOB)  Transfers  Stand Step Transfers: Minimal assistance  Sit to stand: Contact guard assistance((x2 trials with vc for hand placement))  Stand to sit: Minimal assistance(Min assist with max vc for hand placement; uncontrolled descension on 2nd trial; x2 trials)     Cognition  Overall Cognitive Status: Exceptions  Arousal/Alertness: Appropriate responses to stimuli  Following Commands:  Follows all commands without difficulty  Safety Judgement: Decreased awareness of need for safety  Problem Solving: Decreased awareness of errors                 LUE AROM (degrees)  LUE AROM : WFL  Left Hand AROM (degrees)  Left Hand AROM: WFL  RUE AROM (degrees)  RUE AROM : WFL  Right Hand AROM (degrees)  Right Hand AROM: WFL  LUE Strength  Gross LUE Strength: WFL  RUE Strength  Gross RUE Strength: WFL     Hand Dominance  Hand Dominance: Right             Plan   Plan  Times per week: 2-5  Current Treatment Recommendations: Strengthening, Balance Training, Functional Mobility Training, Endurance Training, Self-Care / ADL, Safety Education & Training      AM-PAC Score        AM-Located within [facility] Inpatient Daily Activity Raw Score: 17 (08/06/20 1014)  AM-PAC Inpatient ADL T-Scale Score : 37.26 (08/06/20 1014)  ADL Inpatient CMS 0-100% Score: 50.11 (08/06/20 1014)  ADL Inpatient CMS G-Code Modifier : CK (08/06/20 1014)    Goals   No goals met  Short term goals  Time Frame for Short term goals: by discharge  Short term goal 1: Transfer to/from toilet with supervision  Short term goal 2: Stance with CGA for 5 mins while engaging in ADL/functional task  Short term goal 3: Recall 2/5 Fall prevention techniques with min cues  Short term goal 4: Pt will complete LB dressing (brief/pants) with Min A  Patient Goals   Patient goals : return home       Therapy Time   Individual Concurrent Group Co-treatment   Time In 0827         Time Out 0905         Minutes 38         Timed Code Treatment Minutes: 23 Minutes   Total Timed Treatment Minutes: Kris Concepcion S/OT

## 2020-08-06 NOTE — PLAN OF CARE
Problem: Falls - Risk of:  Goal: Will remain free from falls  Description: Will remain free from falls  Outcome: Ongoing  Note: Call light within reach; bed/chair alarm engaged; increased frequency of rounds      Problem: Skin Integrity:  Goal: Will show no infection signs and symptoms  Description: Will show no infection signs and symptoms  Outcome: Ongoing  Note: VSS; RRR: consuming adequate nutrition

## 2020-08-06 NOTE — PROGRESS NOTES
Pt is alert and oriented, VSS. Blood sugar dropped to 75 from 215, juice given and insulin held. Urinating freely. Tolerating diet. States he is missing a necklace but admission documentation clearly states all jewelry and items he had on admission and it does not include the item he is describing. He is now resting in bed, alarm on, call light within reach.

## 2020-08-06 NOTE — PROGRESS NOTES
Ht 5' 8\" (1.727 m)   Wt 184 lb 8.4 oz (83.7 kg)   SpO2 95%   BMI 28.06 kg/m²     General not in apparent distress  HEENT head atraumatic, eyes PERRLA, neck supple. Cardiac S1, S2 audible, tachycardia, no murmur appreciated   Respiratory bilateral clear to auscultate, no wheeze no rhonchi   Abdomen soft, nontender, bowel sounds present  Neuro alert and x3, no focal neural deficit noted. Mild tremulousness. MSK trace pitting edema. Labs:   Recent Labs     08/04/20  1148 08/05/20  0420 08/06/20  0532   WBC 12.8* 12.7* 7.0   HGB 10.9* 9.4* 9.5*   HCT 35.8* 28.8* 28.9*    274 229     Recent Labs     08/05/20  0035 08/05/20  0420 08/06/20  0533   * 138 128*   K 3.7 3.8 4.6    104 97*   CO2 23 22 19*   BUN 46* 45* 25*   CREATININE 1.4* 1.3 0.9   CALCIUM 8.9 9.0 8.4   PHOS 3.4 3.4 2.7     No results for input(s): AST, ALT, BILIDIR, BILITOT, ALKPHOS in the last 72 hours. No results for input(s): INR in the last 72 hours. No results for input(s): Arjun Griffin in the last 72 hours. Urinalysis:      Lab Results   Component Value Date    NITRU Negative 07/26/2020    WBCUA None seen 07/26/2020    BACTERIA 2+ 04/29/2020    RBCUA None seen 07/26/2020    BLOODU TRACE-INTACT 07/26/2020    SPECGRAV 1.025 07/26/2020    GLUCOSEU 250 07/26/2020       Radiology:  XR CHEST PORTABLE   Final Result   1. No findings for acute cardiopulmonary disease. Assessment/Plan:    Active Hospital Problems    Diagnosis Date Noted    DKA, type 1, not at goal [facility]) [E10.10] 06/18/2020       Assessment   #Diabetic ketoacidosis resolved  #Diabetes mellitus type 1  #Noncompliance with medication. #NABIL likely prerenal  #Mild hyperkalemia  #Pseudohyponatremia in setting of hyperglycemia. #Parkinson's disease      Plan  Change Lantus to 45 units twice daily NovoLog 10 units 3 times daily with meals along with sliding scale. Resume losartan, continue nifedipine.   Continue Paxil, primidone, Requip, Sinemet. Medically stable to be discharged to SNF. DVT Prophylaxis: Lovenox  Diet: DIET CARB CONTROL; Carb Control: 3 carb choices (45 gms)/meal  Code Status: Full Code    PT/OT Eval Status: Completed    Dispo -medically stable to be discharged SNF.     Masood Garrett MD

## 2020-08-06 NOTE — PLAN OF CARE
Problem: Falls - Risk of:  Goal: Will remain free from falls  Description: Will remain free from falls  Outcome: Ongoing  Note: Call light within reach; bed/chair alarm engaged; increased frequency of rounds      Problem: Skin Integrity:  Goal: Will show no infection signs and symptoms  Description: Will show no infection signs and symptoms  Outcome: Ongoing  Note: VSS; RRR: consuming adequate nutrition      Problem: Serum Glucose Level - Abnormal:  Goal: Ability to maintain appropriate glucose levels will improve  Description: Ability to maintain appropriate glucose levels will improve  Outcome: Ongoing  Note: Perform random blood glucose checks; administer insulin as ordered

## 2020-08-06 NOTE — PROGRESS NOTES
5/15/2020). Restrictions  Position Activity Restriction  Other position/activity restrictions: up with assist     Vision/Hearing  Vision: Impaired  Vision Exceptions: Wears glasses at all times  Hearing: Within functional limits     Subjective  General  Chart Reviewed: Yes  Patient assessed for rehabilitation services?: Yes  Additional Pertinent Hx: Mr. Jordan Chavez is a 72year old male with PMH of asthma, DM, HTN, Parkinson's disease who presented with hyperglycemia, BG in 600s. Patient has had numerous admissions for DKA and hypoglycemia, most recently discharged 8/2/20. He was discharged with home health, however, per ED patient was not answering their calls. He's now admitted with diabetic ketoacidosis likely secondary to noncompliance with insulin and poor dietary habits, started on insulin drip and fluid resuscitation. Family / Caregiver Present: No  Diagnosis: hyperglycemia  Follows Commands: Within Functional Limits  Subjective  Subjective: Pt found supine in bed and agreeable to PT. \" They need to get my medications straightened out.  \"  Pain Screening  Patient Currently in Pain: Denies         Orientation  Orientation  Overall Orientation Status: Within Functional Limits     Social/Functional History  Social/Functional History  Lives With: Alone  Type of Home: House  Home Layout: Able to Live on Main level with bedroom/bathroom, One level  Home Access: Stairs to enter with rails  Entrance Stairs - Number of Steps: 5 ANNMARIE  Entrance Stairs - Rails: Both  Bathroom Shower/Tub: Tub/Shower unit  Bathroom Toilet: Standard  Bathroom Equipment: Grab bars in shower, Grab bars around toilet, Shower chair  Home Equipment: Rolling walker, Cane, Crutches, Sock aid  Receives Help From: Friend(s), Family, Home health  ADL Assistance: Independent  Homemaking Assistance: Needs assistance  Homemaking Responsibilities: No  Ambulation Assistance: Independent(using RW around home, used cane out in community)  Transfer Assistance: Minutes 38           Timed Code Treatment Minutes:28       Total Treatment Minutes:  2323 Janneth Matute PT

## 2020-08-06 NOTE — CARE COORDINATION
Case Management Assessment            Discharge Note                    Date / Time of Note: 8/6/2020 12:32 PM                  Discharge Note Completed by: Shivam Singer    Patient Name: Naren King   YOB: 1950  Diagnosis: DKA, type 1, not at goal [facility]) [E10.10]  DKA, type 1, not at goal [facility]) [E10.10]   Date / Time: 8/4/2020 11:30 AM    Current PCP: [address] patient: No    Hospitalization in the last 30 days: Yes    Advance Directives:  Code Status: Full Code  [place] DNR form completed and on chart: No    Financial:  Payor: MEDICARE / Plan: MEDICARE PART A AND B / Product Type: *No Product type* /      Pharmacy:    [facility] [address], [address] [facility] [phone] Kendal Achilles [phone]  [address] [address]  Phone: [phone] Fax: [address] [facility] [address], [address] [phone] Kendal Achilles [phone]  [address] [address]  Phone: [phone] Fax: [phone]      Assistance purchasing medications?: Potential Assistance Purchasing Medications: No  Assistance provided by Case Management: None at this time    Does patient want to participate in local refill/ meds to beds program?: Not Assessed    Meds To Beds General Rules:  1. Can ONLY be done Monday- Friday between 8:30am-5pm  2. Prescription(s) must be in pharmacy by 3pm to be filled same day  3. Copy of patient's insurance/ prescription drug card and patient face sheet must be sent along with the prescription(s)  4. Cost of Rx cannot be added to hospital bill. If financial assistance is needed, please contact unit  or ;  or  CANNOT provide pharmacy voucher for patients co-pays  5.  Patients can then  the prescription on their way out of the hospital at discharge, or pharmacy can deliver to the bedside if staff is available. (payment due at time of pick-up or delivery - cash, check, or card accepted)     Able to afford home medications/ co-pay costs: Yes    ADLS:  Current PT AM-PAC Score: 17 /24  Current OT AM-PAC Score: 17 /24      DISCHARGE Disposition: Kris Robledo (SNF): [facility]. Phone: [phone] Fax: [phone]    LOC at discharge: Skilled  [address] Completed: Yes    Notification completed in HENS/PAS?:  Yes : CM has completed HENS online through secure website for SNF admission at [facility]. Document ID #: [id number]    IMM Completed:   Not Indicated    Transportation:  Transportation PLAN for discharge: EMS transportation   Mode of Transport: Ambulance stretcher - BLS  Reason for medical transport: Other: parkinsons disease with cont tremor high fall risk poor insight and judgement  Name of Transport Company: [employer]  Phone: [phone]  Time of Transport: 2pm    Transport form completed: Yes      Additional CM Notes: pt from home will DC to [facility], [address] #[id number], first care to transport at 2pm today    The Plan for Transition of Care is related to the following treatment goals of DKA, type 1, not at goal [facility]) [E10.10]  DKA, type 1, not at goal [facility]) [E10.10]    The Patient and/or patient representative Wilton Harris and his family were provided with a choice of provider and agrees with the discharge plan Yes    Freedom of choice list was provided with basic dialogue that supports the patient's individualized plan of care/goals and shares the quality data associated with the providers.  Yes    Care Transitions patient: No    Saumya García RN  The [facility] [employer]  Case Management Department  Ph: [phone]  Fax: [phone]

## 2020-08-21 PROBLEM — J96.00 ACUTE RESPIRATORY FAILURE (HCC): Status: ACTIVE | Noted: 2020-01-01

## 2020-08-21 NOTE — PROGRESS NOTES
Full consult will be completed tomorrow by Dr Beverly Guerrero. Preliminary assessment from EMR: VBG from CVC with sats 66% today on NE 4 mcg/kg/min consistent with normal cardiac output, doubt cardiogenic shock, most likely septic shock. However, patient may still have pulmonary edema. CXR could be consistent with pulmonary edema vs infection/pneumonia/COVID. Will order a CVP to assess RA filling pressures, adjust therapy accordingly.          Mayo Hernandez MD, [facility], [address]  The [address]  [address] [place] [address]  Ph: [phone]  Fax: [phone]

## 2020-08-21 NOTE — CARE COORDINATION
Medical Equipment  DME Provider:   Equipment: walker    Home Oxygen and Respiratory Equipment  Has HOME OXYGEN prior to admission: Stephie Gonzalez Amy 262: Not Applicable      DISCHARGE PLAN:  Disposition: [place] (SNF): TBD Phone: TBD Fax: TBD    Transportation PLAN for discharge: EMS transportation     Factors facilitating achievement of predicted outcomes: Family support and Friend support    Barriers to discharge: Cognitive deficit, Impulsivity, Limited safety awareness, Limited insight into deficits and Medical complications    Additional Case Management Notes: see above    The Plan for Transition of Care is related to the following treatment goals of DKA, type 1, not at goal [facility]) [E10.10]  Acute respiratory failure ([facility] [address] [J96.00]  DKA, type 1, not at goal [facility]) [E10.10]  Acute respiratory failure ([facility] [address] [J96.00]    The Patient and/or patient representative Amy Rivera and his family were provided with a choice of provider and agrees with the discharge plan Not Indicated    Freedom of choice list was provided with basic dialogue that supports the patient's individualized plan of care/goals and shares the quality data associated with the providers.  Not Indicated    Care Transition patient: No    ANGLE Paulino, WANG  [facility] [employer]  Case Management Department  Ph: [phone]

## 2020-08-21 NOTE — ED NOTES
Inline suction catheter placed and pt suctioned. Scant amount of frothy sputum noted.      Toma Caldwell RN  08/21/20 4268

## 2020-08-21 NOTE — ED NOTES
Pulse ox 79% on 2L/NC, oxygen increased to 6L/NC. Pulse on 80% on 6L/NC, pt placed on NRB.  Pulse ox increased to 92%     Dar Mcdonough RN  08/21/20 8429

## 2020-08-21 NOTE — ED NOTES
Critical lab value called from lab. Venous pH: 7.068.  Notified Dr. Nickie Kasper and Cindra Essex, RN Eusebio Drafts  08/21/20 [address] [facility]  08/21/20 1052

## 2020-08-21 NOTE — ED PROVIDER NOTES
Intubation    Date/Time: 8/21/2020 12:52 PM  Performed by: Gary Ag MD  Authorized by: Shell Raines MD     Consent:     Consent obtained:  Emergent situation    Consent given by:  Healthcare agent  Pre-procedure details:     Patient status:  Altered mental status    Mallampati score:  III    Pretreatment meds: Ketamine. Paralytics:  Rocuronium  Procedure details:     Preoxygenation:  Nonrebreather mask    CPR in progress: no      Intubation method:  Oral    Oral intubation technique:  Video-assisted    Laryngoscope blade: Mac 3    Tube size (mm):  7.5    Tube type:  Cuffed    Number of attempts:  1    Ventilation between attempts: no      Cricoid pressure: no      Tube visualized through cords: yes    Placement assessment:     ETT to teeth:  23    Tube secured with:  ETT duarte    Breath sounds:  Equal    Placement verification: chest rise, CXR verification, equal breath sounds and ETCO2 detector      CXR findings:  ETT in proper place  Post-procedure details:     Patient tolerance of procedure: Tolerated well, no immediate complications  Comments:      Reversed the paralysis with Sugammadex to allow for patient's spontaneous respirations due to severe acidemia  Central Line    Date/Time: 8/21/2020 12:54 PM  Performed by: Gary Ag MD  Authorized by: Shell Raines MD     Consent:     Consent obtained:  Emergent situation    Consent given by:  Healthcare agent  Universal protocol:     Relevant documents present and verified: yes      Test results available and properly labeled: yes      Imaging studies available: yes      Required blood products, implants, devices, and special equipment available: yes      Immediately prior to procedure, a time out was called: yes      Patient identity confirmed:  Arm band  Pre-procedure details:     Hand hygiene: Hand hygiene performed prior to insertion      Sterile barrier technique:  All elements of maximal sterile technique followed      Skin preparation:  2% chlorhexidine    Skin preparation agent: Skin preparation agent completely dried prior to procedure    Sedation:     Sedation type:  Deep  Anesthesia (see MAR for exact dosages): Anesthesia method:  Local infiltration    Local anesthetic:  Lidocaine 2% WITH epi  Procedure details:     Location:  L internal jugular    Patient position:  Trendelenburg    Procedural supplies:  Triple lumen    Landmarks identified: yes      Ultrasound guidance: yes      Sterile ultrasound techniques: Sterile gel and sterile probe covers were used      Number of attempts:  1    Successful placement: yes    Post-procedure details:     Post-procedure:  Dressing applied and line sutured    Assessment:  Free fluid flow, placement verified by x-ray and no pneumothorax on x-ray    Patient tolerance of procedure:   Tolerated well, no immediate complications           Colin Duran MD  Resident  08/21/20 1812

## 2020-08-21 NOTE — ED NOTES
Pt to CT scan on monitor, IV's infusing with RN and RT. [facility] after [facility] scan.      Darell Cruz RN  08/21/20 6093

## 2020-08-21 NOTE — CONSULTS
Clinical Pharmacy Consult Note    Admit date: 8/21/2020    Subjective/Objective:  72 yom with PMHx significant for Parkinson disease, HTN, DM, and asthma presented to ED with AMS and hyperglycemia. Per family, patient was last seen ~24h prior to presenting to [facility]. On presentation, notable labs include venous pH 7.068, O2 sat 79% on 2L NC, hyperkalemia, glucose of 822, and NABIL with SCr of 1.9. The decision was made to emergently intubate. DKA protocol was initiated. Patient transferred to ICU with concerns for pneumonia. Troponin elevated as well, low dose heparin drip initiated. Of note, patient was admitted to [facility] for treatment of DKA 7/30-8/2 and 8/4-8/6. Last admission, patient was sent to a SNF, from which he was discharged 8/20 per family. Patient also has h/o NABIL for which he was treated with HD during admission in June. Baseline SCr ~1 mg/dL. Pharmacy is consulted to dose Vancomycin per Dr. Rosemarie Hernandez    Pertinent Medications:  Azithromycin -- day # 1   500 mg IV x1 (8/21)   250 mg IV q24h (to begin 8/22)  Meropenem 1g IV q12h -- day # 1  Vancomycin, pharmacy to dose -- day # 1   1.5g IV x1 (8/21)    Recent Labs     08/21/20  1008   *   K 5.8*   CL 89*   CO2 10*   PHOS 8.1*   BUN 41*   CREATININE 1.9*       Estimated Creatinine Clearance: 39 mL/min (A) (based on SCr of 1.9 mg/dL (H)). Recent Labs     08/21/20  1007   WBC 14.8*   HGB 9.7*   HCT 32.0*   MCV 83.4          Height:  5' 8\" (172.7 cm)  Weight: 184 lb (83.5 kg)    Micro:  8/21 COVID-19: pending  8/21 Blood x2: pending  8/21 S pneumo (urine): ordered  8/21 Legionella (urine): ordered  8/21 MRSA PCR: ordered    Prophylaxis:  DVT: heparin drip  SUP: not indicated today    Assessment/Plan:  1. Pneumonia  :  Azithromycin + meropenem + vancomycin day #1  Vancomycin - pharmacy to dose  · Patient received vancomycin 1.5g IV x1 today. Has NABIL currently. Will dose based on levels until renal function returns to baseline.    · Will not re-dose

## 2020-08-21 NOTE — H&P
ICU History and Physical  PGY-1    PCP: Jossy MOYER    Code:Full Code  Admit Date: 8/21/2020  IV Access:  PIV Duration:  <1 day   IV Fluids: LR bolus  Diet: NPO Effective now    CC: Hyperglycemia    HISTORY OF PRESENT ILLNESS:    Patient is a 72 y.o. male with a history of T1DM, Parkinson's disease, asthma, and hypertension presenting with hyperglycemia and altered mental status. According to the patients daughter he was just discharged from a nursing facility yesterday. He was last seen between 10-2 PM yesterday. Today his friend attempted to call him; however, there was no answer which is very abnormal and EMS was summoned. They reported that they had to force entry into the house. Patient has had numerous admissions for DKA and hypoglycemia, most recently discharged 8/6/20. He was discharged to SNF on 45U of lantus nightly, and 10U rapid w/ meals. Patient has history of temporary HD for NABIL on previous admission in June 2020. In ED was oriented to name only, otherwise unable to provide additional history. Patient noted to be afebrile, borderline tachypneic to 28, hypotensive with systolic of 64-26 and SpO2 of 79% on 2L nasal canula. VBG significant for pH of 7.068, pCO2 42.9, pO2 28.9, HCO3 10. Other labs significant for hyperkalemia to 5.8, Na of 130, NABIL with SCr of 1.9 (baseline 0.9-1.0), anion gap of 31, lactate of 9.8, and glucose of 822. WBC elevated to 14.8 (7.0 on 8/6), Hb stable at 9.7. UDS positive for barbituates. EKG in ED with ST depressions in multiple leads, repeat EKG NSR HR of 77, no significant ST-T changes. Troponin elevated to 0.15 from 0.06 (7/26). CXR revealed cardiomegaly with diffuse vascular congestion and interstitial pulmonary edema without evidence of focal infiltrate or significant effusion. Levophed drip was started for hypotension. DKA protocol was initiated and patient was emergently intubated in ED.  Broad spectrum antibiotics were started out of concern for 29G) 29G X 12MM MISC 1 each by Does not apply route daily 100 each 3    rOPINIRole (REQUIP) 3 MG tablet Take 3 mg by mouth 3 times daily      atorvastatin (LIPITOR) 10 MG tablet Take 10 mg by mouth nightly       albuterol (PROVENTIL HFA;VENTOLIN HFA) 108 (90 BASE) MCG/ACT inhaler Inhale 2 puffs into the lungs every 6 hours as needed. Allergies:  Erythromycin    SocialHistory:   TOBACCO:   reports that he has never smoked. He has never used smokeless tobacco.     ETOH:   reports no history of alcohol use. Patient currently lives alone    Family History:   No family history on file. ROS:   Review of Systems   Constitutional: Negative for chills, fatigue and fever. HENT: Negative for congestion, sinus pressure and sore throat. Eyes: Negative for photophobia and visual disturbance. Respiratory: Negative for cough, chest tightness, shortness of breath and wheezing. Cardiovascular: Negative for chest pain, palpitations and leg swelling. Gastrointestinal: Positive for nausea and vomiting. Negative for abdominal distention, abdominal pain, constipation and diarrhea. Genitourinary: Negative for dysuria, frequency and urgency. Skin: Negative for color change and rash. Neurological: Positive for tremors (resting). Negative for dizziness, syncope, weakness and headaches. All other systems reviewed and are negative. PHYSICAL EXAM:  /84   Pulse 83   Temp 97.7 °F (36.5 °C) (Axillary)   Resp 24   Ht 5' 8\" (1.727 m)   Wt 184 lb (83.5 kg)   SpO2 90%   BMI 27.98 kg/m²   Recent Labs     08/21/20  0950 08/21/20  1359   POCGLU >600* >600*     Physical Exam  Constitutional:       Comments: Patient is intubated and sedated   HENT:      Head: Normocephalic. Mouth/Throat:      Comments: fruity breath  Eyes:      Extraocular Movements: Extraocular movements intact. Pupils: Pupils are equal, round, and reactive to light.    Cardiovascular:      Rate and Rhythm: Normal rate and congestion and interstitial edema, similar to the prior exam. No focal infiltrates seen. XR CHEST PORTABLE   Final Result   1. Cardiomegaly with vascular congestion and pulmonary interstitial edema. Assessment & Plan:   Brii Kilgore is a 72 y.o. male with PMH of T1DM, Parkinson's disease, asthma, and HTN who was admitted with DKA. Pulmonary:  Acute respiratory failure 2/2 to flash pulmonary edema and new acute onset heart failure  - s/p intubation in ED 8/21   - bedside echo in ICU revealed evidence of systolic dysfunction, will get TTE  - stop IV fluids, lasix gtt now  Possible Pneumonia  - AMS in setting of leukocytosis and acute onset respiratory failure  - Infectious workup pending    - f/u blood, urine, respiratory cultures, Resp viral panel, Procal, MRSA, legionella, Strep Pneumo  - continue IV Vanc, Merrem, Azithromycin, deescalate pending cultures    CV:  Cardiogenic shock in setting of Acute onset heart failure:  - as above  - follow up TTE  - Cardiology consulted, appreciate recs  HTN:  - Continue home nifedipine; holding losartan as above    Endocrine:  Diabetic Ketoacidosis: likely 2/2 to noncompliance with insulin + poor dietary habits  - Received 2L LR in ED  - Insulin Drip  - NS IV fluids 250ml/hr, switch to D5 0.5 NS 150ml/hr at glucose <250 per DKA protocol  - Renal function panel, Magnesium Q4H, replete lytes as needed  - NPO  - Hypoglycemia protocol  - Glucose Q1h  - Social work consulted for repeated admissions with DKA and hypoglycemia;  Patient needs more support  - F/U on UA    Renal:  NABIL: likely prerenal in setting of DKA; Cr 1.9 (baseline 0.9)  - IVF  - Holding home losartan  - Strict I/Os  Hyperkalemia: K 5.8  - Holding losartan as above;   - Hold off on K supplementation with insulin for now;    Neuro  Parkinson's Disease:  - Continue carbidopa/levidopa, primidone    Code Status: Full  F/E/N:  NPO  GI / DVT Prophylaxis: Heparin drip  Disposition: Admit to ICU    The

## 2020-08-21 NOTE — ED PROVIDER NOTES
[address] [facility] [address] ENCOUNTER          ATTENDING PHYSICIAN NOTE       Date of evaluation: 8/21/2020    Chief Complaint     Altered Mental Status and Hyperglycemia      History of Present Illness     Alice Dumont is a 72 y.o. male with a past medical history of HTN, diabetes, parkinson's disease, and diabetes who presents with hyperglycemia and altered mental status. According to the patients daughter he was just discharged from a nursing facility yesterday. He was last seen between 10-2 PM yesterday. Today his friend attempted to call him; however, there was no answer which if very abnormal and EMS was summoned. They reported that they had to force entry into the house. He was recently discharged on 8/6 for DKA. He is able to tell me his name, but is unable to provide additional history. Review of Systems     Unable to obtain secondary to patients altered mental status. Past Medical, Surgical, Family, and Social History     He has a past medical history of Asthma, Diabetes mellitus ([facility] [address] Hypertension, and Parkinson disease ([facility] [address] He has a past surgical history that includes Upper gastrointestinal endoscopy (N/A, 5/15/2020) and Upper gastrointestinal endoscopy (N/A, 5/15/2020). His family history is not on file. He reports that he has never smoked. He has never used smokeless tobacco. He reports that he does not drink alcohol or use drugs.     Medications     Current Discharge Medication List      CONTINUE these medications which have NOT CHANGED    Details   insulin aspart (NOVOLOG FLEXPEN) 100 UNIT/ML injection pen Inject 10 Units into the skin 3 times daily (before meals)  Qty: 5 pen, Refills: 3      insulin glargine (LANTUS;BASAGLAR) 100 UNIT/ML injection pen Inject 45 Units into the skin nightly  Qty: 5 pen, Refills: 3      vitamin D3 (CHOLECALCIFEROL) 10 MCG (400 UNIT) TABS tablet Take 400 Units by mouth daily      Cinnamon 500 MG CAPS Take 1 capsule by mouth daily fluticasone (FLONASE) 50 MCG/ACT nasal spray 2 sprays by Nasal route daily      carbidopa-levodopa (SINEMET)  MG per tablet Take 2 tablets by mouth 3 times daily      losartan (COZAAR) 100 MG tablet Take 100 mg by mouth daily      NIFEdipine (PROCARDIA XL) 60 MG extended release tablet Take 60 mg by mouth nightly      pantoprazole (PROTONIX) 40 MG tablet Take 40 mg by mouth 2 times daily      PARoxetine (PAXIL) 40 MG tablet Take 40 mg by mouth every morning      primidone (MYSOLINE) 50 MG tablet Take 50 mg by mouth 2 times daily      Insulin Pen Needle ([employer] PEN NEEDLES 29G) 29G X 12MM MISC 1 each by Does not apply route daily  Qty: 100 each, Refills: 3      rOPINIRole (REQUIP) 3 MG tablet Take 3 mg by mouth 3 times daily      atorvastatin (LIPITOR) 10 MG tablet Take 10 mg by mouth nightly       albuterol (PROVENTIL HFA;VENTOLIN HFA) 108 (90 BASE) MCG/ACT inhaler Inhale 2 puffs into the lungs every 6 hours as needed. Allergies     He is allergic to erythromycin. Physical Exam     INITIAL VITALS: BP: 90/67, Temp: 97.7 °F (36.5 °C), Pulse: 94, Resp: 28, SpO2: (!) 79 %   Gen:  Chronically ill appearing. HENT:  Normocephalic atraumatic, conjunctiva pink, mucous membranes moist  Neck:  Supple w/o thyromegaly, lymphadenopathy, or JVD  Resp:  Lungs with crackles bilaterally  CV: Regular rate and rhythm, no murmurs, rubs, or gallops  GI:  Soft, non-tender, non-distended. No rebound, guarding, or peritoneal signs. MSK:  No cyanosis, clubbing, or edema  Skin: warm and dry, no rash  Neuro:  Awake. Able to tell me his name. Can follow commands in upper and lower extremities. PERRL 4->3. Tongue symmetric. Face midline. Psych:  Normal Mood        Diagnostic Results     EKG   Sinus rhythm at a rate of 92 BPM.  . . Left axis deviation with first degree AV block. T wave inverion in aVL with ST segment depression in V3-V6 more pronounced compared to prior EKG.     RADIOLOGY:  CT HEAD WO CONTRAST   Final Result      Evidence of diffuse chronic small vessel white matter disease. Subtle small round subcentimeter lesion suggested in the anterior left chin. This may be artifactual. Further evaluation with MR imaging of the brain without and with contrast is recommended for further evaluation. No other acute intracranial findings. No other acute intracranial findings. Negative noncontrast CT study of the head. XR CHEST PORTABLE   Final Result      ET tube and left IJ central venous catheter placed as described. No evidence of diffuse pulmonary vasculature congestion and interstitial edema, similar to the prior exam. No focal infiltrates seen. XR CHEST PORTABLE   Final Result   1. Cardiomegaly with vascular congestion and pulmonary interstitial edema.           LABS:   Results for orders placed or performed during the hospital encounter of 08/21/20   CBC auto differential   Result Value Ref Range    WBC 14.8 (H) 4.0 - 11.0 K/uL    RBC 3.83 (L) 4.20 - 5.90 M/uL    Hemoglobin 9.7 (L) 13.5 - 17.5 g/dL    Hematocrit 32.0 (L) 40.5 - 52.5 %    MCV 83.4 80.0 - 100.0 fL    MCH 25.2 (L) 26.0 - 34.0 pg    MCHC 30.2 (L) 31.0 - 36.0 g/dL    RDW 19.8 (H) 12.4 - 15.4 %    Platelets 939 673 - 603 K/uL    MPV 8.4 5.0 - 10.5 fL    Neutrophils % 85.0 %    Lymphocytes % 8.1 %    Monocytes % 6.3 %    Eosinophils % 0.0 %    Basophils % 0.6 %    Neutrophils Absolute 12.6 (H) 1.7 - 7.7 K/uL    Lymphocytes Absolute 1.2 1.0 - 5.1 K/uL    Monocytes Absolute 0.9 0.0 - 1.3 K/uL    Eosinophils Absolute 0.0 0.0 - 0.6 K/uL    Basophils Absolute 0.1 0.0 - 0.2 K/uL   Basic Metabolic Panel (EP - 1)   Result Value Ref Range    Sodium 130 (L) 136 - 145 mmol/L    Potassium 5.8 (H) 3.5 - 5.1 mmol/L    Chloride 89 (L) 99 - 110 mmol/L    CO2 10 (LL) 21 - 32 mmol/L    Anion Gap 31 (H) 3 - 16    Glucose 822 (HH) 70 - 99 mg/dL    BUN 41 (H) 7 - 20 mg/dL    CREATININE 1.9 (H) 0.8 - 1.3 mg/dL    GFR Non- 35 (A) >60    GFR  43 (A) >60    Calcium 8.9 8.3 - 10.6 mg/dL   Hepatic function panel (LFTs)   Result Value Ref Range    Total Protein 6.2 (L) 6.4 - 8.2 g/dL    Alb 3.7 3.4 - 5.0 g/dL    Alkaline Phosphatase 154 (H) 40 - 129 U/L    ALT 22 10 - 40 U/L    AST 20 15 - 37 U/L    Total Bilirubin 1.2 (H) 0.0 - 1.0 mg/dL    Bilirubin, Direct 0.3 0.0 - 0.3 mg/dL    Bilirubin, Indirect 0.9 0.0 - 1.0 mg/dL   PT - INR   Result Value Ref Range    Protime 12.0 10.0 - 13.2 sec    INR 1.03 0.86 - 1.14   Troponin (lab)   Result Value Ref Range    Troponin 0.15 (H) <0.01 ng/mL   Lactate, plasma   Result Value Ref Range    Lactic Acid 9.8 (HH) 0.4 - 2.0 mmol/L   Magnesium   Result Value Ref Range    Magnesium 2.90 (H) 1.80 - 2.40 mg/dL   Phosphorus   Result Value Ref Range    Phosphorus 8.1 (H) 2.5 - 4.9 mg/dL   Blood gas, venous (Lab)   Result Value Ref Range    pH, Sonido 7.068 (LL) 7.350 - 7.450    pCO2, Sonido 42.9 41.0 - 51.0 mmHg    pO2, Sonido 28.9 25.0 - 40.0 mmHg    HCO3, Venous 11.8 (L) 24.0 - 28.0 mmol/L    Base Excess, Sonido -17.5 (L) -2.0 - 3.0 mmol/L    O2 Sat, Sonido 25 Not established %    Carboxyhemoglobin 1.0 0.0 - 1.5 %    MetHgb, Sonido 0.8 0.0 - 1.5 %    TC02 (Calc), Sonido 13 mmol/L    Hemoglobin, Sonido, Reduced 73.30 %   Urinalysis, reflex to microscopic (Lab)   Result Value Ref Range    Color, UA Yellow Straw/Yellow    Clarity, UA Clear Clear    Glucose, Ur >=1000 (A) Negative mg/dL    Bilirubin Urine Negative Negative    Ketones, Urine 40 (A) Negative mg/dL    Specific Gravity, UA 1.010 1.005 - 1.030    Blood, Urine Negative Negative    pH, UA 5.5 5.0 - 8.0    Protein, UA Negative Negative mg/dL    Urobilinogen, Urine 0.2 <2.0 E.U./dL    Nitrite, Urine Negative Negative    Leukocyte Esterase, Urine Negative Negative    Microscopic Examination Not Indicated     Urine Type NotGiven    CK (Lab)   Result Value Ref Range    Total  39 - 308 U/L   Urine Drug Screen   Result Value Ref Range Amphetamine Screen, Urine Neg Negative <1000ng/mL    Barbiturate Screen, Ur POSITIVE (A) Negative <200 ng/mL    Benzodiazepine Screen, Urine Neg Negative <200 ng/mL    Cannabinoid Scrn, Ur Neg Negative <50 ng/mL    Cocaine Metabolite Screen, Urine Neg Negative <300 ng/mL    Opiate Scrn, Ur Neg Negative <300 ng/mL    PCP Screen, Urine Neg Negative <25 ng/mL    Methadone Screen, Urine Neg Negative <300 ng/mL    Propoxyphene Scrn, Ur Neg Negative <300 ng/mL    Oxycodone Urine Neg Negative <100 ng/ml    pH, UA 5.5     Drug Screen Comment: see below    Lactate, plasma   Result Value Ref Range    Lactic Acid 6.6 (HH) 0.4 - 2.0 mmol/L   Blood gas, venous (Lab)   Result Value Ref Range    pH, Sonido 7.120 (LL) 7.350 - 7.450    pCO2, Sonido 41.9 41.0 - 51.0 mmHg    pO2, Sonido 50.7 (H) 25.0 - 40.0 mmHg    HCO3, Venous 13.0 (L) 24.0 - 28.0 mmol/L    Base Excess, Sonido -15.2 (L) -2.0 - 3.0 mmol/L    O2 Sat, Sonido 66 Not established %    Carboxyhemoglobin 1.3 0.0 - 1.5 %    MetHgb, Sonido 0.6 0.0 - 1.5 %    TC02 (Calc), Sonido 14 mmol/L    Hemoglobin, Sonido, Reduced 33.60 %   Basic Metabolic Panel   Result Value Ref Range    Sodium 127 (L) 136 - 145 mmol/L    Potassium 5.9 (H) 3.5 - 5.1 mmol/L    Chloride 88 (L) 99 - 110 mmol/L    CO2 13 (LL) 21 - 32 mmol/L    Anion Gap 26 (H) 3 - 16    Glucose 922 (HH) 70 - 99 mg/dL    BUN 47 (H) 7 - 20 mg/dL    CREATININE 2.0 (H) 0.8 - 1.3 mg/dL    GFR Non- 33 (A) >60    GFR  40 (A) >60    Calcium 8.6 8.3 - 10.6 mg/dL   Magnesium   Result Value Ref Range    Magnesium 2.70 (H) 1.80 - 2.40 mg/dL   Phosphorus   Result Value Ref Range    Phosphorus 7.3 (H) 2.5 - 4.9 mg/dL   Procalcitonin   Result Value Ref Range    Procalcitonin 2.15 (H) 0.00 - 0.15 ng/mL   Brain Natriuretic Peptide   Result Value Ref Range    Pro-BNP 3,675 (H) 0 - 124 pg/mL   Troponin   Result Value Ref Range    Troponin 0.15 (H) <0.01 ng/mL   POCT Glucose   Result Value Ref Range    POC Glucose >600 (AA) 70 - 99 mg/dl    Performed on ACCU-CHEK    POCT Glucose   Result Value Ref Range    POC Glucose >600 (AA) 70 - 99 mg/dl    Performed on ACCU-CHEK    EKG 12 Lead   Result Value Ref Range    Ventricular Rate 92 BPM    Atrial Rate 92 BPM    P-R Interval 210 ms    QRS Duration 116 ms    Q-T Interval 388 ms    QTc Calculation (Bazett) 479 ms    P Axis 68 degrees    R Axis -56 degrees    T Axis 96 degrees    Diagnosis       EKG performed in ER and to be interpreted by ER physician. Confirmed by MD, ER ([id number]),  EVER (KULDIP), TITO ([id number]) on 8/21/2020 10:08:03 AM   EKG 12 Lead   Result Value Ref Range    Ventricular Rate 77 BPM    Atrial Rate 77 BPM    P-R Interval 192 ms    QRS Duration 104 ms    Q-T Interval 388 ms    QTc Calculation (Bazett) 439 ms    P Axis 64 degrees    R Axis -1 degrees    T Axis 68 degrees    Diagnosis       Normal sinus rhythmPossible Left atrial enlargementBorderline ECG     RECENT VITALS:  BP: 111/84, Temp: 97.7 °F (36.5 °C), Pulse: 75, Resp: 13, SpO2: 100 %     ED Course     Nursing Notes, Past Medical Hx, Past Surgical Hx, Social Hx, Allergies, and Family Hx were reviewed.     The patient was given the following medications:  Orders Placed This Encounter   Medications    lactated ringers bolus    DISCONTD: cefepime (MAXIPIME) 2 g IVPB minibag    vancomycin (VANCOCIN) 1,500 mg in dextrose 5 % 250 mL IVPB    azithromycin (ZITHROMAX) 500 mg in dextrose 5 % 250 mL IVPB    0.9 % sodium chloride bolus    norepinephrine (LEVOPHED) 16 mg in dextrose 5 % 250 mL infusion    fentaNYL (SUBLIMAZE) 2,000 mcg in dextrose 5 % 200 mL    ketamine (KETALAR) injection 100 mg    DISCONTD: succinylcholine (ANECTINE) injection 120 mg    propofol 1000 MG/100ML injection     Lizzie Duffy: cabinet override    ketamine (KETALAR) 50 MG/ML injection     Lizzie Duffy: cabinet override    rocuronium (ZEMURON) 50 MG/5ML injection     Lizzie Duffy: cabinet override    rocuronium (ZEMURON) 50 MG/5ML injection     [facility] [employer]: cabinet override    sugammadex (BRIDION) injection 1,336 mg    meropenem (MERREM) 1 g in sodium chloride 0.9 % 100 mL IVPB (mini-bag)    azithromycin (ZITHROMAX) 250 mg in dextrose 5 % 250 mL IVPB    DISCONTD: lactated ringers bolus    carbidopa-levodopa (SINEMET)  MG per tablet 2 tablet    DISCONTD: enoxaparin (LOVENOX) injection 40 mg    dextrose 50 % IV solution    potassium chloride 10 mEq/100 mL IVPB (Peripheral Line)    magnesium sulfate 1 g in dextrose 5% 100 mL IVPB    OR Linked Order Group     sodium phosphate 10 mmol in dextrose 5 % 250 mL IVPB     sodium phosphate 15 mmol in dextrose 5 % 250 mL IVPB     sodium phosphate 20 mmol in dextrose 5 % 500 mL IVPB    0.45 % sodium chloride infusion    dextrose 5 % and 0.45 % sodium chloride infusion    DISCONTD: heparin (porcine) injection 5,000 Units    glucose (GLUTOSE) 40 % oral gel 15 g    dextrose 50 % IV solution    glucagon (rDNA) injection 1 mg    dextrose 5 % solution    insulin regular (HUMULIN R;NOVOLIN R) 100 Units in sodium chloride 0.9 % 100 mL infusion    perflutren lipid microspheres (DEFINITY) injection 1.65 mg    DISCONTD: heparin (porcine) injection 6,700 Units    DISCONTD: heparin (porcine) injection 6,700 Units    DISCONTD: heparin (porcine) injection 3,350 Units    DISCONTD: heparin 25,000 units in dextrose 5% 250 mL infusion    heparin (porcine) injection 4,000 Units    heparin (porcine) injection 4,000 Units    heparin (porcine) injection 2,000 Units    heparin 25,000 units in dextrose 5% 250 mL infusion    vancomycin (VANCOCIN) intermittent dosing (placeholder)    furosemide (LASIX) 100 mg in dextrose 5 % 100 mL infusion       CONSULTS:  IP CONSULT TO CRITICAL CARE  IP CONSULT TO HOSPITALIST  PHARMACY TO DOSE VANCOMYCIN  IP CONSULT TO CRITICAL CARE  IP CONSULT TO [address] [place] [address] / DOMINICK / Deedee Muñiz is a 72 y.o. male who presented after being found down in his home and hyperglycemia. Upon arrival he was able to follow commands, but he had coarse breath sounds bilaterally and was hypoxic on NC. He ultimately required a NRB mask to maintain his SpO2 around 88-90. CXR demonstrated evidence of vascular congestion. The patient required intubation for acute hypoxic respiratory failure. He was hyperventilated during the procedure due to his profound acidosis. I did elect to use rocuronium as his potassium was unknown at time of intubation. He ultimately was found to have leukocytosis and lactic acidosis. He was found to be in DKA. His troponin was elevated with lateral ischemia on his EKG which I felt was likely demand. He was given 30cc/kg of IVF for his IBW. I ordered broad spectrum anti-microbials including cefepime, vancomycin, and azithromycin. He was tested for COVID. He was started on norepinephrine margot-intubation, which was subsequently weaned off. He was also started on an insulin infusion. He was given propofol and fentanyl for sedation. I also gave the patient suggammadex to reverse his paralysis and maintain his minute ventilation. I was present for and directly supervised the CVC insertion by the resident physician. At this time he will be admitted to the ICU in critical condition for further management. Critical Care:  Due to the immediate potential for life-threatening deterioration, I spent 48 minutes providing critical care. This time excludes time spent performing procedures but includes time spent on direct patient care, history retrieval, review of the chart, and discussions with patient, family, and consultant(s). Clinical Impression     1. Somnolence    2. Septic shock ([address]    3.  HAP (hospital-acquired pneumonia)        Disposition     PATIENT REFERRED TO:  [address]  [address] [facility] [address]  [phone]            DISCHARGE MEDICATIONS:  Current Discharge

## 2020-08-21 NOTE — CONSULTS
Pulmonary & Critical Care Medicine ICU Consultation    CHIEF COMPLAINT / HPI:                The patient is a 72 y.o. male with significant past medical history of diabetes and hypertension presented with altered mental status, hypoxia and DKA. Patient has had several admissions to the hospital over the past couple months for DKA secondary to noncompliance with his insulin. He just got discharged from a nursing home yesterday and when they called to check up on him today he did not answer his phone. Police or first responders had to forcibly enter his home and found him minimally responsive. He was brought to the emergency room and found to be hypoxemic with severe acidosis so he was emergently intubated. Lab values came back showing high anion gap metabolic acidosis and chest x-ray with bilateral groundglass infiltrates concerning for atypical infection or congestive heart failure. Patient is unable to give any history. Past Medical History:      Diagnosis Date    Asthma     Diabetes mellitus ([facility] [address]     Hypertension     Parkinson disease ([facility] [address]       Past Surgical History:        Procedure Laterality Date    UPPER GASTROINTESTINAL ENDOSCOPY N/A 5/15/2020    EGD CONTROL HEMORRHAGE performed by Brook Tate MD at [address] [place] [address] 5/15/2020    EGD BIOPSY performed by Brook Tate MD at [address] [facility] History:    TOBACCO:   reports that he has never smoked. He has never used smokeless tobacco.  ETOH:   reports no history of alcohol use. Family History:   No family history on file.     REVIEW OF SYSTEMS:    Unobtainable    Objective:   PHYSICAL EXAM:      VITALS:  /84   Pulse 83   Temp 97.7 °F (36.5 °C) (Axillary)   Resp 24   Ht 5' 8\" (1.727 m)   Wt 184 lb (83.5 kg)   SpO2 90%   BMI 27.98 kg/m²       24HR INTAKE/OUTPUT:  No intake or output data in the 24 hours ending 08/21/20 1416  CURRENT PULSE OXIMETRY:  SpO2: 90 %  24HR PULSE OXIMETRY RANGE:  SpO2  Av.2 %  Min: 79 %  Max: 100 % on 80%    Vent Settings:  Vent Mode: AC/VC Rate Set: 25 bmp/Vt Ordered: 450 mL/ /FiO2 : 100 %  PEEP 8  No results for input(s): PHART, KVQ4AAD, PO2ART in the last 72 hours. CONSTITUTIONAL: Intubated and sedated   nECK:  Supple, symmetrical, trachea midline, no adenopathy, thyroid symmetric, not enlarged and no tenderness, skin normal  LUNGS: Moderate increased work of breathing with diffuse rhonchi cARDIOVASCULAR:  normal S1 and S2, 2+ lower extremity edema. ABDOMEN:  normal bowel sounds, non-distended and no masses palpated, and no tenderness to palpation. No hepatospleenomegaly  LYMPHADENOPATHY:  no axillary or supraclavicular adenopathy. No cervical adnenopathy  PSYCHIATRIC: Intubated and sedated   mUSCULOSKELETAL: No obvious misalignment or effusion of the joints. No clubbing, cyanosis of the digits. NEURO: Intubated and sedated. cranial nerves in tact. No focal deficits. SKIN:  normal skin color, texture, turgor and no redness, warmth, or swelling. No palpable nodules      Current Medications:     cefepime  2 g Intravenous Once    azithromycin  500 mg Intravenous Once    rocuronium        meropenem  1 g Intravenous Q8H    azithromycin  250 mg Intravenous Q24H    lactated ringers bolus  1,000 mL Intravenous Q1H    carbidopa-levodopa  2 tablet Oral TID    enoxaparin  40 mg Subcutaneous Daily    heparin (porcine)  5,000 Units Subcutaneous 3 times per day     Allergies:    Allergies   Allergen Reactions    Erythromycin Nausea And Vomiting       IV:   norepinephrine 4 mcg/min (20 1206)    fentaNYL 2000 mcg/200 mL IV infusion 100 mcg/hr (20 1158)    sodium chloride      dextrose 5 % and 0.45 % NaCl      dextrose      insulin 0.1 Units/kg/hr (20 1309)       DATA:    Old records have been reviewed  CBC with Differential:    Lab Results   Component Value Date    WBC 14.8 2020    RBC 3.83 2020 HGB 9.7 08/21/2020    HCT 32.0 08/21/2020     08/21/2020    MCV 83.4 08/21/2020    MCH 25.2 08/21/2020    MCHC 30.2 08/21/2020    RDW 19.8 08/21/2020    BANDSPCT 1 05/18/2020    METASPCT 3 05/19/2020    LYMPHOPCT 8.1 08/21/2020    PROMYELOPCT 7 05/14/2020    MONOPCT 6.3 08/21/2020    MYELOPCT 1 05/19/2020    BASOPCT 0.6 08/21/2020    MONOSABS 0.9 08/21/2020    LYMPHSABS 1.2 08/21/2020    EOSABS 0.0 08/21/2020    BASOSABS 0.1 08/21/2020     BMP:    Lab Results   Component Value Date     08/21/2020    K 5.8 08/21/2020    K 5.8 08/04/2020    CL 89 08/21/2020    CO2 10 08/21/2020    BUN 41 08/21/2020    CREATININE 1.9 08/21/2020    CALCIUM 8.9 08/21/2020    GFRAA 43 08/21/2020    LABGLOM 35 08/21/2020    GLUCOSE 822 08/21/2020     Hepatic Function Panel:    Lab Results   Component Value Date    ALKPHOS 154 08/21/2020    ALT 22 08/21/2020    AST 20 08/21/2020    PROT 6.2 08/21/2020    BILITOT 1.2 08/21/2020    BILIDIR 0.3 08/21/2020    IBILI 0.9 08/21/2020     ABG:    Lab Results   Component Value Date    DLJ2JOG 13 04/29/2020    BEART -11.2 04/29/2020    E1GNKGSH 99 04/29/2020    PHART 7.328 04/29/2020    UGF7SBZ 25.5 04/29/2020    PO2ART 123.0 04/29/2020    IDJ1JVA 14 04/29/2020       Cultures:   Blood Culture:    Sputum Culture:        Radiology Review:  All pertinent images / reports were reviewed as a part of this visit. imaging reveals the following:   XR CHEST PORTABLE   Final Result      ET tube and left IJ central venous catheter placed as described. No evidence of diffuse pulmonary vasculature congestion and interstitial edema, similar to the prior exam. No focal infiltrates seen. XR CHEST PORTABLE   Final Result   1. Cardiomegaly with vascular congestion and pulmonary interstitial edema. CT HEAD WO CONTRAST    (Results Pending)          Assessment/Plan   72 y.o. male acute respiratory failure and high anion gap metabolic acidosis.     Neuro/Sedation: Intubated and sedated with propofol and fentanyl. Head CT did not show any acute changes. CV: Clinical exam consistent with congestive heart failure which would be a new diagnosis for him. Initial EKG in the emergency room showed fairly diffuse ST depressions, however at the time he had a pH of 7.0. Several hours later his EKG had normalized. Initial troponin was elevated at 0.15. Given his history of uncontrolled diabetes, the look of CHF on his chest x-ray as well as lower extremity edema in the context of DKA and high lactic acidosis within 1 day of being discharged from a nursing facility, I think it is appropriate to treat him as if he has had a myocardial event that has triggered his DKA and put him into congestive heart failure. In lieu of giving him fluids as part of his DKA resuscitation will only give him the drips necessary to sedate and treat possible infection but instead will try to diurese. I also ordered an echocardiogram to assess his function which hopefully will be done when his pH is corrected. We will start low-dose heparin drip for non-ST elevation MI and trend troponins. Respiratory failure: Primarily acute hypoxemic respiratory failure but he has had incomplete compensation of his PCO2. When I first assessed the patient he was on a volume control setting and considerably out of sync making his respiration somewhat ineffective. His initial inspiratory time was set low at 0.5 seconds and his respiratory rate of 24. However because of the dyssynchrony he was not pulling adequate volumes consistently. I switched over to pressure support 5/8 and he was pulling tidal volumes in the 900 range and breathing 18 times a minute for minute ventilation between 16 and 17. The seem to be far more effective than the previous settings so I will leave them there as we try to correct his acidemia. ID: Pro-Shmuel and BNP pending to evaluate if this is infectious versus cardiogenic.   Currently on broad-spectrum antibiotics with coverage for atypicals and being ruled out for COVID-19. Renal: Baseline creatinine at discharge from the hospital 2 weeks ago was 0.9, he has an acute kidney injury with creatinine of 1.9. NABIL with severe lactic acidosis is concerning for poor perfusion which would fit with decreased myocardial function. FEN: Hyperkalemia associated with patient's DKA and lactic acidosis and NABIL. Anticipate improvement in his potassium as we improve his serum pH. No other significant electrolyte disturbances. Critical care time spent reviewing labs/films, examining patient, collaborating with other physicians but excluding procedures for life threatening organ failure is 55 minutes.       Sergio Menjivar MD

## 2020-08-21 NOTE — H&P
possible pneumonia. CT head for AMS with only chronic changes, possible subcentimeter lesion of anterior left chin vs artifact. Pro-BNP elevated to 3,675, (1,493 on 7/26). Echo 5/1/20 with EF of 41%, normal systolic and diastolic function and no wall motion abnormalities. Past Medical /Surgical History:    Past Medical History:   Diagnosis Date    Asthma     [facility] mellitus ([facility] [address]     Hypertension     Parkinson disease ([facility] [address]      Past Surgical History:   Procedure Laterality Date    UPPER GASTROINTESTINAL ENDOSCOPY N/A 5/15/2020    EGD CONTROL HEMORRHAGE performed by Ruthie Lord MD at [facility] 5/15/2020    EGD BIOPSY performed by Ruthie Lord MD at [facility] ENDOSCOPY       Medications Prior to Admission:    No current facility-administered medications on file prior to encounter.       Current Outpatient Medications on File Prior to Encounter   Medication Sig Dispense Refill    insulin aspart (NOVOLOG FLEXPEN) 100 UNIT/ML injection pen Inject 10 Units into the skin 3 times daily (before meals) 5 pen 3    insulin glargine (LANTUS;BASAGLAR) 100 UNIT/ML injection pen Inject 45 Units into the skin nightly 5 pen 3    vitamin D3 (CHOLECALCIFEROL) 10 MCG (400 UNIT) TABS tablet Take 400 Units by mouth daily      Cinnamon 500 MG CAPS Take 1 capsule by mouth daily      fluticasone (FLONASE) 50 MCG/ACT nasal spray 2 sprays by Nasal route daily      carbidopa-levodopa (SINEMET)  MG per tablet Take 2 tablets by mouth 3 times daily      losartan (COZAAR) 100 MG tablet Take 100 mg by mouth daily      NIFEdipine (PROCARDIA XL) 60 MG extended release tablet Take 60 mg by mouth nightly      pantoprazole (PROTONIX) 40 MG tablet Take 40 mg by mouth 2 times daily      PARoxetine (PAXIL) 40 MG tablet Take 40 mg by mouth every morning      primidone (MYSOLINE) 50 MG tablet Take 50 mg by mouth 2 times daily      Insulin Pen Needle ([place] PEN NEEDLES 29G) 29G X 12MM MISC 1 each by Does not apply route daily 100 each 3    rOPINIRole (REQUIP) 3 MG tablet Take 3 mg by mouth 3 times daily      atorvastatin (LIPITOR) 10 MG tablet Take 10 mg by mouth nightly       albuterol (PROVENTIL HFA;VENTOLIN HFA) 108 (90 BASE) MCG/ACT inhaler Inhale 2 puffs into the lungs every 6 hours as needed. Allergies:  Erythromycin    SocialHistory:   TOBACCO:   reports that he has never smoked. He has never used smokeless tobacco.     ETOH:   reports no history of alcohol use. Patient currently lives alone    Family History:   No family history on file. ROS:   Review of Systems   Constitutional: Negative for chills, fatigue and fever. HENT: Negative for congestion, sinus pressure and sore throat. Eyes: Negative for photophobia and visual disturbance. Respiratory: Negative for cough, chest tightness, shortness of breath and wheezing. Cardiovascular: Negative for chest pain, palpitations and leg swelling. Gastrointestinal: Positive for nausea and vomiting. Negative for abdominal distention, abdominal pain, constipation and diarrhea. Genitourinary: Negative for dysuria, frequency and urgency. Skin: Negative for color change and rash. Neurological: Positive for tremors (resting). Negative for dizziness, syncope, weakness and headaches. All other systems reviewed and are negative. PHYSICAL EXAM:  BP (!) 91/57   Pulse 74   Temp 97.6 °F (36.4 °C) (Axillary)   Resp 18   Ht 5' 8\" (1.727 m)   Wt 184 lb (83.5 kg)   SpO2 97%   BMI 27.98 kg/m²   Recent Labs     08/21/20  0950 08/21/20  1359 08/21/20  1623   POCGLU >600* >600* >600*     Physical Exam  Constitutional:       Comments: Patient is intubated and sedated   HENT:      Head: Normocephalic. Mouth/Throat:      Comments: fruity breath  Eyes:      Extraocular Movements: Extraocular movements intact. Pupils: Pupils are equal, round, and reactive to light.    Cardiovascular:      Rate and Rhythm: Normal rate and regular rhythm. Pulses: Normal pulses. Heart sounds: Normal heart sounds. No murmur. No friction rub. No gallop. Pulmonary:      Breath sounds: Normal breath sounds. No wheezing, rhonchi or rales. Comments: Borderline tachypneic  Abdominal:      Palpations: Abdomen is soft. Tenderness: There is no abdominal tenderness. There is no guarding or rebound. Skin:     General: Skin is warm and dry. LABS:  Recent Labs     08/21/20  1007   WBC 14.8*   HGB 9.7*   HCT 32.0*                                                                     Recent Labs     08/21/20  1008 08/21/20  1415   * 127*   K 5.8* 5.9*   CL 89* 88*   CO2 10* 13*   BUN 41* 47*   CREATININE 1.9* 2.0*   GLUCOSE 822* 922*     Recent Labs     08/21/20  1008   AST 20   ALT 22   BILITOT 1.2*   ALKPHOS 154*     Recent Labs     08/21/20  1008 08/21/20  1440   TROPONINI 0.15* 0.15*     No results for input(s): BNP in the last 72 hours. Lab Results   Component Value Date    PHART 7.328 04/29/2020    AGE0JAV 25.5 04/29/2020    PO2ART 123.0 04/29/2020     Recent Labs     08/21/20  1007 08/21/20  1534   INR 1.03 1.01     Recent Labs     08/21/20  1229   COLORU Yellow   PHUR 5.5  5.5   CLARITYU Clear   SPECGRAV 1.010   LEUKOCYTESUR Negative   UROBILINOGEN 0.2   BILIRUBINUR Negative   BLOODU Negative   GLUCOSEU >=1000*      Recent Labs     08/21/20  1007 08/21/20  1134   LACTA 9.8* 6.6*       IMAGING:  CT HEAD WO CONTRAST   Final Result      Evidence of diffuse chronic small vessel white matter disease. Subtle small round subcentimeter lesion suggested in the anterior left chin. This may be artifactual. Further evaluation with MR imaging of the brain without and with contrast is recommended for further evaluation. No other acute intracranial findings. No other acute intracranial findings. Negative noncontrast CT study of the head.       XR CHEST PORTABLE   Final Result      ET tube and left IJ central venous catheter placed as described. No evidence of diffuse pulmonary vasculature congestion and interstitial edema, similar to the prior exam. No focal infiltrates seen. XR CHEST PORTABLE   Final Result   1. Cardiomegaly with vascular congestion and pulmonary interstitial edema. Assessment & Plan:   Naomi Cody is a 72 y.o. male with PMH of T1DM, Parkinson's disease, asthma, and HTN who was admitted with DKA. Pulmonary:  Acute respiratory failure 2/2 to flash pulmonary edema and new acute onset heart failure  - s/p intubation in ED 8/21   - bedside echo in ICU revealed evidence of systolic dysfunction, will get TTE  - stop IV fluids, lasix gtt now  Possible Pneumonia  - AMS in setting of leukocytosis and acute onset respiratory failure  - Infectious workup pending    - f/u blood, urine, respiratory cultures, Resp viral panel, Procal, MRSA, legionella, Strep Pneumo  - continue IV Vanc, Merrem, Azithromycin, deescalate pending cultures    CV:  Cardiogenic shock in setting of Acute onset heart failure:  - as above  - follow up TTE  - initial EKG showed ST changes , heparin gtt started , pending cardiology recoomendations  - Cardiology consulted, appreciate recs  HTN:  - Continue home nifedipine; holding losartan as above    Endocrine:  Diabetic Ketoacidosis: likely 2/2 to noncompliance with insulin + poor dietary habits  - Received 2L LR in ED  - Insulin Drip  - NS IV fluids 250ml/hr, switch to D5 0.5 NS 150ml/hr at glucose <250 per DKA protocol  - Renal function panel, Magnesium Q4H, replete lytes as needed  - NPO  - Hypoglycemia protocol  - Glucose Q1h  - Social work consulted for repeated admissions with DKA and hypoglycemia;  Patient needs more support  - F/U on UA    Renal:  NABIL: likely prerenal in setting of DKA; Cr 1.9 (baseline 0.9)  - IVF  - Holding home losartan  - Strict I/Os  Hyperkalemia: K 5.8  - Holding losartan as above;   - Hold off on K

## 2020-08-21 NOTE — ED NOTES
Bed: 1TR-01  Expected date:   Expected time:   Means of arrival:   Comments:  jose Hernandes RN  08/21/20 [id number]

## 2020-08-21 NOTE — PROGRESS NOTES
4 Eyes Admission Assessment     I agree as the admission nurse that 2 RN's have performed a thorough Head to Toe Skin Assessment on the patient. ALL assessment sites listed below have been assessed on admission. Areas assessed by both nurses:   [x]   Head, Face, and Ears   [x]   Shoulders, Back, and Chest  [x]   Arms, Elbows, and Hands   [x]   Coccyx, Sacrum, and Ischum  [x]   Legs, Feet, and Heels        Does the Patient have Skin Breakdown?   No         James Prevention initiated:  Yes   Wound Care Orders initiated:  No      [facility] nurse consulted for Pressure Injury (Stage 3,4, Unstageable, DTI, NWPT, and Complex wounds) or James score 18 or lower:  No      Nurse 1 eSignature: Electronically signed by Joseph Cevallos RN on 8/21/20 at 7:43 PM EDT    **SHARE this note so that the co-signing nurse is able to place an eSignature**    Nurse 2 eSignature: Electronically signed by Alma Rosa Watts RN on 8/21/20 at 7:44 PM EDT

## 2020-08-22 NOTE — PROCEDURES
Ashley Rodriguez is a 72 y.o. male patient. 1. Somnolence    2. Septic shock ([facility])    3. HAP (hospital-acquired pneumonia)      Past Medical History:   Diagnosis Date    Asthma     Diabetes mellitus ([facility])     Hypertension     Parkinson disease (HCC)      Blood pressure (!) 102/55, pulse 74, temperature 97.6 °F (36.4 °C), temperature source Axillary, resp. rate 25, height 5' 8\" (1.727 m), weight 184 lb (83.5 kg), SpO2 100 %. Insert Arterial Line    Date/Time: 8/21/2020 8:32 PM  Performed by: Miles Downs MD  Authorized by: Ying Lee MD   Consent: Verbal consent obtained. Written consent obtained. Risks and benefits: risks, benefits and alternatives were discussed  Consent given by: guardian  Required items: required blood products, implants, devices, and special equipment available  Patient identity confirmed: arm band  Time out: Immediately prior to procedure a \"time out\" was called to verify the correct patient, procedure, equipment, support staff and site/side marked as required. Preparation: Patient was prepped and draped in the usual sterile fashion.   Indications: multiple ABGs, respiratory failure and hemodynamic monitoring  Location: left radial  Anesthesia: local infiltration    Anesthesia:  Local Anesthetic: lidocaine 1% without epinephrine    Sedation:  Patient sedated: yes  Sedatives: fentanyl    Jevon's test normal: yes  Needle gauge: 18  Seldinger technique: Seldinger technique used  Cutdown: cutdown required  Number of attempts: 1  Post-procedure: line sutured and dressing applied  Post-procedure CMS: normal  Patient tolerance: Patient tolerated the procedure well with no immediate complications  Comments: EBL <7FT          Miles Downs MD  5/36/0095

## 2020-08-22 NOTE — PROGRESS NOTES
31-year-old gentleman with history of Parkinson's disease, diabetes mellitus was admitted yesterday for hypoxia and respiratory distress found to be in diabetic ketoacidosis with septic shock. Patient is COVID-19 rule out as per hospital policy in order to conserve PPE ICU team will be primary. We will resume care once patient is out of ICU or COVID-19 is ruled out.     This chart was likely completed using voice recognition technology and may contain unintended grammatical , phraseology,and/or punctuation errors    Ugo Wells MD  Hospitalist

## 2020-08-22 NOTE — PROGRESS NOTES
PROCEDURE:  Patient seen on [facility] twice   PHYSICIAN:  Jaycee Bradley MD    INDICATION:  Acute renal failure    Wt Readings from Last 3 Encounters:   08/22/20 184 lb 11.9 oz (83.8 kg)   08/05/20 184 lb 8.4 oz (83.7 kg)   08/01/20 190 lb 14.7 oz (86.6 kg)     Temp Readings from Last 3 Encounters:   08/22/20 93.2 °F (34 °C) (Core)   08/06/20 97.5 °F (36.4 °C) (Oral)   08/02/20 97.3 °F (36.3 °C) (Oral)     BP Readings from Last 3 Encounters:   08/22/20 106/61   08/06/20 (!) 167/78   08/02/20 (!) 170/73     Pulse Readings from Last 3 Encounters:   08/22/20 53   08/06/20 90   08/02/20 85      In: 1697 [I.V.:1247; NG/GT:100]  Out: 1004 [Urine:777]     CBC:   Recent Labs     08/21/20  1007 08/22/20  0555   WBC 14.8* 14.3*   HGB 9.7* 9.3*    214     BMP:    Recent Labs     08/21/20  2132 08/22/20  0257 08/22/20  0555   * 137 135*   K 4.5 3.9 4.2   CL 97* 102 100   CO2 22 23 24   BUN 50* 50* 47*   CREATININE 2.3* 2.1* 1.7*   GLUCOSE 462* 73 149*     BMD:   Lab Results   Component Value Date    CALCIUM 8.6 08/22/2020    CAION 1.10 (L) 08/22/2020    PHOS 3.2 08/22/2020       Iron:     Lab Results   Component Value Date    IRON 21 (L) 07/26/2020    TIBC 261 07/26/2020    FERRITIN 183.4 07/26/2020            RX:  See dialysis flowsheet for specifics on access, blood flow rate, dialysate baths, duration of dialysis, anticoagulation and other technical information.     COMMENTS:      Jaycee Bradley MD  Cell - [phone]  Pager -  [phone]

## 2020-08-22 NOTE — PROGRESS NOTES
Clinical Pharmacy Consult Note    Admit date: 8/21/2020    Subjective/Objective:  72 yom with PMHx significant for Parkinson disease, HTN, DM, and asthma presented to ED with AMS and hyperglycemia. On presentation, notable labs include venous pH 7.068, O2 sat 79% on 2L NC, hyperkalemia, glucose of 822, and NABIL with SCr of 1.9. The decision was made to emergently intubate. DKA protocol was initiated. Troponin elevated as well, low dose heparin drip initiated. Of note, patient was admitted to [facility] for treatment of DKA 7/30-8/2 and 8/4-8/6. Last admission, patient was sent to a SNF, from which he was discharged 8/20 per family. Patient also has h/o NABIL for which he was treated with HD during admission in June. Baseline SCr ~1 mg/dL. Pt is being treated for acute hypoxic respiratory failure, possible PNA vs COVID-19 r/o, acute onset HF, ACS, and DKA. Interval update:  Now on CRRT. Remains on furosemide drip, pressors, heparin drip, and insulin drip. Sedated and intubated. Cultures pending. Pharmacy is consulted to dose Vancomycin per Dr. Rohan Taylor    Pertinent Medications:  Azithromycin -- day # 2   500 mg IV x1 (8/21)   250 mg IV q24h (to begin 8/22)  Meropenem 1g IV q12h -- day # 2  Vancomycin, pharmacy to dose -- day # 2   Intermittent doses based on levels (8/21-current)    Date Vancomycin Level Vancomycin Dose   8/21  1.5g   8/22 15.6mcg/mL               Recent Labs     08/22/20  0257 08/22/20  0555    135*   K 3.9 4.2    100   CO2 23 24   PHOS 3.7 3.2   BUN 50* 47*   CREATININE 2.1* 1.7*       Estimated Creatinine Clearance: 43 mL/min (A) (based on SCr of 1.7 mg/dL (H)).   - currently on CRRT    Recent Labs     08/21/20  1007 08/22/20  0555   WBC 14.8* 14.3*   HGB 9.7* 9.3*   HCT 32.0* 28.0*   MCV 83.4 75.3*    214       Height:  5' 8\" (172.7 cm)  Weight: 184 lb 11.9 oz (83.8 kg)    Micro:  8/21 COVID-19: sent  8/21 Blood x2: sent  8/21 S pneumo (urine): sent  8/21 Legionella (urine): sent  8/21 MRSA PCR: sent  8/21 Respiratory PCR:  sent  8/22 Sputum:  No org seen on gram stain, final cx pending    Prophylaxis:  DVT: heparin drip  SUP: not ordered    Assessment/Plan:  1. Possible Pneumonia:  Azithromycin + meropenem + vancomycin day #2  Meropenem -  · Dose of 1g IV q12h remains appropriate for patient on CRRT. · Will monitor closely, and will adjust dose as appropriate per [facility] Renal Dose Adjustment Policy. Vancomycin - pharmacy to dose  · Pt with NABIL - currently on CRRT. Will dose intermittently based on levels for now. · Random level this AM = 15.6mcg/mL. Will give 1.25g IV x1 today, and will check random level in AM tomorrow. · Clinical condition will be monitored closely, and levels / doses will be ordered as appropriate.     Please call with questions--  Thanks--  Bob Marino, PharmD, [address], [address] [place] [address]   8/22/2020 10:12 AM

## 2020-08-22 NOTE — PROGRESS NOTES
Patient tolerating CRRT well at this time. Core temp noted to be low on assessment @ 34 celsius--bear hugger placed on patient and resident physician aware. Levophed and precedex titrated per order parameters. All lines/gtts inspected and infusing properly. Will continue to monitor.

## 2020-08-22 NOTE — PROCEDURES
Rohit Aj is a 72 y.o. male patient. 1. Somnolence    2. Septic shock ([facility] [address]    3. HAP (hospital-acquired pneumonia)      Past Medical History:   Diagnosis Date    Asthma     Diabetes mellitus ([facility] [address]     Hypertension     Parkinson disease (HCC)      Blood pressure (!) 116/58, pulse 68, temperature 98.6 °F (37 °C), temperature source Axillary, resp. rate 22, height 5' 8\" (1.727 m), weight 184 lb (83.5 kg), SpO2 100 %. Central Line    Date/Time: 8/22/2020 2:19 AM  Performed by: Freddy Dias DO  Authorized by: Hernando Valdes DO   Consent: Verbal consent obtained. Risks and benefits: risks, benefits and alternatives were discussed  Procedure consent: procedure consent matches procedure scheduled  Patient identity confirmed: arm band  Indications: vascular access  Anesthesia: local infiltration    Anesthesia:  Local Anesthetic: lidocaine 1% without epinephrine  Preparation: skin prepped with 2% chlorhexidine  Skin prep agent dried: skin prep agent completely dried prior to procedure  Sterile barriers: all five maximum sterile barriers used - cap, mask, sterile gown, sterile gloves, and large sterile sheet  Hand hygiene: hand hygiene performed prior to central venous catheter insertion  Location details: right internal jugular  Patient position: flat  Catheter type: triple lumen  Catheter size: 15Fr. Pre-procedure: landmarks identified  Ultrasound guidance: yes  Sterile ultrasound techniques: sterile gel and sterile probe covers were used  Number of attempts: 1  Successful placement: yes  Post-procedure: line sutured and dressing applied  Assessment: blood return through all ports and free fluid flow  Patient tolerance: Patient tolerated the procedure well with no immediate complications  Comments: Biopatch applied. Wire visualized in RIJ- see picture below.  EBL 5cc              Hernando Valdes DO  8/22/2020

## 2020-08-22 NOTE — PROGRESS NOTES
ICU History and Physical  PGY-1    PCP: Jaren MOYER    Code:Full Code  Admit Date: 8/21/2020  IV Access:  PIV Duration:  <1 day   IV Fluids: LR bolus  Diet: NPO Effective now    Interval History:  No acute events overnight. His body temperature dropped to 93.2 likely secondary to CRRT. Nurse instituted bear hugger. He is making urine now. . His urine output yesterday was 913 ml. CRRT output 164 ml. Net positive +1.7 L. CC: Hyperglycemia    HISTORY OF PRESENT ILLNESS:    Patient is a 72 y.o. male with a history of T1DM, Parkinson's disease, asthma, and hypertension presenting with hyperglycemia and altered mental status. According to the patients daughter he was just discharged from a nursing facility yesterday. He was last seen between 10-2 PM yesterday. Today his friend attempted to call him; however, there was no answer which is very abnormal and EMS was summoned. They reported that they had to force entry into the house. Patient has had numerous admissions for DKA and hypoglycemia, most recently discharged 8/6/20. He was discharged to SNF on 45U of lantus nightly, and 10U rapid w/ meals. Patient has history of temporary HD for NABIL on previous admission in June 2020. In ED was oriented to name only, otherwise unable to provide additional history. Patient noted to be afebrile, borderline tachypneic to 28, hypotensive with systolic of 75-37 and SpO2 of 79% on 2L nasal canula. VBG significant for pH of 7.068, pCO2 42.9, pO2 28.9, HCO3 10. Other labs significant for hyperkalemia to 5.8, Na of 130, NABIL with SCr of 1.9 (baseline 0.9-1.0), anion gap of 31, lactate of 9.8, and glucose of 822. WBC elevated to 14.8 (7.0 on 8/6), Hb stable at 9.7. UDS positive for barbituates. EKG in ED with ST depressions in multiple leads, repeat EKG NSR HR of 77, no significant ST-T changes. Troponin elevated to 0.15 from 0.06 (7/26).  CXR revealed cardiomegaly with diffuse vascular congestion and interstitial pulmonary edema without evidence of focal infiltrate or significant effusion. Levophed drip was started for hypotension. DKA protocol was initiated and patient was emergently intubated in ED. Broad spectrum antibiotics were started out of concern for possible pneumonia. CT head for AMS with only chronic changes, possible subcentimeter lesion of anterior left chin vs artifact. Pro-BNP elevated to 3,675, (1,493 on 7/26). Echo 5/1/20 with EF of 61%, normal systolic and diastolic function and no wall motion abnormalities. Past Medical /Surgical History:    Past Medical History:   Diagnosis Date    Asthma     Diabetes mellitus ([facility] [address]     Hypertension     Parkinson disease ([facility] [address]      Past Surgical History:   Procedure Laterality Date    UPPER GASTROINTESTINAL ENDOSCOPY N/A 5/15/2020    EGD CONTROL HEMORRHAGE performed by Nolberto Sierra MD at [address] [place] [address] 5/15/2020    EGD BIOPSY performed by Nolberto Sierra MD at [facility] ENDOSCOPY       Medications Prior to Admission:    No current facility-administered medications on file prior to encounter.       Current Outpatient Medications on File Prior to Encounter   Medication Sig Dispense Refill    insulin aspart (NOVOLOG FLEXPEN) 100 UNIT/ML injection pen Inject 10 Units into the skin 3 times daily (before meals) 5 pen 3    insulin glargine (LANTUS;BASAGLAR) 100 UNIT/ML injection pen Inject 45 Units into the skin nightly 5 pen 3    vitamin D3 (CHOLECALCIFEROL) 10 MCG (400 UNIT) TABS tablet Take 400 Units by mouth daily      Cinnamon 500 MG CAPS Take 1 capsule by mouth daily      fluticasone (FLONASE) 50 MCG/ACT nasal spray 2 sprays by Nasal route daily      carbidopa-levodopa (SINEMET)  MG per tablet Take 2 tablets by mouth 3 times daily      losartan (COZAAR) 100 MG tablet Take 100 mg by mouth daily      NIFEdipine (PROCARDIA XL) 60 MG extended release tablet Take 60 mg by mouth nightly      pantoprazole (PROTONIX) 40 MG tablet Take 40 mg by mouth 2 times daily      PARoxetine (PAXIL) 40 MG tablet Take 40 mg by mouth every morning      primidone (MYSOLINE) 50 MG tablet Take 50 mg by mouth 2 times daily      Insulin Pen Needle ([employer] PEN NEEDLES 29G) 29G X 12MM MISC 1 each by Does not apply route daily 100 each 3    rOPINIRole (REQUIP) 3 MG tablet Take 3 mg by mouth 3 times daily      atorvastatin (LIPITOR) 10 MG tablet Take 10 mg by mouth nightly       albuterol (PROVENTIL HFA;VENTOLIN HFA) 108 (90 BASE) MCG/ACT inhaler Inhale 2 puffs into the lungs every 6 hours as needed. Allergies:  Erythromycin    SocialHistory:   TOBACCO:   reports that he has never smoked. He has never used smokeless tobacco.     ETOH:   reports no history of alcohol use. Patient currently lives alone    Family History:   No family history on file. ROS:   Review of Systems   Constitutional: Negative for chills, fatigue and fever. HENT: Negative for congestion, sinus pressure and sore throat. Eyes: Negative for photophobia and visual disturbance. Respiratory: Negative for cough, chest tightness, shortness of breath and wheezing. Cardiovascular: Negative for chest pain, palpitations and leg swelling. Gastrointestinal: Positive for nausea and vomiting. Negative for abdominal distention, abdominal pain, constipation and diarrhea. Genitourinary: Negative for dysuria, frequency and urgency. Skin: Negative for color change and rash. Neurological: Positive for tremors (resting). Negative for dizziness, syncope, weakness and headaches. All other systems reviewed and are negative.     PHYSICAL EXAM:  /61   Pulse 54   Temp 93.2 °F (34 °C) (Axillary)   Resp 22   Ht 5' 8\" (1.727 m)   Wt 184 lb 11.9 oz (83.8 kg)   SpO2 100%   BMI 28.09 kg/m²   Recent Labs     08/22/20  0400 08/22/20  0508 08/22/20  0550 08/22/20  0643 08/22/20  0809   POCGLU 76 108* 90 135* 121*     Physical Exam  Constitutional:       Comments: Patient is intubated and sedated   HENT:      Head: Normocephalic. Eyes:      Extraocular Movements: Extraocular movements intact. Pupils: Pupils are equal, round, and reactive to light. Cardiovascular:      Rate and Rhythm: Normal rate and regular rhythm. Pulses: Normal pulses. Heart sounds: Normal heart sounds. No murmur. No friction rub. No gallop. Pulmonary:      Breath sounds: Normal breath sounds. No wheezing, rhonchi or rales. Abdominal:      Palpations: Abdomen is soft. Tenderness: There is no abdominal tenderness. There is no guarding or rebound. Skin:     General: Skin is warm and dry. LABS:  Recent Labs     08/21/20  1007 08/22/20  0555   WBC 14.8* 14.3*   HGB 9.7* 9.3*   HCT 32.0* 28.0*    214                                                                  Recent Labs     08/21/20  2132 08/22/20  0257 08/22/20  0555   * 137 135*   K 4.5 3.9 4.2   CL 97* 102 100   CO2 22 23 24   BUN 50* 50* 47*   CREATININE 2.3* 2.1* 1.7*   GLUCOSE 462* 73 149*     Recent Labs     08/21/20  1008   AST 20   ALT 22   BILITOT 1.2*   ALKPHOS 154*     Recent Labs     08/21/20  1440 08/21/20  2132 08/22/20  0257   TROPONINI 0.15* 1.82* 2.54*     No results for input(s): BNP in the last 72 hours. Lab Results   Component Value Date    PHART 7.328 04/29/2020    POT9SVX 25.5 04/29/2020    PO2ART 123.0 04/29/2020     Recent Labs     08/21/20  1007 08/21/20  1534   INR 1.03 1.01     Recent Labs     08/21/20  1229   COLORU Yellow   PHUR 5.5  5.5   CLARITYU Clear   SPECGRAV 1.010   LEUKOCYTESUR Negative   UROBILINOGEN 0.2   BILIRUBINUR Negative   BLOODU Negative   GLUCOSEU >=1000*      Recent Labs     08/21/20  1007 08/21/20  1134   LACTA 9.8* 6.6*       IMAGING:  XR CHEST PORTABLE   Final Result   1.   There is been interval placement of a right internal jugular approach    central venous catheter with the tip near the confluence of the brachiocephalic veins. No pneumothorax. The remaining support tubes and    lines in the chest are nonsignificantly altered in position. 2.  Previously described interstitial edema appears to questionably    slightly improved. XR ABDOMEN (KUB) (SINGLE AP VIEW)   Final Result     The nasogastric tube traverses the mediastinum and terminates in the    mid to distal body of the stomach. CT HEAD WO CONTRAST   Final Result      Evidence of diffuse chronic small vessel white matter disease. Subtle small round subcentimeter lesion suggested in the anterior left chin. This may be artifactual. Further evaluation with MR imaging of the brain without and with contrast is recommended for further evaluation. No other acute intracranial findings. No other acute intracranial findings. Negative noncontrast CT study of the head. XR CHEST PORTABLE   Final Result      ET tube and left IJ central venous catheter placed as described. No evidence of diffuse pulmonary vasculature congestion and interstitial edema, similar to the prior exam. No focal infiltrates seen. XR CHEST PORTABLE   Final Result   1. Cardiomegaly with vascular congestion and pulmonary interstitial edema. Assessment & Plan:   oMreno Tavarez is a 72 y.o. male with PMH of T1DM, Parkinson's disease, asthma, and HTN who was admitted with DKA. Pulmonary:  Acute respiratory failure 2/2 to flash pulmonary edema and new acute onset heart failure  - s/p intubation in ED 8/21   - bedside echo in ICU revealed evidence of systolic dysfunction.  -Trans thoracic Echo 08/22/2020:    Left ventricular cavity size is normal. There is mild left ventricular  hypertrophy. Overall left ventricular systolic function appears low normal  with an ejection fraction of 45- 50%. No regional wall motion abnormalities  are noted.  Indeterminate diastolic function.  Trivial mitral and tricuspid regurgitation.    Estimated pulmonary artery systolic pressure is at 32 mmHg assuming a right    atrial pressure of 8 mmHg.  The right ventricle appears at the upper limits of normal, low normal    systolic function. Possible Pneumonia  - AMS in setting of leukocytosis and acute onset respiratory failure  - Infectious workup pending    - Blood cultures: no growth till date  - urine, respiratory cultures, Resp viral panel, Procal, MRSA, legionella, Strep Pneumo: Pending  - continue IV Vanc, Merrem, Azithromycin    CV:  Cardiogenic shock in setting of Acute onset heart failure:  - as above  - initial EKG showed ST changes , heparin gtt started  - Cardiology consulted, appreciate recs    HTN:  - Continue home nifedipine; holding losartan as above    Endocrine:  Diabetic Ketoacidosis:   likely 2/2 to noncompliance with insulin + poor dietary habits  - Received 2L LR in ED  - NS IV fluids 250ml/hr, switch to D5 0.5 NS 150ml/hr at glucose <250 per DKA protocol  - Renal function panel, Magnesium Q4H, replete lytes as needed  - NPO  - Hypoglycemia protocol  - Glucose Q1h  - Social work consulted for repeated admissions with DKA and hypoglycemia; Patient needs more support      Renal:  NABIL: likely prerenal in setting of DKA;   Cr 1.7 today, it was 2.3 yesterday (baseline 0.9)  - IVF  - Holding home losartan  - Strict I/Os    Hyperkalemia: (resolved)   -K 5.8 on admission  - Holding losartan as above;   - Hold off on K supplementation with insulin for now;    Neuro  Parkinson's Disease:  - Continue carbidopa/levidopa, primidone    Code Status: Full  F/E/N:  NPO  GI / DVT Prophylaxis: Heparin drip  Disposition: ICU      Jeannette Centeno MD  Internal Medicine Resident, PGY-1  8/22/2020  8:51 AM     Patient seen, examined and discussed with the resident and I agree with the assessment and plan. Remains intubated and sedated. Had to be started on CRRT yesterday because he was not making urine despite a Lasix infusion.   We are pulling fluid with CRRT and his urine output has started to . I had left him on pressure support yesterday and he was switched back to full support when placed on CRRT, for his acidemia. Respiratory rate was 24 I backed that off to 20 breaths/min and decreased his PEEP from 8-5. Echocardiogram today showed an EF of 45 to 50% which is decreased from baseline but not as bad as what we suspected yesterday when he was more acidemic. On heparin drip for presumed non-ST elevation MI with a troponin that peaked at 2.54. It is now downtrending. I believe that a myocardial event was the trigger for his DKA and what appears to be volume overload. He will likely need an angiogram when he is otherwise recovered. Anion gap is closed and he had a brief episode of hypoglycemia on the insulin drip for DKA so that was stopped. I put him on the multiplier this morning to maintain glycemic control in the context of congestive heart failure and recent myocardial infarction. Critical care time spent reviewing labs/films, examining patient, collaborating with other physicians but excluding procedures for life threatening organ failure is 45 minutes.       Jj Gamboa MD

## 2020-08-22 NOTE — PROGRESS NOTES
Discussed patient with ICU team earlier today. Based on current values this evening around 7 pm:     VBG sats 66% on Norepi 5 gtt, no inotropes, calculated cardiac index CI 3.3 lt/min/m2, CO 6.6 lt/min. MAP of 69 and CVP 13, hence SVR 8.5 WILSON = 678 dynes. Parameters are consistent with septic shock. Severe acidosis on admission (pH of 7.0) may have led to myocardial dysfunction and low EF, thus some pulmonary edema but doubt cardiogenic shock. Recommend:   -No need for inotropic support ( or milrinone)  -Increase pressor support for BP as needed  -May continue with lasix gtt to keep CVP < 10  -Follow VBG sats qam; if < 55%, then may consider low dose dobutamine 3 mcg/kg/min.   -C/w antibiotics and check COVID    Dr Jay Dodd will see patient in am for a full consult.      Thank you

## 2020-08-22 NOTE — CONSULTS
[facility]  Cardiology Inpatient Consult Service                                                                                          Pt Name: Rohit Aj  Age: 72 y.o. Sex: male  : 1950  Location: [address] [place] [address]    Referring Physician: Raymundo Fernandez MD      Reason for Consult:       Reason for Consultation/Chief Complaint: Volume overload/renal failure/hypoxemia/DKA/acute hypoxic respiratory failure      HPI:      Rohit Aj is a 72 y.o. male with a past medical history of hypertension, diabetes,Parkinson's, prior admission with DKA who presented to the hospital with AMS along with hypoxemia and respiratory distress requiring intubation. On admission he was significantly acidotic and found to be in DKA. He was a started on CRRT and remains intubated at this point in time. His admission EKG was concerning for ST depressions although his pH was 7 at that time. His EKG today shows normal sinus rhythm without ischemic changes. His troponins are currently trending up. No arrhythmias noted on telemetry. We will get an echo today. We are consulted for further assistance given possible acute heart failure on possible ACS. Histories     Past Medical History:   has a past medical history of Asthma, Diabetes mellitus ([facility] [address] Hypertension, and Parkinson disease ([facility] [address] Surgical History:   has a past surgical history that includes Upper gastrointestinal endoscopy (N/A, 5/15/2020) and Upper gastrointestinal endoscopy (N/A, 5/15/2020). Social History:   reports that he has never smoked. He has never used smokeless tobacco. He reports that he does not drink alcohol or use drugs. Family History:  No evidence for sudden cardiac death or premature CAD      Medications:       Home Medications  Were reviewed and are listed in nursing record. and/or listed below  Prior to Admission medications    Medication Sig Start Date End Date Taking?  Authorizing Provider insulin aspart (NOVOLOG FLEXPEN) 100 UNIT/ML injection pen Inject 10 Units into the skin 3 times daily (before meals) 8/2/20   Zohra Hills MD   insulin glargine (LANTUS;BASAGLAR) 100 UNIT/ML injection pen Inject 45 Units into the skin nightly 8/2/20   Zohra Hills MD   vitamin D3 (CHOLECALCIFEROL) 10 MCG (400 UNIT) TABS tablet Take 400 Units by mouth daily    Historical Provider, MD   Cinnamon 500 MG CAPS Take 1 capsule by mouth daily    Historical Provider, MD   fluticasone (FLONASE) 50 MCG/ACT nasal spray 2 sprays by Nasal route daily 7/6/20   Historical Provider, MD   carbidopa-levodopa (SINEMET)  MG per tablet Take 2 tablets by mouth 3 times daily 4/22/20   Historical Provider, MD   losartan (COZAAR) 100 MG tablet Take 100 mg by mouth daily 12/4/18   Historical Provider, MD   NIFEdipine (PROCARDIA XL) 60 MG extended release tablet Take 60 mg by mouth nightly 5/4/20   Historical Provider, MD   pantoprazole (PROTONIX) 40 MG tablet Take 40 mg by mouth 2 times daily    Historical Provider, MD   PARoxetine (PAXIL) 40 MG tablet Take 40 mg by mouth every morning 4/16/20   Historical Provider, MD   primidone (MYSOLINE) 50 MG tablet Take 50 mg by mouth 2 times daily 7/16/19   Historical Provider, MD   Insulin Pen Needle ([employer] PEN NEEDLES 29G) 29G X 12MM MISC 1 each by Does not apply route daily 7/28/20   Adan Jacome MD   rOPINIRole (REQUIP) 3 MG tablet Take 3 mg by mouth 3 times daily    Historical Provider, MD   atorvastatin (LIPITOR) 10 MG tablet Take 10 mg by mouth nightly     Historical Provider, MD   albuterol (PROVENTIL HFA;VENTOLIN HFA) 108 (90 BASE) MCG/ACT inhaler Inhale 2 puffs into the lungs every 6 hours as needed.       Historical Provider, MD          Inpatient Medications:   meropenem  1 g Intravenous Q12H    azithromycin  250 mg Intravenous Q24H    carbidopa-levodopa  2 tablet Oral TID    vancomycin (VANCOCIN) intermittent dosing (placeholder)   Other See Admin Instructions       IV drips:   sodium chloride      sodium chloride      norepinephrine 0.5 mcg/min (08/22/20 0800)    fentaNYL 2000 mcg/200 mL IV infusion 50 mcg/hr (08/21/20 2033)    dextrose      insulin Stopped (08/22/20 0257)    heparin (porcine) 11 Units/kg/hr (08/22/20 0452)    furosemide (LASIX) 1mg/ml infusion 5 mg/hr (08/22/20 0454)    dexmedetomidine (PRECEDEX) IV infusion 0.9 mcg/kg/hr (08/22/20 0827)    prismaSATE BGK 4/2.5 1,000 mL/hr (08/22/20 0319)    prismaSATE BGK 4/2.5 1,000 mL/hr (08/22/20 0319)    prismaSATE BGK 4/2.5 1,000 mL/hr (08/22/20 0319)       PRN:  perflutren lipid microspheres, dextrose, glucose, dextrose, glucagon (rDNA), dextrose, perflutren lipid microspheres, heparin (porcine), heparin (porcine), sodium phosphate IVPB **OR** sodium phosphate IVPB **OR** sodium phosphate IVPB **OR** sodium phosphate IVPB, calcium gluconate **OR** calcium gluconate **OR** calcium gluconate **OR** calcium gluconate, magnesium sulfate, potassium chloride    Allergy:     Erythromycin       Review of Systems:     Unable to perform, patient currently intubated sedated.       Physical Examination:     Vitals:    08/22/20 0645 08/22/20 0700 08/22/20 0800 08/22/20 0900   BP:   106/61    Pulse: 56 54 54 50   Resp: 25 25 22 25   Temp:   93.2 °F (34 °C)    TempSrc:   Axillary    SpO2: 100% 100% 100% 100%   Weight:       Height:           Wt Readings from Last 3 Encounters:   08/22/20 184 lb 11.9 oz (83.8 kg)   08/05/20 184 lb 8.4 oz (83.7 kg)   08/01/20 190 lb 14.7 oz (86.6 kg)       Objective      General Appearance:   Intubated and sedated   Head:  Normocephalic, without obvious abnormality, atraumatic   Eyes:   Sedated       Nose: Nares normal, no drainage or sinus tenderness   Throat: Lips, mucosa, and tongue normal   Neck: Supple, symmetrical, trachea midline, no adenopathy, thyroid: not enlarged, symmetric, no tenderness/mass/nodules, no carotid bruit or JVD       Lungs:   Clear to auscultation bilaterally, interpretation  Admission EKG with significant ST depressions. Following EKG normal sinus rhythm. Telemetry:  Sinus rhythm    Last Stress (if available):    Last TTE/RHYS(if available): Pending  04/2020  Normal left ventricle size, wall thickness, and systolic function with an   estimated ejection fraction of 55%. No regional wall motion abnormalities   are seen. Diastolic filling parameters suggests normal diastolic function. The right ventricle is normal in size. TAPSE measures: 1.33 cm. RV S   velocity measures: 11.4 cm/s. No evidence of significant valvular stenosis   or regurgitation present. Last Cath (if available):    Last CMR  (if available):      Assessment / Plan:     Acute hypoxic respiratory failure/elevated BNP/elevated troponins/DKA/hypertension.  -Presentation t is somewhat concerning for acute decompensated heart failure and possible ACS along with possible sepsis, having said that he had rapid improvement and he is on minimal pressor requirement at this time. Admission EKG had significant ST depressions, having said that during that time his pH was 7.  -Currently his troponins are trending up despite being on CRRT, does not complain of any chest pain although he is sedated. On telemetry there are no ventricular arrhythmias. -TTE today, on minimal pressor requirement  -Fluid management per CRRT, ideally aim for negative fluid status.  -Continue trending troponins.  -Heparin drip, aspirin and statin (we will treat as ACS, once renal function improves and stable consider left heart cath)    Tobacco use was discussed with the patient and educated on the negative effects. I have asked the patient to not utilize these agents. Thank you for allowing to us to participate in the care or [facility]. Further evaluation will be based upon the patient's clinical course and testing results.     I have spent 40 minutes of face to face time with the patient with more than 50% spent counseling and coordinating care. All questions and concerns were addressed to the patient/family. Alternatives to my treatment were discussed. The note was completed using EMR. Every effort was made to ensure accuracy; however, inadvertent computerized transcription errors may be present. I have personally reviewed the reports and images of labs, radiological studies, cardiac studies including ECG's and telemetry, current and old medical records. Michael Pearson MD, [facility], [facility]

## 2020-08-22 NOTE — PROGRESS NOTES
Patient's SBP in 70-80s. Urine output is 5ml/hr for the last 2 hours. CVP is 15. Residents made aware. Lasix to continue, starting Levophed to support BP. Monitoring closely. Glucose continues to read Hi. Glucose sent to lab.

## 2020-08-22 NOTE — PROGRESS NOTES
NUTRITION ASSESSMENT  Admission Date: 8/21/2020     Type and Reason for Visit: Initial    NUTRITION RECOMMENDATIONS:   **NPO- monitor ability to extubate with diet advancement vs need for alternative nutrition. EN recommendations provided below as needed. Enteral Nutrition Recommendations. 1. Recommend EN formula Glucerna 1.2 @ goal rate of 65 ml/hr to provide 1560 total volume, 1872kcal, 93 g protein and 1255 ml free water. Initiate EN @20 mL/hr and as tolerated, increase by 20 mL/hr q4 hours until goal of 65 mL/hr is met. 2. Recommend maintenance water flush of 30 ml every 4 hours. 3. Recommend protein modular QD via feeding tube. Flush with 30 ml water before/after administration. Do not mix with tube feeding formula. NUTRITION ASSESSMENT:  Patient currently intubated and sedated with fentanyl and precedex, propofol not given since yesterday afternoon. Patient is on pressors, insulin gtt, CRRT and darnell hugger. OGT in place and clamped. Will provide EN recommendations and monitor nutritional status. MALNUTRITION ASSESSMENT  Context of Malnutrition: Acute Illness   Malnutrition Status: At risk for malnutrition (Comment)  Findings of the 6 clinical characteristics of malnutrition (Minimum of 2 out of 6 clinical characteristics is required to make the diagnosis of moderate or severe Protein Calorie Malnutrition based on AND/ASPEN Guidelines):  Energy Intake: Unable to Assess    Energy Intake Time: Unable to assess   Weight Loss %: Unable to assess    Weight loss Time: Unable to assess   Body Fat Loss: Unable to Assess   Body Fat Location: Unable to assess    Body Muscle Loss: Unable to Assess   Body Muscle Loss Location: Unable to assess    Fluid Accumulation: No significant    Fluid Accumulation Location: No significant     Strength: Not Performed;  Not Measured     NUTRITION DIAGNOSIS   Problem: Problem #1: Inadequate oral intake  Etiology: Impaired respiratory function-inability to consume food  Signs & Symptoms: NPO status due to medical condition    NUTRITION INTERVENTION  Food and/or Nutrient Delivery:Continue NPO  Nutrition education/counseling/coordination of care: Continue Inpatient Monitoring     NUTRITION MONITORING & EVALUATION:  Evaluation:Goals set   Goals:Goals: Patient will tolerate most appropriate ofrm of nutrition therapy when initiated  Monitoring: Nutrition Progression  or Pertinent Labs      OBJECTIVE DATA:  · Nutrition-Focused Physical Findings: Intubated, sedated, no BM recorded  · Wounds None      Past Medical History:   Diagnosis Date    Asthma     [facility] mellitus ([facility]     Hypertension     Parkinson disease ([facility]         ANTHROPOMETRICS  Current Height: 5' 8\" (172.7 cm)  Current Weight: 184 lb 11.9 oz (83.8 kg)    Admission weight: 184 lb (83.5 kg)  Ideal Bodyweight 154 lb (70 kg)   Usual Bodyweight 185-200# per EMR      BMI BMI (Calculated): 28.1    Wt Readings from Last 50 Encounters:   08/22/20 184 lb 11.9 oz (83.8 kg)   08/05/20 184 lb 8.4 oz (83.7 kg)   08/01/20 190 lb 14.7 oz (86.6 kg)   07/26/20 185 lb (83.9 kg)   07/15/20 190 lb (86.2 kg)   06/22/20 206 lb 12.7 oz (93.8 kg)   06/17/20 185 lb (83.9 kg)   05/27/20 207 lb (93.9 kg)   05/19/20 207 lb 10.8 oz (94.2 kg)   05/05/20 193 lb 5.5 oz (87.7 kg)   10/31/19 185 lb (83.9 kg)   01/19/18 185 lb (83.9 kg)   04/20/17 185 lb (83.9 kg)       COMPARATIVE STANDARDS  Estimated Total Kcals/Day : 22-25  Current Bodyweight (83 kg) 6369-4129 kcal    Estimated Total Protein (g/day) : 1.3-1.5 Current Bodyweight (83 kg) 107-124 g/day  Estimated Daily Total Fluid (ml/day): 6743-0844 mL per day     Food / Nutrition-Related History  Pre-Admission / Home Diet:      Home Supplements / Herbals:    none noted  Food Restrictions / Cultural Requests:    none noted    Diet Orders / Intake / Nutrition Support  Current diet/supplement order: Diet NPO Effective Now     NSG Recorded PO:   PO Fluids    PO Meals     PO Intake: NPO  PO Supplement: NPO   PO Supplement Intake: NPO  IVF: N/A ml/hr     Tube Feeding Goal: Glucerna 1.2 (Diabetic 1.2) @ 65 mL/hr plus one proteinex modular daily provides 1080 mL TV, 1976 kcal, 119 grams protein, 1255 mL free water. Maintenance water flush of 30 ml every 4 hours.      NUTRITION RISK LEVEL: Risk Level: High     Carrington Loredo, [address] [place] [address]  [place]quiles:  [phone]  Office:  [phone]

## 2020-08-22 NOTE — PROGRESS NOTES
Patient brought up from ED after CT scan. VSS at this time. ETT at 23 at the lip, respiratory therapist at bedside connecting patient to Ventilator. PIV x2, TLC in left IJ, Jeffrey to gravity. Bilateral wrist restraints in place for patient safety. CHG bath and skin assessment performed. Blood glucose still reading Hi on glucometer. Stat glucose walked to lab.

## 2020-08-22 NOTE — PROGRESS NOTES
Daughter Gissel Harrison called and expressed that she is the ONLY person who should receive any updates on the patient from here on. The social situation between the two emergency contacts seems to be complicated and both parties claim to have medical power of . RN requested that a copy of the medical power of  be brought to \[facility] [place] [facility]\"". Gissel Harrison is going to e-mail the paperwork and RN will place it on the chart.

## 2020-08-22 NOTE — PROGRESS NOTES
With turning and repositioning pt noted to follow commands, squeezing hands and wiggling toes. HR in 50's, Precedex gtt decreased, will cont to monitor.

## 2020-08-22 NOTE — CONSULTS
Nephrology Consult Note                                                                                                                                                                                                                                                                                                                                                               Office : [phone]     Fax :[phone]              Patient's Name: Lacey Kowalski  9:39 AM  8/22/2020    Reason for Consult: NABIL   Requesting Physician:  Carson MOYER      Chief Complaint:  *shock     History of Present Ilness:    Lacey Kowalski is a 72 y.o. male with a past medical history of hypertension, diabetes,Parkinson's, prior admission with DKA who presented to the hospital with AMS along with hypoxemia and respiratory distress requiring intubation. On admission he was significantly acidotic and found to be in DKA. He was a started on CRRT and remains intubated at this point in time. His admission EKG was concerning for ST depressions although his pH was 7 at that time. His EKG today shows normal sinus rhythm without ischemic changes. His troponins are currently up. No arrhythmias noted on telemetry. We are consulted for further assistance given NABIL     Past Medical History:   Diagnosis Date    Asthma     [facility] mellitus ([facility]     Hypertension     Parkinson disease ([facility]        Past Surgical History:   Procedure Laterality Date    UPPER GASTROINTESTINAL ENDOSCOPY N/A 5/15/2020    EGD CONTROL HEMORRHAGE performed by Brandon Mas MD at [facility] 5/15/2020    EGD BIOPSY performed by Brandon Mas MD at [facility]       No family history on file. reports that he has never smoked. He has never used smokeless tobacco. He reports that he does not drink alcohol or use drugs.     Allergies:  Erythromycin    Current Medications:    sodium chloride 0.9 % infusion,   sodium chloride 0.9 % infusion,   perflutren lipid microspheres (DEFINITY) injection 1.65 mg, ONCE PRN  norepinephrine (LEVOPHED) 16 mg in dextrose 5 % 250 mL infusion, Continuous  fentaNYL (SUBLIMAZE) 2,000 mcg in dextrose 5 % 200 mL, Continuous  meropenem (MERREM) 1 g in sodium chloride 0.9 % 100 mL IVPB (mini-bag), Q12H  azithromycin (ZITHROMAX) 250 mg in dextrose 5 % 250 mL IVPB, Q24H  carbidopa-levodopa (SINEMET)  MG per tablet 2 tablet, TID  dextrose 50 % IV solution, PRN  glucose (GLUTOSE) 40 % oral gel 15 g, PRN  dextrose 50 % IV solution, PRN  glucagon (rDNA) injection 1 mg, PRN  dextrose 5 % solution, PRN  insulin regular (HUMULIN R;NOVOLIN R) 100 Units in sodium chloride 0.9 % 100 mL infusion, Continuous  perflutren lipid microspheres (DEFINITY) injection 1.65 mg, ONCE PRN  heparin (porcine) injection 4,000 Units, PRN  heparin (porcine) injection 2,000 Units, PRN  heparin 25,000 units in dextrose 5% 250 mL infusion, Continuous  vancomycin (VANCOCIN) intermittent dosing (placeholder), See Admin Instructions  furosemide (LASIX) 100 mg in dextrose 5 % 100 mL infusion, Continuous  dexmedetomidine (PRECEDEX) 400 mcg in sodium chloride 0.9 % 100 mL infusion, Continuous  prismaSATE BGK 4/2.5 dialysis solution, Continuous  prismaSATE BGK 4/2.5 dialysis solution, Continuous  prismaSATE BGK 4/2.5 dialysis solution, Continuous  sodium phosphate 6 mmol in dextrose 5 % 100 mL IVPB, PRN    Or  sodium phosphate 12 mmol in dextrose 5 % 250 mL IVPB, PRN    Or  sodium phosphate 18 mmol in dextrose 5 % 250 mL IVPB, PRN    Or  sodium phosphate 24 mmol in dextrose 5 % 250 mL IVPB, PRN  calcium gluconate 1 g in dextrose 5 % 100 mL IVPB, PRN    Or  calcium gluconate 2 g in dextrose 5 % 100 mL IVPB, PRN    Or  calcium gluconate 3 g in dextrose 5 % 100 mL IVPB, PRN    Or  calcium gluconate 4 g in dextrose 5 % 250 mL IVPB, PRN  magnesium sulfate 1 g in dextrose 5% 100 mL IVPB, PRN  potassium chloride 20 mEq/50 mL IVPB (Central Line), PRN        Review of Systems:   AMS   Physical exam:     Vitals:  /61   Pulse 53   Temp 93.2 °F (34 °C) (Core)   Resp 25   Ht 5' 8\" (1.727 m)   Wt 184 lb 11.9 oz (83.8 kg)   SpO2 100%   BMI 28.09 kg/m²   Constitutional:  inruabted  Skin: no rash, turgor wnl  Heent:  eomi, mmm  Neck: no bruits or jvd noted  Cardiovascular:  S1, S2 without m/r/g  Respiratory: CTA B without w/r/r  Abdomen:  +bs, soft, nt, nd  Ext: *+ lower extremity edema  Psychiatric: mood and affect flat   Musculoskeletal:  Rom, muscular strength intact    Data:   Labs:  CBC:   Recent Labs     08/21/20 1007 08/22/20  0555   WBC 14.8* 14.3*   HGB 9.7* 9.3*    214     BMP:    Recent Labs     08/21/20 2132 08/22/20  0257 08/22/20  0555   * 137 135*   K 4.5 3.9 4.2   CL 97* 102 100   CO2 22 23 24   BUN 50* 50* 47*   CREATININE 2.3* 2.1* 1.7*   GLUCOSE 462* 73 149*     Ca/Mg/Phos:   Recent Labs     08/21/20 2132 08/22/20  0257 08/22/20  0555   CALCIUM 8.7 9.1 8.6   MG 2.60* 2.70* 2.50*   PHOS 3.8 3.7 3.2     Hepatic:   Recent Labs     08/21/20  1008   AST 20   ALT 22   BILITOT 1.2*   ALKPHOS 154*     Troponin:   Recent Labs     08/21/20  1440 08/21/20 2132 08/22/20  0257   TROPONINI 0.15* 1.82* 2.54*     BNP: No results for input(s): BNP in the last 72 hours. Lipids: No results for input(s): CHOL, TRIG, HDL, LDLCALC, LABVLDL in the last 72 hours. ABGs: No results for input(s): PHART, PO2ART, WWL1EOF in the last 72 hours.   INR:   Recent Labs     08/21/20  1007 08/21/20  1534   INR 1.03 1.01     UA:  Recent Labs     08/21/20  1229   COLORU Yellow   CLARITYU Clear   GLUCOSEU >=1000*   BILIRUBINUR Negative   KETUA 40*   SPECGRAV 1.010   BLOODU Negative   PHUR 5.5  5.5   PROTEINU Negative   UROBILINOGEN 0.2   NITRU Negative   LEUKOCYTESUR Negative   LABMICR Not Indicated   URINETYPE NotGiven      Urine Microscopic: No results for input(s): LABCAST, BACTERIA, COMU, HYALCAST, WBCUA, RBCUA, EPIU in the last 72 hours. Urine Culture: No results for input(s): LABURIN in the last 72 hours. Urine Chemistry: No results for input(s): Marissa Heman, PROTEINUR, NAUR in the last 72 hours. IMAGING:  XR CHEST PORTABLE   Final Result   1. There is been interval placement of a right internal jugular approach    central venous catheter with the tip near the confluence of the    brachiocephalic veins. No pneumothorax. The remaining support tubes and    lines in the chest are nonsignificantly altered in position. 2.  Previously described interstitial edema appears to questionably    slightly improved. XR ABDOMEN (KUB) (SINGLE AP VIEW)   Final Result     The nasogastric tube traverses the mediastinum and terminates in the    mid to distal body of the stomach. CT HEAD WO CONTRAST   Final Result      Evidence of diffuse chronic small vessel white matter disease. Subtle small round subcentimeter lesion suggested in the anterior left chin. This may be artifactual. Further evaluation with MR imaging of the brain without and with contrast is recommended for further evaluation. No other acute intracranial findings. No other acute intracranial findings. Negative noncontrast CT study of the head. XR CHEST PORTABLE   Final Result      ET tube and left IJ central venous catheter placed as described. No evidence of diffuse pulmonary vasculature congestion and interstitial edema, similar to the prior exam. No focal infiltrates seen. XR CHEST PORTABLE   Final Result   1. Cardiomegaly with vascular congestion and pulmonary interstitial edema. Assessment/Plan   1. NABIL     2. Shock - cardiogenic     3. Anemia    4. Acid- base/ Electrolyte imbalance     5. Lactic acidosis     6.  DKA     Plan   - CRRT   - need good perfusion   - Pressors   - Cards following   - Monitor lytes  - UF as love   - CVP 14   - d/w ICU team and RN   - prognosis guarded                 Thank you for allowing us to participate in care of [address] free to contact me   [facility] [address]  Office : [phone]  Fax :[phone]

## 2020-08-23 NOTE — PROGRESS NOTES
Clinical Pharmacy Consult Note    Admit date: 8/21/2020    Subjective/Objective:  72 yom with PMHx significant for Parkinson disease, HTN, DM, and asthma presented to ED with AMS and hyperglycemia. On presentation, notable labs include venous pH 7.068, O2 sat 79% on 2L NC, hyperkalemia, glucose of 822, and NABIL with SCr of 1.9. The decision was made to emergently intubate. DKA protocol was initiated. Troponin elevated as well, low dose heparin drip initiated. Of note, patient was admitted to [facility] for treatment of DKA 7/30-8/2 and 8/4-8/6. Last admission, patient was sent to a SNF, from which he was discharged 8/20 per family. Patient also has h/o NABIL for which he was treated with HD during admission in June. Baseline SCr ~1 mg/dL. Pt is being treated for acute hypoxic respiratory failure, possible PNA vs COVID-19 r/o, acute onset HF, ACS, NABIL and DKA. CRRT was started 8/21. Interval update:  CRRT stopped 8/22. Off pressors. Remains on furosemide drip, heparin drip, and insulin drip. Sedated and intubated. Cultures pending. Pharmacy is consulted to dose Vancomycin per Dr. Popeye Wong    Pertinent Medications:  Azithromycin -- day # 3   500 mg IV x1 (8/21)   250 mg IV q24h (to begin 8/22)  Meropenem 1g IV q12h -- day # 3  Vancomycin, pharmacy to dose -- day # 3   Intermittent doses based on levels (8/21-current)    Date Vancomycin Level Vancomycin Dose   8/21  1.5g   8/22 15.6mcg/mL 1.25g   8/23 18.0mcg/mL          Recent Labs     08/22/20  2209 08/23/20  0445   * 131*   K 4.5 4.4   CL 98* 97*   CO2 24 24   PHOS 2.8 2.6   BUN 32* 35*   CREATININE 1.3 1.4*       Estimated Creatinine Clearance: 53 mL/min (A) (based on SCr of 1.4 mg/dL (H)).   -     Recent Labs     08/22/20  0555 08/23/20  0445   WBC 14.3* 11.0   HGB 9.3* 8.9*   HCT 28.0* 26.9*   MCV 75.3* 76.3*    139       Height:  5' 8\" (172.7 cm)  Weight: 191 lb 12.8 oz (87 kg)    Micro:  8/21 COVID-19: sent  8/21 Blood x2: No growth to date  8/21 S pneumo (urine): sent  8/21 Legionella (urine): sent  8/21 MRSA PCR: canceled  8/21 Respiratory PCR:  sent  8/22 MRSA screening cx:  ordered    Prophylaxis:  DVT: heparin drip  SUP: not ordered    Assessment/Plan:  1. Possible Pneumonia:  Azithromycin + meropenem + vancomycin day #3  Meropenem -  · Est CrCl now > 50mL/min - will increase to 1g IV q8h. · Will monitor closely, and will adjust dose as appropriate per [facility] Renal Dose Adjustment Policy. Vancomycin - pharmacy to dose  · Pt with NABIL - now off CRRT. Will dose intermittently based on levels for now. · Random level this AM = 18.0mcg/mL. Will give 750mg IV x1 today, and will check random level in AM tomorrow. · Clinical condition will be monitored closely, and levels / doses will be ordered as appropriate.     Please call with questions--  Thanks--  Lashawn Escamilla, PharmD, [address]  [phone]   8/23/2020 9:31 AM

## 2020-08-23 NOTE — PROGRESS NOTES
Nephrology  Note                                                                                                                                                                                                                                                                                                                                                               Office : [phone]     Fax :[phone]              Patient's Name: Anupama Torres  5:40 PM  8/23/2020    Reason for Consult: NABIL   Requesting Physician:  Lopez MOYER      Chief Complaint:  *shock     History of Present Ilness:    Anupama Torres is a 72 y.o. male with a past medical history of hypertension, diabetes,Parkinson's, prior admission with DKA who presented to the hospital with AMS along with hypoxemia and respiratory distress requiring intubation. On admission he was significantly acidotic and found to be in DKA. He was a started on CRRT and remains intubated at this point in time. His admission EKG was concerning for ST depressions although his pH was 7 at that time. His EKG today shows normal sinus rhythm without ischemic changes. His troponins are currently up. No arrhythmias noted on telemetry. We are consulted for further assistance given NABIL         08/23/20    Good UO   Cr better   Off CRRT       Past Medical History:   Diagnosis Date    Asthma     [facility]     Hypertension     Parkinson disease ([facility]        Past Surgical History:   Procedure Laterality Date    UPPER GASTROINTESTINAL ENDOSCOPY N/A 5/15/2020    EGD CONTROL HEMORRHAGE performed by Willy Shook MD at [facility] 5/15/2020    EGD BIOPSY performed by Willy Shook MD at [facility] ENDOSCOPY       No family history on file. reports that he has never smoked. He has never used smokeless tobacco. He reports that he does not drink alcohol or use drugs.     Allergies: Erythromycin    Current Medications:    meropenem (MERREM) 1 g in sodium chloride 0.9 % 100 mL IVPB (mini-bag), Q8H  perflutren lipid microspheres (DEFINITY) injection 1.65 mg, ONCE PRN  insulin regular (HUMULIN R;NOVOLIN R) 100 Units in sodium chloride 0.9 % 100 mL infusion, Continuous  glucose (GLUTOSE) 40 % oral gel 15 g, PRN  dextrose 50 % IV solution, PRN  glucagon (rDNA) injection 1 mg, PRN  dextrose 5 % solution, PRN  famotidine (PEPCID) injection 10 mg, BID  heparin (porcine) injection 2,400 Units, PRN  norepinephrine (LEVOPHED) 16 mg in dextrose 5 % 250 mL infusion, Continuous  fentaNYL (SUBLIMAZE) 2,000 mcg in dextrose 5 % 200 mL, Continuous  azithromycin (ZITHROMAX) 250 mg in dextrose 5 % 250 mL IVPB, Q24H  carbidopa-levodopa (SINEMET)  MG per tablet 2 tablet, TID  dextrose 50 % IV solution, PRN  glucose (GLUTOSE) 40 % oral gel 15 g, PRN  dextrose 50 % IV solution, PRN  glucagon (rDNA) injection 1 mg, PRN  dextrose 5 % solution, PRN  perflutren lipid microspheres (DEFINITY) injection 1.65 mg, ONCE PRN  heparin (porcine) injection 4,000 Units, PRN  heparin (porcine) injection 2,000 Units, PRN  heparin 25,000 units in dextrose 5% 250 mL infusion, Continuous  vancomycin (VANCOCIN) intermittent dosing (placeholder), See Admin Instructions  furosemide (LASIX) 100 mg in dextrose 5 % 100 mL infusion, Continuous  dexmedetomidine (PRECEDEX) 400 mcg in sodium chloride 0.9 % 100 mL infusion, Continuous          Physical exam:     Vitals:  BP (!) 106/53   Pulse 62   Temp 100.4 °F (38 °C) (Oral)   Resp 20   Ht 5' 8\" (1.727 m)   Wt 191 lb 12.8 oz (87 kg)   SpO2 100%   BMI 29.16 kg/m²     Skin: no rash, turgor wnl  Heent:  eomi, mmm  Neck: no bruits or jvd noted  Cardiovascular:  S1, S2 without m/r/g  Respiratory: CTA B without w/r/r  Abdomen:  +bs, soft, nt, nd  Ext: *+ lower extremity edema  Psychiatric: mood and affect flat   Musculoskeletal:  Rom, muscular strength intact    Data:   Labs:  CBC:   Recent Labs     08/21/20  1007 08/22/20  0555 08/23/20  0445   WBC 14.8* 14.3* 11.0   HGB 9.7* 9.3* 8.9*    214 139     BMP:    Recent Labs     08/22/20  0957 08/22/20 2209 08/23/20  0445   * 131* 131*   K 4.6 4.5 4.4   CL 99 98* 97*   CO2 24 24 24   BUN 36* 32* 35*   CREATININE 1.4* 1.3 1.4*   GLUCOSE 252* 144* 207*     Ca/Mg/Phos:   Recent Labs     08/22/20  0257 08/22/20  0555 08/22/20  0957 08/22/20 2209 08/23/20  0445   CALCIUM 9.1 8.6 8.3 8.3 8.2*   MG 2.70* 2.50*  --  2.20  --    PHOS 3.7 3.2  --  2.8 2.6     Hepatic:   Recent Labs     08/22/20  0957 08/22/20 2209 08/23/20  0445   AST 73* 53* 44*   ALT <5* <5* <5*   BILITOT 0.6 0.6 0.5   ALKPHOS 140* 127 128     Troponin:   Recent Labs     08/22/20  1531 08/22/20 2209 08/23/20 0446   TROPONINI 1.05* 1.11* 1.10*     BNP: No results for input(s): BNP in the last 72 hours. Lipids: No results for input(s): CHOL, TRIG, HDL, LDLCALC, LABVLDL in the last 72 hours. ABGs:   Recent Labs     08/23/20  1329   PHART 7.419   PO2ART 92.9   QMB9BKG 42.8     INR:   Recent Labs     08/21/20  1007 08/21/20  1534   INR 1.03 1.01     UA:  Recent Labs     08/21/20  1229   COLORU Yellow   CLARITYU Clear   GLUCOSEU >=1000*   BILIRUBINUR Negative   KETUA 40*   SPECGRAV 1.010   BLOODU Negative   PHUR 5.5  5.5   PROTEINU Negative   UROBILINOGEN 0.2   NITRU Negative   LEUKOCYTESUR Negative   LABMICR Not Indicated   URINETYPE NotGiven      Urine Microscopic: No results for input(s): LABCAST, BACTERIA, COMU, HYALCAST, WBCUA, RBCUA, EPIU in the last 72 hours. Urine Culture: No results for input(s): LABURIN in the last 72 hours. Urine Chemistry: No results for input(s): Samantha Garduno, PROTEINUR, NAUR in the last 72 hours. IMAGING:  CT HEAD WO CONTRAST   Final Result      1. No acute intracranial process. 2.  Moderate cerebral atrophy and mild chronic small vessel ischemic disease. XR CHEST PORTABLE   Final Result   1.   There is been interval placement of a right internal jugular approach    central venous catheter with the tip near the confluence of the    brachiocephalic veins. No pneumothorax. The remaining support tubes and    lines in the chest are nonsignificantly altered in position. 2.  Previously described interstitial edema appears to questionably    slightly improved. XR ABDOMEN (KUB) (SINGLE AP VIEW)   Final Result     The nasogastric tube traverses the mediastinum and terminates in the    mid to distal body of the stomach. CT HEAD WO CONTRAST   Final Result      Evidence of diffuse chronic small vessel white matter disease. Subtle small round subcentimeter lesion suggested in the anterior left chin. This may be artifactual. Further evaluation with MR imaging of the brain without and with contrast is recommended for further evaluation. No other acute intracranial findings. No other acute intracranial findings. Negative noncontrast CT study of the head. XR CHEST PORTABLE   Final Result      ET tube and left IJ central venous catheter placed as described. No evidence of diffuse pulmonary vasculature congestion and interstitial edema, similar to the prior exam. No focal infiltrates seen. XR CHEST PORTABLE   Final Result   1. Cardiomegaly with vascular congestion and pulmonary interstitial edema. Assessment/Plan   1. NABIL     2. Shock - cardiogenic     3. Anemia    4. Acid- base/ Electrolyte imbalance     5. Lactic acidosis     6.  DKA     Plan   - Cr better  - off CRRT   - lasix gtt   - need good perfusion   - ECHO reviewed - EF 40 percent   - Cards following   - Monitor lytes  - prognosis guarded                 Thank you for allowing us to participate in care of [address] free to contact me   [facility] [address]  Office : [phone]  Fax :[phone]

## 2020-08-23 NOTE — PROGRESS NOTES
PARoxetine (PAXIL) 40 MG tablet Take 40 mg by mouth every morning      primidone (MYSOLINE) 50 MG tablet Take 50 mg by mouth 2 times daily      Insulin Pen Needle ([employer] PEN NEEDLES 29G) 29G X 12MM MISC 1 each by Does not apply route daily 100 each 3    rOPINIRole (REQUIP) 3 MG tablet Take 3 mg by mouth 3 times daily      atorvastatin (LIPITOR) 10 MG tablet Take 10 mg by mouth nightly       albuterol (PROVENTIL HFA;VENTOLIN HFA) 108 (90 BASE) MCG/ACT inhaler Inhale 2 puffs into the lungs every 6 hours as needed. Allergies:  Erythromycin    SocialHistory:   TOBACCO:   reports that he has never smoked. He has never used smokeless tobacco.     ETOH:   reports no history of alcohol use. Patient currently lives alone    Family History:   No family history on file. ROS:   Review of Systems   Constitutional: Negative for chills, fatigue and fever. HENT: Negative for congestion, sinus pressure and sore throat. Eyes: Negative for photophobia and visual disturbance. Respiratory: Negative for cough, chest tightness, shortness of breath and wheezing. Cardiovascular: Negative for chest pain, palpitations and leg swelling. Gastrointestinal: Positive for nausea and vomiting. Negative for abdominal distention, abdominal pain, constipation and diarrhea. Genitourinary: Negative for dysuria, frequency and urgency. Skin: Negative for color change and rash. Neurological: Positive for tremors (resting). Negative for dizziness, syncope, weakness and headaches. All other systems reviewed and are negative.     PHYSICAL EXAM:  BP (!) 106/58   Pulse 64   Temp 100 °F (37.8 °C) (Bladder)   Resp 20   Ht 5' 8\" (1.727 m)   Wt 191 lb 12.8 oz (87 kg)   SpO2 100%   BMI 29.16 kg/m²   Recent Labs     08/23/20  0247 08/23/20  0401 08/23/20  0544 08/23/20  0636 08/23/20  0730   POCGLU 235* 211* 174* 133* 128*     Physical Exam  Constitutional:       Comments: Patient is intubated and sedated   HENT: Head: Normocephalic. Eyes:      Extraocular Movements: Extraocular movements intact. Pupils: Pupils are equal, round, and reactive to light. Cardiovascular:      Rate and Rhythm: Normal rate and regular rhythm. Pulses: Normal pulses. Heart sounds: Normal heart sounds. No murmur. No friction rub. No gallop. Pulmonary:      Breath sounds: Normal breath sounds. No wheezing, rhonchi or rales. Abdominal:      Palpations: Abdomen is soft. Tenderness: There is no abdominal tenderness. There is no guarding or rebound. Skin:     General: Skin is warm and dry. LABS:  Recent Labs     08/21/20  1007 08/22/20  0555 08/23/20  0445   WBC 14.8* 14.3* 11.0   HGB 9.7* 9.3* 8.9*   HCT 32.0* 28.0* 26.9*    214 139                                                                  Recent Labs     08/22/20  0957 08/22/20  2209 08/23/20  0445   * 131* 131*   K 4.6 4.5 4.4   CL 99 98* 97*   CO2 24 24 24   BUN 36* 32* 35*   CREATININE 1.4* 1.3 1.4*   GLUCOSE 252* 144* 207*     Recent Labs     08/22/20  0957 08/22/20  2209 08/23/20  0445   AST 73* 53* 44*   ALT <5* <5* <5*   BILITOT 0.6 0.6 0.5   ALKPHOS 140* 127 128     Recent Labs     08/22/20  1531 08/22/20  2209 08/23/20  0446   TROPONINI 1.05* 1.11* 1.10*     No results for input(s): BNP in the last 72 hours. Lab Results   Component Value Date    PHART 7.374 08/23/2020    YRJ0WLT 43.6 08/23/2020    PO2ART 75.2 08/23/2020     Recent Labs     08/21/20  1007 08/21/20  1534   INR 1.03 1.01     Recent Labs     08/21/20  1229   COLORU Yellow   PHUR 5.5  5.5   CLARITYU Clear   SPECGRAV 1.010   LEUKOCYTESUR Negative   UROBILINOGEN 0.2   BILIRUBINUR Negative   BLOODU Negative   GLUCOSEU >=1000*      Recent Labs     08/21/20  1007 08/21/20  1134   LACTA 9.8* 6.6*       IMAGING:  XR CHEST PORTABLE   Final Result   1.   There is been interval placement of a right internal jugular approach    central venous catheter with the tip near the confluence of the    brachiocephalic veins. No pneumothorax. The remaining support tubes and    lines in the chest are nonsignificantly altered in position. 2.  Previously described interstitial edema appears to questionably    slightly improved. XR ABDOMEN (KUB) (SINGLE AP VIEW)   Final Result     The nasogastric tube traverses the mediastinum and terminates in the    mid to distal body of the stomach. CT HEAD WO CONTRAST   Final Result      Evidence of diffuse chronic small vessel white matter disease. Subtle small round subcentimeter lesion suggested in the anterior left chin. This may be artifactual. Further evaluation with MR imaging of the brain without and with contrast is recommended for further evaluation. No other acute intracranial findings. No other acute intracranial findings. Negative noncontrast CT study of the head. XR CHEST PORTABLE   Final Result      ET tube and left IJ central venous catheter placed as described. No evidence of diffuse pulmonary vasculature congestion and interstitial edema, similar to the prior exam. No focal infiltrates seen. XR CHEST PORTABLE   Final Result   1. Cardiomegaly with vascular congestion and pulmonary interstitial edema. Assessment & Plan:   Rohit Aj is a 72 y.o. male with PMH of T1DM, Parkinson's disease, asthma, and HTN who was admitted with DKA. Pulmonary:  Acute respiratory failure 2/2 to flash pulmonary edema and new acute onset heart failure  -SBT , SAT passed today , possible extubation  - bedside echo in ICU revealed evidence of systolic dysfunction. - off levo gtt , VSS  -Trans thoracic Echo 08/22/2020:    Left ventricular cavity size is normal. There is mild left ventricular  hypertrophy. Overall left ventricular systolic function appears low normal  with an ejection fraction of 45- 50%. No regional wall motion abnormalities  are noted.  Indeterminate diastolic function.  Trivial mitral and tricuspid regurgitation.    Estimated pulmonary artery systolic pressure is at 32 mmHg assuming a right    atrial pressure of 8 mmHg.  The right ventricle appears at the upper limits of normal, low normal    systolic function. Possible Pneumonia  - AMS in setting of leukocytosis and acute onset respiratory failure  - Infectious workup pending  - Blood cultures: no growth till date  - urine, respiratory cultures, Resp viral panel, Procal, MRSA, legionella, Strep Pneumo: Pending  - continue IV Vanc, Merrem, Azithromycin      CV:  - Cardiogenic shock in setting of Acute onset heart failure:  - as above  - initial EKG showed ST changes , heparin gtt started for possible ACS  - Cardiology following  - Heparin gtt, aspirin, statin for ACS  - will consider left cath once stable     HTN:  - Continue home nifedipine; holding losartan as above     Endocrine:  Diabetic Ketoacidosis:   likely 2/2 to noncompliance with insulin + poor dietary habits  - Received 2L LR in ED  - NS IV fluids 250ml/hr, switch to D5 0.5 NS 150ml/hr at glucose <250 per DKA protocol  - Renal function panel, Magnesium Q4H, replete lytes as needed  - NPO  - Hypoglycemia protocol  - Glucose Q1h  - insulin gtt  - Social work consulted for repeated admissions with DKA and hypoglycemia; Patient needs more support    Renal:  NABIL: likely prerenal in setting of DKA;   Cr 1.4 today (baseline 0.9)  - IVF  - Holding home losartan   - Strict I/Os  - off CRRT  -continue lasix gtt    Neuro  Parkinson's Disease:  - Continue carbidopa/levidopa, primidone    GI  -start tube feeds if extubated    Code Status: Full  F/E/N:  NPO  GI / DVT Prophylaxis: Heparin drip  Disposition: ICU      Rhea Haines MD  Internal Medicine Resident, PGY-1  8/23/2020  7:59 AM       Rhea Haines MD PGY-1    Patient seen, examined and discussed with the resident and I agree with the assessment and plan.     Fentanyl and Precedex to sedate him on the ventilator but has made good progress with diuresis following his initial need for vasopressors and CRRT. I placed him on a spontaneous breathing trial today and he did well despite having developed a new low-grade fever. Plan was to extubate patient however after he appeared to be awake and ready for extubation he became unresponsive despite having sedation turned off. He could not be aroused even with painful stimuli. A stat head CT was performed that did not show any acute abnormalities. After approximately an hour having returned from the CT scanner patient was more alert. As it was late in the day and he is not a robust character to begin with I opted to resume some sedation and keep him on the ventilator overnight and continue to optimize his fluid status with diuresis. It is unclear to me what this abrupt change in mentation was. He is currently on Vanco meropenem and azithromycin day 3. If he remains arousable in the morning then extubation early in the day would be preferable. Critical care time spent reviewing labs/films, examining patient, collaborating with other physicians but excluding procedures for life threatening organ failure is 43 minutes.       Fernie Toure MD

## 2020-08-23 NOTE — PROGRESS NOTES
MECHANICAL VENTILATION WEANING PROTOCOL    PRE-TRIAL PATIENT ASSESSMENT - COMPLETED AT 1208    Ventilatory Assessment:    PARAMETER CRITERIA FOR WEANING   Spontaneous Cough:  Yes    Sputum Characteristics:  · Sputum Amount: Small  · Tenacity: Thick  · Sputum Color: White SPONTANEOUS COUGH With small to moderate  Amount of secretions   FiO2 : 30 % FIO2 less than or equal to 50%     PEEP less than or equal to 8   Progressive Mobility Protocol  Yes     ABG:  Lab Results   Component Value Date    PHART 7.374 08/23/2020    MYS6RKE 43.6 08/23/2020    PO2ART 75.2 08/23/2020    S3XKAHBG 95 08/23/2020    BGW7BDB 25.5 08/23/2020    BEART 0 08/23/2020     HGB/WBC:  Lab Results   Component Value Date    HGB 8.9 08/23/2020    WBC 11.0 08/23/2020        Vital Signs:    PARAMETER CRITERIA FOR WEANING Meets Criteria   Pulse: 66 Within patient's normal limits / stable Yes   Resp: 17 Less than or equal to 30 Yes   BP: (!) 106/58 Within patient's normal limits / minimal pressors (Hemodynamically Stable) Yes   SpO2: 98 % Greater than or equal to 90% Yes   End Tidal CO2: 38 (%) Within patient's normal limits Yes   Temp: 97.8 °F (36.6 °C) Less than 38. 5oC / 101. 3oF Yes     [x]    Based on this assessment and the [facility] Ventilator Weaning Protocol, this patient  IS being placed on a Spontaneous Breathing Trial (SBT) at this time.  []    Based on this assessment and the [facility] Ventilator Weaning Protocol, this patient  IS NOT being placed on a Spontaneous Breathing Trial (SBT) at this time. []    Patient  IS NOT being placed on a Spontaneous Breathing Trial (SBT) at this time because of factors not previously addressed.   Those factors include     Vital Capacity (VC) = N/A    Maximum Inspiratory Force (MIF) = -15    SBT - Initiated at  1209    Ventilator Settings:  CPAP - 5 cmH2O, PS - 8 cmH2O(if using settings other than CPAP 5/PS 8, please explain reasons for settings here):       1 Minute SBT Respiratory Parameters:   VE: 7.4 L   RR: 16 b/m   VT: 463 mL (average VT = VE/RR)   RSBI: 35 (RR/VT in liters)   ETCO2: 38 cmH2O   SPO2: 97 %   If on sedation, amount and type Precedex 1.4, Fentanyl 175    [x]   RR is less than 35, RSBI is less than 100, patient's vitals signs are stable, and patient is in no apparent distress; therefore, patient is being left on SBT for up to 1 hour. []   RR is greater than 35, RSBI is greater than 100, VS's are unstable, or patient is in distress; therefore, patient is being placed back on previous settings. SBT - Concluded at  1420. Weaning Parameters/VS's at conclusion of SBT:   VE: 8 L   RR: 16 b/m   VT: 500 mL (average VT = VE/RR)   RSBI: 32 (RR/VT in liters)   ETCO2: 35 cmH2O   SPO2: 100 %    If on sedation, amount and type Precedex 1.4, Fentanyl 150    [x]   Patient tolerated SBT for full 60 minutes with acceptable weaning parameters and vital signs and showed no signs or distress. []   Patient tolerated SBT for full 60 minutes, but had unacceptable weaning parameters or vital signs, and/or signs of distress. []   Patient was unable to tolerate SBT for 60 minutes and was placed back on previous settings.             COMMENTS:

## 2020-08-23 NOTE — PROGRESS NOTES
67-year-old gentleman with history of Parkinson's disease, diabetes mellitus was admitted yesterday for hypoxia and respiratory distress found to be in diabetic ketoacidosis with septic shock. Patient is COVID-19 rule out as per hospital policy in order to conserve PPE ICU team will be primary. We will resume care once patient is out of ICU or COVID-19 is ruled out. Overnight event reviewed. Off of CRRT. Ischemic work-up planned per cardiology.   Discussed with ICU team.    This chart was likely completed using voice recognition technology and may contain unintended grammatical , phraseology,and/or punctuation errors    Shai Freedman MD  Hospitalist

## 2020-08-23 NOTE — PROGRESS NOTES
Returned from [employer]. Pt still unresponsive but spontaneously eye opening was noted. Pt not following any commands. Extubation order discontinued. Residents and pulmonologist will reevaluate extubation tomorrow.

## 2020-08-23 NOTE — PROGRESS NOTES
Pt ventilator alarming. Nurse came to the room to find ETT beside his neck still strapped on the duarte. OG was also found to be removed by patient. Pt placed on 6L NC. RT and residents called to the room. Pt calling staff \"crazy bitches\". Pt saturating 92% on 6L NC. Switched to high flow NC at 6L per RT. Residents examined pt. Decision not to reintubate. Monitoring Pt closely. Order received to give ativan 1mg IV for pt agitation. Patient remains in restraints at this time for safety.

## 2020-08-23 NOTE — PROGRESS NOTES
Pt starting to move extremities. Eyes are open. Precedex restarted at half of previous dose for comfort. Bilateral wist restraints remain in place for safety. Bed alarm on. But pt still pending covid test so room door closed.

## 2020-08-23 NOTE — PROGRESS NOTES
Orders to extubate. Pt taken off fentanyl at 1430. Pt was assessed by RT before extubation and no longer has cough or gag. Pt is also not responding to painful stimulus. Precedex was stopped. Physicians notified. BS checked and was 73. Insulin was stopped. Dr. Kendell Larios at bedside. CT being ordered.

## 2020-08-23 NOTE — PROGRESS NOTES
[facility]  Cardiology Inpatient Consult Service  Daily Progress Note        Admit Date:  8/21/2020    Referring Physician: Keely Kessler MD    Reason for Consultation/Chief Complaint:     Acute hypoxic respiratory failure/elevated BNP/elevated troponins/DKA/hypertension. Subjective: Interval history:  Off CRRT. Off Levophed. Vital signs are stable. Making urine. Medications:   vancomycin  750 mg Intravenous Once    meropenem  1 g Intravenous Q8H    famotidine (PEPCID) injection  10 mg Intravenous BID    azithromycin  250 mg Intravenous Q24H    carbidopa-levodopa  2 tablet Oral TID    vancomycin (VANCOCIN) intermittent dosing (placeholder)   Other See Admin Instructions       IV drips:   insulin Stopped (08/23/20 0731)    dextrose      norepinephrine Stopped (08/23/20 0046)    fentaNYL 2000 mcg/200 mL IV infusion 200 mcg/hr (08/23/20 0015)    dextrose      heparin (porcine) 11 Units/kg/hr (08/23/20 0131)    furosemide (LASIX) 1mg/ml infusion 5 mg/hr (08/22/20 2311)    dexmedetomidine (PRECEDEX) IV infusion 1.4 mcg/kg/hr (08/23/20 0236)       PRN:  perflutren lipid microspheres, glucose, dextrose, glucagon (rDNA), dextrose, heparin (porcine), dextrose, glucose, dextrose, glucagon (rDNA), dextrose, perflutren lipid microspheres, heparin (porcine), heparin (porcine)      Objective:     Vitals:    08/23/20 0800 08/23/20 0900 08/23/20 0918 08/23/20 1000   BP:       Pulse: 68 85 67 67   Resp: 20 25 20 20   Temp: 97.8 °F (36.6 °C)      TempSrc: Bladder      SpO2: 99% 95% 97% 100%   Weight:       Height:           Intake/Output Summary (Last 24 hours) at 8/23/2020 1026  Last data filed at 8/23/2020 0733  Gross per 24 hour   Intake 1908 ml   Output 3054 ml   Net -1146 ml     I/O last 3 completed shifts:   In: 2140 [I.V.:2070; NG/GT:70]  Out: 6121 [Urine:2688]  Wt Readings from Last 3 Encounters:   08/23/20 191 lb 12.8 oz (87 kg)   08/05/20 184 lb 8.4 oz (83.7 kg) Lasix drip  -Off CRRT at this time.  -We will plan for ischemic work-up next week when renal is okay with contrast administration.  -Continue aspirin and statin. -Vent management by primary team.  -Telemetry and EKG stable. Thank you for allowing to us to participate in the care of Damion Mahan. Please call our service with questions. All questions and concerns were addressed to the patient/family. Alternatives to my treatment were discussed. The note was completed using EMR. Every effort was made to ensure accuracy; however, inadvertent computerized transcription errors may be present. I have personally reviewed the reports and images of labs, radiological studies, cardiac studies including ECG's and telemetry, current and old medical records. Michael Ulloa MD, [facility]    8/23/2020 10:26 AM

## 2020-08-23 NOTE — PLAN OF CARE
Problem: Serum Glucose Level - Abnormal:  Goal: Ability to maintain appropriate glucose levels will improve  Description: Ability to maintain appropriate glucose levels will improve  Outcome: Ongoing

## 2020-08-23 NOTE — PROGRESS NOTES
Report received from night shift nurse. Pt still intubated and sedated. Unable to follow commands. Will make eye contact spontaneously. Vitals stable. Will continue to monitor.

## 2020-08-23 NOTE — PLAN OF CARE
Problem: Falls - Risk of:  Goal: Will remain free from falls  Description: Will remain free from falls  Outcome: Ongoing  Goal: Absence of physical injury  Description: Absence of physical injury  Outcome: Ongoing     Problem: Restraint Use - Nonviolent/Non-Self-Destructive Behavior:  Goal: Absence of restraint indications  Description: Absence of restraint indications  Outcome: Ongoing  Goal: Absence of restraint-related injury  Description: Absence of restraint-related injury  Outcome: Ongoing     Problem: Nutrition  Goal: Optimal nutrition therapy  Outcome: Ongoing     Problem: Skin Integrity:  Goal: Will show no infection signs and symptoms  Description: Will show no infection signs and symptoms  Outcome: Ongoing  Goal: Absence of new skin breakdown  Description: Absence of new skin breakdown  Outcome: Ongoing     Problem: Serum Glucose Level - Abnormal:  Goal: Ability to maintain appropriate glucose levels will improve  Description: Ability to maintain appropriate glucose levels will improve  Outcome: Ongoing

## 2020-08-24 NOTE — PROGRESS NOTES
Patient is COVID-19 rule out as per hospital policy in order to conserve PPE ICU team will be primary. We will resume care once patient is out of ICU or COVID-19 is ruled out. Overnight event reviewed. Off of CRRT. Self extubated yesterday. Ischemic work-up planned per cardiology.     This chart was likely completed using voice recognition technology and may contain unintended grammatical , phraseology,and/or punctuation errors

## 2020-08-24 NOTE — CONSULTS
NSTEMI. Patient was self-extubated ovenight and is now on 5 liters oxygen with adequate sats. Cr improving at 1.3 and LFT's improving. VBG sats 72% today. Histories     Past Medical History:   has a past medical history of Asthma, Diabetes mellitus ([facility] [address] Hypertension, and Parkinson disease ([facility] [address] Surgical History:   has a past surgical history that includes Upper gastrointestinal endoscopy (N/A, 5/15/2020) and Upper gastrointestinal endoscopy (N/A, 5/15/2020). Social History:   reports that he has never smoked. He has never used smokeless tobacco. He reports that he does not drink alcohol or use drugs. Family History:  No evidence for sudden cardiac death or premature CAD      Medications:       Home Medications  Were reviewed and are listed in nursing record. and/or listed below  Prior to Admission medications    Medication Sig Start Date End Date Taking?  Authorizing Provider   insulin aspart (NOVOLOG FLEXPEN) 100 UNIT/ML injection pen Inject 10 Units into the skin 3 times daily (before meals) 8/2/20   Nikole Wilson MD   insulin glargine (LANTUS;BASAGLAR) 100 UNIT/ML injection pen Inject 45 Units into the skin nightly 8/2/20   Nikole Wilson MD   vitamin D3 (CHOLECALCIFEROL) 10 MCG (400 UNIT) TABS tablet Take 400 Units by mouth daily    Historical Provider, MD   Cinnamon 500 MG CAPS Take 1 capsule by mouth daily    Historical Provider, MD   fluticasone (FLONASE) 50 MCG/ACT nasal spray 2 sprays by Nasal route daily 7/6/20   Historical Provider, MD   carbidopa-levodopa (SINEMET)  MG per tablet Take 2 tablets by mouth 3 times daily 4/22/20   Historical Provider, MD   losartan (COZAAR) 100 MG tablet Take 100 mg by mouth daily 12/4/18   Historical Provider, MD   NIFEdipine (PROCARDIA XL) 60 MG extended release tablet Take 60 mg by mouth nightly 5/4/20   Historical Provider, MD   pantoprazole (PROTONIX) 40 MG tablet Take 40 mg by mouth 2 times daily    Historical Provider, MD   PARoxetine (PAXIL) 40 MG tablet Take 40 mg by mouth every morning 4/16/20   Historical Provider, MD   primidone (MYSOLINE) 50 MG tablet Take 50 mg by mouth 2 times daily 7/16/19   Historical Provider, MD   Insulin Pen Needle ([employer] PEN NEEDLES 29G) 29G X 12MM MISC 1 each by Does not apply route daily 7/28/20   Antonio Scott MD   rOPINIRole (REQUIP) 3 MG tablet Take 3 mg by mouth 3 times daily    Historical Provider, MD   atorvastatin (LIPITOR) 10 MG tablet Take 10 mg by mouth nightly     Historical Provider, MD   albuterol (PROVENTIL HFA;VENTOLIN HFA) 108 (90 BASE) MCG/ACT inhaler Inhale 2 puffs into the lungs every 6 hours as needed. Historical Provider, MD          Inpatient Medications:   insulin glargine  30 Units Subcutaneous Nightly    insulin lispro  0-12 Units Subcutaneous 4 times per day    famotidine (PEPCID) injection  10 mg Intravenous BID    carbidopa-levodopa  2 tablet Oral TID       IV drips:   insulin Stopped (08/24/20 0531)    dextrose      norepinephrine Stopped (08/23/20 0046)    heparin (porcine) 11 Units/kg/hr (08/24/20 0542)    furosemide (LASIX) 1mg/ml infusion 5 mg/hr (08/24/20 0542)    dexmedetomidine (PRECEDEX) IV infusion 0.8 mcg/kg/hr (08/24/20 1411)       PRN:  perflutren lipid microspheres, glucose, dextrose, glucagon (rDNA), dextrose, heparin (porcine), perflutren lipid microspheres, heparin (porcine), heparin (porcine)    Allergy:     Erythromycin       Review of Systems:     All 12 point review of symptoms completed. Pertinent positives identified in the HPI, all other review of symptoms negative as below. CONSTITUTIONAL: No fatigue  SKIN: No rash or pruritis. EYES: No visual changes or diplopia. No scleral icterus. ENT: No Headaches, hearing loss or vertigo. No mouth sores or sore throat. CARDIOVASCULAR: No chest pain/chest pressure/chest discomfort. No palpitations. No edema. RESPIRATORY: + dyspnea. No cough or wheezing, no sputum production. GASTROINTESTINAL: No N/V/D.  No abdominal pain, appetite loss, blood in stools. GENITOURINARY: No dysuria, trouble voiding, or hematuria. MUSCULOSKELETAL:  No gait disturbance, weakness or joint complaints. NEUROLOGICAL: No headache, diplopia, change in muscle strength, numbness or tingling. No change in gait, balance, coordination, mood, affect, memory, mentation, behavior. ENDOCRINE: No excessive thirst, fluid intake, or urination. No tremor. HEMATOLOGIC: No abnormal bruising or bleeding. ALLERGY: No nasal congestion or hives. Physical Examination:     Vitals:    08/24/20 1100 08/24/20 1200 08/24/20 1300 08/24/20 1400   BP: (!) 104/52 119/63 (!) 92/55 (!) 116/53   Pulse: 72 76 61 64   Resp: 14 19 (!) 7 9   Temp:  99.3 °F (37.4 °C)     TempSrc:  Bladder     SpO2: 96% 96%     Weight:       Height:           Wt Readings from Last 3 Encounters:   08/24/20 183 lb 13.8 oz (83.4 kg)   08/05/20 184 lb 8.4 oz (83.7 kg)   08/01/20 190 lb 14.7 oz (86.6 kg)         General Appearance:  Alert, cooperative, no distress, appears stated age Appropriate weight   Head:  Normocephalic, without obvious abnormality, atraumatic   Eyes:  PERRL, conjunctiva/corneas clear EOM intact  Ears normal   Throat no lesions       Nose: Nares normal, no drainage or sinus tenderness   Throat: Lips, mucosa, and tongue normal   Neck: Supple, symmetrical, trachea midline, no adenopathy, thyroid: not enlarged, symmetric, no tenderness/mass/nodules, no carotid bruit. Lungs:   Respirations unlabored, coarse breath sounds bilaterally. Chest Wall:  No tenderness or deformity   Heart:  Regular rhythm, rate is controlled, S1, S2 normal, there is no murmur, there is no rub or gallop, no jvd, no bilateral lower extremity edema   Abdomen:   Soft, non-tender, bowel sounds active all four quadrants,  no masses, no organomegaly       Extremities: Extremities normal, atraumatic, no cyanosis.     Pulses: 2+ and symmetric   Skin: Skin color, texture, turgor normal, no rashes or lesions   Pysch: Normal mood and affect   Neurologic: Normal gross motor and sensory exam.  Cranial nerves intact        Labs:     Recent Labs     08/21/20  2132 08/22/20  0257 08/22/20  0555 08/22/20  0957 08/22/20 2209 08/23/20  0445 08/23/20  1734 08/24/20  0538   * 137 135* 133* 131* 131* 131* 135*   K 4.5 3.9 4.2 4.6 4.5 4.4 3.9 3.5   BUN 50* 50* 47* 36* 32* 35* 34* 33*   CREATININE 2.3* 2.1* 1.7* 1.4* 1.3 1.4* 1.3 1.3   CL 97* 102 100 99 98* 97* 93* 96*   CO2 22 23 24 24 24 24 24 28   GLUCOSE 462* 73 149* 252* 144* 207* 113* 151*   CALCIUM 8.7 9.1 8.6 8.3 8.3 8.2* 8.3 7.8*   MG 2.60* 2.70* 2.50*  --  2.20  --   --  1.80     Recent Labs     08/22/20  0555 08/23/20  0445 08/24/20  0538   WBC 14.3* 11.0 8.6   HGB 9.3* 8.9* 8.2*   HCT 28.0* 26.9* 24.3*    139 132*   MCV 75.3* 76.3* 75.7*     No results for input(s): CHOLTOT, TRIG, HDL in the last 72 hours. Invalid input(s): LIPIDCOMM, CHOLHDL, VLDCHOL, LDL  No results for input(s): PTT, INR in the last 72 hours. Invalid input(s): PT  Recent Labs     08/22/20  0257 08/22/20  0957 08/22/20  1531 08/22/20 2209 08/23/20  0446   TROPONINI 2.54* 1.03* 1.05* 1.11* 1.10*     No results for input(s): BNP in the last 72 hours. No results for input(s): TSH in the last 72 hours. No results for input(s): CHOL, HDL, LDLCALC, TRIG in the last 72 hours.]    Lab Results   Component Value Date    CKTOTAL 204 08/21/2020    TROPONINI 1.10 ([place]) 08/23/2020         Imaging:     Telemetry:  NSR      Assessment / Plan:     1. Septic shock:  Patient presented in septic shock. VBG sats 66% on Norepi 5 gtt, no inotropes, calculated cardiac index CI 3.3 lt/min/m2, CO 6.6 lt/min. MAP of 69 and CVP 13, hence SVR 8.5 WILSON = 678 dynes.      Parameters were consistent with septic shock.  Severe acidosis on admission (pH of 7.0) has certainly led to myocardial dysfunction and acute heart failure, with some pulmonary edema  but not necessarily cardiogenic shock (SVR was low, CI normal). -C/w IV antibiotics per primary team.   - COVID pending. 2. Acute HFrEF:   Per #1.     -Agree with lasix 5 mg/hr  -Will repeat limited echo later as outpatient to reassess LVEF. -no need for ischemic evaluation at this time. 3. Elevated troponin:   I doubt ACS. It was most likely due to demand ischemia from septic shock, hypoxia, NABIL, severe acidosis. ECG though borderline abnormal on admission (during severe acidosis), became normal within 4 hours. Peak troponin 2.5 on 8/21.    -We will stop heparin drip now  -We will start aspirin  - No need for ischemic evaluation at this time. We will reassess stress test at a later time (as outpatient) if LVEF remains depressed. - BB later  - Start statin. I have personally reviewed the reports and images of labs, radiological studies, cardiac studies including ECG's and telemetry, current and old medical records. The note was completed using EMR and Dragon dictation system. Every effort was made to ensure accuracy; however, inadvertent computerized transcription errors may be present. All questions and concerns were addressed to the patient/family. Alternatives to my treatment were discussed. I would like to thank you for providing me the opportunity to participate in the care of your patient. If you have any questions, please do not hesitate to contact me.      Qian Alvarez MD, [facility] [place], [address]  The [address]  [address] [address] [place] [address]  Ph: [phone]  Fax: [phone]

## 2020-08-24 NOTE — PROGRESS NOTES
MG tablet Take 50 mg by mouth 2 times daily      Insulin Pen Needle ([employer] PEN NEEDLES 29G) 29G X 12MM MISC 1 each by Does not apply route daily 100 each 3    rOPINIRole (REQUIP) 3 MG tablet Take 3 mg by mouth 3 times daily      atorvastatin (LIPITOR) 10 MG tablet Take 10 mg by mouth nightly       albuterol (PROVENTIL HFA;VENTOLIN HFA) 108 (90 BASE) MCG/ACT inhaler Inhale 2 puffs into the lungs every 6 hours as needed. Allergies:  Erythromycin    SocialHistory:   TOBACCO:   reports that he has never smoked. He has never used smokeless tobacco.     ETOH:   reports no history of alcohol use. Patient currently lives alone    Family History:   No family history on file. ROS:   Review of Systems   Constitutional: Negative for chills, fatigue and fever. HENT: Negative for congestion, sinus pressure and sore throat. Eyes: Negative for photophobia and visual disturbance. Respiratory: Negative for cough, chest tightness, shortness of breath and wheezing. Cardiovascular: Negative for chest pain, palpitations and leg swelling. Gastrointestinal: Positive for nausea and vomiting. Negative for abdominal distention, abdominal pain, constipation and diarrhea. Genitourinary: Negative for dysuria, frequency and urgency. Skin: Negative for color change and rash. Neurological: Positive for tremors (resting). Negative for dizziness, syncope, weakness and headaches. All other systems reviewed and are negative. PHYSICAL EXAM:  BP (!) 115/58   Pulse 67   Temp 99.5 °F (37.5 °C) (Bladder)   Resp 16   Ht 5' 8\" (1.727 m)   Wt 183 lb 13.8 oz (83.4 kg)   SpO2 96%   BMI 27.96 kg/m²   Recent Labs     08/24/20  0204 08/24/20  0303 08/24/20  0409 08/24/20  0526 08/24/20  0624   POCGLU 114* 160* 160* 145* 148*     Physical Exam  Constitutional:       Comments: Patient is extubated and sedated 2/2 agitation  HENT:      Head: Normocephalic.    Eyes:      Extraocular Movements: Extraocular movements vessel ischemic disease. XR CHEST PORTABLE   Final Result   1. There is been interval placement of a right internal jugular approach    central venous catheter with the tip near the confluence of the    brachiocephalic veins. No pneumothorax. The remaining support tubes and    lines in the chest are nonsignificantly altered in position. 2.  Previously described interstitial edema appears to questionably    slightly improved. XR ABDOMEN (KUB) (SINGLE AP VIEW)   Final Result     The nasogastric tube traverses the mediastinum and terminates in the    mid to distal body of the stomach. CT HEAD WO CONTRAST   Final Result      Evidence of diffuse chronic small vessel white matter disease. Subtle small round subcentimeter lesion suggested in the anterior left chin. This may be artifactual. Further evaluation with MR imaging of the brain without and with contrast is recommended for further evaluation. No other acute intracranial findings. No other acute intracranial findings. Negative noncontrast CT study of the head. XR CHEST PORTABLE   Final Result      ET tube and left IJ central venous catheter placed as described. No evidence of diffuse pulmonary vasculature congestion and interstitial edema, similar to the prior exam. No focal infiltrates seen. XR CHEST PORTABLE   Final Result   1. Cardiomegaly with vascular congestion and pulmonary interstitial edema. Assessment & Plan:   Sean Floyd is a 72 y.o. male with PMH of T1DM, Parkinson's disease, asthma, and HTN who was admitted with DKA.     Pulmonary:  Acute respiratory failure 2/2 to flash pulmonary edema and new acute onset heart failure  -Patient self extubated, saturating well on 5L NC  - off levo gtt , VSS  Possible Pneumonia  - AMS in setting of leukocytosis and acute onset respiratory failure  - Blood cultures: no growth till date  - urine, Resp viral panel, legionella, Strep Pneumo: pending  -respiratory cultures: Gram stain showed 3+ wbcs , No organisms   - MRSA negative  - continue IV Vanc, Merrem, Azithromycin (day 4)   - Discontinued abx     CV:   - Cardiogenic shock in setting of Acute onset heart failure:  - as above  - initial EKG showed ST changes , heparin gtt started for possible ACS  - Cardiology following  - Heparin gtt, aspirin, statin for ACS  - will plan for ischemic work-up next week when renal is okay with contrast    HTN:  - Continue home nifedipine; holding losartan as above     Endocrine:  Diabetic Ketoacidosis:   likely 2/2 to noncompliance with insulin + poor dietary habits  - NPO  - Hypoglycemia protocol  - Glucose Q1h  - insulin gtt  - Social work consulted for repeated admissions with DKA and hypoglycemia; Patient needs more support    Renal:  NABIL: likely prerenal in setting of DKA;   Cr 1.3 today (baseline 0.9)  - IVF  - Holding home losartan   - Strict I/Os  - off CRRT  -continue lasix gtt  - UOP improved 3.9 L     Neuro  Parkinson's Disease:  - Continue carbidopa/levidopa, primidone  - Precedex gtt for agitation    GI  -started tube feeds    Code Status: Full  F/E/N:  NPO  GI / DVT Prophylaxis: Heparin drip  Disposition: ICU      Levell MD Usama  Internal Medicine Resident, PGY-1  8/24/2020  7:54 AM     Patient was seen, examined and discussed with Dr. Светлана Marshall. I agree with the interval history. My physical exam confirms the findings listed below  Chart was reviewed including labs and medical records confirm the findings noted     Acute respiratory failure, self extubated last night. Pulmonary edema - improved   Shock - unclear etiology - resolved. Lactic acidosis on admission - resolved. NABIL on admission required CRRT. UOP 3.9L over the past 24 hours. DKA - blood sugar is better controlled    ID: no clear source of infection   Hospital delirium    Continue precedex for tonight  Agree on holding ABX  Agree on the A/P noted above.

## 2020-08-24 NOTE — PROGRESS NOTES
Clinical Pharmacy Consult Note    Admit date: 8/21/2020    Subjective/Objective:  72 yom with PMHx significant for Parkinson disease, HTN, DM, and asthma presented to ED with AMS and hyperglycemia. On presentation, notable labs include venous pH 7.068, O2 sat 79% on 2L NC, hyperkalemia, glucose of 822, and NABIL with SCr of 1.9. The decision was made to emergently intubate. DKA protocol was initiated. Troponin elevated as well, low dose heparin drip initiated. Of note, patient was admitted to [facility] for treatment of DKA 7/30-8/2 and 8/4-8/6. Last admission, patient was sent to a SNF, from which he was discharged 8/20 per family. Patient also has h/o NABIL for which he was treated with HD during admission in June. Baseline SCr ~1 mg/dL. Pt is being treated for acute hypoxic respiratory failure, possible PNA vs COVID-19 r/o, acute onset HF, ACS, NABIL and DKA. CRRT was started 8/21, and stopped 8/22. .    Interval update:  Remains on furosemide drip and heparin drip per Cardiology. Patient self-extubated; now requiring Precedex and intermittent doses of lorazepam to remain calm. Tmax 100.8. Pharmacy is consulted to dose Vancomycin per Dr. Samira Claudio    Pertinent Medications:  Azithromycin -- day #4   500 mg IV x1 (8/21)   250 mg IV q24h (8/22-current)  Meropenem 1g IV q8h -- day #4  Vancomycin, pharmacy to dose -- day #4   Intermittent doses based on levels (8/21-current)    Date Vancomycin Level Vancomycin Dose   8/21  1.5g   8/22 15.6 mcg/mL 1.25g   8/23 18 mcg/mL 750mg   8/24 15.5 mcg/mL          Recent Labs     08/23/20  1734 08/24/20  0538   * 135*   K 3.9 3.5   CL 93* 96*   CO2 24 28   PHOS 3.3 3.5   BUN 34* 33*   CREATININE 1.3 1.3       Estimated Creatinine Clearance: 56 mL/min (based on SCr of 1.3 mg/dL).       Recent Labs     08/23/20  0445 08/24/20  0538   WBC 11.0 8.6   HGB 8.9* 8.2*   HCT 26.9* 24.3*   MCV 76.3* 75.7*    132*       Height:  5' 8\" (172.7 cm)  Weight: 183 lb 13.8 oz (83.4 kg)    Micro:  8/21 COVID-19: sent  8/21 Blood x2: No growth to date  8/21 S pneumo (urine): sent  8/21 Legionella (urine): sent  8/21 MRSA PCR: canceled  8/22 Respiratory - bronchial washings: No org seen  8/22 MRSA screening cx: In process    Prophylaxis:  DVT: heparin drip  SUP: famotidine    Assessment/Plan:  1. Empiric Pneumonia:  Azithromycin + meropenem + vancomycin day #4  Meropenem -  · Currently on 1g IV q8h. Appropriate for current renal function. · Will monitor closely, and will adjust dose as appropriate per [facility] Renal Dose Adjustment Policy. Vancomycin - pharmacy to dose  · Pt admitted with NABIL. CRRT from 8/21-8/22, now off. Scr becoming more stable at 1.3.    · Random level this AM = 15.5 mcg/mL. Will continue to dose via levels for now, and order 1g IV x1 today. Plan will be to reevaluate renal function and random level in the AM.    · Clinical condition will be monitored closely, and levels / doses will be ordered as appropriate.     Please call with questions--  Thanks--  Sally Damon PharmD., BCPS   8/24/2020 8:13 AM  Wireless: [phone]

## 2020-08-24 NOTE — PLAN OF CARE
Problem: Falls - Risk of:  Goal: Will remain free from falls  Description: Will remain free from falls  8/24/2020 0152 by Jaycee Gonzalez RN  Outcome: Ongoing  8/23/2020 1946 by Fredi Carson RN  Outcome: Ongoing  Goal: Absence of physical injury  Description: Absence of physical injury  8/24/2020 0152 by Jaycee Gonzalez RN  Outcome: Ongoing  8/23/2020 1946 by Fredi Carson RN  Outcome: Ongoing     Problem: Restraint Use - Nonviolent/Non-Self-Destructive Behavior:  Goal: Absence of restraint indications  Description: Absence of restraint indications  8/24/2020 0152 by Jaycee Gonzalez RN  Outcome: Ongoing  8/23/2020 1946 by Fredi Carson RN  Outcome: Ongoing  Goal: Absence of restraint-related injury  Description: Absence of restraint-related injury  8/24/2020 0152 by Jaycee Gonzalez RN  Outcome: Ongoing  8/23/2020 1946 by Fredi Carson RN  Outcome: Ongoing     Problem: Nutrition  Goal: Optimal nutrition therapy  8/24/2020 0152 by Jaycee Gonzalez RN  Outcome: Ongoing  8/23/2020 1946 by Fredi Carson RN  Outcome: Ongoing     Problem: Skin Integrity:  Goal: Will show no infection signs and symptoms  Description: Will show no infection signs and symptoms  8/24/2020 0152 by Jaycee Gonzalez RN  Outcome: Ongoing  8/23/2020 1946 by Fredi Carson RN  Outcome: Ongoing  Goal: Absence of new skin breakdown  Description: Absence of new skin breakdown  8/24/2020 0152 by Jaycee Gonzalez RN  Outcome: Ongoing  8/23/2020 1946 by Fredi Carson RN  Outcome: Ongoing     Problem: Serum Glucose Level - Abnormal:  Goal: Ability to maintain appropriate glucose levels will improve  Description: Ability to maintain appropriate glucose levels will improve  8/24/2020 0152 by Jaycee Gonzalez RN  Outcome: Ongoing  8/23/2020 1946 by rFedi Carson RN  Outcome: Ongoing

## 2020-08-24 NOTE — PROGRESS NOTES
Precedex decreased to 0.3 mcg/kg/hr to better assess neurological status. Patient has a weak cough, is alert and disoriented X 4. \"What are you doing? \" \"What happened? \"    He states that he is at [facility]", it is \"8690\", and he does not remember why he's in the hospital. He is pulling hard against his restraints, requiring close supervision.  RASS +3

## 2020-08-24 NOTE — PROGRESS NOTES
Patient continues to be disoriented X 4 and is now verbally aggressive, diaphoretic, very agitated, attempting to remove lines and NG tube. Glucose checked - 100. Will increase Precedex for patient safety and comfort.

## 2020-08-24 NOTE — PROGRESS NOTES
Office : [phone]     Fax :[phone]         Renal Progress Note  Subjective:   Admit Date: 8/21/2020     Reason for Consult: NABIL    HPI   Jairo Arana is a 72 y.o. male with a past medical history of hypertension, diabetes,Parkinson's, prior admission with DKA who presented to the hospital with AMS along with hypoxemia and respiratory distress requiring intubation.  On admission he was significantly acidotic and found to be in DKA. Erling Baumgarten was a started on CRRT and his admission EKG was concerning for ST depressions although his pH was 7 at that time. [facility] Nephrology team was consulted for further assistance given NABIL        8/24:    Pt showing signs of disorientation and confusion this AM. Making good urine. Labs better post CRRT and improving.         DIET DIET TUBE FEED CONTINUOUS/CYCLIC NPO; Diabetic; Nasogastric; Continuous; 10; 40; Exceptions are: Ice Chips, Sips with Meds  Medications:   Scheduled Meds:   vancomycin  1,000 mg Intravenous Once    meropenem  1 g Intravenous Q8H    famotidine (PEPCID) injection  10 mg Intravenous BID    azithromycin  250 mg Intravenous Q24H    carbidopa-levodopa  2 tablet Oral TID    vancomycin (VANCOCIN) intermittent dosing (placeholder)   Other See Admin Instructions     Continuous Infusions:   insulin Stopped (08/24/20 0531)    dextrose      norepinephrine Stopped (08/23/20 0046)    dextrose      heparin (porcine) 11 Units/kg/hr (08/24/20 0542)    furosemide (LASIX) 1mg/ml infusion 5 mg/hr (08/24/20 0542)    dexmedetomidine (PRECEDEX) IV infusion 0.5 mcg/kg/hr (08/24/20 0600)       Labs:  CBC:   Recent Labs 08/22/20  0555 08/23/20  0445 08/24/20  0538   WBC 14.3* 11.0 8.6   HGB 9.3* 8.9* 8.2*    139 132*     BMP:    Recent Labs     08/23/20  0445 08/23/20  1734 08/24/20  0538   * 131* 135*   K 4.4 3.9 3.5   CL 97* 93* 96*   CO2 24 24 28   BUN 35* 34* 33*   CREATININE 1.4* 1.3 1.3   GLUCOSE 207* 113* 151*     Ca/Mg/Phos:   Recent Labs     08/22/20  0555  08/22/20 2209 08/23/20 0445 08/23/20  1734 08/24/20  0538   CALCIUM 8.6   < > 8.3 8.2* 8.3 7.8*   MG 2.50*  --  2.20  --   --  1.80   PHOS 3.2  --  2.8 2.6 3.3 3.5    < > = values in this interval not displayed. Hepatic:   Recent Labs     08/23/20  0445 08/23/20  1734 08/24/20  0538   AST 44* 34 26   ALT <5* <5* <5*   BILITOT 0.5 0.7 0.6   ALKPHOS 128 184* 121     Troponin:   Recent Labs     08/22/20  1531 08/22/20 2209 08/23/20  0446   TROPONINI 1.05* 1.11* 1.10*     BNP: No results for input(s): BNP in the last 72 hours. Lipids: No results for input(s): CHOL, TRIG, HDL, LDLCALC, LABVLDL in the last 72 hours. ABGs:   Recent Labs     08/23/20  1329   PHART 7.419   PO2ART 92.9   [id number] 42.8     INR:   Recent Labs     08/21/20  1007 08/21/20  1534   INR 1.03 1.01     UA:  Recent Labs     08/21/20  1229   COLORU Yellow   CLARITYU Clear   GLUCOSEU >=1000*   BILIRUBINUR Negative   KETUA 40*   SPECGRAV 1.010   BLOODU Negative   PHUR 5.5  5.5   PROTEINU Negative   UROBILINOGEN 0.2   NITRU Negative   LEUKOCYTESUR Negative   LABMICR Not Indicated   URINETYPE NotGiven      Urine Microscopic: No results for input(s): LABCAST, BACTERIA, COMU, HYALCAST, WBCUA, RBCUA, EPIU in the last 72 hours. Urine Culture: No results for input(s): LABURIN in the last 72 hours. Urine Chemistry: No results for input(s): Alfornia Vandana, PROTEINUR, NAUR in the last 72 hours.     Objective:   Vitals: BP (!) 112/53   Pulse 68   Temp 97.7 °F (36.5 °C) (Bladder)   Resp 17   Ht 5' 8\" (1.727 m)   Wt 183 lb 13.8 oz (83.4 kg)   SpO2 96%   BMI 27.96 kg/m²    Wt Readings from Last 3 Encounters:   08/24/20 183 lb 13.8 oz (83.4 kg)   08/05/20 184 lb 8.4 oz (83.7 kg)   08/01/20 190 lb 14.7 oz (86.6 kg)      24HR INTAKE/OUTPUT:      Intake/Output Summary (Last 24 hours) at 8/24/2020 0509  Last data filed at 8/24/2020 0900  Gross per 24 hour   Intake 2366.16 ml   Output 4125 ml   Net -1758.84 ml       Physical Exam    Constitutional:  Signs of disorientation and confusion  NECK: supple, no JVD  Cardiovascular:  S1, S2 without m/r/g  Respiratory:  CTA B without w/r/r  Abdomen: +bs, soft, nt  Ext: +1 LE edema      Assessment and Plan:       IMAGING:  XR ABDOMEN (KUB) (SINGLE AP VIEW)   Final Result   Impression:   Enteric tube tip overlying the body of the stomach. CT HEAD WO CONTRAST   Final Result      1. No acute intracranial process. 2.  Moderate cerebral atrophy and mild chronic small vessel ischemic disease. XR CHEST PORTABLE   Final Result   1. There is been interval placement of a right internal jugular approach    central venous catheter with the tip near the confluence of the    brachiocephalic veins. No pneumothorax. The remaining support tubes and    lines in the chest are nonsignificantly altered in position. 2.  Previously described interstitial edema appears to questionably    slightly improved. XR ABDOMEN (KUB) (SINGLE AP VIEW)   Final Result     The nasogastric tube traverses the mediastinum and terminates in the    mid to distal body of the stomach. CT HEAD WO CONTRAST   Final Result      Evidence of diffuse chronic small vessel white matter disease. Subtle small round subcentimeter lesion suggested in the anterior left chin. This may be artifactual. Further evaluation with MR imaging of the brain without and with contrast is recommended for further evaluation. No other acute intracranial findings. No other acute intracranial findings. Negative noncontrast CT study of the head.       XR CHEST PORTABLE   Final Result      ET tube and left IJ central venous catheter placed as described. No evidence of diffuse pulmonary vasculature congestion and interstitial edema, similar to the prior exam. No focal infiltrates seen. XR CHEST PORTABLE   Final Result   1. Cardiomegaly with vascular congestion and pulmonary interstitial edema. Assessment/Plan   Azalea Brown is a 72 y.o. male with a past medical history of hypertension, diabetes,Parkinson's, prior admission with DKA who presented to the hospital with AMS along with hypoxemia and respiratory distress requiring intubation.  On admission he was significantly acidotic and found to be in DKA. We are consulted for further assistance given NABIL       1. NABIL   - Cr on admission 2.0 --> 1.3 now  - Cr improved with CRRT  - need good perfusion   - Pressors   - S/p CRRT with improved labs     2. Shock - cardiogenic   - Cardiology team following  - pro-BNP 3,675  - echo with ejection fraction of 45- 50%  - try to maintain pt euvolemic  - monitor BP     3. Anemia  - Hgb 9.7 on admission --> 8.2 now  - f/u H/H     4. Acid- base/ Electrolyte imbalance   - improved with CRRT  - Na 127 on admission --> 135 now  - K 5.9 on admission --> 3.5 now  - Mg 2.7 on admission --> 1.8 now  - Phosphorus 7.3 on admission --> 3.5 now  - Monitor lytes and replace as needed     5. Lactic acidosis   - lactic acid 6.6 on admission  - monitor improvement with CRRT     6.  DKA   - glucose 922 on admission --> 151 now  - HbA1C 9.3               I will discuss patient and findings with my attending Dr. Francy Argueta MD.     Juarez Early MD  PGY-1        Thank you for allowing us to participate in care of [facility] free to contact  [facility] -[phone]  [phone]    Good UO   On lasix gtt   Cr stable     Adrian Guzmán MD

## 2020-08-25 NOTE — CARE COORDINATION
Case Management Assessment           Daily Note                 Date: 8/25/2020     Patient Name: Ashley Rodriguez   YOB: 1950    Diagnosis:DKA, type 1, not at goal [facility]) [E10.10]  Acute respiratory failure ([facility] [address] [J96.00]  DKA, type 1, not at goal [facility]) [E10.10]  Acute respiratory failure ([facility] [place] [J96.00]   Patient Admission Status: Inpatient    Date of Admission:8/21/2020  9:52 AM Length of Stay: 4     Current Plan of Care: Confusion, COVID negative. Hospital Delirium, DKA. Possible PNA, Nephrology on board,   ________________________________________________________________________________________  PT AM-PAC:   / 24 per last evaluation on: pending     OT AM-PAC:   / 24 per last evaluation on: pending     DME Needs for discharge: defer  ________________________________________________________________________________________  Discharge Plan: To Be Determined DUE TO: progress and treatment, likey placement if agreeable. Insurance: Payor: MEDICARE / Plan: MEDICARE PART A AND B / Product Type: *No Product type* /     Tentative discharge date: TBD    Current barriers to discharge: Acute medical needs resolve. Referrals completed: Not Applicable    Resources/ information provided: Not indicated at this time   ________________________________________________________________________________________  Case Management Notes:  SW spoke with bedside RN. Patient is not alert and orientated to speak today. Daughters are concerned patient's \"girlfriend\" is not caring for home. SW is very familiar with patient. He frequently refuses care supports in the community and/or denies them entry into the home to work with him. He has been non complaint with mediation and diet as well. SW following. Patient was recently at [facility] before returning home. SW continues to follow.        [facility] and his family were provided with choice of provider; he and his family are in agreement with the discharge plan.     Care Transition Patient: No Helaine Skiff Day, MSW  [facility] - ICU   Case Management Department  Ph: [phone]

## 2020-08-25 NOTE — PROGRESS NOTES
08/23/20 1734 08/24/20  0538 08/25/20  0336   * 135* 139   K 3.9 3.5 3.4*   CL 93* 96* 96*   CO2 24 28 34*   BUN 34* 33* 27*   CREATININE 1.3 1.3 1.0   GLUCOSE 113* 151* 164*     Ca/Mg/Phos:   Recent Labs     08/22/20 2209 08/23/20 1734 08/24/20  0538 08/25/20  0336   CALCIUM 8.3   < > 8.3 7.8* 8.5   MG 2.20  --   --  1.80 1.90   PHOS 2.8   < > 3.3 3.5 3.3    < > = values in this interval not displayed. Hepatic:   Recent Labs     08/23/20 1734 08/24/20 0538 08/25/20  0336   AST 34 26 22   ALT <5* <5* <5*   BILITOT 0.7 0.6 0.5   ALKPHOS 184* 121 135*     Troponin:   Recent Labs     08/22/20  1531 08/22/20 2209 08/23/20  0446   TROPONINI 1.05* 1.11* 1.10*     BNP: No results for input(s): BNP in the last 72 hours. Lipids: No results for input(s): CHOL, TRIG, HDL, LDLCALC, LABVLDL in the last 72 hours. ABGs:   Recent Labs     08/23/20  1329   PHART 7.419   PO2ART 92.9   PFG0KQP 42.8     INR:   No results for input(s): INR in the last 72 hours. UA:  No results for input(s): Ros Deepak, GLUCOSEU, BILIRUBINUR, KETUA, SPECGRAV, BLOODU, PHUR, PROTEINU, UROBILINOGEN, NITRU, LEUKOCYTESUR, Olevia Myra in the last 72 hours. Urine Microscopic: No results for input(s): LABCAST, BACTERIA, COMU, HYALCAST, WBCUA, RBCUA, EPIU in the last 72 hours. Urine Culture: No results for input(s): LABURIN in the last 72 hours. Urine Chemistry: No results for input(s): Salud Blank, PROTEINUR, NAUR in the last 72 hours.     Objective:   Vitals: BP (!) 141/80   Pulse 80   Temp 99 °F (37.2 °C) (Bladder)   Resp 21   Ht 5' 8\" (1.727 m)   Wt 169 lb 5 oz (76.8 kg)   SpO2 94%   BMI 25.74 kg/m²    Wt Readings from Last 3 Encounters:   08/25/20 169 lb 5 oz (76.8 kg)   08/05/20 184 lb 8.4 oz (83.7 kg)   08/01/20 190 lb 14.7 oz (86.6 kg)      24HR INTAKE/OUTPUT:      Intake/Output Summary (Last 24 hours) at 8/25/2020 1118  Last data filed at 8/25/2020 1000  Gross per 24 hour   Intake 2419 ml   Output 5225 ml Net -2806 ml       Physical Exam    Constitutional:  AA  NECK: supple, no JVD  Cardiovascular:  S1, S2 without m/r/g  Respiratory:  CTA B without w/r/r  Abdomen: +bs, soft, nt  Ext: +1 LE edema      Assessment and Plan:       IMAGING:  XR ABDOMEN (KUB) (SINGLE AP VIEW)   Final Result   Impression:   Enteric tube tip overlying the body of the stomach. CT HEAD WO CONTRAST   Final Result      1. No acute intracranial process. 2.  Moderate cerebral atrophy and mild chronic small vessel ischemic disease. XR CHEST PORTABLE   Final Result   1. There is been interval placement of a right internal jugular approach    central venous catheter with the tip near the confluence of the    brachiocephalic veins. No pneumothorax. The remaining support tubes and    lines in the chest are nonsignificantly altered in position. 2.  Previously described interstitial edema appears to questionably    slightly improved. XR ABDOMEN (KUB) (SINGLE AP VIEW)   Final Result     The nasogastric tube traverses the mediastinum and terminates in the    mid to distal body of the stomach. CT HEAD WO CONTRAST   Final Result      Evidence of diffuse chronic small vessel white matter disease. Subtle small round subcentimeter lesion suggested in the anterior left chin. This may be artifactual. Further evaluation with MR imaging of the brain without and with contrast is recommended for further evaluation. No other acute intracranial findings. No other acute intracranial findings. Negative noncontrast CT study of the head. XR CHEST PORTABLE   Final Result      ET tube and left IJ central venous catheter placed as described. No evidence of diffuse pulmonary vasculature congestion and interstitial edema, similar to the prior exam. No focal infiltrates seen. XR CHEST PORTABLE   Final Result   1. Cardiomegaly with vascular congestion and pulmonary interstitial edema. Assessment/Plan   Anthony Lopez is a 72 y.o. male with a past medical history of hypertension, diabetes,Parkinson's, prior admission with DKA who presented to the hospital with AMS along with hypoxemia and respiratory distress requiring intubation.  On admission he was significantly acidotic and found to be in DKA. We are consulted for further assistance given NABIL       1. NABIL   - Cr on admission 2.0 --> 1.3-->1.0 now  - Cr improved with CRRT  - need good perfusion   - Pressors   - S/p CRRT with improved labs     2. Shock - cardiogenic   - Cardiology team following  - pro-BNP 3,675  - echo with ejection fraction of 45- 50%  - try to maintain pt euvolemic  - monitor BP     3. Anemia  - Hgb 9.7 on admission --> 8.2 now  - f/u H/H     4. Acid- base/ Electrolyte imbalance   - improved with CRRT  - Na 127 on admission --> 135 now  - K 5.9 on admission --> 3.5 now  - Mg 2.7 on admission --> 1.8 now  - Phosphorus 7.3 on admission --> 3.5 now  - Monitor lytes and replace as needed     5. Lactic acidosis   - lactic acid 6.6 on admission  - monitor improvement with CRRT     6.  DKA   - glucose 922 on admission --> 151 now  - HbA1C 9.3               Thank you for allowing us to participate in care of [facility] free to contact  Nephrology associates of [address] [place] [address] -[phone]  [phone]    Richi Neri MD

## 2020-08-25 NOTE — PROGRESS NOTES
Interval history: There were no overnight events. Bp increasing, oxygen requirements now down to 2 liters. COVID neg. Objective:     Medications:   carvedilol  3.125 mg Oral BID WC    furosemide  40 mg Intravenous BID    QUEtiapine  12.5 mg Oral Nightly    insulin glargine  30 Units Subcutaneous Nightly    insulin lispro  0-12 Units Subcutaneous 4 times per day    aspirin  325 mg Oral Once    aspirin  81 mg Oral Daily    atorvastatin  40 mg Oral Nightly    famotidine (PEPCID) injection  10 mg Intravenous BID    carbidopa-levodopa  2 tablet Oral TID       IV drips:   dextrose      norepinephrine Stopped (08/23/20 0046)    dexmedetomidine (PRECEDEX) IV infusion 1.4 mcg/kg/hr (08/25/20 1431)       PRN:  perflutren lipid microspheres, glucose, dextrose, glucagon (rDNA), dextrose, heparin (porcine), perflutren lipid microspheres    Vitals:    08/25/20 1100 08/25/20 1200 08/25/20 1300 08/25/20 1400   BP: 138/71 (!) 142/79 126/66 (!) 127/59   Pulse: 62 64 63 70   Resp: 18 14 10 17   Temp:  98.8 °F (37.1 °C)     TempSrc:  Bladder     SpO2: 96% 97% 92% 91%   Weight:       Height:           Intake/Output Summary (Last 24 hours) at 8/25/2020 1718  Last data filed at 8/25/2020 1600  Gross per 24 hour   Intake 1848 ml   Output 5750 ml   Net -3902 ml     I/O last 3 completed shifts:   In: 2136 [I.V.:888; NG/GT:1248]  Out: 5786 [Urine:4925]  Wt Readings from Last 3 Encounters:   08/25/20 169 lb 5 oz (76.8 kg)   08/05/20 184 lb 8.4 oz (83.7 kg)   08/01/20 190 lb 14.7 oz (86.6 kg)       Admit Wt: Weight: 184 lb (83.5 kg)   Todays Wt: Weight: 169 lb 5 oz (76.8 kg)    TELEMETRY: Sinus     Physical Exam:         General Appearance:  Alert, cooperative, no distress, appears stated age Appropriate weight   Head:  Normocephalic, without obvious abnormality, atraumatic   Eyes:  PERRL, conjunctiva/corneas clear EOM intact  Ears normal   Throat no lesions       Nose: Nares normal, no drainage or sinus tenderness Throat: Lips, mucosa, and tongue normal   Neck: Supple, symmetrical, trachea midline, no adenopathy, thyroid: not enlarged, symmetric, no tenderness/mass/nodules, no carotid bruit. Lungs:   Normal respiratory rate, lungs mild basilar ronchi bilaterally. Chest Wall:  No tenderness or deformity   Heart:  Regular rhythm, rate is controlled, S1, S2 normal, there is no murmur, there is no rub or gallop, + jvd, 1+ bilateral lower extremity edema   Abdomen:   Soft, non-tender, bowel sounds active all four quadrants,  no masses, no organomegaly       Extremities: Extremities normal, atraumatic, no cyanosis. Pulses: 2+ and symmetric   Skin: Skin color, texture, turgor normal, no rashes or lesions   Pysch: Normal mood and affect   Neurologic: Normal gross motor and sensory exam.  Cranial nerves intact       Labs:   Recent Labs     08/22/20 2209 08/23/20 1734 08/24/20  0538 08/25/20  0336   *   < > 131* 135* 139   K 4.5   < > 3.9 3.5 3.4*   BUN 32*   < > 34* 33* 27*   CREATININE 1.3   < > 1.3 1.3 1.0   CL 98*   < > 93* 96* 96*   CO2 24   < > 24 28 34*   GLUCOSE 144*   < > 113* 151* 164*   CALCIUM 8.3   < > 8.3 7.8* 8.5   MG 2.20  --   --  1.80 1.90    < > = values in this interval not displayed. Recent Labs     08/23/20 0445 08/24/20  0538 08/25/20  0336   WBC 11.0 8.6 6.8   HGB 8.9* 8.2* 9.4*   HCT 26.9* 24.3* 28.0*    132* 152   MCV 76.3* 75.7* 75.0*     No results for input(s): CHOLTOT, TRIG, HDL in the last 72 hours. Invalid input(s): LIPIDCOMM, CHOLHDL, VLDCHOL, LDL  No results for input(s): PTT, INR in the last 72 hours. Invalid input(s): PT  Recent Labs     08/22/20 2209 08/23/20 0446   TROPONINI 1.11* 1.10*     No results for input(s): BNP in the last 72 hours. No results for input(s): NTPROBNP in the last 72 hours. No results for input(s): TSH in the last 72 hours. Imaging:       Assessment & Plan:     1. Septic shock:  Patient presented in septic shock.  VBG sats 66% on Norepi 5 gtt, no inotropes, calculated cardiac index CI 3.3 lt/min/m2, CO 6.6 lt/min. MAP of 69 and CVP 13, hence SVR 8.5 WILSON = 678 dynes.      Parameters were consistent with septic shock. Severe acidosis on admission (pH of 7.0) has certainly led to myocardial dysfunction and acute heart failure, with some pulmonary edema  but not necessarily cardiogenic shock (SVR was low, CI normal).      -C/w IV antibiotics per primary team.   - COVID negative.      2. Acute HFrEF:   Per #1.      -Cw lasix 5 mg/hr  -Will repeat limited echo later as outpatient to reassess LVEF. -no need for ischemic evaluation at this time.      3. Elevated troponin:   I doubt ACS. It was most likely due to demand ischemia from septic shock, hypoxia, NABIL, severe acidosis. ECG though borderline abnormal on admission (during severe acidosis), became normal within 4 hours. Peak troponin 2.5 on 8/21. Heparin gtt stopped 8/24.      -Cw aspirin  - Will start coreg 3.125 bid  - Lipitor 40    - No need for ischemic evaluation at this time. We will reassess stress test at a later time (as outpatient) if LVEF remains depressed. I have spent 35 minutes of face to face time with the patient with more than 50% spent counseling and coordinating care. I have personally reviewed the reports and images of labs, radiological studies, cardiac studies including ECG's and telemetry, current and old medical records. The note was completed using EMR and Dragon dictation system. Every effort was made to ensure accuracy; however, inadvertent computerized transcription errors may be present. All questions and concerns were addressed to the patient/family. Alternatives to my treatment were discussed. Thank you for allowing to us to participate in the care or [facility]. Please call our service with questions.     Poppy Johnston MD, [address] [address]  Ph: [phone]  Fax: [phone]

## 2020-08-25 NOTE — PROGRESS NOTES
Hospitalist Progress Note      PCP: Brea MOYER    Date of Admission: 8/21/2020      Hospital Course: This is a 61-year-old male with a history of Parkinson disease diabetes mellitus who presented with diabetic ketoacidosis and altered mental status. He was intubated on arrival, self extubated 2 days ago    Subjective: Patient seen and examined  Is drowsy, on Precedex drip due to agitation  On Lasix drip  No new complaints      Medications:  Reviewed    Infusion Medications    dextrose      norepinephrine Stopped (08/23/20 0046)    dexmedetomidine (PRECEDEX) IV infusion 1.4 mcg/kg/hr (08/25/20 1431)     Scheduled Medications    carvedilol  3.125 mg Oral BID WC    furosemide  40 mg Intravenous BID    QUEtiapine  12.5 mg Oral Nightly    insulin glargine  30 Units Subcutaneous Nightly    insulin lispro  0-12 Units Subcutaneous 4 times per day    aspirin  325 mg Oral Once    aspirin  81 mg Oral Daily    atorvastatin  40 mg Oral Nightly    famotidine (PEPCID) injection  10 mg Intravenous BID    carbidopa-levodopa  2 tablet Oral TID     PRN Meds: perflutren lipid microspheres, glucose, dextrose, glucagon (rDNA), dextrose, heparin (porcine), perflutren lipid microspheres      Intake/Output Summary (Last 24 hours) at 8/25/2020 1525  Last data filed at 8/25/2020 1203  Gross per 24 hour   Intake 2136 ml   Output 4925 ml   Net -2789 ml       Physical Exam Performed:    BP (!) 127/59   Pulse 70   Temp 98.8 °F (37.1 °C) (Bladder)   Resp 17   Ht 5' 8\" (1.727 m)   Wt 169 lb 5 oz (76.8 kg)   SpO2 91%   BMI 25.74 kg/m²     General appearance: No apparent distress, appears stated age and cooperative. HEENT: Pupils equal, round, and reactive to light. Conjunctivae/corneas clear. Neck: Supple, with full range of motion. No jugular venous distention. Trachea midline.   Respiratory: Bilateral air entry decreased at the bases  Cardiovascular: Regular rate and rhythm with normal S1/S2 without murmurs, rubs or gallops. Abdomen: Soft, non-tender, non-distended with normal bowel sounds. Musculoskeletal: No clubbing, cyanosis or edema bilaterally. Full range of motion without deformity. Skin: Skin color, texture, turgor normal.  No rashes or lesions. Neurologic:  Neurovascularly intact without any focal sensory/motor deficits. Cranial nerves: II-XII intact, grossly non-focal.  Psychiatric: Lethargic , on sedation   capillary Refill: Brisk,< 3 seconds   Peripheral Pulses: +2 palpable, equal bilaterally       Labs:   Recent Labs     08/23/20  0445 08/24/20  0538 08/25/20  0336   WBC 11.0 8.6 6.8   HGB 8.9* 8.2* 9.4*   HCT 26.9* 24.3* 28.0*    132* 152     Recent Labs     08/23/20  1734 08/24/20  0538 08/25/20  0336   * 135* 139   K 3.9 3.5 3.4*   CL 93* 96* 96*   CO2 24 28 34*   BUN 34* 33* 27*   CREATININE 1.3 1.3 1.0   CALCIUM 8.3 7.8* 8.5   PHOS 3.3 3.5 3.3     Recent Labs     08/23/20  1734 08/24/20  0538 08/25/20  0336   AST 34 26 22   ALT <5* <5* <5*   BILITOT 0.7 0.6 0.5   ALKPHOS 184* 121 135*     No results for input(s): INR in the last 72 hours. Recent Labs     08/22/20  1531 08/22/20  2209 08/23/20  0446   TROPONINI 1.05* 1.11* 1.10*       Urinalysis:      Lab Results   Component Value Date    NITRU Negative 08/21/2020    WBCUA None seen 07/26/2020    BACTERIA 2+ 04/29/2020    RBCUA None seen 07/26/2020    BLOODU Negative 08/21/2020    SPECGRAV 1.010 08/21/2020    GLUCOSEU >=1000 08/21/2020       Radiology:  XR ABDOMEN (KUB) (SINGLE AP VIEW)   Final Result   Impression:   Enteric tube tip overlying the body of the stomach. CT HEAD WO CONTRAST   Final Result      1. No acute intracranial process. 2.  Moderate cerebral atrophy and mild chronic small vessel ischemic disease. XR CHEST PORTABLE   Final Result   1. There is been interval placement of a right internal jugular approach    central venous catheter with the tip near the confluence of the    brachiocephalic veins.   No pneumothorax. The remaining support tubes and    lines in the chest are nonsignificantly altered in position. 2.  Previously described interstitial edema appears to questionably    slightly improved. XR ABDOMEN (KUB) (SINGLE AP VIEW)   Final Result     The nasogastric tube traverses the mediastinum and terminates in the    mid to distal body of the stomach. CT HEAD WO CONTRAST   Final Result      Evidence of diffuse chronic small vessel white matter disease. Subtle small round subcentimeter lesion suggested in the anterior left chin. This may be artifactual. Further evaluation with MR imaging of the brain without and with contrast is recommended for further evaluation. No other acute intracranial findings. No other acute intracranial findings. Negative noncontrast CT study of the head. XR CHEST PORTABLE   Final Result      ET tube and left IJ central venous catheter placed as described. No evidence of diffuse pulmonary vasculature congestion and interstitial edema, similar to the prior exam. No focal infiltrates seen. XR CHEST PORTABLE   Final Result   1. Cardiomegaly with vascular congestion and pulmonary interstitial edema.               Assessment/Plan:    Active Hospital Problems    Diagnosis    Acute respiratory failure ([facility] [address] [J96.00]    DKA, type 1, not at goal [facility]) [E10.10]     Acute respiratory failure with hypoxia status post intubation 8/21 s/p self extubation  Multifactorial volume overload versus pneumonia   Vent management as per pulmonary/critical car     #Diabetic ketoacidosis  On insulin  Tube feeds      sepsis possible pna with possible septic shock    on iv abx     #Lactic acidosis likely due to hypoperfusion  We will monitor     #Elevated troponin likely demand ischemia versus NSTEMI  EKG normal sinus rhythm poor quality EKG but cannot appreciate any significant ST changes.  -cardiology following  Stopped heparin drip, ischemic eval as OP    #Parkinson's disease. DVT Prophylaxis: lovenox  Diet: DIET TUBE FEED CONTINUOUS/CYCLIC NPO; Diabetic (Glucerna 1.2);  Nasogastric; Continuous; 10; 65; 24; Exceptions are: Morganm and Vigil, Sips with Meds  Code Status: Full Code    PT/OT Eval Status: n/a     Dispo - icu    Luis Gandhi MD

## 2020-08-26 NOTE — PLAN OF CARE
Problem: Falls - Risk of:  Goal: Will remain free from falls  Description: Will remain free from falls  8/26/2020 1921 by Anali Ravi RN  Outcome: Ongoing  8/26/2020 1920 by Anali Ravi RN  Outcome: Ongoing  Goal: Absence of physical injury  Description: Absence of physical injury  8/26/2020 1921 by Anali Ravi RN  Outcome: Ongoing  8/26/2020 1920 by Anali Ravi RN  Outcome: Ongoing     Problem: Restraint Use - Nonviolent/Non-Self-Destructive Behavior:  Goal: Absence of restraint indications  Description: Absence of restraint indications  8/26/2020 1921 by Anali Ravi RN  Outcome: Ongoing  8/26/2020 1920 by Anali Ravi RN  Outcome: Ongoing  Goal: Absence of restraint-related injury  Description: Absence of restraint-related injury  8/26/2020 1921 by Anali Ravi RN  Outcome: Ongoing  8/26/2020 1920 by Anali Ravi RN  Outcome: Ongoing     Problem: Nutrition  Goal: Optimal nutrition therapy  8/26/2020 1921 by Anali Ravi RN  Outcome: Ongoing  8/26/2020 1920 by Anali Ravi RN  Outcome: Ongoing     Problem: Skin Integrity:  Goal: Will show no infection signs and symptoms  Description: Will show no infection signs and symptoms  8/26/2020 1921 by Anali Ravi RN  Outcome: Ongoing  8/26/2020 1920 by Anali Ravi RN  Outcome: Ongoing  Goal: Absence of new skin breakdown  Description: Absence of new skin breakdown  8/26/2020 1921 by Anali Ravi RN  Outcome: Ongoing  8/26/2020 1920 by Anali Ravi RN  Outcome: Ongoing     Problem: Serum Glucose Level - Abnormal:  Goal: Ability to maintain appropriate glucose levels will improve  Description: Ability to maintain appropriate glucose levels will improve  8/26/2020 1921 by Anali Ravi RN  Outcome: Ongoing  8/26/2020 1920 by Anali Ravi RN  Outcome: Ongoing     Problem: Urinary Elimination:  Goal: Signs and symptoms of infection will decrease  Description: Signs and symptoms of infection will decrease  Outcome: Ongoing  Goal: Complications related to the disease process, condition or treatment will be avoided or minimized  Description: Complications related to the disease process, condition or treatment will be avoided or minimized  Outcome: Ongoing     Problem: Infection - Central Venous Catheter-Associated Bloodstream Infection:  Goal: Will show no infection signs and symptoms  Description: Will show no infection signs and symptoms  8/26/2020 1921 by Mary Yadav RN  Outcome: Ongoing  8/26/2020 1920 by Mary Yadav RN  Outcome: Ongoing

## 2020-08-26 NOTE — PLAN OF CARE
Nutrition Problem #1: Inadequate oral intake  Intervention: Food and/or Nutrient Delivery: Continue Current Diet, Start Oral Nutrition Supplement  Nutritional Goals: Patient will tolerate po diet and consume 50% or greater of all meals and supplements

## 2020-08-26 NOTE — CARE COORDINATION
Case Management Assessment           Daily Note                 Date/ Time of Note: 8/26/2020 12:22 PM         Note completed by: Dwaine Valiente    Patient Name: Ab Granados  YOB: 1950    Diagnosis:DKA, type 1, not at goal [facility]) [E10.10]  Acute respiratory failure ([facility] [address] [J96.00]  DKA, type 1, not at goal [facility]) [E10.10]  [facility] ([facility] [J96.00]  Patient Admission Status: Inpatient    Date of Admission:8/21/2020  9:52 AM Length of Stay: 5 GLOS:      Current Plan of Care: medical complications, septic shock, IV lasix and antibiotics, remains confused and in restraints  ________________________________________________________________________________________  PT AM-PAC:   / 24 per last evaluation on:     OT AM-PAC:   / 24 per last evaluation on:     DME Needs for discharge: TBD    ________________________________________________________________________________________  Discharge Plan: To Be Determined DUE TO: medical complications and confusion    Tentative discharge date: 8/31/2020    Current barriers to discharge: medical complications    Referrals completed: Not Applicable    Resources/ information provided: Not indicated at this time  ________________________________________________________________________________________  Case Management Notes:Spoke to patient. Patient remains confused and in restraints. Unsure of d/c needs at this time. Anticipate patient will need SNF, however, patient usually refuses. Zaina, friend, is usually able to convince patient to go to SNF. Will continue to follow for d/c needs. Betzaida Hernandez and his family were provided with choice of provider; he and his family are in agreement with the discharge plan.     Care Transition Patient: Stephie Valiente, [place], LOULOU-S  [facility] [employer]  Case Management Department  Ph: [phone]

## 2020-08-26 NOTE — PROGRESS NOTES
Office : [phone]     Fax :[phone]         Renal Progress Note  Subjective:   Admit Date: 8/21/2020     Reason for Consult: NABIL    HPI   Dg Gomes is a 72 y.o. male with a past medical history of hypertension, diabetes,Parkinson's, prior admission with DKA who presented to the hospital with AMS along with hypoxemia and respiratory distress requiring intubation.  On admission he was significantly acidotic and found to be in DKA. Guero Rodriguez was a started on CRRT and his admission EKG was concerning for ST depressions although his pH was 7 at that time. [facility] team was consulted for further assistance given NABIL        08/26/20    Good UO   Cr stable       DIET DIET TUBE FEED CONTINUOUS/CYCLIC NPO; Diabetic (Glucerna 1.2);  Nasogastric; Continuous; 10; 65; 24; Exceptions are: Francheska and Yaritza, Sips with Meds  Medications:   Scheduled Meds:   potassium chloride  20 mEq Intravenous Q1H    carvedilol  3.125 mg Oral BID WC    furosemide  40 mg Intravenous BID    QUEtiapine  12.5 mg Oral Nightly    insulin glargine  30 Units Subcutaneous Nightly    insulin lispro  0-12 Units Subcutaneous 4 times per day    aspirin  325 mg Oral Once    aspirin  81 mg Oral Daily    atorvastatin  40 mg Oral Nightly    famotidine (PEPCID) injection  10 mg Intravenous BID    carbidopa-levodopa  2 tablet Oral TID     Continuous Infusions:   dextrose      norepinephrine Stopped (08/23/20 0046)    dexmedetomidine (PRECEDEX) IV infusion 1.1 mcg/kg/hr (08/26/20 0841)       Labs:  CBC:   Recent Labs     08/24/20  0538 08/25/20  0336 08/26/20  0357   WBC 8.6 6.8 5.8   HGB 8.2* 9.4* JVD  Cardiovascular:  S1, S2 without m/r/g  Respiratory:  CTA B without w/r/r  Abdomen: +bs, soft, nt  Ext: +1 LE edema      Assessment and Plan:       IMAGING:  XR ABDOMEN (KUB) (SINGLE AP VIEW)   Final Result   Impression:   Enteric tube tip overlying the body of the stomach. CT HEAD WO CONTRAST   Final Result      1. No acute intracranial process. 2.  Moderate cerebral atrophy and mild chronic small vessel ischemic disease. XR CHEST PORTABLE   Final Result   1. There is been interval placement of a right internal jugular approach    central venous catheter with the tip near the confluence of the    brachiocephalic veins. No pneumothorax. The remaining support tubes and    lines in the chest are nonsignificantly altered in position. 2.  Previously described interstitial edema appears to questionably    slightly improved. XR ABDOMEN (KUB) (SINGLE AP VIEW)   Final Result     The nasogastric tube traverses the mediastinum and terminates in the    mid to distal body of the stomach. CT HEAD WO CONTRAST   Final Result      Evidence of diffuse chronic small vessel white matter disease. Subtle small round subcentimeter lesion suggested in the anterior left chin. This may be artifactual. Further evaluation with MR imaging of the brain without and with contrast is recommended for further evaluation. No other acute intracranial findings. No other acute intracranial findings. Negative noncontrast CT study of the head. XR CHEST PORTABLE   Final Result      ET tube and left IJ central venous catheter placed as described. No evidence of diffuse pulmonary vasculature congestion and interstitial edema, similar to the prior exam. No focal infiltrates seen. XR CHEST PORTABLE   Final Result   1. Cardiomegaly with vascular congestion and pulmonary interstitial edema.           Assessment/Plan   Anupama Torres is a 72 y.o. male with a past medical history of hypertension, diabetes,Parkinson's, prior admission with DKA who presented to the hospital with AMS along with hypoxemia and respiratory distress requiring intubation.  On admission he was significantly acidotic and found to be in DKA. We are consulted for further assistance given NABIL       1. NABIL   - Cr on admission 2.0 --> 1.3-->1.0 now  - off CRRT   - Diuresis as love      2. Shock - cardiogenic   - Cardiology team following  - pro-BNP 3,675  - echo with ejection fraction of 45- 50%  - try to maintain pt euvolemic  - monitor BP     3. Anemia  - f/u H/H     4. Acid- base/ Electrolyte imbalance   - improved with CRRT  - Na 127 on admission --> 135 now  - K 5.9 on admission --> 3.5 now  - Mg 2.7 on admission --> 1.8 now  - Phosphorus 7.3 on admission --> 3.5 now  - Monitor lytes and replace as needed     5. Lactic acidosis   - lactic acid 6.6 on admission  - monitor improvement with CRRT     6.  DKA   - glucose 922 on admission --> 151 now  - HbA1C 9.3               Thank you for allowing us to participate in care of [facility] free to contact  [facility] -[phone]  [phone]    Luc Nielsen MD

## 2020-08-26 NOTE — PROGRESS NOTES
Cardiology Consult Service  Daily Progress Note        Admit Date:  8/21/2020  Primary cardiologist: Dr Jose Hagen    Reason for Consultation/Chief Complaint: Consulted by Dr Jose Hagen for r/o cardiogenic shock    Subjective: Julia Childers is a 72 y.o. male with a past medical history of HTN, DM, Parkinson's disease.      Patient had numerous previous admissions for DKA and hypoglycemia most recently discharged 8/6. He was discharged to SNF and was released from the nursing facility on 8/20. Patient has a temporary hemodialysis catheter from a previous admission for NABIL in 06/2020.     Patient presented on 8/21 with altered mental status, severe hyperglycemia, hypoxia and acidosis. He was emergently intubated. At ED, afebrile (fever of 100.8 on 8/23), BP 80/50s, HR 90s sinus, hypoxic on 2 liters. VBG pH 7.0, pCO2 43, pO2 29; repeat VBG sats 66%. Na 127, Cr 2.0 (BL 0.9), LA 6.6, Glu 922, procal 2.15, proBNP 3,600, trop 0.15, peak 2.5 on 8/21, WBC 14.8 with neutros, CXR with diffuse interstitial infiltrates. Cardiology was consulted for possible cardiogenic shock; please review my brief note on 8/21 and then full cardiology consult note by Dr Jose Hagen on 8/22/20.     ECG 8/21 10a: NSR, ST/T wave changes, cannot exclude ischemia. Repeat ECG at 2pm, ischemic changes have resolved.      Echo 8/22/20: nl LV, EF 45-50%, indeterminate diastolic, mild RV dysfunction, nl valves, RVSP 32 (8).    Echo 5/1/20: normal.      Patient was started on DKA protocol, IV broad spectrum antibiotics and Lasix drip at 5 mg/h (CVP 12-15), inotropes were not initiated due to adequate cardiac index of 3.3 lt/min/m2 on NE 5 gtt (VBG 66%). He was also placed on norepinephrine for hypotension, it was discontinued on 8/22, placed on CRRT, now off. He is also on heparin drip for possible NSTEMI. Patient was self-extubated ovenight and is now on 5 liters oxygen with adequate sats. Cr improving at 1.3 and LFT's improving. VBG sats 72% on 8/24. Interval history: There were no overnight events. Patient is confused today. Supplemental oxygen down to 1 liter, I/O neg 4.1 liters. Objective:     Medications:   spironolactone  12.5 mg Oral Daily    [START ON 8/27/2020] furosemide  40 mg Intravenous Daily    [START ON 8/27/2020] losartan  25 mg Oral Daily    losartan  12.5 mg Oral Once    carvedilol  3.125 mg Oral BID WC    QUEtiapine  12.5 mg Oral Nightly    insulin glargine  30 Units Subcutaneous Nightly    insulin lispro  0-12 Units Subcutaneous 4 times per day    aspirin  325 mg Oral Once    aspirin  81 mg Oral Daily    atorvastatin  40 mg Oral Nightly    famotidine (PEPCID) injection  10 mg Intravenous BID    carbidopa-levodopa  2 tablet Oral TID       IV drips:   dextrose      norepinephrine Stopped (08/23/20 0046)    dexmedetomidine (PRECEDEX) IV infusion 1.1 mcg/kg/hr (08/26/20 1151)       PRN:  perflutren lipid microspheres, glucose, dextrose, glucagon (rDNA), dextrose, heparin (porcine), perflutren lipid microspheres    Vitals:    08/26/20 1100 08/26/20 1200 08/26/20 1300 08/26/20 1400   BP: (!) 142/73 (!) 162/90 (!) 146/69 (!) 161/72   Pulse: 72 72 61 65   Resp: 21 23 16 18   Temp:  98.6 °F (37 °C)  98.1 °F (36.7 °C)   TempSrc:  Bladder  Bladder   SpO2: (!) 88% 94% 95% 96%   Weight:       Height:           Intake/Output Summary (Last 24 hours) at 8/26/2020 1451  Last data filed at 8/26/2020 1400  Gross per 24 hour   Intake 2332.91 ml   Output 4150 ml   Net -1817.09 ml     I/O last 3 completed shifts: In: 2913 [I.V.:1252;  NG/GT:1661]  Out: 4350 [Urine:4350]  Wt Readings from Last 3 Encounters:   08/26/20 166 lb 7.2 oz (75.5 kg)   08/05/20 184 lb 8.4 oz (83.7 kg)   08/01/20 190 lb 14.7 oz (86.6 kg)       Admit Wt: Weight: 184 lb (83.5 kg)   Todays Wt: Weight: 166 lb 7.2 oz (75.5 kg)    TELEMETRY: Sinus     Physical Exam:         General Appearance:  Alert, cooperative, no distress, appears stated age Appropriate weight   Head: Normocephalic, without obvious abnormality, atraumatic   Eyes:  PERRL, conjunctiva/corneas clear EOM intact  Ears normal   Throat no lesions       Nose: Nares normal, no drainage or sinus tenderness   Throat: Lips, mucosa, and tongue normal   Neck: Supple, symmetrical, trachea midline, no adenopathy, thyroid: not enlarged, symmetric, no tenderness/mass/nodules, no carotid bruit. Lungs:   Normal respiratory rate, lungs CTA bilaterally. Chest Wall:  No tenderness or deformity   Heart:  Regular rhythm, rate is controlled, S1, S2 normal, there is no murmur, there is no rub or gallop, no jvd, 1+ bilateral lower extremity edema   Abdomen:   Soft, non-tender, bowel sounds active all four quadrants,  no masses, no organomegaly       Extremities: Extremities normal, atraumatic, no cyanosis. Pulses: 2+ and symmetric   Skin: Skin color, texture, turgor normal, no rashes or lesions   Pysch: Normal mood and affect   Neurologic: Normal gross motor and sensory exam.  Cranial nerves intact       Labs:   Recent Labs     08/24/20  0538 08/25/20  0336 08/26/20  0357   * 139 143   K 3.5 3.4* 3.2*   BUN 33* 27* 28*   CREATININE 1.3 1.0 0.9   CL 96* 96* 95*   CO2 28 34* 40*   GLUCOSE 151* 164* 119*   CALCIUM 7.8* 8.5 8.7   MG 1.80 1.90 2.00     Recent Labs     08/24/20  0538 08/25/20  0336 08/26/20  0357   WBC 8.6 6.8 5.8   HGB 8.2* 9.4* 9.4*   HCT 24.3* 28.0* 28.3*   * 152 163   MCV 75.7* 75.0* 75.2*     No results for input(s): CHOLTOT, TRIG, HDL in the last 72 hours. Invalid input(s): LIPIDCOMM, CHOLHDL, VLDCHOL, LDL  No results for input(s): PTT, INR in the last 72 hours. Invalid input(s): PT  No results for input(s): CKTOTAL, CKMB, CKMBINDEX, TROPONINI in the last 72 hours. No results for input(s): BNP in the last 72 hours. No results for input(s): NTPROBNP in the last 72 hours. No results for input(s): TSH in the last 72 hours. Imaging:       Assessment & Plan:     1.  Septic shock:  Patient [address]  [facility] [address] [place] [address]  Ph: [phone]  Fax: [phone]

## 2020-08-26 NOTE — PROGRESS NOTES
last seen between 10-2 PM yesterday. Today his friend attempted to call him; however, there was no answer which if very abnormal and EMS was summoned. They reported that they had to force entry into the house. He was recently discharged on 8/6 for DKA. He is able to tell me his name, but is unable to provide additional history. \"    Impression  Pt intubated 8/21-8/23 (self extubation). Pt alert, oriented to this being a hospital, not to month or year. Pt followed simple commands and produced strong volitional cough, voice is hoarse. Pt has history of Parkinson's, states \"it is only in my left hand. \"  Pt denies any swallowing impairments. Oral structures grossly intact, oral mucosa is dry. Presented pt with puree, thin liquids via cup / straw, including 3 ounces of uninterrupted swallows, as well as a dawna cracker. Pt demonstrated no overt signs of aspiration: no coughing/throat clearing or change in vocal quality. Good labial seal with no anterior loss of liquids. Good swallow movement noted upon palpation of anterior neck. Pt exhibited no difficulty with mastication of cracker, no oral residue. Recommend dysphagia III soft and bite sized with thin liquids    Dysphagia Diagnosis: Swallow function appears grossly intact  Dysphagia Outcome Severity Scale: Level 6: Within functional limits/Modified independence     Treatment Plan  Requires SLP Intervention: Yes  Duration/Frequency of Treatment: 2-3 x  D/C Recommendations: To be determined     Recommended Diet and Intervention  Diet Solids Recommendation: Dysphagia Soft and Bite-Sized (Dysphagia III)  Liquid Consistency Recommendation: Thin  Recommended Form of Meds: Whole with water(if difficulty, place in puree)     Therapeutic Interventions: Diet tolerance monitoring;Oral care; Patient/Family education    Compensatory Swallowing Strategies  Upright as possible for all oral intake  Eat/Feed slowly    Treatment/Goals  Pt to be seen to address the closer to dc  Pt therapy goal: I am ready to eat something  Pt dc goal: I don't know    Pam Mercado M.S./CCC-SLP #[id number]  Pg.  # [phone]  Needs met prior to leaving room, call light within reach, d/w ROXANNA Aquino  This document will serve as a dc summary if pt dc prior to next visit  8/26/2020 12:39 PM

## 2020-08-26 NOTE — PROGRESS NOTES
Spoke with Dr. Cruz Hassan regarding the patient tolerating speech evaluation and speech recommended a soft and bite sized diet with thin liquids. Dr. Cruz Hassan ordered to discontinue the NG tube and start recommended diet with carb control. Orders were read back and verified.

## 2020-08-26 NOTE — PROGRESS NOTES
possible pneumonia. CT head for AMS with only chronic changes, possible subcentimeter lesion of anterior left chin vs artifact. Pro-BNP elevated to 3,675, (1,493 on 7/26). Echo 5/1/20 with EF of 07%, normal systolic and diastolic function and no wall motion abnormalities. Interval History:  Pt more oriented today but still with signs of agitation. Oriented to age, place, and year. States that he is feeling thirsty, but did not express any other concerns. Did very well on Seroquel overnight. Will wean off Precedex. Passed a bedside swallow study this AM, was evaluated by the speech therapy team, and was advanced to soft and bite-sized diet. Past Medical /Surgical History:    Past Medical History:   Diagnosis Date    Asthma     Diabetes mellitus ([facility]     Hypertension     Parkinson disease ([facility]      Past Surgical History:   Procedure Laterality Date    UPPER GASTROINTESTINAL ENDOSCOPY N/A 5/15/2020    EGD CONTROL HEMORRHAGE performed by Landen Rodarte MD at [address] [place] [address] 5/15/2020    EGD BIOPSY performed by Landen Rodarte MD at [facility] ENDOSCOPY       Medications Prior to Admission:    No current facility-administered medications on file prior to encounter.       Current Outpatient Medications on File Prior to Encounter   Medication Sig Dispense Refill    insulin aspart (NOVOLOG FLEXPEN) 100 UNIT/ML injection pen Inject 10 Units into the skin 3 times daily (before meals) 5 pen 3    insulin glargine (LANTUS;BASAGLAR) 100 UNIT/ML injection pen Inject 45 Units into the skin nightly 5 pen 3    vitamin D3 (CHOLECALCIFEROL) 10 MCG (400 UNIT) TABS tablet Take 400 Units by mouth daily      Cinnamon 500 MG CAPS Take 1 capsule by mouth daily      fluticasone (FLONASE) 50 MCG/ACT nasal spray 2 sprays by Nasal route daily      carbidopa-levodopa (SINEMET)  MG per tablet Take 2 tablets by mouth 3 times daily      losartan (COZAAR) 100 MG tablet Take 100 mg by mouth daily      NIFEdipine (PROCARDIA XL) 60 MG extended release tablet Take 60 mg by mouth nightly      pantoprazole (PROTONIX) 40 MG tablet Take 40 mg by mouth 2 times daily      PARoxetine (PAXIL) 40 MG tablet Take 40 mg by mouth every morning      primidone (MYSOLINE) 50 MG tablet Take 50 mg by mouth 2 times daily      Insulin Pen Needle ([employer] PEN NEEDLES 29G) 29G X 12MM MISC 1 each by Does not apply route daily 100 each 3    rOPINIRole (REQUIP) 3 MG tablet Take 3 mg by mouth 3 times daily      atorvastatin (LIPITOR) 10 MG tablet Take 10 mg by mouth nightly       albuterol (PROVENTIL HFA;VENTOLIN HFA) 108 (90 BASE) MCG/ACT inhaler Inhale 2 puffs into the lungs every 6 hours as needed. Allergies:  Erythromycin    SocialHistory:   TOBACCO:   reports that he has never smoked. He has never used smokeless tobacco.     ETOH:   reports no history of alcohol use. Patient currently lives alone    Family History:   No family history on file. PHYSICAL EXAM:  /68   Pulse 56   Temp 98.1 °F (36.7 °C) (Bladder)   Resp 21   Ht 5' 8\" (1.727 m)   Wt 166 lb 7.2 oz (75.5 kg)   SpO2 95%   BMI 25.31 kg/m²   Recent Labs     08/24/20  2301 08/25/20  0556 08/25/20  1203 08/25/20  1926 08/26/20  0018   POCGLU 224* 200* 269* 342* 129*     Physical Exam  Constitutional:       Comments: Oriented to age, place, and year, but still exhibiting signs of agitation  HENT:      Head: Normocephalic. Eyes:      Extraocular Movements: Extraocular movements intact. Pupils: Pupils are equal, round, and reactive to light. Cardiovascular:      Rate and Rhythm: Normal rate and regular rhythm. Pulses: Normal pulses. Heart sounds: Normal heart sounds. No murmur. No friction rub. No gallop. No edema in lower extremities. Pulmonary:      Breath sounds: Normal breath sounds. No wheezing, rhonchi or rales. Abdominal:      Palpations: Abdomen is soft. Tenderness:  There is no abdominal tenderness. There is no guarding or rebound. Skin:     General: Skin is warm and dry. LABS:  Recent Labs     08/24/20  0538 08/25/20 0336 08/26/20  0357   WBC 8.6 6.8 5.8   HGB 8.2* 9.4* 9.4*   HCT 24.3* 28.0* 28.3*   * 152 163                                                                  Recent Labs     08/24/20  0538 08/25/20  0336 08/26/20  0357   * 139 143   K 3.5 3.4* 3.2*   CL 96* 96* 95*   CO2 28 34* 40*   BUN 33* 27* 28*   CREATININE 1.3 1.0 0.9   GLUCOSE 151* 164* 119*     Recent Labs     08/24/20 0538 08/25/20 0336 08/26/20  0357   AST 26 22 21   ALT <5* <5* 10   BILITOT 0.6 0.5 0.4   ALKPHOS 121 135* 139*     No results for input(s): TROPONINI in the last 72 hours. No results for input(s): BNP in the last 72 hours. Lab Results   Component Value Date    PHART 7.419 08/23/2020    KXF0DHD 42.8 08/23/2020    PO2ART 92.9 08/23/2020     No results for input(s): INR in the last 72 hours. No results for input(s): NITRITE, COLORU, PHUR, LABCAST, WBCUA, RBCUA, MUCUS, TRICHOMONAS, YEAST, BACTERIA, CLARITYU, SPECGRAV, LEUKOCYTESUR, UROBILINOGEN, BILIRUBINUR, BLOODU, GLUCOSEU, AMORPHOUS in the last 72 hours. Invalid input(s): KETONESU   No results for input(s): LACTA, LACTATE in the last 72 hours. IMAGING:  XR ABDOMEN (KUB) (SINGLE AP VIEW)   Final Result   Impression:   Enteric tube tip overlying the body of the stomach. CT HEAD WO CONTRAST   Final Result      1. No acute intracranial process. 2.  Moderate cerebral atrophy and mild chronic small vessel ischemic disease. XR CHEST PORTABLE   Final Result   1. There is been interval placement of a right internal jugular approach    central venous catheter with the tip near the confluence of the    brachiocephalic veins. No pneumothorax. The remaining support tubes and    lines in the chest are nonsignificantly altered in position.    2.  Previously described interstitial edema appears to questionably for ACS  - will plan for ischemic work-up next week when renal is okay with contrast  - discontinued lasix gtt, started on lasix IV pushes    HTN:  - holding losartan as above  - on carvedilol     Endocrine:  Diabetic Ketoacidosis:   likely 2/2 to noncompliance with insulin + poor dietary habits  - NPO  - Hypoglycemia protocol  - Glucose Q1h    Renal:  NABIL: likely prerenal in setting of DKA;   Cr 1.3 today (baseline 0.9)  - IVF  - Holding home losartan   - Strict I/Os  - off CRRT  - discontinued lasix gtt, on lasix IV pushes  - Had UOP of 4L over the past 24 hours, with net negative 1.4 liters. - He has mild hypokalemia - replaced K  Lactic acidosis on admission - resolved  DKA - blood sugar is better controlled       ID:   - no clear source of infection   - keep off ABX    Neuro  Hospital delirium and Parkinson's Disease:  - Continue carbidopa/levidopa, primidone  - weaning off Precedex  - started on Seroquel last night, pt tolerated well    GI  - speech and therapy evaluation today  - diet advanced to soft and bite sized, carbs control    Code Status: Full  F/E/N:  soft and bite sized, carbs control diet  GI / DVT Prophylaxis: Heparin drip  Disposition: ICU      I will discuss the patient and findings with the attending Dr. Yann Lee MD.      Nelson Vaughn MD  Internal Medicine Resident, PGY-1  8/26/2020  7:52 AM     Patient was seen, examined and discussed with Mark Ch. I agree with the interval history.  My physical exam confirms the findings listed below  Chart was reviewed including labs and medical records confirm the findings noted     Acute respiratory failure, self extubated on 5/23.  Currently on one liter of O2  Pulmonary edema - improved   Shock - unclear etiology - resolved.    Lactic acidosis on admission - resolved.    NABIL on admission required CRRT.  now improved.  Had 4L UOP over the past 24 hours with increase in bicarb and low K   DKA - blood sugar is better controlled    ID: no clear source of infection. Had one episode of temp at 100.6  Hospital delirium - start improving.   On precedex and started on low dose seroquel last night.    (avoid high dose of seroquel as he has parkinson's disease.       Agree on decreasing lasix   Wean off precedex   Keep off ABX  Replace K  Continue seroquel

## 2020-08-26 NOTE — PROGRESS NOTES
Clinical Pharmacy Progress Note    PERTINENT MEDICATIONS:  Famotidine 20mg IV BID    DIET:  Carb Control, dysphagia soft/bite sized. ASSESSMENT/PLAN:  1)  IV To Po Conversion:   · Will convert famotidine to PO based on IV to PO policy (see below). · Next dose due tonight. Please call with questions --   An Mann PharmD., BCPS   8/26/2020 3:56 PM  Wireless: [phone]          Patient Selection   A. Able to tolerate oral / NG medications, diet, or tube feeding    B. Exhibits signs of clinical improvement specific to the drug therapy to be switched. C. Criteria required for IV antibiotics prior to conversion to an oral equivalent   Temp is trending down and is < 100°F (37.8°C) or patient is afebrile   WBC is normalizing   Stable or improving radiographic findings (if applicable)   D. Professional judgment of the pharmacist indicates that PO medications are appropriate for the patient   E.  Exclusion Criteria    NPO   Not tolerating feeding (e.g. nausea, vomiting, diarrhea, large residuals on tube feeding)   Continuous NG suctioning   Active GI Bleed (specific for H2 Blockers and PPIs)   Specific for antibiotic conversion - the following diagnoses are excluded:   - Neutropenia (ANC ? 500)   - Meningitis  - Endocarditis  - Undrained abscess   - Orbital cellulites, endopthalmitis  - Initial treatment of osteomyelitis  - Cystic fibrosis patient

## 2020-08-26 NOTE — PROGRESS NOTES
NUTRITION ASSESSMENT  Admission Date: 8/21/2020     Type and Reason for Visit: Reassess    NUTRITION RECOMMENDATIONS:   1. Dysphagia soft and bite sized diet per SLP recommendations, monitor and encourage intake. 2. Add Glucerna BID, encourage acceptance. NUTRITION ASSESSMENT:  Patient self-extubated ~2 days ago. Pt had been tolerating EN at goal rate however now NGT removed and po diet advanced per SLP recommendations. Dysphagia soft and bite-sized diet ordered this afternoon, will order ONS and monitor tolerance. MALNUTRITION ASSESSMENT  Context of Malnutrition: Acute Illness   Malnutrition Status: At risk for malnutrition (Comment)  Findings of the 6 clinical characteristics of malnutrition (Minimum of 2 out of 6 clinical characteristics is required to make the diagnosis of moderate or severe Protein Calorie Malnutrition based on AND/ASPEN Guidelines):  Energy Intake: Unable to Assess    Energy Intake Time: Unable to assess   Weight Loss %: Unable to assess    Weight loss Time: Unable to assess   Body Fat Loss: Unable to Assess   Body Fat Location: Unable to assess    Body Muscle Loss: Unable to Assess   Body Muscle Loss Location: Unable to assess    Fluid Accumulation: No significant    Fluid Accumulation Location: No significant     Strength: Not Performed;  Not Measured     NUTRITION DIAGNOSIS   Problem: Problem #1: Inadequate oral intake  Etiology: Acute or chronic injury or trauma  Signs & Symptoms: Intake 0-25%    NUTRITION INTERVENTION  Food and/or Nutrient Delivery:Continue Current diet  or Start ONS   Nutrition education/counseling/coordination of care: Continue Inpatient Monitoring     NUTRITION MONITORING & EVALUATION:  Evaluation:Goals set   Goals:Goals: Patient will tolerate po diet and consume 50% or greater of all meals and supplements  Monitoring: Nutrition Progression  or Pertinent Labs      OBJECTIVE DATA:  · Nutrition-Focused Physical Findings: Intubated, sedated, no BM recorded  · Wounds None      Past Medical History:   Diagnosis Date    Asthma     Diabetes mellitus ([facility] [address] [facility]     Hypertension     Parkinson disease ([facility]       ANTHROPOMETRICS  Current Height: 5' 8\" (172.7 cm)  Current Weight: 166 lb 7.2 oz (75.5 kg)    Admission weight: 184 lb (83.5 kg)  Ideal Bodyweight 154 lb (70 kg)   Usual Bodyweight 185-200# per EMR      BMI BMI (Calculated): 25.4    Wt Readings from Last 50 Encounters:   08/26/20 166 lb 7.2 oz (75.5 kg)   08/05/20 184 lb 8.4 oz (83.7 kg)   08/01/20 190 lb 14.7 oz (86.6 kg)   07/26/20 185 lb (83.9 kg)   07/15/20 190 lb (86.2 kg)   06/22/20 206 lb 12.7 oz (93.8 kg)   06/17/20 185 lb (83.9 kg)   05/27/20 207 lb (93.9 kg)   05/19/20 207 lb 10.8 oz (94.2 kg)   05/05/20 193 lb 5.5 oz (87.7 kg)   10/31/19 185 lb (83.9 kg)   01/19/18 185 lb (83.9 kg)   04/20/17 185 lb (83.9 kg)       COMPARATIVE STANDARDS  Estimated Total Kcals/Day : 22-25  Current Bodyweight (83 kg) 9227-7225 kcal    Estimated Total Protein (g/day) : 1.3-1.5 Current Bodyweight (83 kg) 107-124 g/day  Estimated Daily Total Fluid (ml/day): 3310-4988 mL per day     Food / Nutrition-Related History  Pre-Admission / Home Diet:  Pre-Admission/Home Diet: Carb Control   Home Supplements / Herbals:    none noted  Food Restrictions / Cultural Requests:    none noted    Diet Orders / Intake / Nutrition Support  Current diet/supplement order: DIET DYSPHAGIA SOFT AND BITE-SIZED; Carb Control: 3 carb choices (45 gms)/meal     NSG Recorded PO:   PO Fluids P.O.: 0 mL  PO Meals     PO Intake: 0%   PO Supplement: None  None   PO Supplement Intake: None   IVF: N/A ml/hr     NUTRITION RISK LEVEL: Risk Level:  Moderate     Lino Gray, [address],   [place]:  [phone]  Office:  [phone]

## 2020-08-27 NOTE — PROGRESS NOTES
4 Eyes Admission Assessment     I agree as the admission nurse that 2 RN's have performed a thorough Head to Toe Skin Assessment on the patient. ALL assessment sites listed below have been assessed on admission. Areas assessed by both nurses: rhonda/ace  [x]   Head, Face, and Ears   [x]   Shoulders, Back, and Chest  [x]   Arms, Elbows, and Hands   [x]   Coccyx, Sacrum, and Ischum  [x]   Legs, Feet, and Heels        Does the Patient have Skin Breakdown? Yes a wound was noted on the Admission Assessment and an LDA was Initiated documentation include the Shamika-wound, Wound Assessment, Measurements, Dressing Treatment, Drainage, and Color\",  Blanchable redness to buttocks, redness to bottom of R foot and R great toe, scattered bruising, redness/scabs RLE, scabbed abrasions to L arm.         James Prevention initiated:  Yes   Wound Care Orders initiated:  Yes      [address]  nurse consulted for Pressure Injury (Stage 3,4, Unstageable, DTI, NWPT, and Complex wounds) or James score 18 or lower:  N/A      Nurse 1 eSignature: Electronically signed by Karlis Paget, RN on 8/27/20 at 6:04 PM EDT    **SHARE this note so that the co-signing nurse is able to place an eSignature**    Nurse 2 eSignature: Electronically signed by Hector Weber RN on 8/27/20 at 6:08 PM EDT

## 2020-08-27 NOTE — PROGRESS NOTES
Pt admitted to room 6317 from [address] [facility]. Alert and oriented x4. Fall precautions in place. Call light in reach. Tele applied.

## 2020-08-27 NOTE — PROGRESS NOTES
Hospitalist Progress Note      PCP: Kapil MOYER    Date of Admission: 8/21/2020      Hospital Course: This is a 71-year-old male with a history of Parkinson disease diabetes mellitus who presented with diabetic ketoacidosis and altered mental status. He was intubated on arrival, self extubated 2 days ago    Subjective: Patient seen and examined  Looks better today awake alert, off Precedex drip  Left arm shaking due to Parkinson's  PT OT today    Medications:  Reviewed    Infusion Medications    dextrose      norepinephrine Stopped (08/23/20 0046)    dexmedetomidine (PRECEDEX) IV infusion Stopped (08/27/20 0534)     Scheduled Medications    LORazepam  0.5 mg Intravenous Once    spironolactone  12.5 mg Oral Daily    losartan  25 mg Oral Daily    famotidine  10 mg Oral BID    docusate sodium  100 mg Oral Daily    insulin lispro  0-12 Units Subcutaneous 4x Daily AC & HS    carvedilol  3.125 mg Oral BID WC    QUEtiapine  12.5 mg Oral Nightly    insulin glargine  30 Units Subcutaneous Nightly    aspirin  325 mg Oral Once    aspirin  81 mg Oral Daily    atorvastatin  40 mg Oral Nightly    carbidopa-levodopa  2 tablet Oral TID     PRN Meds: acetaminophen, perflutren lipid microspheres, glucose, dextrose, glucagon (rDNA), dextrose, heparin (porcine), perflutren lipid microspheres      Intake/Output Summary (Last 24 hours) at 8/27/2020 1149  Last data filed at 8/27/2020 0500  Gross per 24 hour   Intake 1166.21 ml   Output 940 ml   Net 226.21 ml       Physical Exam Performed:    BP (!) 96/48   Pulse 78   Temp 98.8 °F (37.1 °C) (Bladder)   Resp (!) 31   Ht 5' 8\" (1.727 m)   Wt 166 lb 7.2 oz (75.5 kg)   SpO2 97%   BMI 25.31 kg/m²     General appearance: No apparent distress, appears stated age and cooperative. HEENT: Pupils equal, round, and reactive to light. Conjunctivae/corneas clear. Neck: Supple, with full range of motion. No jugular venous distention.  Trachea jugular approach    central venous catheter with the tip near the confluence of the    brachiocephalic veins. No pneumothorax. The remaining support tubes and    lines in the chest are nonsignificantly altered in position. 2.  Previously described interstitial edema appears to questionably    slightly improved. XR ABDOMEN (KUB) (SINGLE AP VIEW)   Final Result     The nasogastric tube traverses the mediastinum and terminates in the    mid to distal body of the stomach. CT HEAD WO CONTRAST   Final Result      Evidence of diffuse chronic small vessel white matter disease. Subtle small round subcentimeter lesion suggested in the anterior left chin. This may be artifactual. Further evaluation with MR imaging of the brain without and with contrast is recommended for further evaluation. No other acute intracranial findings. No other acute intracranial findings. Negative noncontrast CT study of the head. XR CHEST PORTABLE   Final Result      ET tube and left IJ central venous catheter placed as described. No evidence of diffuse pulmonary vasculature congestion and interstitial edema, similar to the prior exam. No focal infiltrates seen. XR CHEST PORTABLE   Final Result   1. Cardiomegaly with vascular congestion and pulmonary interstitial edema.               Assessment/Plan:    Active Hospital Problems    Diagnosis    Acute respiratory failure ([address] [J96.00]    DKA, type 1, not at goal [facility]) [E10.10]     Acute respiratory failure with hypoxia status post intubation 8/21 s/p self extubation  Multifactorial volume overload versus pneumonia   Improved     #Diabetic ketoacidosis  Weaned off of insulin drip  On dysphagia 3 diet     sepsis possible pna with possible septic shock    on iv abx     #Lactic acidosis likely due to hypoperfusion, improved  We will monitor     #Elevated troponin likely demand ischemia versus NSTEMI  EKG normal sinus rhythm poor quality EKG but cannot appreciate any significant ST changes.  -cardiology following  Stopped heparin drip, ischemic eval as OP    #Parkinson's disease. DVT Prophylaxis: lovenox  Diet: DIET DYSPHAGIA SOFT AND BITE-SIZED;  Carb Control: 3 carb choices (45 gms)/meal; Dysphagia Minced and Moist  Code Status: Full Code    PT/OT Eval Status: ordered, might need placement     Dispo - icu    Flako Tapia MD

## 2020-08-27 NOTE — PROGRESS NOTES
Patient up to chair with SKYLAR kothari and gait belt used for safety and patient tolerated well. Chair alarm activated and fall precautions in place.

## 2020-08-27 NOTE — PROGRESS NOTES
Report called to Vicki Pruitt RN on [facility]. Patient expected to move into room 6317. All belongings collected and placed in patient bag.

## 2020-08-27 NOTE — PROGRESS NOTES
ICU Progress Note  PGY-1    PCP: Tarah MOYER    Code:Full Code  Admit Date: 8/21/2020  IV Access:  PIV Duration:  <1 day   IV Fluids: LR bolus  Diet: NPO Effective now    CC: Hyperglycemia     HISTORY OF PRESENT ILLNESS:    Patient is a 72 y.o. male with a history of T1DM, Parkinson's disease, asthma, and hypertension presenting with hyperglycemia and altered mental status. According to the patients daughter he was just discharged from a nursing facility yesterday. He was last seen between 10-2 PM yesterday. Today his friend attempted to call him; however, there was no answer which is very abnormal and EMS was summoned. They reported that they had to force entry into the house. Patient has had numerous admissions for DKA and hypoglycemia, most recently discharged 8/6/20. He was discharged to SNF on 45U of lantus nightly, and 10U rapid w/ meals. Patient has history of temporary HD for NABIL on previous admission in June 2020. In ED was oriented to name only, otherwise unable to provide additional history. Patient noted to be afebrile, borderline tachypneic to 28, hypotensive with systolic of 90-72 and SpO2 of 79% on 2L nasal canula. VBG significant for pH of 7.068, pCO2 42.9, pO2 28.9, HCO3 10. Other labs significant for hyperkalemia to 5.8, Na of 130, NABIL with SCr of 1.9 (baseline 0.9-1.0), anion gap of 31, lactate of 9.8, and glucose of 822. WBC elevated to 14.8 (7.0 on 8/6), Hb stable at 9.7. UDS positive for barbituates. EKG in ED with ST depressions in multiple leads, repeat EKG NSR HR of 77, no significant ST-T changes. Troponin elevated to 0.15 from 0.06 (7/26). CXR revealed cardiomegaly with diffuse vascular congestion and interstitial pulmonary edema without evidence of focal infiltrate or significant effusion. Levophed drip was started for hypotension. DKA protocol was initiated and patient was emergently intubated in ED.  Broad spectrum antibiotics were started out of concern for tablets by mouth 3 times daily      losartan (COZAAR) 100 MG tablet Take 100 mg by mouth daily      NIFEdipine (PROCARDIA XL) 60 MG extended release tablet Take 60 mg by mouth nightly      pantoprazole (PROTONIX) 40 MG tablet Take 40 mg by mouth 2 times daily      PARoxetine (PAXIL) 40 MG tablet Take 40 mg by mouth every morning      primidone (MYSOLINE) 50 MG tablet Take 50 mg by mouth 2 times daily      Insulin Pen Needle ([employer] PEN NEEDLES 29G) 29G X 12MM MISC 1 each by Does not apply route daily 100 each 3    rOPINIRole (REQUIP) 3 MG tablet Take 3 mg by mouth 3 times daily      atorvastatin (LIPITOR) 10 MG tablet Take 10 mg by mouth nightly       albuterol (PROVENTIL HFA;VENTOLIN HFA) 108 (90 BASE) MCG/ACT inhaler Inhale 2 puffs into the lungs every 6 hours as needed. Allergies:  Erythromycin    SocialHistory:   TOBACCO:   reports that he has never smoked. He has never used smokeless tobacco.     ETOH:   reports no history of alcohol use. Patient currently lives alone    Family History:   History reviewed. No pertinent family history. PHYSICAL EXAM:  BP (!) 96/48   Pulse 78   Temp 98.8 °F (37.1 °C) (Bladder)   Resp 25   Ht 5' 8\" (1.727 m)   Wt 166 lb 7.2 oz (75.5 kg)   SpO2 93%   BMI 25.31 kg/m²   Recent Labs     08/26/20  1933 08/27/20  0438 08/27/20  0457 08/27/20  0603 08/27/20  0825   POCGLU 251* 29* 93 100* 131*     Physical Exam  Constitutional:       Comments: Oriented to age, place, and year. Having chronic tremors due to Parkinsons disease. HENT:      Head: Normocephalic. Eyes:      Extraocular Movements: Extraocular movements intact. Pupils: Pupils are equal, round, and reactive to light. Cardiovascular:      Rate and Rhythm: Normal rate and regular rhythm. Pulses: Normal pulses. Heart sounds: Normal heart sounds. No murmur. No friction rub. No gallop. No edema in lower extremities. Pulmonary:      Breath sounds: Normal breath sounds.  No nonsignificantly altered in position. 2.  Previously described interstitial edema appears to questionably    slightly improved. XR ABDOMEN (KUB) (SINGLE AP VIEW)   Final Result     The nasogastric tube traverses the mediastinum and terminates in the    mid to distal body of the stomach. CT HEAD WO CONTRAST   Final Result      Evidence of diffuse chronic small vessel white matter disease. Subtle small round subcentimeter lesion suggested in the anterior left chin. This may be artifactual. Further evaluation with MR imaging of the brain without and with contrast is recommended for further evaluation. No other acute intracranial findings. No other acute intracranial findings. Negative noncontrast CT study of the head. XR CHEST PORTABLE   Final Result      ET tube and left IJ central venous catheter placed as described. No evidence of diffuse pulmonary vasculature congestion and interstitial edema, similar to the prior exam. No focal infiltrates seen. XR CHEST PORTABLE   Final Result   1. Cardiomegaly with vascular congestion and pulmonary interstitial edema. Assessment & Plan:   Lacey Kowalski is a 72 y.o. male with PMH of T1DM, Parkinson's disease, asthma, and HTN who was admitted with DKA. Pulmonary:  Acute respiratory failure 2/2 to flash pulmonary edema and new acute onset heart failure  - self extubated on 5/23.   Currently on NC  - off levo gtt , VSS  - pulmonary edema improved  Possible Pneumonia  - AMS in setting of leukocytosis and acute onset respiratory failure  - Blood cultures: no growth till date  - Resp viral panel: negative  - legionella and Strep Pneumo cultures: presumptive negative  - respiratory cultures: Gram stain showed 3+ wbcs , No organisms   - MRSA negative  - COVID negative  - Discontinued abx     CV:   - possible cardiogenic shock in setting of Acute onset heart failure  - shock - unclear etiology - resolved  - as above  - initial EKG showed ST changes  - Cardiology following  - discontinued Heparin gtt,   - started aspirin, statin for ACS  - will plan for ischemic work-up next week when renal is okay with contrast  - discontinued lasix gtt, started on lasix IV pushes    HTN:  - holding losartan as above  - on carvedilol     Endocrine:  Diabetic Ketoacidosis:   likely 2/2 to noncompliance with insulin + poor dietary habits  - NPO  - Hypoglycemia protocol  - trend Glucose    Renal:  NABIL: likely prerenal in setting of DKA;   Cr 1.3 today (baseline 0.9)  - IVF  - Holding home losartan   - Strict I/Os  - off CRRT  - discontinued lasix gtt, on lasix IV pushes  - making good urine   - He has mild hypokalemia - replaced K  Lactic acidosis on admission - resolved  DKA - blood sugar is better controlled       ID:   - no clear source of infection   - keep off ABX    Neuro  Hospital delirium and Parkinson's Disease:  - Continue carbidopa/levidopa, primidone  - weaned off Precedex  - started on Seroquel, pt tolerated well    GI  - speech and therapy evaluation today  - diet downgraded to dysphagia II minced and moist with thin liquids , carbs control    Code Status: Full  F/E/N:  dysphagia II minced and moist with thin liquids , carbs control  GI / DVT Prophylaxis: Heparin drip  Disposition: ready for transfer to [facility]      I will discuss the patient and findings with the attending Dr. Bj Guillen MD.      Astrid Wu MD  Internal Medicine Resident, PGY-1  8/27/2020  9:04 AM

## 2020-08-27 NOTE — PROGRESS NOTES
Cardiology Consult Service  Daily Progress Note        Admit Date:  8/21/2020  Primary cardiologist: Dr Femi West    Reason for Consultation/Chief Complaint: Consulted by Dr Femi West for r/o cardiogenic shock    Subjective: Camilo Sanchez is a 72 y.o. male with a past medical history of HTN, DM, Parkinson's disease.      Patient had numerous previous admissions for DKA and hypoglycemia most recently discharged 8/6. He was discharged to SNF and was released from the nursing facility on 8/20. Patient has a temporary hemodialysis catheter from a previous admission for NABIL in 06/2020.     Patient presented on 8/21 with altered mental status, severe hyperglycemia, hypoxia and acidosis. He was emergently intubated. At ED, afebrile (fever of 100.8 on 8/23), BP 80/50s, HR 90s sinus, hypoxic on 2 liters. VBG pH 7.0, pCO2 43, pO2 29; repeat VBG sats 66%. Na 127, Cr 2.0 (BL 0.9), LA 6.6, Glu 922, procal 2.15, proBNP 3,600, trop 0.15, peak 2.5 on 8/21, WBC 14.8 with neutros, CXR with diffuse interstitial infiltrates. Cardiology was consulted for possible cardiogenic shock; please review my brief note on 8/21 and then full cardiology consult note by Dr Femi West on 8/22/20.     ECG 8/21 10a: NSR, ST/T wave changes, cannot exclude ischemia. Repeat ECG at 2pm, ischemic changes have resolved.      Echo 8/22/20: nl LV, EF 45-50%, indeterminate diastolic, mild RV dysfunction, nl valves, RVSP 32 (8).    Echo 5/1/20: normal.      Patient was started on DKA protocol, IV broad spectrum antibiotics and Lasix drip at 5 mg/h (CVP 12-15), inotropes were not initiated due to adequate cardiac index of 3.3 lt/min/m2 on NE 5 gtt (VBG 66%). He was also placed on norepinephrine for hypotension, it was discontinued on 8/22, placed on CRRT, now off. He is also on heparin drip for possible NSTEMI. Patient was self-extubated ovenight and is now on 5 liters oxygen with adequate sats. Cr improving at 1.3 and LFT's improving. VBG sats 72% on 8/24. conjunctiva/corneas clear EOM intact  Ears normal   Throat no lesions       Nose: Nares normal, no drainage or sinus tenderness   Throat: Lips, mucosa, and tongue normal   Neck: Supple, symmetrical, trachea midline, no adenopathy, thyroid: not enlarged, symmetric, no tenderness/mass/nodules, no carotid bruit. Lungs:   Normal respiratory rate, lungs CTA bilaterally. Chest Wall:  No tenderness or deformity   Heart:  Regular rhythm, rate is controlled, S1, S2 normal, there is no murmur, there is no rub or gallop, no jvd, no bilateral lower extremity edema   Abdomen:   Soft, non-tender, bowel sounds active all four quadrants,  no masses, no organomegaly       Extremities: Extremities normal, atraumatic, no cyanosis. Pulses: 2+ and symmetric   Skin: Skin color, texture, turgor normal, no rashes or lesions   Pysch: Normal mood and affect   Neurologic: Normal gross motor and sensory exam.  Cranial nerves intact       Labs:   Recent Labs     08/25/20  0336 08/26/20  0357 08/26/20  1709 08/27/20  0327    143 137 138   K 3.4* 3.2* 3.8 3.1*   BUN 27* 28* 23* 27*   CREATININE 1.0 0.9 0.9 0.9   CL 96* 95* 92* 94*   CO2 34* 40* 37* 38*   GLUCOSE 164* 119* 214* 26*   CALCIUM 8.5 8.7 8.8 8.7   MG 1.90 2.00  --  2.00     Recent Labs     08/25/20  0336 08/26/20  0357 08/27/20  0327   WBC 6.8 5.8 5.5   HGB 9.4* 9.4* 9.2*   HCT 28.0* 28.3* 27.3*    163 167   MCV 75.0* 75.2* 75.0*     No results for input(s): CHOLTOT, TRIG, HDL in the last 72 hours. Invalid input(s): LIPIDCOMM, CHOLHDL, VLDCHOL, LDL  No results for input(s): PTT, INR in the last 72 hours. Invalid input(s): PT  No results for input(s): CKTOTAL, CKMB, CKMBINDEX, TROPONINI in the last 72 hours. No results for input(s): BNP in the last 72 hours. No results for input(s): NTPROBNP in the last 72 hours. No results for input(s): TSH in the last 72 hours. Imaging:       Assessment & Plan:     1.  Septic shock:  Patient presented in septic shock. VBG sats 66% on Norepi 5 gtt, no inotropes, calculated cardiac index CI 3.3 lt/min/m2, CO 6.6 lt/min. MAP of 69 and CVP 13, hence SVR 8.5 WILSON = 678 dynes.      Parameters were consistent with septic shock. Severe acidosis on admission (pH of 7.0) has certainly led to myocardial dysfunction and acute heart failure, with some pulmonary edema  but not necessarily cardiogenic shock (SVR was low, CI normal).      -C/w IV antibiotics per primary team.   - COVID negative.      2. Acute HFrEF:   Per #1. Patient is now euvolemic and mildly hypotensive.     -Stop lasix IV. May need a small amount of IV fluids to support BP.   -May hold HF meds (coreg 3.125 bid, losartan 25 daily and estephanie 12.5) while hypotensive.   -Will repeat limited echo later as outpatient to reassess LVEF.   -No need for ischemic evaluation at this time.      3. Elevated troponin:   I doubt ACS. It was most likely due to demand ischemia from septic shock, hypoxia, NABIL, severe acidosis. ECG though borderline abnormal on admission (during severe acidosis), became normal within 4 hours. Peak troponin 2.5 on 8/21. Heparin gtt stopped 8/24.      -Cw aspirin  - Cw coreg 3.125 bid  - Lipitor 40    - No need for ischemic evaluation at this time. We will reassess stress test at a later time (as outpatient) if LVEF remains depressed. I have spent 35 minutes of face to face time with the patient with more than 50% spent counseling and coordinating care. I have personally reviewed the reports and images of labs, radiological studies, cardiac studies including ECG's and telemetry, current and old medical records. The note was completed using EMR and Dragon dictation system. Every effort was made to ensure accuracy; however, inadvertent computerized transcription errors may be present. All questions and concerns were addressed to the patient/family. Alternatives to my treatment were discussed.      Thank you for allowing to us to participate in the care or [place]. Please call our service with questions.     Sunday Ramirez MD, [facility] [place], [address]  The [address]  [place] [address] [place] [address]  Ph: [phone]  Fax: [phone]

## 2020-08-27 NOTE — PROGRESS NOTES
Speech Language Pathology  Facility/Department: [facility] ICU  Dysphagia Daily Treatment Note - Late entry    NAME: Dg Gomes  : 1950  MRN: [id number]    Patient Diagnosis(es):   Patient Active Problem List    Diagnosis Date Noted    Acute respiratory failure ([facility] [place] [facility] 2020    Fall at home, sequela 2020    Type [facility] diabetes mellitus with [facility]cemia and without coma [facility]     DKA, type 1, not at goal [facility]) 2020    GI bleed 2020    Sepsis ([facility] [place] [facility] 2020    Hypoglycemia     EKG abnormalities     Syncope      Allergies: Allergies   Allergen Reactions    Erythromycin Nausea And Vomiting     Recent Chest Xray 2020  Impression    1. Christian Burton is been interval placement of a right internal jugular approach    central venous catheter with the tip near the confluence of the    brachiocephalic veins.  No pneumothorax.  The remaining support tubes and    lines in the chest are nonsignificantly altered in position. 2.  Previously described interstitial edema appears to questionably    slightly improved.       CT of head 2020      1.  No acute intracranial process. 2.  Moderate cerebral atrophy and mild chronic small vessel ischemic disease. Previous MBS - n/a  Chart reviewed. Medical Diagnosis: DKA  Treatment Diagnosis: dysphagia     BSE Impression 2020  Pt intubated - (self extubation). Pt alert, oriented to this being a hospital, not to month or year. Pt followed simple commands and produced strong volitional cough, voice is hoarse. Pt has history of Parkinson's, states \"it is only in my left hand. \"  Pt denies any swallowing impairments. Oral structures grossly intact, oral mucosa is dry. Presented pt with puree, thin liquids via cup / straw, including 3 ounces of uninterrupted swallows, as well as a dawna cracker. Pt demonstrated no overt signs of aspiration: no coughing/throat clearing or change in vocal quality.   Good labial seal with no anterior loss of liquids. Good swallow movement noted upon palpation of anterior neck. Pt exhibited no difficulty with mastication of cracker, no oral residue. Recommend dysphagia III soft and bite sized with thin liquids    MBS results - pt adamantly declines study    Pain: no    Current Diet :  Dysphagia III / thin, recommend downgrade to dysphagia II / thin - if any s/s of aspiration emerge, or there is respiratory decline,  make NPO until further evaluated by SLP, 8/27      Treatment:  Pt seen bedside to address the following goals:  Pt to be seen to address the following goals;  1- The patient will tolerate recommended diet without observed clinical signs of aspiration  8/27:  Pt alert, attempting to feed self with difficulty due to tremors in left hand. Pt also consuming large amounts and placing more food in mouth prior to clearance of prior bolus. Pt analyzed with eggs, chopped sausage, banana and liquids via cup and straw. Pt exhibited 2 instances of coughing with soft solids. No cough/throat clearing with thin liquids. RN reports pt drank cup of water earlier with no s/s of aspiration. Recommend MBS however pt adamantly declines study (see goal 2). Recommend downgrade to dysphagia II minced and moist with thin liquids. There is no respiratory decline per chart  RN notified this SLP after I left that pt vomited what appeared to be everything he ate. Recommend holding PO until lunch time and starting over with softer diet recommended. RN will alert SLP if any further difficulty noted  Cont goal  2-The patient/caregiver will demonstrate understanding of dysphagia recommendations/ compensatory strategies for improved swallowing safety  8/26: Educated pt to purpose of visit, s/s of aspiration, concern if aspiration occurs, rationale for diet recommendation/strategies to reduce risk for aspiration and instruction to notify staff if any signs emerge.  Pt stated understanding  Cont goal  8/27: educated pt to recommendation for MBS study and rationale. Explained coughing/choking on the solids as well as Parkinson's at baseline. Pt adamantly declines study, with max encouragement provided. Pt agreeable to downgrade of diet and stated understanding as to recommendation  Cont goal    Patient/Family/Caregiver Education:  As above    Compensatory Strategies: ASSISTANCE W/MEALS  90 degrees all meals and 30 min after  Alternate consistencies  small bites/sips  Swallow prior to next bite    Plan:  Continued daily Dysphagia treatment with goals per  plan of care. Diet recommendations: downgrade to dysphagia II minced and moist with thin liquids -if any s/s of aspiration emerge, or there is respiratory decline,  make NPO until further evaluated by SLP  DC recommendation: TBD  Treatment: 25  D/W nursing Mindy  Needs met prior to leaving room, call button in reach. Ava Tamayo M.S./[facility]-SLP #[id number]  Pg.  # [phone]    If patient is discharged prior to next treatment, this note will serve as the discharge summary

## 2020-08-27 NOTE — PROGRESS NOTES
Pulmonary Followup Note    CC: NSTEMI, DKA, acute hypoxemic respiratory failure  Subjective:  Resting comfortably on room air. Has his baseline resting tremor. Asking to sit in a chair and eat his lunch    ROS:  Denies headache, nausea or chest pain. 24HR INTAKE/OUTPUT:      Intake/Output Summary (Last 24 hours) at 2020 1402  Last data filed at 2020 1200  Gross per 24 hour   Intake 974.85 ml   Output 1690 ml   Net -715.15 ml        LORazepam  0.5 mg Intravenous Once    spironolactone  12.5 mg Oral Daily    losartan  25 mg Oral Daily    famotidine  10 mg Oral BID    docusate sodium  100 mg Oral Daily    insulin lispro  0-12 Units Subcutaneous 4x Daily AC & HS    carvedilol  3.125 mg Oral BID WC    QUEtiapine  12.5 mg Oral Nightly    insulin glargine  30 Units Subcutaneous Nightly    aspirin  325 mg Oral Once    aspirin  81 mg Oral Daily    atorvastatin  40 mg Oral Nightly    carbidopa-levodopa  2 tablet Oral TID           PHYSICAL EXAMINATION:  /82   Pulse 86   Temp 99.7 °F (37.6 °C) (Bladder)   Resp 20   Ht 5' 8\" (1.727 m)   Wt 166 lb 7.2 oz (75.5 kg)   SpO2 97%   BMI 25.31 kg/m²   CURRENT PULSE OXIMETRY:  SpO2: 97 %  24HR PULSE OXIMETRY RANGE:  SpO2  Av.1 %  Min: 80 %  Max: 100 % on room air      Gen: no distress. Speaking in full sentences without accessory muscle use  HEENT: PERRL, EOMI, OP nl  Lung:No wheezes, rales or ronchi. No egophony or fremitus. No accessory respiratory muscle use. CV: RRR without M/R/R  Abd: +BS, soft, NT/ND  Ext: No edema.     DATA  CBC:   Recent Labs     20  0336 20  0357 20  0327   WBC 6.8 5.8 5.5   HGB 9.4* 9.4* 9.2*   HCT 28.0* 28.3* 27.3*   MCV 75.0* 75.2* 75.0*    163 167     BMP:   Recent Labs     20  0357 20  1709 20  032    137 138   K 3.2* 3.8 3.1*   CL 95* 92* 94*   CO2 40* 37* 38*   PHOS 3.5 3.3 3.9   BUN 28* 23* 27*   CREATININE 0.9 0.9 0.9     No results for input(s): PHART, KGP7POG, PO2ART in the last 72 hours. LIVER PROFILE:   Recent Labs     08/26/20  0357 08/26/20  1709 08/27/20  0327   AST 21 20 19   ALT 10 <5* <5*   BILITOT 0.4 0.5 0.4   ALKPHOS 139* 136* 126       CXR REVIEWED BY ME AND SHOWED:  XR ABDOMEN (KUB) (SINGLE AP VIEW)   Final Result   Impression:   Enteric tube tip overlying the body of the stomach. CT HEAD WO CONTRAST   Final Result      1. No acute intracranial process. 2.  Moderate cerebral atrophy and mild chronic small vessel ischemic disease. XR CHEST PORTABLE   Final Result   1. There is been interval placement of a right internal jugular approach    central venous catheter with the tip near the confluence of the    brachiocephalic veins. No pneumothorax. The remaining support tubes and    lines in the chest are nonsignificantly altered in position. 2.  Previously described interstitial edema appears to questionably    slightly improved. XR ABDOMEN (KUB) (SINGLE AP VIEW)   Final Result     The nasogastric tube traverses the mediastinum and terminates in the    mid to distal body of the stomach. CT HEAD WO CONTRAST   Final Result      Evidence of diffuse chronic small vessel white matter disease. Subtle small round subcentimeter lesion suggested in the anterior left chin. This may be artifactual. Further evaluation with MR imaging of the brain without and with contrast is recommended for further evaluation. No other acute intracranial findings. No other acute intracranial findings. Negative noncontrast CT study of the head. XR CHEST PORTABLE   Final Result      ET tube and left IJ central venous catheter placed as described. No evidence of diffuse pulmonary vasculature congestion and interstitial edema, similar to the prior exam. No focal infiltrates seen. XR CHEST PORTABLE   Final Result   1.  Cardiomegaly with vascular congestion and pulmonary interstitial edema. ASSESSMENT/PLAN:  This is a 72 y.o. male who presented with high anion gap metabolic acidosis 2/2 DKA and lactic acidosis brought on by NSTEMI and acute chf exacerbation. Patient had the rare combination of volume overload with DKA and he also had hypoperfusion that I believe was secondary to AMI and flash pulmonary edema. He was empirically treated for infection as well, but there was never any overt signs of infection, so antibiotics were stopped after 3.5 days. In addition his blood pressure corrected with correction of his acidemia. He's now making good urine on his own and the 2+ edema he had in his lower extremities is gone. We removed fluid with dialysis while he was on vasopressors, and he improved. This is not consistent with a diagnosis of septic shock. He appears better compensated at the moment. Cardiology is following. No need for urgent ischemic evaluation but it will need to happen in the future as this patient has significant risk factors for CAD and I'd be fairly surprised if he doesn't have multivessel disease based on how poorly his diabetes is controlled. Plan is to transfer out of ICU today. He doesn't have any acute pulmonary needs so please call if there are additional questions.       Yesenia Angel MD

## 2020-08-28 NOTE — DISCHARGE INSTR - COC
Y92.009    Acute respiratory failure ([facility]) J96.00       Isolation/Infection:   Isolation          No Isolation        Patient Infection Status     Infection Onset Added Last Indicated Last Indicated By Review Planned Expiration Resolved Resolved By    COVID-19 Rule Out 08/28/20 08/28/20 08/28/20 COVID-19 (Ordered) 09/04/20 09/11/20      Resolved    COVID-19 Rule Out 08/21/20 08/21/20 08/21/20 COVID-19 (Ordered)   08/25/20 Rule-Out Test Resulted    COVID-19 Rule Out 08/06/20 08/06/20 08/06/20 COVID-19 (Ordered)   08/06/20 Rule-Out Test Resulted    COVID-19 Rule Out 08/04/20 08/04/20 08/04/20 COVID-19 (Ordered)   08/05/20 Genia Preston RN    COVID-19 Rule Out 05/14/20 05/14/20 05/14/20 COVID-19 (Ordered)   05/14/20 Rule-Out Test Resulted    COVID-19 Rule Out 04/28/20 04/28/20 04/28/20 COVID-19 (Ordered)   04/29/20 Rule-Out Test Resulted          Nurse Assessment:  Last Vital Signs: BP (!) 142/67   Pulse 77   Temp 97.3 °F (36.3 °C) (Oral)   Resp 18   Ht 5' 8\" (1.727 m)   Wt 160 lb 15 oz (73 kg)   SpO2 96%   BMI 24.47 kg/m²     Last documented pain score (0-10 scale): Pain Level: 0  Last Weight:   Wt Readings from Last 1 Encounters:   08/28/20 160 lb 15 oz (73 kg)     Mental Status:  oriented    IV Access:  - None    Nursing Mobility/ADLs:  Walking   Assisted  Transfer  Assisted  Bathing  Assisted  Dressing  Assisted  Toileting  Assisted  Feeding  Independent  Med Admin  Assisted  Med Delivery   crushed    Wound Care Documentation and Therapy:        Elimination:  Continence:   · Bowel: Yes  · Bladder: Yes  Urinary Catheter: None   Colostomy/Ileostomy/Ileal Conduit: No       Date of Last BM: ***    Intake/Output Summary (Last 24 hours) at 8/28/2020 1457  Last data filed at 8/28/2020 1330  Gross per 24 hour   Intake 120 ml   Output 675 ml   Net -555 ml     I/O last 3 completed shifts:   In: 120 [P.O.:120]  Out: 1225 [Urine:1225]    Safety Concerns:     History of Falls (last 30 days)    Impairments/Disabilities:      [address] Impairments/Disabilities:[id number]}    Nutrition Therapy:  Current Nutrition Therapy:   - Oral Diet:  Dysphagia 2 mechanically altered    Routes of Feeding: Oral  Liquids: Thin Liquids  Daily Fluid Restriction: no  Last Modified Barium Swallow with Video (Video Swallowing Test): {Done Not Done SLIY:[id number]}    Treatments at the Time of Hospital Discharge:   Respiratory Treatments: ***  Oxygen Therapy:  is not on home oxygen therapy. Ventilator:    - No ventilator support    Rehab Therapies: {THERAPEUTIC INTERVENTION:[id number]}  Weight Bearing Status/Restrictions: No weight bearing restirctions  Other Medical Equipment (for information only, NOT a DME order):  walker  Other Treatments: ***    Patient's personal belongings (please select all that are sent with patient):  {[facility] DME Belongings:[id number]}    RN SIGNATURE:  Electronically signed by Laurie Reardon RN on 8/30/20 at 1:47 PM EDT    CASE MANAGEMENT/SOCIAL WORK SECTION    Inpatient Status Date: ***    Readmission Risk Assessment Score:  Readmission Risk              Risk of Unplanned Readmission:        54           Discharging to Facility/ Agency   · Name: Aleksey Ceja  · Address:  · Phone:[phone]  · IPC:[phone]    Dialysis Facility (if applicable)   · Name:  · Address:  · Dialysis Schedule:  · Phone:  · Fax:    / signature: {Esignature:[id number]}    PHYSICIAN SECTION    Prognosis: Good    Condition at Discharge: Stable    Rehab Potential (if transferring to Rehab): Good    Recommended Labs or Other Treatments After Discharge: follow up with your PCP, Cardiologist    Physician Certification: I certify the above information and transfer of Ascencion Walter  is necessary for the continuing treatment of the diagnosis listed and that he requires Home Care for greater 30 days.      Update Admission H&P: No change in H&P    PHYSICIAN SIGNATURE:  Electronically signed by Sylvie Chadwick MD on

## 2020-08-28 NOTE — PROGRESS NOTES
Speech Language Pathology  Facility/Department: [facility] ICU  Dysphagia Daily Treatment Note - Late entry    NAME: Willy Jimenez  : 1950  MRN: [id number]    Patient Diagnosis(es):   Patient Active Problem List    Diagnosis Date Noted    Acute respiratory failure ([facility] 2020    Fall at home, sequela 2020    Type 1 diabetes mellitus with hypoglycemia and without coma ([facility]     DKA, type 1, not at goal [facility]) 2020    GI bleed 2020    Sepsis ([facility] 2020    Hypoglycemia     EKG abnormalities     Syncope      Allergies: Allergies   Allergen Reactions    Erythromycin Nausea And Vomiting     Recent Chest Xray 2020  Impression    1. Beulah Gayle is been interval placement of a right internal jugular approach    central venous catheter with the tip near the confluence of the    brachiocephalic veins.  No pneumothorax.  The remaining support tubes and    lines in the chest are nonsignificantly altered in position. 2.  Previously described interstitial edema appears to questionably    slightly improved.       CT of head 2020      1.  No acute intracranial process. 2.  Moderate cerebral atrophy and mild chronic small vessel ischemic disease. Previous MBS - n/a  Chart reviewed. Medical Diagnosis: DKA  Treatment Diagnosis: dysphagia     BSE Impression 2020  Pt intubated - (self extubation). Pt alert, oriented to this being a hospital, not to month or year. Pt followed simple commands and produced strong volitional cough, voice is hoarse. Pt has history of Parkinson's, states \"it is only in my left hand. \"  Pt denies any swallowing impairments. Oral structures grossly intact, oral mucosa is dry. Presented pt with puree, thin liquids via cup / straw, including 3 ounces of uninterrupted swallows, as well as a dawna cracker. Pt demonstrated no overt signs of aspiration: no coughing/throat clearing or change in vocal quality.   Good labial seal with no anterior loss of liquids. Good swallow movement noted upon palpation of anterior neck. Pt exhibited no difficulty with mastication of cracker, no oral residue. Recommend dysphagia III soft and bite sized with thin liquids    MBS results - pt adamantly declines study    Pain: no    Current Diet :  Dysphagia III / thin, recommend downgrade to dysphagia II / thin - if any s/s of aspiration emerge, or there is respiratory decline,  make NPO until further evaluated by SLP, 8/27      Treatment:  Pt seen bedside to address the following goals:  Pt to be seen to address the following goals;  1- The patient will tolerate recommended diet without observed clinical signs of aspiration  8/27:  Pt alert, attempting to feed self with difficulty due to tremors in left hand. Pt also consuming large amounts and placing more food in mouth prior to clearance of prior bolus. Pt analyzed with eggs, chopped sausage, banana and liquids via cup and straw. Pt exhibited 2 instances of coughing with soft solids. No cough/throat clearing with thin liquids. RN reports pt drank cup of water earlier with no s/s of aspiration. Recommend MBS however pt adamantly declines study (see goal 2). Recommend downgrade to dysphagia II minced and moist with thin liquids. There is no respiratory decline per chart  RN notified this SLP after I left that pt vomited what appeared to be everything he ate. Recommend holding PO until lunch time and starting over with softer diet recommended. RN will alert SLP if any further difficulty noted  Cont goal  8/28/20:  RN reports and pt confirms that pt had vomiting again this morning. Pt agreeable to PO trials at this time. Pt tolerates thin liquids with no clinical s/s aspiration. Pt demonstrates impulsivity with bite size of solids, but is able to masticate and swallow cracker with no clinical s/s aspiration. Pt verbalized recall of strategy to alternate consistencies.      Continue goal.    2-The patient/caregiver will demonstrate understanding of dysphagia recommendations/ compensatory strategies for improved swallowing safety  8/26: Educated pt to purpose of visit, s/s of aspiration, concern if aspiration occurs, rationale for diet recommendation/strategies to reduce risk for aspiration and instruction to notify staff if any signs emerge. Pt stated understanding  Cont goal  8/27: educated pt to recommendation for MBS study and rationale. Explained coughing/choking on the solids as well as Parkinson's at baseline. Pt adamantly declines study, with max encouragement provided. Pt agreeable to downgrade of diet and stated understanding as to recommendation  Cont goal  8/28/20:  SLP re-educated pt re: role of SLP, anatomy and physiology of swallow, rationale for MBS procedure, and diet recommendations; pt needs reinforcement. Continue goal.    Patient/Family/Caregiver Education:  As above    Compensatory Strategies:   NEEDS ASSISTANCE WITH MEALS  90 degrees all meals and 30 min after  Alternate consistencies  small bites/sips  Swallow prior to next bite    Plan:  Continued dysphagia treatment with goals per plan of care. Based on pt's medical history, he would benefit from objective/instrumental assessment; however he declines at this time. Diet recommendations: Dysphagia II Minced and Moist with Thin Liquids -if any s/s of aspiration emerge, or there is respiratory decline,  make NPO until further evaluated by SLP  DC recommendation: TBD  Treatment: 15 minutes  D/W nursing Denise  Needs met prior to leaving room, call button in reach.     Electronically Signed by:  Jese Cee. [facility]  Speech-Language Pathologist  [phone]  Pager #[phone]      If patient is discharged prior to next treatment, this note will serve as the discharge summary

## 2020-08-28 NOTE — PROGRESS NOTES
184 lb (83.5 kg)   Todays Wt: Weight: 160 lb 15 oz (73 kg)    TELEMETRY: Sinus     Physical Exam:     General: Sleeping this morning  Skin:  Warm and dry  Neck:  -JVP  Chest:  Clear to auscultation, respiration normal  Cardiovascular:  RRR S1S2  Abdomen:  Soft -  Extremities:  - edema      Objective    Labs:   Recent Labs     08/27/20  0327 08/27/20  1545 08/28/20  0453 08/28/20  0458    136  --  141   K 3.1* 4.1  --  3.5   BUN 27* 25*  --  23*   CREATININE 0.9 1.0  --  1.1   CL 94* 92*  --  97*   CO2 38* 33*  --  33*   GLUCOSE 26* 142*  --  59*   CALCIUM 8.7 9.2  --  8.8   MG 2.00  --  2.10 2.10     Recent Labs     08/26/20  0357 08/27/20  0327 08/28/20  0458   WBC 5.8 5.5 8.3   HGB 9.4* 9.2* 10.2*   HCT 28.3* 27.3* 30.7*    167 233   MCV 75.2* 75.0* 76.1*     No results for input(s): CHOLTOT, TRIG, HDL in the last 72 hours. Invalid input(s): LIPIDCOMM, CHOLHDL, VLDCHOL, LDL  No results for input(s): PTT, INR in the last 72 hours. Invalid input(s): PT  No results for input(s): CKTOTAL, CKMB, CKMBINDEX, TROPONINI in the last 72 hours. No results for input(s): BNP in the last 72 hours. No results for input(s): NTPROBNP in the last 72 hours. No results for input(s): TSH in the last 72 hours. Imaging:   I personally reviewed imaging studies including CXR, Stress test, TTE/RHYS. Assessment & Plan:     Septic shock/acute systolic heart failure/elevated troponin  -Primary cardiologist is Dr. Ru Schwartz.  -Continue current heart failure medications, currently hypertensive, increase Coreg to 6.25. If he still remains hypertensive next step will be to increase losartan to 50  -Plan for ischemic eval as an outpatient  -Continue aspirin and statin. Thank you for allowing to us to participate in the care of Zenaida Maunabo. Please call our service with questions. All questions and concerns were addressed to the patient/family. Alternatives to my treatment were discussed.  The note was completed
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
## 2020-08-28 NOTE — PLAN OF CARE
Problem: Falls - Risk of:  Goal: Will remain free from falls  Description: Will remain free from falls  Outcome: Ongoing  Note: Patient is a fall risk. Patient is a ace steady x 2. See Fall Risk assessment for details. Bed is in low, lock position; call light/belongings within reach. No attempts to get out of bed have been made, calls appropriately when assistance is needed. Bed alarm and hourly rounds in place for safety. Will continue to monitor and reassess throughout shift. Problem: Falls - Risk of:  Goal: Absence of physical injury  Description: Absence of physical injury  Outcome: Ongoing     Problem: Skin Integrity:  Goal: Will show no infection signs and symptoms  Description: Will show no infection signs and symptoms  Outcome: Ongoing  Note: Pt at risk for skin breakdown. Patient has blanchable redness on bottom, R foot is red, scattered bruising, L elbow has scab that pulled off. Skin assessment as documented. Pts skin cleansed with inc cleanser as needed. Pt repositioned in bed with pillow support as needed. Call light within reach. Will continue to monitor. Problem: Skin Integrity:  Goal: Absence of new skin breakdown  Description: Absence of new skin breakdown  Outcome: Ongoing     Problem: Urinary Elimination:  Goal: Signs and symptoms of infection will decrease  Description: Signs and symptoms of infection will decrease  Outcome: Ongoing  Note: Patient has a Jeffrey with good output. Problem: Infection - Central Venous Catheter-Associated Bloodstream Infection:  Goal: Will show no infection signs and symptoms  Description: Will show no infection signs and symptoms  Outcome: Ongoing  Note: Pt at risk for skin breakdown. Patient has blanchable redness on bottom, R foot is red, scattered bruising, L elbow has scab that pulled off. Skin assessment as documented. Pts skin cleansed with inc cleanser as needed. Pt repositioned in bed with pillow support as needed.   Call light within reach. Will continue to monitor.

## 2020-08-28 NOTE — PROGRESS NOTES
Patient's heart rhythm was showing v tach, which made his HR go up to 205,  So messaged Dr. Brittani Diaz about it he asked how many seconds it was, it was 12-15 seconds and also he has tremors from parkinson's, message me back saying will continue to monitor patient. The v tach happened again so messaged Dr. Brittani Diaz so he ordered mag to be drawn, which came back normal. Will continue to monitor.

## 2020-08-28 NOTE — PLAN OF CARE
Problem: Nutrition  Goal: Optimal nutrition therapy  Outcome: Ongoing  Note: Nutrition Problem #1: Inadequate oral intake  Intervention: Food and/or Nutrient Delivery: Start Oral Nutrition Supplement, Modify Current Diet  Nutritional Goals: Patient will tolerate po diet and consume 50% or greater of all meals and supplements

## 2020-08-28 NOTE — PROGRESS NOTES
Hospitalist Progress Note      PCP: Bassam MOYER    Date of Admission: 8/21/2020      Hospital Course: This is a 60-year-old male with a history of Parkinson disease diabetes mellitus who presented with diabetic ketoacidosis and altered mental status. He was intubated on arrival, self extubated 2 days ago    Subjective: Patient seen and examined  Looks better today   Left arm shaking due to Parkinson's  PT OT today  Need placement, covid test ordered anticipating d/c tomorrow  Increased coreg per cards    Medications:  Reviewed    Infusion Medications    dextrose      norepinephrine Stopped (08/23/20 0046)    dexmedetomidine (PRECEDEX) IV infusion Stopped (08/27/20 0534)     Scheduled Medications    LORazepam  0.5 mg Intravenous Once    pantoprazole  40 mg Intravenous Daily    spironolactone  12.5 mg Oral Daily    losartan  25 mg Oral Daily    docusate sodium  100 mg Oral Daily    insulin lispro  0-12 Units Subcutaneous 4x Daily AC & HS    carvedilol  3.125 mg Oral BID WC    QUEtiapine  12.5 mg Oral Nightly    insulin glargine  30 Units Subcutaneous Nightly    aspirin  325 mg Oral Once    aspirin  81 mg Oral Daily    atorvastatin  40 mg Oral Nightly    carbidopa-levodopa  2 tablet Oral TID     PRN Meds: ondansetron, acetaminophen, perflutren lipid microspheres, glucose, dextrose, glucagon (rDNA), dextrose, heparin (porcine), perflutren lipid microspheres      Intake/Output Summary (Last 24 hours) at 8/28/2020 1045  Last data filed at 8/27/2020 2346  Gross per 24 hour   Intake 120 ml   Output 1225 ml   Net -1105 ml       Physical Exam Performed:    BP (!) 158/75   Pulse 87   Temp 97.3 °F (36.3 °C) (Oral)   Resp 18   Ht 5' 8\" (1.727 m)   Wt 160 lb 15 oz (73 kg)   SpO2 95%   BMI 24.47 kg/m²     General appearance: No apparent distress, appears stated age and cooperative. HEENT: Pupils equal, round, and reactive to light. Conjunctivae/corneas clear.   Neck: Supple, with full range of motion. No jugular venous distention. Trachea midline. Respiratory: Bilateral air entry decreased at the bases  Cardiovascular: Regular rate and rhythm with normal S1/S2 without murmurs, rubs or gallops. Abdomen: Soft, non-tender, non-distended with normal bowel sounds. Musculoskeletal: No clubbing, cyanosis or edema bilaterally. Full range of motion without deformity. Skin: Skin color, texture, turgor normal.  No rashes or lesions. Neurologic:  Neurovascularly intact without any focal sensory/motor deficits. Cranial nerves: II-XII intact, grossly non-focal.  Psychiatric: Lethargic , on sedation   capillary Refill: Brisk,< 3 seconds   Peripheral Pulses: +2 palpable, equal bilaterally       Labs:   Recent Labs     08/26/20  0357 08/27/20  0327 08/28/20  0458   WBC 5.8 5.5 8.3   HGB 9.4* 9.2* 10.2*   HCT 28.3* 27.3* 30.7*    167 233     Recent Labs     08/27/20 0327 08/27/20  1545 08/28/20  0453 08/28/20  0458    136  --  141   K 3.1* 4.1  --  3.5   CL 94* 92*  --  97*   CO2 38* 33*  --  33*   BUN 27* 25*  --  23*   CREATININE 0.9 1.0  --  1.1   CALCIUM 8.7 9.2  --  8.8   PHOS 3.9 3.2 3.0  --      Recent Labs     08/27/20  0327 08/27/20  1545 08/28/20  0458   AST 19 25 26   ALT <5* <5* <5*   BILITOT 0.4 0.7 0.6   ALKPHOS 126 152* 135*     No results for input(s): INR in the last 72 hours. No results for input(s): Shalini Sox in the last 72 hours. Urinalysis:      Lab Results   Component Value Date    NITRU Negative 08/21/2020    WBCUA None seen 07/26/2020    BACTERIA 2+ 04/29/2020    RBCUA None seen 07/26/2020    BLOODU Negative 08/21/2020    SPECGRAV 1.010 08/21/2020    GLUCOSEU >=1000 08/21/2020       Radiology:  XR ABDOMEN (KUB) (SINGLE AP VIEW)   Final Result   Impression:   Enteric tube tip overlying the body of the stomach. CT HEAD WO CONTRAST   Final Result      1. No acute intracranial process.    2.  Moderate cerebral atrophy and mild chronic small vessel ischemic disease. XR CHEST PORTABLE   Final Result   1. There is been interval placement of a right internal jugular approach    central venous catheter with the tip near the confluence of the    brachiocephalic veins. No pneumothorax. The remaining support tubes and    lines in the chest are nonsignificantly altered in position. 2.  Previously described interstitial edema appears to questionably    slightly improved. XR ABDOMEN (KUB) (SINGLE AP VIEW)   Final Result     The nasogastric tube traverses the mediastinum and terminates in the    mid to distal body of the stomach. CT HEAD WO CONTRAST   Final Result      Evidence of diffuse chronic small vessel white matter disease. Subtle small round subcentimeter lesion suggested in the anterior left chin. This may be artifactual. Further evaluation with MR imaging of the brain without and with contrast is recommended for further evaluation. No other acute intracranial findings. No other acute intracranial findings. Negative noncontrast CT study of the head. XR CHEST PORTABLE   Final Result      ET tube and left IJ central venous catheter placed as described. No evidence of diffuse pulmonary vasculature congestion and interstitial edema, similar to the prior exam. No focal infiltrates seen. XR CHEST PORTABLE   Final Result   1. Cardiomegaly with vascular congestion and pulmonary interstitial edema.               Assessment/Plan:    Active Hospital Problems    Diagnosis    Acute respiratory failure ([facility] [J96.00]    DKA, type 1, not at goal [facility]) [E10.10]     Acute respiratory failure with hypoxia status post intubation 8/21 s/p self extubation  Multifactorial volume overload versus pneumonia   Improved     #Diabetic ketoacidosis  Weaned off of insulin drip  On dysphagia 3 diet     sepsis possible pna with possible septic shock    on iv abx     #Lactic acidosis likely due to hypoperfusion, improved  We will monitor     #Elevated troponin likely demand ischemia versus NSTEMI  EKG normal sinus rhythm poor quality EKG but cannot appreciate any significant ST changes.  -cardiology following  Stopped heparin drip, ischemic eval as OP    HTN  Uncontrolled  increased coreg today    #Parkinson's disease. DVT Prophylaxis: lovenox  Diet: DIET DYSPHAGIA SOFT AND BITE-SIZED;  Carb Control: 3 carb choices (45 gms)/meal; Dysphagia Minced and Moist  Code Status: Full Code    PT/OT Eval Status: ordered, might need placement     Dispo - icu    Tish Holcomb MD

## 2020-08-28 NOTE — PROGRESS NOTES
Office : [phone]     Fax :[phone]         Renal Progress Note  Subjective:   Admit Date: 8/21/2020     Reason for Consult: NABIL    KEO Millan is a 72 y.o. male with a past medical history of hypertension, diabetes,Parkinson's, prior admission with DKA who presented to the hospital with AMS along with hypoxemia and respiratory distress requiring intubation.  On admission he was significantly acidotic and found to be in DKA. Teresia Paci was a started on CRRT and his admission EKG was concerning for ST depressions although his pH was 7 at that time. [facility] Nephrology team was consulted for further assistance given NABIL        08/27/20    Good UO   Cr stable   On lasix       DIET DIET DYSPHAGIA SOFT AND BITE-SIZED;  Carb Control: 3 carb choices (45 gms)/meal; Dysphagia Minced and Moist  Medications:   Scheduled Meds:   LORazepam  0.5 mg Intravenous Once    pantoprazole  40 mg Intravenous Daily    spironolactone  12.5 mg Oral Daily    losartan  25 mg Oral Daily    docusate sodium  100 mg Oral Daily    insulin lispro  0-12 Units Subcutaneous 4x Daily AC & HS    carvedilol  3.125 mg Oral BID WC    QUEtiapine  12.5 mg Oral Nightly    insulin glargine  30 Units Subcutaneous Nightly    aspirin  325 mg Oral Once    aspirin  81 mg Oral Daily    atorvastatin  40 mg Oral Nightly    carbidopa-levodopa  2 tablet Oral TID     Continuous Infusions:   dextrose      norepinephrine Stopped (08/23/20 0046)    dexmedetomidine (PRECEDEX) IV infusion Stopped (08/27/20 0534)       Labs:  CBC:   Recent Labs     08/25/20  0336 08/26/20  0357 08/27/20  0327   WBC 6.8 5.8 5.5 HGB 9.4* 9.4* 9.2*    163 167     BMP:    Recent Labs     08/26/20  1709 08/27/20  0327 08/27/20  1545    138 136   K 3.8 3.1* 4.1   CL 92* 94* 92*   CO2 37* 38* 33*   BUN 23* 27* 25*   CREATININE 0.9 0.9 1.0   GLUCOSE 214* 26* 142*     Ca/Mg/Phos:   Recent Labs     08/25/20  0336 08/26/20  0357 08/26/20 1709 08/27/20  0327 08/27/20  1545   CALCIUM 8.5 8.7 8.8 8.7 9.2   MG 1.90 2.00  --  2.00  --    PHOS 3.3 3.5 3.3 3.9 3.2     Hepatic:   Recent Labs     08/26/20 1709 08/27/20 0327 08/27/20  1545   AST 20 19 25   ALT <5* <5* <5*   BILITOT 0.5 0.4 0.7   ALKPHOS 136* 126 152*     Troponin:   No results for input(s): TROPONINI in the last 72 hours. BNP: No results for input(s): BNP in the last 72 hours. Lipids: No results for input(s): CHOL, TRIG, HDL, LDLCALC, LABVLDL in the last 72 hours. ABGs:   No results for input(s): PHART, PO2ART, AUQ4PLA in the last 72 hours. INR:   No results for input(s): INR in the last 72 hours. UA:  No results for input(s): Miriam Joshua, GLUCOSEU, BILIRUBINUR, KETUA, SPECGRAV, BLOODU, PHUR, PROTEINU, UROBILINOGEN, NITRU, LEUKOCYTESUR, [place] in the last 72 hours. Urine Microscopic: No results for input(s): LABCAST, BACTERIA, COMU, HYALCAST, WBCUA, RBCUA, EPIU in the last 72 hours. Urine Culture: No results for input(s): LABURIN in the last 72 hours. Urine Chemistry: No results for input(s): Cresenciano Brad, PROTEINUR, NAUR in the last 72 hours.     Objective:   Vitals: BP (!) 157/64   Pulse 93   Temp 98.4 °F (36.9 °C) (Oral)   Resp 18   Ht 5' 8\" (1.727 m)   Wt 166 lb 7.2 oz (75.5 kg)   SpO2 97%   BMI 25.31 kg/m²    Wt Readings from Last 3 Encounters:   08/26/20 166 lb 7.2 oz (75.5 kg)   08/05/20 184 lb 8.4 oz (83.7 kg)   08/01/20 190 lb 14.7 oz (86.6 kg)      24HR INTAKE/OUTPUT:      Intake/Output Summary (Last 24 hours) at 8/27/2020 2320  Last data filed at 8/27/2020 1914  Gross per 24 hour   Intake 120 ml   Output 1490 ml   Net -1370 ml Physical Exam    Constitutional:  AA  NECK: supple, no JVD  Cardiovascular:  S1, S2 without m/r/g  Respiratory:  CTA B without w/r/r  Abdomen: +bs, soft, nt  Ext: no LE edema      Assessment and Plan:       IMAGING:  XR ABDOMEN (KUB) (SINGLE AP VIEW)   Final Result   Impression:   Enteric tube tip overlying the body of the stomach. CT HEAD WO CONTRAST   Final Result      1. No acute intracranial process. 2.  Moderate cerebral atrophy and mild chronic small vessel ischemic disease. XR CHEST PORTABLE   Final Result   1. There is been interval placement of a right internal jugular approach    central venous catheter with the tip near the confluence of the    brachiocephalic veins. No pneumothorax. The remaining support tubes and    lines in the chest are nonsignificantly altered in position. 2.  Previously described interstitial edema appears to questionably    slightly improved. XR ABDOMEN (KUB) (SINGLE AP VIEW)   Final Result     The nasogastric tube traverses the mediastinum and terminates in the    mid to distal body of the stomach. CT HEAD WO CONTRAST   Final Result      Evidence of diffuse chronic small vessel white matter disease. Subtle small round subcentimeter lesion suggested in the anterior left chin. This may be artifactual. Further evaluation with MR imaging of the brain without and with contrast is recommended for further evaluation. No other acute intracranial findings. No other acute intracranial findings. Negative noncontrast CT study of the head. XR CHEST PORTABLE   Final Result      ET tube and left IJ central venous catheter placed as described. No evidence of diffuse pulmonary vasculature congestion and interstitial edema, similar to the prior exam. No focal infiltrates seen. XR CHEST PORTABLE   Final Result   1. Cardiomegaly with vascular congestion and pulmonary interstitial edema.           Assessment/Plan   Nash Gomez Scott Jones is a 72 y.o. male with a past medical history of hypertension, diabetes,Parkinson's, prior admission with DKA who presented to the hospital with AMS along with hypoxemia and respiratory distress requiring intubation.  On admission he was significantly acidotic and found to be in DKA. We are consulted for further assistance given NABIL       1. NABIL   - Cr on admission 2.0 --> 1.3-->1.0 now  - off CRRT   - Diuresis as love   - remove HD cath      2. Shock - cardiogenic   - echo with ejection fraction of 45- 50%  - try to maintain pt euvolemic  - monitor BP     3. Anemia  - f/u H/H     4. Acid- base/ Electrolyte imbalance   - improved     5. Lactic acidosis   - lactic acid 6.6 on admission  - resolved      6.  DKA   - glucose 922 on admission --> 151 now  - HbA1C 9.3               Thank you for allowing us to participate in care of [facility] free to contact  [facility] [address] -[phone]  [phone]    Adrian Guzmán MD

## 2020-08-29 NOTE — PROGRESS NOTES
Office : [phone]     Fax :[phone]         Renal Progress Note  Subjective:   Admit Date: 8/21/2020     Reason for Consult: NABIL    HPI   Matthias Lombardo is a 72 y.o. male with a past medical history of hypertension, diabetes,Parkinson's, prior admission with DKA who presented to the hospital with AMS along with hypoxemia and respiratory distress requiring intubation.  On admission he was significantly acidotic and found to be in DKA. Annamaria Sharif was a started on CRRT and his admission EKG was concerning for ST depressions although his pH was 7 at that time. [facility] team was consulted for further assistance given NABIL        8/28  No nausea or vomiting   Good UO   Cr stable   No abdominal pain     DIET DIET DYSPHAGIA SOFT AND BITE-SIZED;  Carb Control: 4 carb choices (60 gms)/meal; Dysphagia Minced and Moist  Dietary Nutrition Supplements: Diabetic Oral Supplement  Medications:   Scheduled Meds:   potassium chloride  20 mEq Oral BID WC    carvedilol  6.25 mg Oral BID WC    [START ON 8/30/2020] losartan  25 mg Oral Daily    insulin glargine  25 Units Subcutaneous Nightly    insulin lispro  0-6 Units Subcutaneous TID WC    insulin lispro  0-3 Units Subcutaneous Nightly    LORazepam  0.5 mg Intravenous Once    pantoprazole  40 mg Intravenous Daily    spironolactone  12.5 mg Oral Daily    docusate sodium  100 mg Oral Daily    QUEtiapine  12.5 mg Oral Nightly    aspirin  325 mg Oral Once    aspirin  81 mg Oral Daily    atorvastatin  40 mg Oral Nightly    carbidopa-levodopa  2 tablet Oral TID     Continuous Infusions:   dextrose      norepinephrine Stopped (08/23/20 0046)    dexmedetomidine (PRECEDEX) IV infusion Stopped (08/27/20 0534)       Labs:  CBC:   Recent Labs     08/27/20  0327 08/28/20  0458 08/29/20  0505   WBC 5.5 8.3 6.6   HGB 9.2* 10.2* 9.7*    233 229     BMP:    Recent Labs     08/27/20  1545 08/28/20  0458 08/29/20  0505    141 137   K 4.1 3.5 3.5   CL 92* 97* 97*   CO2 33* 33* 31   BUN 25* 23* 16   CREATININE 1.0 1.1 1.0   GLUCOSE 142* 59* 140*     Ca/Mg/Phos:   Recent Labs     08/27/20  1545 08/28/20  0453 08/28/20  0458 08/29/20  0505   CALCIUM 9.2  --  8.8 8.9   MG  --  2.10 2.10 2.40   PHOS 3.2 3.0  --  2.5     Hepatic:   Recent Labs     08/27/20  1545 08/28/20  0458 08/29/20  0505   AST 25 26 17   ALT <5* <5* <5*   BILITOT 0.7 0.6 0.7   ALKPHOS 152* 135* 130*     Troponin:   No results for input(s): TROPONINI in the last 72 hours. BNP: No results for input(s): BNP in the last 72 hours. Lipids: No results for input(s): CHOL, TRIG, HDL, LDLCALC, LABVLDL in the last 72 hours. ABGs:   No results for input(s): PHART, PO2ART, RBA8FCW in the last 72 hours. INR:   No results for input(s): INR in the last 72 hours. UA:  No results for input(s): Xenia Finger, GLUCOSEU, BILIRUBINUR, KETUA, SPECGRAV, BLOODU, PHUR, PROTEINU, UROBILINOGEN, NITRU, LEUKOCYTESUR, Nathaneil Fray in the last 72 hours. Urine Microscopic: No results for input(s): LABCAST, BACTERIA, COMU, HYALCAST, WBCUA, RBCUA, EPIU in the last 72 hours. Urine Culture: No results for input(s): LABURIN in the last 72 hours. Urine Chemistry: No results for input(s): Kiser Zo, PROTEINDINORAH, NADINORAH in the last 72 hours.     Objective:   Vitals: BP (!) 144/74   Pulse 87   Temp 97.3 °F (36.3 °C) (Oral)   Resp 20   Ht 5' 8\" (1.727 m)   Wt 160 lb 15 oz (73 kg)   SpO2 95%   BMI 24.47 kg/m²    Wt Readings from Last 3 Encounters:   08/28/20 160 lb 15 oz (73 kg)   08/05/20 184 lb 8.4 oz (83.7 kg)   08/01/20 190 lb 14.7 oz (86.6 kg)      24HR INTAKE/OUTPUT: Intake/Output Summary (Last 24 hours) at 8/29/2020 0950  Last data filed at 8/29/2020 0945  Gross per 24 hour   Intake 600 ml   Output 1250 ml   Net -650 ml       Physical Exam  Constitutional:  awake, NAD  HEENT:  MMM  Neck: supple, no JVD  Cardiovascular:  S1, S2 reg  Respiratory: CTA  Abdomen:  +BS, soft, ND  Ext: no lower extremity edema  Skin: dry/intact  CNS: alert, no agitation       Assessment and Plan:       IMAGING:  XR ABDOMEN (KUB) (SINGLE AP VIEW)   Final Result   Impression:   Enteric tube tip overlying the body of the stomach. CT HEAD WO CONTRAST   Final Result      1. No acute intracranial process. 2.  Moderate cerebral atrophy and mild chronic small vessel ischemic disease. XR CHEST PORTABLE   Final Result   1. There is been interval placement of a right internal jugular approach    central venous catheter with the tip near the confluence of the    brachiocephalic veins. No pneumothorax. The remaining support tubes and    lines in the chest are nonsignificantly altered in position. 2.  Previously described interstitial edema appears to questionably    slightly improved. XR ABDOMEN (KUB) (SINGLE AP VIEW)   Final Result     The nasogastric tube traverses the mediastinum and terminates in the    mid to distal body of the stomach. CT HEAD WO CONTRAST   Final Result      Evidence of diffuse chronic small vessel white matter disease. Subtle small round subcentimeter lesion suggested in the anterior left chin. This may be artifactual. Further evaluation with MR imaging of the brain without and with contrast is recommended for further evaluation. No other acute intracranial findings. No other acute intracranial findings. Negative noncontrast CT study of the head. XR CHEST PORTABLE   Final Result      ET tube and left IJ central venous catheter placed as described.         No evidence of diffuse pulmonary vasculature congestion and interstitial edema, similar to the prior exam. No focal infiltrates seen. XR CHEST PORTABLE   Final Result   1. Cardiomegaly with vascular congestion and pulmonary interstitial edema. Assessment/Plan   Brii Kilgore is a 72 y.o. male with a past medical history of hypertension, diabetes,Parkinson's, prior admission with DKA who presented to the hospital with AMS along with hypoxemia and respiratory distress requiring intubation.  On admission he was significantly acidotic and found to be in DKA. We are consulted for further assistance given NABIL       1. NABIL   - Cr on admission 2.0 --> 1.3-->1.0 now  - off CRRT   - off lasix   - dc HD cath      2. Shock - cardiogenic   - echo with ejection fraction of 45- 50%  - On ARB and aldactone   - try to maintain pt euvolemic  - monitor BP     3. Anemia  - f/u H/H     4. Acid- base/ Electrolyte imbalance   - improved     5. Lactic acidosis   - lactic acid 6.6 on admission  - resolved      6.  DKA   - glucose 922 on admission  - HbA1C 9.3           Thank you for allowing us to participate in care of Justin Perales  8/29/2020    [facility] [address]  Office : [phone]  Fax :[phone]

## 2020-08-29 NOTE — PROGRESS NOTES
Office : [phone]     Fax :[phone]         Renal Progress Note  Subjective:   Admit Date: 8/21/2020     Reason for Consult: NABIL    HPI   Naomi Cody is a 72 y.o. male with a past medical history of hypertension, diabetes,Parkinson's, prior admission with DKA who presented to the hospital with AMS along with hypoxemia and respiratory distress requiring intubation.  On admission he was significantly acidotic and found to be in DKA. Shelley Ahumada was a started on CRRT and his admission EKG was concerning for ST depressions although his pH was 7 at that time. [facility] Nephrology team was consulted for further assistance given NABIL        8/28    Good UO   Cr stable   On lasix       DIET DIET DYSPHAGIA SOFT AND BITE-SIZED;  Carb Control: 4 carb choices (60 gms)/meal; Dysphagia Minced and Moist  Dietary Nutrition Supplements: Diabetic Oral Supplement  Medications:   Scheduled Meds:   insulin glargine  25 Units Subcutaneous Nightly    insulin lispro  0-6 Units Subcutaneous TID WC    insulin lispro  0-3 Units Subcutaneous Nightly    LORazepam  0.5 mg Intravenous Once    pantoprazole  40 mg Intravenous Daily    spironolactone  12.5 mg Oral Daily    losartan  25 mg Oral Daily    docusate sodium  100 mg Oral Daily    carvedilol  3.125 mg Oral BID WC    QUEtiapine  12.5 mg Oral Nightly    aspirin  325 mg Oral Once    aspirin  81 mg Oral Daily    atorvastatin  40 mg Oral Nightly    carbidopa-levodopa  2 tablet Oral TID     Continuous Infusions:   dextrose      norepinephrine Stopped (08/23/20 0046)    dexmedetomidine (PRECEDEX) IV infusion Stopped (08/27/20 0534) Labs:  CBC:   Recent Labs     08/26/20  0357 08/27/20  0327 08/28/20  0458   WBC 5.8 5.5 8.3   HGB 9.4* 9.2* 10.2*    167 233     BMP:    Recent Labs     08/27/20  0327 08/27/20  1545 08/28/20  0458    136 141   K 3.1* 4.1 3.5   CL 94* 92* 97*   CO2 38* 33* 33*   BUN 27* 25* 23*   CREATININE 0.9 1.0 1.1   GLUCOSE 26* 142* 59*     Ca/Mg/Phos:   Recent Labs     08/27/20  0327 08/27/20  1545 08/28/20  0453 08/28/20  0458   CALCIUM 8.7 9.2  --  8.8   MG 2.00  --  2.10 2.10   PHOS 3.9 3.2 3.0  --      Hepatic:   Recent Labs     08/27/20  0327 08/27/20  1545 08/28/20  0458   AST 19 25 26   ALT <5* <5* <5*   BILITOT 0.4 0.7 0.6   ALKPHOS 126 152* 135*     Troponin:   No results for input(s): TROPONINI in the last 72 hours. BNP: No results for input(s): BNP in the last 72 hours. Lipids: No results for input(s): CHOL, TRIG, HDL, LDLCALC, LABVLDL in the last 72 hours. ABGs:   No results for input(s): PHART, PO2ART, OEY5QZL in the last 72 hours. INR:   No results for input(s): INR in the last 72 hours. UA:  No results for input(s): Selina Drone, GLUCOSEU, BILIRUBINUR, KETUA, SPECGRAV, BLOODU, PHUR, PROTEINU, UROBILINOGEN, NITRU, LEUKOCYTESUR, Marilee Kenning in the last 72 hours. Urine Microscopic: No results for input(s): LABCAST, BACTERIA, COMU, HYALCAST, WBCUA, RBCUA, EPIU in the last 72 hours. Urine Culture: No results for input(s): LABURIN in the last 72 hours. Urine Chemistry: No results for input(s): Alfornia Vandana, PROTEINUR, NAUR in the last 72 hours.     Objective:   Vitals: BP (!) 142/67   Pulse 82   Temp 97.3 °F (36.3 °C) (Oral)   Resp 20   Ht 5' 8\" (1.727 m)   Wt 160 lb 15 oz (73 kg)   SpO2 94%   BMI 24.47 kg/m²    Wt Readings from Last 3 Encounters:   08/28/20 160 lb 15 oz (73 kg)   08/05/20 184 lb 8.4 oz (83.7 kg)   08/01/20 190 lb 14.7 oz (86.6 kg)      24HR INTAKE/OUTPUT:      Intake/Output Summary (Last 24 hours) at 8/29/2020 0156  Last data filed at 8/28/2020 2145  Gross per 24 hour   Intake 120 ml   Output 950 ml   Net -830 ml       Physical Exam    Constitutional:  AA  NECK: supple, no JVD  Cardiovascular:  S1, S2 without m/r/g  Respiratory:  CTA B without w/r/r  Abdomen: +bs, soft, nt  Ext: no LE edema      Assessment and Plan:       IMAGING:  XR ABDOMEN (KUB) (SINGLE AP VIEW)   Final Result   Impression:   Enteric tube tip overlying the body of the stomach. CT HEAD WO CONTRAST   Final Result      1. No acute intracranial process. 2.  Moderate cerebral atrophy and mild chronic small vessel ischemic disease. XR CHEST PORTABLE   Final Result   1. There is been interval placement of a right internal jugular approach    central venous catheter with the tip near the confluence of the    brachiocephalic veins. No pneumothorax. The remaining support tubes and    lines in the chest are nonsignificantly altered in position. 2.  Previously described interstitial edema appears to questionably    slightly improved. XR ABDOMEN (KUB) (SINGLE AP VIEW)   Final Result     The nasogastric tube traverses the mediastinum and terminates in the    mid to distal body of the stomach. CT HEAD WO CONTRAST   Final Result      Evidence of diffuse chronic small vessel white matter disease. Subtle small round subcentimeter lesion suggested in the anterior left chin. This may be artifactual. Further evaluation with MR imaging of the brain without and with contrast is recommended for further evaluation. No other acute intracranial findings. No other acute intracranial findings. Negative noncontrast CT study of the head. XR CHEST PORTABLE   Final Result      ET tube and left IJ central venous catheter placed as described. No evidence of diffuse pulmonary vasculature congestion and interstitial edema, similar to the prior exam. No focal infiltrates seen. XR CHEST PORTABLE   Final Result   1.  Cardiomegaly with vascular congestion and pulmonary interstitial edema. Assessment/Plan   Rohit Aj is a 72 y.o. male with a past medical history of hypertension, diabetes,Parkinson's, prior admission with DKA who presented to the hospital with AMS along with hypoxemia and respiratory distress requiring intubation.  On admission he was significantly acidotic and found to be in DKA. We are consulted for further assistance given NABIL       1. NABIL   - Cr on admission 2.0 --> 1.3-->1.0 now  - off CRRT   - off lasix   - remove HD cath      2. Shock - cardiogenic   - echo with ejection fraction of 45- 50%  - On ARB and aldactone   - try to maintain pt euvolemic  - monitor BP     3. Anemia  - f/u H/H     4. Acid- base/ Electrolyte imbalance   - improved     5. Lactic acidosis   - lactic acid 6.6 on admission  - resolved      6.  DKA   - glucose 922 on admission --> 151 now  - HbA1C 9.3               Thank you for allowing us to participate in care of [facility] free to contact  [facility] -[phone]  [phone]    Rafael Moore MD

## 2020-08-29 NOTE — PROGRESS NOTES
Electrophysiology - PROGRESS NOTE    Admit Date: 8/21/2020     Chief Complaint: septic schnook, acute D/SCHF , elevated trops    Interval History:   Patient seen and examined and notes reviewed. Patient is being followed for septic shock, acute sCHF and elevated trops.  Patient with multiple admissions d/t noncompliance with his insulin, he had been d/c'd from the [facility] after his last admission and when they called the next day to check on him, he was found nonresponsive with pulm edema, EF 45-50%,     In: 120 [P.O.:120]  Out: 700    Wt Readings from Last 2 Encounters:   08/28/20 160 lb 15 oz (73 kg)   08/05/20 184 lb 8.4 oz (83.7 kg)         Data:   Scheduled Meds:   Scheduled Meds:   insulin glargine  25 Units Subcutaneous Nightly    insulin lispro  0-6 Units Subcutaneous TID WC    insulin lispro  0-3 Units Subcutaneous Nightly    LORazepam  0.5 mg Intravenous Once    pantoprazole  40 mg Intravenous Daily    spironolactone  12.5 mg Oral Daily    losartan  25 mg Oral Daily    docusate sodium  100 mg Oral Daily    carvedilol  3.125 mg Oral BID WC    QUEtiapine  12.5 mg Oral Nightly    aspirin  325 mg Oral Once    aspirin  81 mg Oral Daily    atorvastatin  40 mg Oral Nightly    carbidopa-levodopa  2 tablet Oral TID     Continuous Infusions:   dextrose      norepinephrine Stopped (08/23/20 0046)    dexmedetomidine (PRECEDEX) IV infusion Stopped (08/27/20 0534)     PRN Meds:.ondansetron, acetaminophen, perflutren lipid microspheres, glucose, dextrose, glucagon (rDNA), dextrose, heparin (porcine), perflutren lipid microspheres  Continuous Infusions:   dextrose      norepinephrine Stopped (08/23/20 0046)    dexmedetomidine (PRECEDEX) IV infusion Stopped (08/27/20 0534)       Intake/Output Summary (Last 24 hours) at 8/29/2020 0828  Last data filed at 8/29/2020 0510  Gross per 24 hour   Intake 120 ml   Output 1250 ml   Net -1130 ml       CBC:   Lab Results   Component Value Date    WBC 6.6 08/29/2020    HGB 9.7 08/29/2020     08/29/2020     BMP:  Lab Results   Component Value Date     08/29/2020    K 3.5 08/29/2020    CL 97 08/29/2020    CO2 31 08/29/2020    BUN 16 08/29/2020    CREATININE 1.0 08/29/2020    GLUCOSE 140 08/29/2020     INR:   Lab Results   Component Value Date    INR 1.01 08/21/2020    INR 1.03 08/21/2020    INR 0.97 06/22/2020        CARDIAC LABS  ENZYMES:No results for input(s): CKMB, CKMBINDEX, TROPONINI in the last 72 hours. Invalid input(s): CKTOTAL;3  FASTING LIPID PANEL:  Lab Results   Component Value Date    HDL 40 07/31/2020    LDLCALC 42 07/31/2020    TRIG 69 07/31/2020     LIVER PROFILE:  Lab Results   Component Value Date    AST 17 08/29/2020    AST 26 08/28/2020    ALT <5 08/29/2020    ALT <5 08/28/2020       -----------------------------------------------------------------  Telemetry: Personally reviewed  NSR, occ PVCs    Objective:   Vitals: BP (!) 144/74   Pulse 87   Temp 97.3 °F (36.3 °C) (Oral)   Resp 20   Ht 5' 8\" (1.727 m)   Wt 160 lb 15 oz (73 kg)   SpO2 95%   BMI 24.47 kg/m²   General appearance: alert, appears stated age and cooperative, No acute distress   Eyes: Conjunctiva and pupils normal and reactive  Skin: Skin color, texture, turgor normal. No rashes or ecchymosis. Neck: no JVD, supple, symmetrical, trachea midline   Lungs: , no accessory muscle use, no respiratory distress  Heart: RRR  Abdomen: soft, non-tender; bowel sounds normal  Extremities: No edema, DP +, + tremor  Psychiatric: normal insight and affect    Patient Active Problem List:     Hypoglycemia     EKG abnormalities     Syncope     Sepsis (HCC)     GI bleed     DKA, type 1, not at goal [facility])     Type 1 diabetes mellitus with hypoglycemia and without coma ([facility] [address]     Fall at home, sequela     Acute respiratory failure ([facility] [address]        Assessment & Plan:      1. Elevated trops  2. Acute sCHF.      71 y/o man with a h/o HTN, DM, Parkinsons, CKD who p/w hyperglycemia, AMS and resp failure requiring intubation, CXR showed interstitial infiltrates.      Elevated trops  - No s/s  - Ischemic eval outpatient  - On coreg 3.125 - HR 80's  - On ASA, statin    Acute s CHF  - Off lasix for now, neg 7.5 L  - Keep K+ > 4.0 and Mg > 2.0 - replacement ordered  - Limited echo outpatient  - On coreg 3.125 - will up titrate to 6.25 mg  - On losartan 25 mg - will up titrate to 50 mg if BP tolerates      Brandy Hudson [address]

## 2020-08-29 NOTE — PLAN OF CARE
1114 by Rogelio Cortes RN  Outcome: Ongoing  8/29/2020 0435 by Sheryl Acevedo RN  Outcome: Ongoing     Problem: Serum Glucose Level - Abnormal:  Goal: Ability to maintain appropriate glucose levels will improve  Description: Ability to maintain appropriate glucose levels will improve  8/29/2020 1114 by Rogelio Cortes RN  Outcome: Ongoing  Note:  this am. Pt states feeling \"much better today. \" Will cont to monitor. 8/29/2020 0435 by Sheryl Acevedo RN  Outcome: Ongoing     Problem: Urinary Elimination:  Goal: Signs and symptoms of infection will decrease  Description: Signs and symptoms of infection will decrease  8/29/2020 1114 by Rogelio Cortes RN  Outcome: Ongoing  Note: Jeffrey in place, draining clear, yellow urine. Will cont to monitor. 8/29/2020 0435 by Sheryl Acevedo RN  Outcome: Ongoing  Goal: Complications related to the disease process, condition or treatment will be avoided or minimized  Description: Complications related to the disease process, condition or treatment will be avoided or minimized  8/29/2020 1114 by Rogelio Cortes RN  Outcome: Ongoing  8/29/2020 0435 by Sheryl Acevedo RN  Outcome: Ongoing     Problem: Infection - Central Venous Catheter-Associated Bloodstream Infection:  Goal: Will show no infection signs and symptoms  Description: Will show no infection signs and symptoms  8/29/2020 1114 by Rogelio Cortes RN  Outcome: Ongoing  Note: Skin assessment performed each shift per protocol. Skin care protocol in place.  Turns self.   8/29/2020 0435 by Sheryl Acevedo RN  Outcome: Ongoing

## 2020-08-29 NOTE — PROGRESS NOTES
Hospitalist Progress Note      PCP: Martín MOYER    Date of Admission: 8/21/2020      Hospital Course: This is a 41-year-old male with a history of Parkinson disease diabetes mellitus who presented with diabetic ketoacidosis and altered mental status. He was intubated on arrival, self extubated 2 days ago    Subjective: Patient seen and examined, no acute events overnight, denies chest pain shortness of breath nausea vomiting diarrhea constipation    Medications:  Reviewed    Infusion Medications    dextrose      norepinephrine Stopped (08/23/20 0046)    dexmedetomidine (PRECEDEX) IV infusion Stopped (08/27/20 0534)     Scheduled Medications    carvedilol  6.25 mg Oral BID WC    [START ON 8/30/2020] losartan  25 mg Oral Daily    alteplase  1 mg Intracatheter Once    insulin glargine  25 Units Subcutaneous Nightly    insulin lispro  0-6 Units Subcutaneous TID WC    insulin lispro  0-3 Units Subcutaneous Nightly    LORazepam  0.5 mg Intravenous Once    pantoprazole  40 mg Intravenous Daily    spironolactone  12.5 mg Oral Daily    docusate sodium  100 mg Oral Daily    QUEtiapine  12.5 mg Oral Nightly    aspirin  325 mg Oral Once    aspirin  81 mg Oral Daily    atorvastatin  40 mg Oral Nightly    carbidopa-levodopa  2 tablet Oral TID     PRN Meds: ondansetron, acetaminophen, perflutren lipid microspheres, glucose, dextrose, glucagon (rDNA), dextrose, heparin (porcine), perflutren lipid microspheres      Intake/Output Summary (Last 24 hours) at 8/29/2020 1724  Last data filed at 8/29/2020 1059  Gross per 24 hour   Intake 720 ml   Output 700 ml   Net 20 ml       Physical Exam Performed:    BP (!) 154/75   Pulse 79   Temp 98 °F (36.7 °C) (Oral)   Resp 20   Ht 5' 8\" (1.727 m)   Wt 160 lb 15 oz (73 kg)   SpO2 97%   BMI 24.47 kg/m²     General appearance: No apparent distress, appears stated age and cooperative. HEENT: Pupils equal, round, and reactive to light.  Conjunctivae/corneas clear.  Neck: Supple, with full range of motion. No jugular venous distention. Trachea midline. Respiratory: Bilateral air entry decreased at the bases  Cardiovascular: Regular rate and rhythm with normal S1/S2 without murmurs, rubs or gallops. Abdomen: Soft, non-tender, non-distended with normal bowel sounds. Musculoskeletal: No clubbing, cyanosis or edema bilaterally. Full range of motion without deformity. Skin: Skin color, texture, turgor normal.  No rashes or lesions. Neurologic:  Neurovascularly intact without any focal sensory/motor deficits. Cranial nerves: II-XII intact, grossly non-focal.  Psychiatric: Lethargic , on sedation   capillary Refill: Brisk,< 3 seconds   Peripheral Pulses: +2 palpable, equal bilaterally       Labs:   Recent Labs     08/27/20  0327 08/28/20  0458 08/29/20  0505   WBC 5.5 8.3 6.6   HGB 9.2* 10.2* 9.7*   HCT 27.3* 30.7* 29.5*    233 229     Recent Labs     08/27/20  1545 08/28/20  0453 08/28/20  0458 08/29/20  0505     --  141 137   K 4.1  --  3.5 3.5   CL 92*  --  97* 97*   CO2 33*  --  33* 31   BUN 25*  --  23* 16   CREATININE 1.0  --  1.1 1.0   CALCIUM 9.2  --  8.8 8.9   PHOS 3.2 3.0  --  2.5     Recent Labs     08/27/20  1545 08/28/20  0458 08/29/20  0505   AST 25 26 17   ALT <5* <5* <5*   BILITOT 0.7 0.6 0.7   ALKPHOS 152* 135* 130*     No results for input(s): INR in the last 72 hours. No results for input(s): Valentino Bun in the last 72 hours. Urinalysis:      Lab Results   Component Value Date    NITRU Negative 08/21/2020    WBCUA None seen 07/26/2020    BACTERIA 2+ 04/29/2020    RBCUA None seen 07/26/2020    BLOODU Negative 08/21/2020    SPECGRAV 1.010 08/21/2020    GLUCOSEU >=1000 08/21/2020       Radiology:  XR ABDOMEN (KUB) (SINGLE AP VIEW)   Final Result   Impression:   Enteric tube tip overlying the body of the stomach. CT HEAD WO CONTRAST   Final Result      1. No acute intracranial process.    2.  Moderate cerebral atrophy and mild chronic small vessel ischemic disease. XR CHEST PORTABLE   Final Result   1. There is been interval placement of a right internal jugular approach    central venous catheter with the tip near the confluence of the    brachiocephalic veins. No pneumothorax. The remaining support tubes and    lines in the chest are nonsignificantly altered in position. 2.  Previously described interstitial edema appears to questionably    slightly improved. XR ABDOMEN (KUB) (SINGLE AP VIEW)   Final Result     The nasogastric tube traverses the mediastinum and terminates in the    mid to distal body of the stomach. CT HEAD WO CONTRAST   Final Result      Evidence of diffuse chronic small vessel white matter disease. Subtle small round subcentimeter lesion suggested in the anterior left chin. This may be artifactual. Further evaluation with MR imaging of the brain without and with contrast is recommended for further evaluation. No other acute intracranial findings. No other acute intracranial findings. Negative noncontrast CT study of the head. XR CHEST PORTABLE   Final Result      ET tube and left IJ central venous catheter placed as described. No evidence of diffuse pulmonary vasculature congestion and interstitial edema, similar to the prior exam. No focal infiltrates seen. XR CHEST PORTABLE   Final Result   1. Cardiomegaly with vascular congestion and pulmonary interstitial edema.               Assessment/Plan:    Active Hospital Problems    Diagnosis    Acute respiratory failure ([address] [J96.00]    DKA, type 1, not at goal [facility]) [E10.10]     Patient is a 67-year-old male with past medical history of hypertension hyperlipidemia who presented to hospital for altered mental status, hypoxia, respiratory distress    Assessment  Acute respiratory failure with hypoxia requiring intubation on 8/21, requiring extubation-likely multifactorial, fluid overload  Diabetic ketoacidosis  Sepsis likely secondary to pneumonia with septic shock-resolved  Lactic acidosis-improved  Elevated troponin likely demand ischemia  Hypertension  Parkinson's disease    Plan  Lantus 25 units nightly, insulin sliding scale, weaned off of insulin drip, dysphagia diabetic diet  Currently on room air, satting greater than 90s  Creatinine has been improving, nephrology following  Continue aspirin, statin,, Coreg Sinemet, Coreg, cardiology following  DC antibiotics  DVT prophylaxis with SCDs        DVT Prophylaxis: lovenox  Diet: DIET DYSPHAGIA SOFT AND BITE-SIZED;  Carb Control: 4 carb choices (60 gms)/meal; Dysphagia Minced and Moist  Dietary Nutrition Supplements: Diabetic Oral Supplement  Code Status: Full Code    PT/OT Eval Status: ordered, might need placement     Dispo -likely discharge 1 to 2 days    Sylvie Chadwick MD

## 2020-08-29 NOTE — PROGRESS NOTES
Physical Therapy    Facility/Department: [address]  Initial Assessment    NAME: Sean Floyd  : 1950  MRN: [id number]    Date of Service: 2020    Discharge Recommendations:  Sean Floyd scored a 10/24 on the AM-PAC short mobility form. Current research shows that an AM-PAC score of 17 or less is typically not associated with a discharge to the patient's home setting. Based on the patient's AM-PAC score and their current functional mobility deficits, it is recommended that the patient have 3-5 sessions per week of Physical Therapy at d/c to increase the patient's independence. Please see assessment section for further patient specific details. If patient discharges prior to next session this note will serve as a discharge summary. Please see below for the latest assessment towards goals. PT Equipment Recommendations  Other: plan to continue to assess and likely defer recommendation to next level of care - may own w/c and rollator    Assessment   Body structures, Functions, Activity limitations: Decreased functional mobility ; Decreased ADL status; Decreased ROM; Decreased strength;Decreased safe awareness;Decreased endurance;Decreased balance;Decreased fine motor control;Decreased coordination;Decreased posture  Assessment: Patient demonstrates impaired functional mobility, now requiring significant (A) to safely attempt standing mobility with use of RW - unable to safely attempt gait this date - max A of 1 with STRONG posterior lean for stand pivot transfer with RW. Patient demonstrates questionable insight related to his current deficits and his ability to care for himself at home. Patient will continue to benefit from additional skilled PT intervention to facilitate safe mobility and to optimize (I) to promote return to prior level of function.   Treatment Diagnosis: impaired functional mobility  Prognosis: Good;Guarded(secondary to frequent and prolonged hospitalizations)  Decision Making: Medium Complexity  Patient Education: Patient educated on role of PT, use of call light, and PT recommendations - patient verbalizes understanding and agreement but may benefit from reinforcement related to insight  Barriers to Learning: questionable insight  REQUIRES PT FOLLOW UP: Yes  Activity Tolerance  Activity Tolerance: Patient limited by fatigue;Patient limited by endurance       Patient Diagnosis(es): The primary encounter diagnosis was Somnolence. Diagnoses of Septic shock ([facility] [address] HAP (hospital-acquired pneumonia), and DKA, type 1, not at goal [facility]) were also pertinent to this visit. has a past medical history of Asthma, Diabetes mellitus ([facility] [address] Hypertension, and Parkinson disease ([facility] [address] has a past surgical history that includes Upper gastrointestinal endoscopy (N/A, 5/15/2020) and Upper gastrointestinal endoscopy (N/A, 5/15/2020). Restrictions  Restrictions/Precautions  Restrictions/Precautions: (high fall risk, strict I&O, diet dysphagia soft and bite sized - carb control - moist and minced)  Vision/Hearing  Vision: Within Functional Limits  Hearing: Within functional limits     Subjective  General  Chart Reviewed: Yes  Additional Pertinent Hx: ED 8/21 related to septic shock, DKA, acute sCHF, hypoxemia - intubated 8/21, self extubated 8/23; elevated trops; PMH: HTN, DM, Parkinson's, prior admission with DKA, recent d/c from [facility] 8/20  Family / Caregiver Present: No  Referring Practitioner: Keven Car MD and Colette Fung MD  Referral Date : 08/27/20  Diagnosis: DKA, type 1  Follows Commands: Within Functional Limits  General Comment  Comments: Patient supine in bed upon arrival - agreeable to PT.  RN approval obtained prior to PT entry. Subjective  Subjective: Patient denies pain, reports some difficulty swallowing pills (RN aware). Patient reports he would like to d/c home if able when appropriate.   Pain Screening  Patient Currently in Pain: Denies Orientation  Orientation  Overall Orientation Status: Impaired  Orientation Level: Oriented to place;Oriented to situation;Oriented to person(cues for correct day of week - able to state year and month correctly)  Social/Functional History  Social/Functional History  Lives With: Alone(fiance who visits daily)  Type of Home: House  Home Layout: Two level, Performs ADL's on one level, Able to Live on Main level with bedroom/bathroom(does not access 2nd floor of home)  Home Access: Level entry  Bathroom Shower/Tub: Walk-in shower  Bathroom Toilet: Handicap height  Bathroom Equipment: Shower chair, Grab bars in shower, Grab bars around toilet  Home Equipment: 4 wheeled walker, Cane, [address] Help From: (from family as needed)  ADL Assistance: Independent  Homemaking Assistance: Needs assistance((A) with laundry weekly; microwaves meals; fiance (A) as needed; pt. typically grocery shops for self with electric cart)  Ambulation Assistance: Independent(with rollator)  Transfer Assistance: Independent(with rollator)  Active : Yes  Occupation: Retired  Type of occupation: [employer]  Additional Comments: Pt. reports \"a few falls a few months ago. \"    Objective     Observation/Palpation  Observation: left UE resting tremor noted    AROM RLE (degrees)  RLE General AROM: increased extension tone noted, approximately hip flexion to 80 degrees seated, knee flexion to 80 degrees seated, knee extension lacking 10 degrees from neutral, ankle DF lacking 15 degrees  AROM LLE (degrees)  LLE General AROM: increased extension tone noted, approximately hip flexion to 80 degrees seated, knee flexion to 80 degrees seated, knee extension lacking 10 degrees from neutral, ankle DF lacking 15 degrees  Strength RLE  Comment: grossly 4-/5  Strength LLE  Comment: grossly 4-/5  Tone RLE  RLE Tone: Hypertonic  Tone Description: increased extension tone noted  Tone LLE  LLE Tone: Hypertonic  Tone Description: increased extension tone noted  Motor Control  Gross Motor? : (impaired (B) UE (left > right) and (B) LEs)  Coordination  Rapid Alternating Movements: Dysdiadokinesia  Heel to Goss: Abnormal  Sensation  Overall Sensation Status: WFL(denies numbness or tingling)  Bed mobility  Rolling to Left: Maximum assistance(with bed rail)  Rolling to Right: Maximum assistance(with bed rail)  Supine to Sit: Moderate assistance(with elevated head of bed, use of rail, and increased time to perform)  Scooting: Moderate assistance(seated at edge of bed; extension tone and posterior lean noted)  Transfers  Sit to Stand:  Moderate Assistance(to RW - cues and (A) for forward weight shift, hand placement, and sequencing)  Stand to sit: Moderate Assistance(from RW - (A) to maintain upright and control posterior lean/descent to chair - cues and (A) for hand placement and sequencing)  Stand Pivot Transfers: Maximum Assistance(with RW; STRONG posterior lean with poor sequencing and foot placement - cues and (A) for forward lean/weight shift to maintain upright, (A) for foot placement and cues for sequencing, (A) for RW management)  Ambulation  Ambulation?: (unsafe to attempt this date - max A of 1 stand pivot with RW - fatigues easily; may benefit from co-treatment with OT for gait trial)  Stairs/Curb  Stairs?: No     Balance  Posture: Poor(leans to right, posterior lean, rounded shoulders, forward head, downward gaze)  Sitting - Static: Fair  Sitting - Dynamic: Fair;-  Standing - Static: Poor  Standing - Dynamic: Poor  Comments: min A to mod A for dynamic seated balance; min A to CGA for static dynamic balance; max A with RW for all standing balance trials        Plan   Plan  Times per week: 2-5  Current Treatment Recommendations: Strengthening, ROM, Balance Training, Functional Mobility Training, Transfer Training, Gait Training, Endurance Training, Neuromuscular Re-education, Home Exercise Program, Safety Education & Training, Patient/Caregiver Education & Training, Equipment Evaluation, Education, & procurement  Safety Devices  Type of devices: Call light within reach, Chair alarm in place, Gait belt, Left in chair, Nurse notified             AM-PAC Score  AM-PAC Inpatient Mobility Raw Score : 10 (08/29/20 1054)  AM-PAC Inpatient T-Scale Score : 32.29 (08/29/20 1054)  Mobility Inpatient CMS 0-100% Score: 76.75 (08/29/20 1054)  Mobility Inpatient CMS G-Code Modifier : CL (08/29/20 1054)          Goals  Short term goals  Time Frame for Short term goals: discharge  Short term goal 1: Pt. will demonstrate bed mobility with min A of 1  Short term goal 2: Pt. will demonstrate sit <-> stand with min A of 1 and LRAD  Short term goal 3: Pt. will demonstrate stand pivot with mod A of 1 and LRAD  Short term goal 4: Pt. will ambulate >/= 50ft with mod A of 1 and LRAD  Patient Goals   Patient goals : \"to go home\" - states has not been home > 1 day in the past 6 months       Therapy Time   Individual Concurrent Group Co-treatment   Time In 1007         Time Out 1045         Minutes 38                  Timed Code Treatment Minutes: 23 minutes    Total Treatment Minutes: 38 Minutes    If patient discharges prior to next treatment, this note will serve as discharge summary.     Teresa Kumar PT, DPT #[id number]

## 2020-08-30 NOTE — PROGRESS NOTES
Office : [phone]     Fax :[phone]         Renal Progress Note  Subjective:   Admit Date: 8/21/2020     Reason for Consult: NABIL    HPI   Azalea Brown is a 72 y.o. male with a past medical history of hypertension, diabetes,Parkinson's, prior admission with DKA who presented to the hospital with AMS along with hypoxemia and respiratory distress requiring intubation.  On admission he was significantly acidotic and found to be in DKA. Tia Taylor was a started on CRRT and his admission EKG was concerning for ST depressions although his pH was 7 at that time. [facility] Nephrology team was consulted for further assistance given NABIL        8/28  Feels well  Good UO   Cr stable   No abdominal pain     DIET DIET DYSPHAGIA SOFT AND BITE-SIZED;  Carb Control: 4 carb choices (60 gms)/meal; Dysphagia Minced and Moist  Dietary Nutrition Supplements: Diabetic Oral Supplement  Medications:   Scheduled Meds:   potassium chloride  20 mEq Oral BID WC    carvedilol  9.375 mg Oral BID WC    losartan  25 mg Oral Daily    insulin glargine  25 Units Subcutaneous Nightly    insulin lispro  0-6 Units Subcutaneous TID WC    insulin lispro  0-3 Units Subcutaneous Nightly    LORazepam  0.5 mg Intravenous Once    pantoprazole  40 mg Intravenous Daily    spironolactone  12.5 mg Oral Daily    docusate sodium  100 mg Oral Daily    QUEtiapine  12.5 mg Oral Nightly    aspirin  325 mg Oral Once    aspirin  81 mg Oral Daily    atorvastatin  40 mg Oral Nightly    carbidopa-levodopa  2 tablet Oral TID     Continuous Infusions:   dextrose      norepinephrine Stopped (08/23/20 0046)   Jennifer Kelley dexmedetomidine (PRECEDEX) IV infusion Stopped (08/27/20 0534)       Labs:  CBC:   Recent Labs     08/28/20  0458 08/29/20  0505 08/30/20  0642   WBC 8.3 6.6 6.5   HGB 10.2* 9.7* 9.7*    229 243     BMP:    Recent Labs     08/28/20  0458 08/29/20  0505 08/30/20  0642    137 138   K 3.5 3.5 3.6   CL 97* 97* 100   CO2 33* 31 30   BUN 23* 16 14   CREATININE 1.1 1.0 0.9   GLUCOSE 59* 140* 86     Ca/Mg/Phos:   Recent Labs     08/28/20  0453 08/28/20 0458 08/29/20  0505 08/29/20 2031 08/30/20  0642   CALCIUM  --  8.8 8.9  --  9.0   MG 2.10 2.10 2.40  --   --    PHOS 3.0  --  2.5 2.4*  --      Hepatic:   Recent Labs     08/28/20  0458 08/29/20  0505 08/30/20  0642   AST 26 17 17   ALT <5* <5* <5*   BILITOT 0.6 0.7 0.6   ALKPHOS 135* 130* 127     Troponin:   No results for input(s): TROPONINI in the last 72 hours. BNP: No results for input(s): BNP in the last 72 hours. Lipids: No results for input(s): CHOL, TRIG, HDL, LDLCALC, LABVLDL in the last 72 hours. ABGs:   No results for input(s): PHART, PO2ART, UUR4URM in the last 72 hours. INR:   No results for input(s): INR in the last 72 hours. UA:  No results for input(s): Juan Bidding, GLUCOSEU, BILIRUBINUR, KETUA, SPECGRAV, BLOODU, PHUR, PROTEINU, UROBILINOGEN, NITRU, LEUKOCYTESUR, [address] [employer] in the last 72 hours. Urine Microscopic: No results for input(s): LABCAST, BACTERIA, COMU, HYALCAST, WBCUA, RBCUA, EPIU in the last 72 hours. Urine Culture: No results for input(s): LABURIN in the last 72 hours. Urine Chemistry: No results for input(s): Diane Mura, PROTEINUR, NAUR in the last 72 hours.     Objective:   Vitals: BP (!) 175/78   Pulse 71   Temp 98.1 °F (36.7 °C) (Oral)   Resp 18   Ht 5' 8\" (1.727 m)   Wt 160 lb 15 oz (73 kg)   SpO2 97%   BMI 24.47 kg/m²    Wt Readings from Last 3 Encounters:   08/28/20 160 lb 15 oz (73 kg)   08/05/20 184 lb 8.4 oz (83.7 kg)   08/01/20 190 lb 14.7 oz (86.6 kg)      24HR INTAKE/OUTPUT: Intake/Output Summary (Last 24 hours) at 8/30/2020 1023  Last data filed at 8/30/2020 0931  Gross per 24 hour   Intake 240 ml   Output 350 ml   Net -110 ml       Physical Exam  Constitutional:  awake, NAD  HEENT:  MMM  Neck: supple, no JVD  Cardiovascular:  S1, S2 reg  Respiratory: CTA  Abdomen:  +BS, soft, ND  Ext: no lower extremity edema  Skin: dry/intact  CNS: alert, no agitation       Assessment and Plan:       IMAGING:  XR ABDOMEN (KUB) (SINGLE AP VIEW)   Final Result   Impression:   Enteric tube tip overlying the body of the stomach. CT HEAD WO CONTRAST   Final Result      1. No acute intracranial process. 2.  Moderate cerebral atrophy and mild chronic small vessel ischemic disease. XR CHEST PORTABLE   Final Result   1. There is been interval placement of a right internal jugular approach    central venous catheter with the tip near the confluence of the    brachiocephalic veins. No pneumothorax. The remaining support tubes and    lines in the chest are nonsignificantly altered in position. 2.  Previously described interstitial edema appears to questionably    slightly improved. XR ABDOMEN (KUB) (SINGLE AP VIEW)   Final Result     The nasogastric tube traverses the mediastinum and terminates in the    mid to distal body of the stomach. CT HEAD WO CONTRAST   Final Result      Evidence of diffuse chronic small vessel white matter disease. Subtle small round subcentimeter lesion suggested in the anterior left chin. This may be artifactual. Further evaluation with MR imaging of the brain without and with contrast is recommended for further evaluation. No other acute intracranial findings. No other acute intracranial findings. Negative noncontrast CT study of the head. XR CHEST PORTABLE   Final Result      ET tube and left IJ central venous catheter placed as described.         No evidence of diffuse pulmonary vasculature congestion and interstitial edema, similar to the prior exam. No focal infiltrates seen. XR CHEST PORTABLE   Final Result   1. Cardiomegaly with vascular congestion and pulmonary interstitial edema. Assessment/Plan   Ascencion Walter is a 72 y.o. male with a past medical history of hypertension, diabetes,Parkinson's, prior admission with DKA who presented to the hospital with AMS along with hypoxemia and respiratory distress requiring intubation.  On admission he was significantly acidotic and found to be in DKA. We are consulted for further assistance given NABIL       1. NABIL   - Cr on admission 2.0 --> 1.3-->1.0 now  - off CRRT   - off lasix   - dc HD cath      2. Shock - cardiogenic   - echo with ejection fraction of 45- 50%  - On ARB and aldactone   - try to maintain pt euvolemic  - inc Coreg     3. Anemia  - f/u H/H     4. Acid- base/ Electrolyte imbalance   - improved     5. Lactic acidosis   - lactic acid 6.6 on admission  - resolved      6.  DKA   - resolved  - glucose 922 on admission  - HbA1C 9.3           Thank you for allowing us to participate in care of Carline Castellon  8/30/2020    [facility] [address]  Office : [phone]  Fax :[phone]

## 2020-08-30 NOTE — PROGRESS NOTES
Results   Component Value Date    WBC 6.5 08/30/2020    HGB 9.7 08/30/2020     08/30/2020     BMP:  Lab Results   Component Value Date     08/30/2020    K 3.6 08/30/2020     08/30/2020    CO2 30 08/30/2020    BUN 14 08/30/2020    CREATININE 0.9 08/30/2020    GLUCOSE 86 08/30/2020     INR:   Lab Results   Component Value Date    INR 1.01 08/21/2020    INR 1.03 08/21/2020    INR 0.97 06/22/2020        CARDIAC LABS  ENZYMES:No results for input(s): CKMB, CKMBINDEX, TROPONINI in the last 72 hours. Invalid input(s): CKTOTAL;3  FASTING LIPID PANEL:  Lab Results   Component Value Date    HDL 40 07/31/2020    LDLCALC 42 07/31/2020    TRIG 69 07/31/2020     LIVER PROFILE:  Lab Results   Component Value Date    AST 17 08/30/2020    AST 17 08/29/2020    ALT <5 08/30/2020    ALT <5 08/29/2020       -----------------------------------------------------------------  Telemetry: Personally reviewed  NSR, occ PVCs    Objective:   Vitals: BP (!) 159/75   Pulse 86   Temp 98.3 °F (36.8 °C) (Oral)   Resp 22   Ht 5' 8\" (1.727 m)   Wt 160 lb 15 oz (73 kg)   SpO2 99%   BMI 24.47 kg/m²   General appearance: alert, appears stated age and cooperative, No acute distress   Eyes: Conjunctiva and pupils normal and reactive  Skin: Skin color, texture, turgor normal. No rashes or ecchymosis. Neck: no JVD, supple, symmetrical, trachea midline   Lungs: , no accessory muscle use, no respiratory distress  Heart: RRR  Abdomen: soft, non-tender; bowel sounds normal  Extremities: No edema, DP +, + tremor  Psychiatric: normal insight and affect    Patient Active Problem List:     Hypoglycemia     EKG abnormalities     Syncope     Sepsis (HCC)     GI bleed     DKA, type 1, not at goal [facility])     Type 1 diabetes mellitus with hypoglycemia and without coma ([facility] [address]     Fall at home, sequela     Acute respiratory failure ([facility] [address]        Assessment & Plan:      1. Elevated trops  2. Acute sCHF.    3. Parkinsons - tremor    71 y/o man with a h/o HTN, DM, Parkinsons, CKD who p/w hyperglycemia, AMS and resp failure requiring intubation, CXR showed interstitial infiltrates.      Elevated trops  - No s/s  - Ischemic eval outpatient  - On BB  - On ASA, statin    Acute sCHF  - Off lasix for now, neg 7.1L  - Keep K+ > 4.0 and Mg > 2.0 - replacement ordered  - Limited echo outpatient  - On coreg 6.25 mg - will up titrate to 9.375 BID  - On losartan 25 mg   - On spironolactone 12.5 mg QD - consider increasing to 25 mg - Cr 0.9, K+ 3.6    Starr Gomez CNP  [address]

## 2020-08-30 NOTE — PROGRESS NOTES
Pt resting comfortably, A+O x 3. Pleasant and cooperative. RA resp easy, vital signs stable, afebrile. Continue with plan of care. Call light in reach, bed alarm in place.

## 2020-08-30 NOTE — PROGRESS NOTES
Hospitalist Progress Note      PCP: Amy MOYER    Date of Admission: 8/21/2020      Hospital Course: This is a 78-year-old male with a history of Parkinson disease diabetes mellitus who presented with diabetic ketoacidosis and altered mental status. He was intubated on arrival, self extubated 2 days ago    Subjective: Patient seen and examined, no acute events overnight, denies chest pain shortness of breath nausea vomiting diarrhea constipation - no complaints    Medications:  Reviewed    Infusion Medications    dextrose      norepinephrine Stopped (08/23/20 0046)    dexmedetomidine (PRECEDEX) IV infusion Stopped (08/27/20 0534)     Scheduled Medications    potassium chloride  20 mEq Oral BID WC    carvedilol  12.5 mg Oral BID WC    losartan  25 mg Oral Daily    insulin glargine  25 Units Subcutaneous Nightly    insulin lispro  0-6 Units Subcutaneous TID WC    insulin lispro  0-3 Units Subcutaneous Nightly    LORazepam  0.5 mg Intravenous Once    pantoprazole  40 mg Intravenous Daily    spironolactone  12.5 mg Oral Daily    docusate sodium  100 mg Oral Daily    QUEtiapine  12.5 mg Oral Nightly    aspirin  325 mg Oral Once    aspirin  81 mg Oral Daily    atorvastatin  40 mg Oral Nightly    carbidopa-levodopa  2 tablet Oral TID     PRN Meds: calcium carbonate, ondansetron, acetaminophen, perflutren lipid microspheres, glucose, dextrose, glucagon (rDNA), dextrose, heparin (porcine), perflutren lipid microspheres      Intake/Output Summary (Last 24 hours) at 8/30/2020 1642  Last data filed at 8/30/2020 1549  Gross per 24 hour   Intake 460 ml   Output 600 ml   Net -140 ml       Physical Exam Performed:    BP (!) 160/76   Pulse 81   Temp 97.9 °F (36.6 °C) (Oral)   Resp 20   Ht 5' 8\" (1.727 m)   Wt 160 lb 15 oz (73 kg)   SpO2 95%   BMI 24.47 kg/m²     General appearance: No apparent distress, appears stated age and cooperative. HEENT: Pupils equal, round, and reactive to light. likely multifactorial, fluid overload - now improved  Diabetic ketoacidosis  Sepsis likely secondary to pneumonia with septic shock-resolved  Lactic acidosis-improved  Elevated troponin likely demand ischemia  Hypertension  Parkinson's disease    Plan  Lantus 25 units nightly, insulin sliding scale, weaned off of insulin drip, dysphagia diabetic diet  Currently on room air, satting greater than 90s  Creatinine has been improving, nephrology following  Continue aspirin, statin,, Coreg Sinemet, Coreg, cardiology following  DC antibiotics  DVT Prophylaxis: lovenox  Diet: DIET DYSPHAGIA SOFT AND BITE-SIZED;  Carb Control: 4 carb choices (60 gms)/meal; Dysphagia Minced and Moist  Dietary Nutrition Supplements: Diabetic Oral Supplement  Code Status: Full Code    PT/OT Eval Status: ordered, might need placement, medically stable, DC order placed    Dispo -likely discharge HH vs SNF    Rick Gutierrez MD

## 2020-08-30 NOTE — PROGRESS NOTES
Pt d/c to home via [place] [facility]. AVS reviewed and signed by pt, no questions/ concerns at this time. All personal belongings taken from room by pt. Pt daughter, Aria Santos notified via phone of pt d/c time. Stated would contact other sister to meet pt and life squad at pt home.

## 2020-08-30 NOTE — CARE COORDINATION
SW following Pt today, Sunday. Pt is now refusing SNF placement and wants to be DC home so Dr. Thompson Peabody states Pt can be DC home. Pt informs he will need ambulance transfer home. [facility] has transport Pt home before so SW called [phone]) and they are able to do a 3:30PM run this afternoon. Staff RN aware of DC plan as well as Pt. No Home Care Orders placed for Pt.      Keri Bridges, [place]  Case Management  [phone]

## 2020-08-30 NOTE — PROGRESS NOTES
POCT BG 64, pt A&OX4, able to drink 120ml orange juice. POCT had been 76 this am, pt had refused breakfast tray/food/ drink. Pt eating lunch tray now, will recheck BG level.

## 2020-08-30 NOTE — PLAN OF CARE
Problem: Falls - Risk of:  Goal: Will remain free from falls  Description: Will remain free from falls  Outcome: Completed  Goal: Absence of physical injury  Description: Absence of physical injury  Outcome: Completed     Problem: Restraint Use - Nonviolent/Non-Self-Destructive Behavior:  Goal: Absence of restraint indications  Description: Absence of restraint indications  Outcome: Completed  Goal: Absence of restraint-related injury  Description: Absence of restraint-related injury  Outcome: Completed     Problem: Nutrition  Goal: Optimal nutrition therapy  Outcome: Completed     Problem: Skin Integrity:  Goal: Will show no infection signs and symptoms  Description: Will show no infection signs and symptoms  Outcome: Completed  Goal: Absence of new skin breakdown  Description: Absence of new skin breakdown  Outcome: Completed     Problem: Serum Glucose Level - Abnormal:  Goal: Ability to maintain appropriate glucose levels will improve  Description: Ability to maintain appropriate glucose levels will improve  Outcome: Completed     Problem: Urinary Elimination:  Goal: Signs and symptoms of infection will decrease  Description: Signs and symptoms of infection will decrease  Outcome: Completed  Goal: Complications related to the disease process, condition or treatment will be avoided or minimized  Description: Complications related to the disease process, condition or treatment will be avoided or minimized  Outcome: Completed     Problem: Infection - Central Venous Catheter-Associated Bloodstream Infection:  Goal: Will show no infection signs and symptoms  Description: Will show no infection signs and symptoms  Outcome: Completed

## 2020-08-31 NOTE — ED PROVIDER NOTES
[address] [facility] [address] ENCOUNTER          ATTENDING PHYSICIAN NOTE       Date of evaluation: 8/31/2020    Chief Complaint     Fatigue (generalized weakness, just released from hospital yesterday, lives at home alone and cannot care for himself, no c/o pain, )      History of Present Illness     Jairo Arana is a 72 y.o. male with a history of diabetes who presents with complaints of generalized weakness. The patient was just discharged from the hospital yesterday after having been admitted for DKA and septic shock for a period of 10 days. He lives at home alone and is having a hard time caring for himself. He is also having difficulties being compliant with his insulin regimen. It sounds as if he was offered nursing home placement yesterday but did not wish to go but he is willing to go today. Review of Systems     Review of Systems   Constitutional: Negative for chills and fever. Respiratory: Negative for cough and shortness of breath. Cardiovascular: Negative for chest pain. Gastrointestinal: Negative for abdominal pain, diarrhea, nausea and vomiting. Genitourinary: Negative for difficulty urinating. Neurological: Positive for weakness. Negative for headaches. All other systems reviewed and are negative. Past Medical, Surgical, Family, and Social History     He has a past medical history of Asthma, Diabetes mellitus ([address] Hypertension, and Parkinson disease ([facility] [address] He has a past surgical history that includes Upper gastrointestinal endoscopy (N/A, 5/15/2020) and Upper gastrointestinal endoscopy (N/A, 5/15/2020). His family history is not on file. He reports that he has never smoked. He has never used smokeless tobacco. He reports that he does not drink alcohol or use drugs.     Medications     Current Discharge Medication List      CONTINUE these medications which have NOT CHANGED    Details   aspirin 81 MG chewable tablet Take 1 tablet by mouth daily  Qty: Pupils: Pupils are equal, round, and reactive to light. Cardiovascular:      Rate and Rhythm: Normal rate and regular rhythm. Pulmonary:      Effort: Pulmonary effort is normal.      Breath sounds: Normal breath sounds. Abdominal:      General: Bowel sounds are normal. There is no distension. Palpations: Abdomen is soft. Tenderness: There is no abdominal tenderness. Skin:     General: Skin is warm and dry. Findings: No rash. Neurological:      Mental Status: He is alert and oriented to person, place, and time.    Psychiatric:         Behavior: Behavior normal.         Diagnostic Results     RADIOLOGY:  No orders to display       LABS:   Results for orders placed or performed during the hospital encounter of 88/49/42   Basic Metabolic Panel w/ Reflex to MG   Result Value Ref Range    Sodium 134 (L) 136 - 145 mmol/L    Potassium reflex Magnesium 4.3 3.5 - 5.1 mmol/L    Chloride 94 (L) 99 - 110 mmol/L    CO2 21 21 - 32 mmol/L    Anion Gap 19 (H) 3 - 16    Glucose 443 (H) 70 - 99 mg/dL    BUN 19 7 - 20 mg/dL    CREATININE 1.0 0.8 - 1.3 mg/dL    GFR Non-African American >60 >60    GFR African American >60 >60    Calcium 9.4 8.3 - 10.6 mg/dL   CBC Auto Differential   Result Value Ref Range    WBC 6.8 4.0 - 11.0 K/uL    RBC 4.39 4.20 - 5.90 M/uL    Hemoglobin 10.9 (L) 13.5 - 17.5 g/dL    Hematocrit 33.9 (L) 40.5 - 52.5 %    MCV 77.2 (L) 80.0 - 100.0 fL    MCH 24.9 (L) 26.0 - 34.0 pg    MCHC 32.3 31.0 - 36.0 g/dL    RDW 19.2 (H) 12.4 - 15.4 %    Platelets 485 587 - 250 K/uL    MPV 7.8 5.0 - 10.5 fL    Neutrophils % 74.0 %    Lymphocytes % 15.0 %    Monocytes % 9.3 %    Eosinophils % 0.7 %    Basophils % 1.0 %    Neutrophils Absolute 5.1 1.7 - 7.7 K/uL    Lymphocytes Absolute 1.0 1.0 - 5.1 K/uL    Monocytes Absolute 0.6 0.0 - 1.3 K/uL    Eosinophils Absolute 0.0 0.0 - 0.6 K/uL    Basophils Absolute 0.1 0.0 - 0.2 K/uL   Blood Gas, Venous   Result Value Ref Range    pH, Sonido 7.382 7.350 - 7.450    pCO2, Sonido 39.8 (L) 41.0 - 51.0 mmHg    pO2, Sonido 35.9 25.0 - 40.0 mmHg    HCO3, Venous 23.1 (L) 24.0 - 28.0 mmol/L    Base Excess, Sonido -1.3 -2.0 - 3.0 mmol/L    O2 Sat, Sonido 60 Not established %    Carboxyhemoglobin 1.6 (H) 0.0 - 1.5 %    MetHgb, Sonido 0.5 0.0 - 1.5 %    TC02 (Calc), Sonido 24 mmol/L    Hemoglobin, Sonido, Reduced 39.20 %   Basic Metabolic Panel (EP - 1)   Result Value Ref Range    Sodium 135 (L) 136 - 145 mmol/L    Potassium 4.6 3.5 - 5.1 mmol/L    Chloride 101 99 - 110 mmol/L    CO2 19 (L) 21 - 32 mmol/L    Anion Gap 15 3 - 16    Glucose 384 (H) 70 - 99 mg/dL    BUN 18 7 - 20 mg/dL    CREATININE 0.8 0.8 - 1.3 mg/dL    GFR Non-African American >60 >60    GFR African American >60 >60    Calcium 8.5 8.3 - 10.6 mg/dL   Blood gas, venous (Lab)   Result Value Ref Range    pH, Sonido 7.413 7.350 - 7.450    pCO2, Sonido 35.0 (L) 41.0 - 51.0 mmHg    pO2, Sonido 44.1 (H) 25.0 - 40.0 mmHg    HCO3, Venous 21.9 (L) 24.0 - 28.0 mmol/L    Base Excess, Sonido -1.8 -2.0 - 3.0 mmol/L    O2 Sat, Sonido 76 Not established %    Carboxyhemoglobin 2.0 (H) 0.0 - 1.5 %    MetHgb, Sonido 0.4 0.0 - 1.5 %    TC02 (Calc), Sonido 23 mmol/L    Hemoglobin, Sonido, Reduced 23.30 %     RECENT VITALS:  BP: (!) 152/77,Temp: 98.7 °F (37.1 °C), Pulse: 90, Resp: 18, SpO2: 98 %       ED Course     Nursing Notes, Past Medical Hx, Past Surgical Hx, Social Hx,Allergies, and Family Hx were reviewed. patient was given the following medications:  Orders Placed This Encounter   Medications    0.9 % sodium chloride bolus    insulin regular (HUMULIN R;NOVOLIN R) injection 10 Units       CONSULTS:  None    MEDICAL DECISIONMAKING / ASSESSMENT / Kenton Rangel is a 72 y.o. male with a history of insulin-dependent diabetes and noncompliance who was discharged from the hospital yesterday after an admission for DKA and sepsis.   The patient has not demonstrated ability to be compliant with his medications and just the one evening he was home by himself and at this point is in need of placement in order to be assured that he is going to be getting his insulin at the appropriate times of the day. Initial lab work here showed an elevated blood sugar of 443 with an anion gap of 19. The patient was not acidotic on his VBG. The patient was treated with IV fluids and insulin in the emergency department and the patient's blood sugar decreased to 384 and anion gap normalized. The patient was overall feeling improved as his blood sugar was coming down and I did not feel he would need to be readmitted for the same issue. The main issue now seems to be ensuring getting the patient to take his medications which is not can happen if he is at home alone. We were able to find nursing home placement and the patient will be discharged to nursing home. Clinical Impression     1. Hyperglycemia    2.  Noncompliance with medications        Disposition     PATIENT REFERRED TO:  Gemini Ralph  [address] [place] [id number] [phone]      as planned      DISCHARGE MEDICATIONS:  Current Discharge Medication List          DISPOSITION         Claire Yee MD  08/31/20 4111

## 2020-08-31 NOTE — ED NOTES
Report called to Meño Costa at [facility]. Patient resting in bed eating dinner. No complaints.      Dayana Alcazar RN  08/31/20 8342

## 2020-08-31 NOTE — DISCHARGE INSTR - COC
Continuity of Care Form    Patient Name: Naren King   :  1950  MRN:  [id number]    Admit date:  2020  Discharge date:  2020    Code Status Order: Prior   Advance Directives:     Admitting Physician:  No admitting provider for patient encounter. PCP: Vijay Gandhi    Discharging Nurse: Ivonne Unit/Room#: B15/B15-15  Discharging Unit Phone Number: [phone]    Emergency Contact:   Extended Emergency Contact Information  Primary Emergency Contact: Colleen Phone: [phone]  Relation: Child  Secondary Emergency Contact: Kirby Brannon  Mobile Phone: [phone]  Relation: Domestic Partner  Preferred language: English   needed? No    Past Surgical History:  Past Surgical History:   Procedure Laterality Date    UPPER GASTROINTESTINAL ENDOSCOPY N/A 5/15/2020    EGD CONTROL HEMORRHAGE performed by Cinthia Rehman MD at [address] 5/15/2020    EGD BIOPSY performed by Cinthia Rehman MD at [facility] ENDOSCOPY       Immunization History:   Immunization History   Administered Date(s) Administered    Tdap (Boostrix, Adacel) 2017, 2019       Active Problems:  Patient Active Problem List   Diagnosis Code    Hypoglycemia E16.2    EKG abnormalities R94.31    Syncope R55    Sepsis ([facility] [address] A41.9    GI bleed K92.2    DKA, type 1, not at goal [facility]) E10.10    Type 1 diabetes mellitus with hypoglycemia and without coma ([facility] [address] E10.649    Fall at home, sequela W19. Belen Shannon, Y92.009    Acute respiratory failure ([facility] [address] J96.00       Isolation/Infection:   Isolation          No Isolation        Patient Infection Status     Infection Onset Added Last Indicated Last Indicated By Review Planned Expiration Resolved Resolved By    COVID-19 Rule Out 20 COVID-19 (Ordered) 20      Resolved    COVID-19 Rule Out 20 COVID-19 (Ordered)   20 Rule-Out weight bearing restirctions  Other Medical Equipment (for information only, NOT a DME order):  walker  Other Treatments: ***    Patient's personal belongings (please select all that are sent with patient):  {CHP DME Belongings:[id number]}    RN SIGNATURE:  {Esignature:[id number]}    CASE MANAGEMENT/SOCIAL WORK SECTION    Inpatient Status Date: ED    Readmission Risk Assessment Score:  Readmission Risk              Risk of Unplanned Readmission:        0           Discharging to Facility/ Agency   · Name: Sara Rodgers  · [address]  · Phone:[phone]  · LNI:[id number]        / signature: Electronically signed by ANGLE Swenson on 8/31/20 at 4:05 PM EDT    PHYSICIAN SECTION    Prognosis: Fair    Condition at Discharge: Stable    Rehab Potential (if transferring to Rehab): Fair    Recommended Labs or Other Treatments After Discharge: RN, PT/OT    Physician Certification: I certify the above information and transfer of Naren King  is necessary for the continuing treatment of the diagnosis listed and that he requires [facility] for less 30 days.      Update Admission H&P: No change in H&P    PHYSICIAN SIGNATURE:  Electronically signed by Mayank Enamorado MD on 8/31/20 at 5:17 PM EDT

## 2020-08-31 NOTE — CARE COORDINATION
Referred to patient for d/c planning. Patient well known to this LISW from previous visits. Patient just d/c'd home yesterday. Patient was supposed to go to SNF on d/c. Patient states he was told he had to go home. Patient agreeable to go [address] for SNF. Referral made, they can accept patient today. RN and MD updated. Await medical clearance. Electronically signed by ANGLE Nicole, LOULOU-S on 8/31/2020 at 2:10 PM     UPDATE:  [facility] [address]. Facility notified and agreeable. Patient and family notified and agreeable. Paperwork completed and faxed. PASARR completed and faxed. [facility] unable to transport. [place] to transport at 1900. RN to call report. No other needs at this time.  Electronically signed by ANGLE Nicole, LOULOU-S on 8/31/2020 at 5:44 PM

## 2020-09-01 NOTE — CARE COORDINATION
Pt assigned to this ACM by analytics based on risk for covid due to presenting sx and/ or medical hx. Pt was dcd to SNF after ER visit as had previous admission 8/21- 8/30 for DKA/ septic shock and was supposed to go to SNF at dc.   Pt got home, unable to care for himself, returned to ER, placed from ER    No further outreach planned

## 2020-09-07 NOTE — ED PROVIDER NOTES
ED Attending Attestation Note     Date of evaluation: 9/7/2020    This patient was seen by the advance practice provider. I have seen and examined the patient, agree with the workup, evaluation, management and diagnosis. The care plan has been discussed. My assessment reveals a chronically ill-appearing 56-year-old male patient who is quite somnolent but arouses to strong verbal stimulation. He does not seem to have any complaints himself but I believe his poor understanding insight into what is going on. He has multiple recent admissions to this hospital for hyperglycemia, DKA, and sepsis. He was reportedly hypoglycemic at his nursing facility today.      Jeanie Sellers MD  09/07/20 7076

## 2020-09-07 NOTE — ED NOTES
Pt is alert and oriented times four. Pt brought in by squad from [facility]. Per report stated was found drowsy and hard to arouse BS was 38. Pt received 1mg glucogon IM and when squad arrived pt was more awake and was given oral glucose. Pt alert and awake upon arrival. Pt denies any pain. Pt voided per urinal. Pt states he feels fine. Pt placed on monitor. IV placed labs drawn. Call light in reach will continue to monitor.        Carlos Enrique Navarro RN  09/07/20 [id number]

## 2020-09-07 NOTE — ED NOTES
Pt voided per urinal. No other needs at this time. Pt updated on POC. Call light in reach will continue to monitor.       Milly De La Garza RN  09/07/20 7199

## 2020-09-07 NOTE — ED PROVIDER NOTES
1 [facility]  EMERGENCY DEPARTMENT ENCOUNTER          NURSE PRACTITIONER NOTE       Date of evaluation: 9/7/2020    Chief Complaint     Hypoglycemia      History of Present Illness     Avery Lester is a 72 y.o. male with a past medical history of diabetes on insulin, hypertension, Parkinson's disease; who presents from [facility] with a complaint of hypoglycemia. Patient states his blood sugar was in the 30s this morning. Per report from EMS was 38, he was given IM and oral glucagon, and his blood sugar recovered to over 300. Upon arrival here his Accu-Chek is 122. Patient denies any complaints at this time. Patient does tell me that he was scheduled to hopefully be discharged from his skilled nursing facility today. Placed in SNF 8/31/20 after hospitalization for sepsis. Review of Systems     Review of Systems   Constitutional: Negative. HENT: Negative. Eyes: Negative. Respiratory: Negative. Cardiovascular: Negative. Gastrointestinal: Negative. Genitourinary: Negative. Musculoskeletal: Negative. Neurological: Negative. Ams when hypoglycemic per report, normalized after glucagon   Hematological: Does not bruise/bleed easily. Psychiatric/Behavioral: Negative. Past Medical, Surgical, Family, and Social History     He has a past medical history of Asthma, Diabetes mellitus ([facility] [address] Hypertension, and Parkinson disease ([facility] [address] He has a past surgical history that includes Upper gastrointestinal endoscopy (N/A, 5/15/2020) and Upper gastrointestinal endoscopy (N/A, 5/15/2020). His family history is not on file. He reports that he has never smoked. He has never used smokeless tobacco. He reports that he does not drink alcohol or use drugs.     Medications     Current Discharge Medication List      CONTINUE these medications which have NOT CHANGED    Details   aspirin 81 MG chewable tablet Take 1 tablet by mouth daily  Qty: 30 tablet, Refills: 3      carvedilol (COREG) 12.5 MG tablet Take 1 tablet by mouth 2 times daily (with meals)  Qty: 60 tablet, Refills: 3      spironolactone (ALDACTONE) 25 MG tablet Take 0.5 tablets by mouth daily  Qty: 30 tablet, Refills: 3      insulin aspart (NOVOLOG FLEXPEN) 100 UNIT/ML injection pen Inject 10 Units into the skin 3 times daily (before meals)  Qty: 5 pen, Refills: 3      insulin glargine (LANTUS;BASAGLAR) 100 UNIT/ML injection pen Inject 45 Units into the skin nightly  Qty: 5 pen, Refills: 3      vitamin D3 (CHOLECALCIFEROL) 10 MCG (400 UNIT) TABS tablet Take 400 Units by mouth daily      Cinnamon 500 MG CAPS Take 1 capsule by mouth daily      fluticasone (FLONASE) 50 MCG/ACT nasal spray 2 sprays by Nasal route daily      carbidopa-levodopa (SINEMET)  MG per tablet Take 2 tablets by mouth 3 times daily      losartan (COZAAR) 100 MG tablet Take 100 mg by mouth daily      pantoprazole (PROTONIX) 40 MG tablet Take 40 mg by mouth 2 times daily      PARoxetine (PAXIL) 40 MG tablet Take 40 mg by mouth every morning      primidone (MYSOLINE) 50 MG tablet Take 50 mg by mouth 2 times daily      Insulin Pen Needle ([employer] PEN NEEDLES 29G) 29G X 12MM MISC 1 each by Does not apply route daily  Qty: 100 each, Refills: 3      rOPINIRole (REQUIP) 3 MG tablet Take 3 mg by mouth 3 times daily      atorvastatin (LIPITOR) 10 MG tablet Take 10 mg by mouth nightly       albuterol (PROVENTIL HFA;VENTOLIN HFA) 108 (90 BASE) MCG/ACT inhaler Inhale 2 puffs into the lungs every 6 hours as needed. Allergies     He is allergic to erythromycin. Physical Exam     INITIAL VITALS: BP: (!) 142/77, Temp: 97.6 °F (36.4 °C), Pulse: 71, Resp: 16, SpO2: 100 %   Physical Exam  Nursing note reviewed. Constitutional:       Appearance: He is obese. Comments: Elderly male, tremor noted, NAD   Eyes:      Extraocular Movements: Extraocular movements intact. Pupils: Pupils are equal, round, and reactive to light. Neck:      Musculoskeletal: Normal range of motion and neck supple. Cardiovascular:      Rate and Rhythm: Normal rate and regular rhythm. Pulses: Normal pulses. Heart sounds: Normal heart sounds. Pulmonary:      Effort: Pulmonary effort is normal.      Breath sounds: Normal breath sounds. Abdominal:      General: There is no distension. Palpations: Abdomen is soft. Tenderness: There is no abdominal tenderness. Skin:     General: Skin is warm and dry. Neurological:      General: No focal deficit present. Mental Status: He is alert and oriented to person, place, and time. Psychiatric:         Mood and Affect: Mood normal.         Behavior: Behavior normal.           Diagnostic Results       RADIOLOGY:  XR CHEST (2 VW)   Final Result      Minimal residual infiltrate or atelectasis at right lung base.              LABS:   Results for orders placed or performed during the hospital encounter of 09/07/20   CBC auto differential   Result Value Ref Range    WBC 12.8 (H) 4.0 - 11.0 K/uL    RBC 4.36 4.20 - 5.90 M/uL    Hemoglobin 10.8 (L) 13.5 - 17.5 g/dL    Hematocrit 33.5 (L) 40.5 - 52.5 %    MCV 76.8 (L) 80.0 - 100.0 fL    MCH 24.8 (L) 26.0 - 34.0 pg    MCHC 32.3 31.0 - 36.0 g/dL    RDW 20.4 (H) 12.4 - 15.4 %    Platelets 764 491 - 356 K/uL    MPV 7.9 5.0 - 10.5 fL    Neutrophils % 84.4 %    Lymphocytes % 9.6 %    Monocytes % 5.2 %    Eosinophils % 0.5 %    Basophils % 0.3 %    Neutrophils Absolute 10.8 (H) 1.7 - 7.7 K/uL    Lymphocytes Absolute 1.2 1.0 - 5.1 K/uL    Monocytes Absolute 0.7 0.0 - 1.3 K/uL    Eosinophils Absolute 0.1 0.0 - 0.6 K/uL    Basophils Absolute 0.0 0.0 - 0.2 K/uL   Basic Metabolic Panel w/ Reflex to MG   Result Value Ref Range    Sodium 135 (L) 136 - 145 mmol/L    Potassium reflex Magnesium 3.9 3.5 - 5.1 mmol/L    Chloride 103 99 - 110 mmol/L    CO2 24 21 - 32 mmol/L    Anion Gap 8 3 - 16    Glucose 111 (H) 70 - 99 mg/dL    BUN 11 7 - 20 mg/dL    CREATININE 0.8 0.8 - 1.3 mg/dL    GFR Non-African American >60 >60    GFR African American >60 >60    Calcium 8.4 8.3 - 10.6 mg/dL   Blood gas, venous (Lab)   Result Value Ref Range    pH, Sonido 7.311 (L) 7.350 - 7.450    pCO2, Sonido 52.0 (H) 41.0 - 51.0 mmHg    pO2, Sonido 22.0 (L) 25.0 - 40.0 mmHg    HCO3, Venous 25.4 24.0 - 28.0 mmol/L    Base Excess, Sonido -0.7 -2.0 - 3.0 mmol/L    O2 Sat, Sonido 28 Not established %    Carboxyhemoglobin 1.0 0.0 - 1.5 %    MetHgb, Sonido 0.6 0.0 - 1.5 %    TC02 (Calc), Sonido 27 mmol/L    Hemoglobin, Sonido, Reduced 70.90 %   Urinalysis, reflex to microscopic (Lab)   Result Value Ref Range    Color, UA Yellow Straw/Yellow    Clarity, UA Clear Clear    Glucose, Ur Negative Negative mg/dL    Bilirubin Urine Negative Negative    Ketones, Urine Negative Negative mg/dL    Specific Gravity, UA 1.015 1.005 - 1.030    Blood, Urine Negative Negative    pH, UA 5.0 5.0 - 8.0    Protein, UA Negative Negative mg/dL    Urobilinogen, Urine 0.2 <2.0 E.U./dL    Nitrite, Urine Negative Negative    Leukocyte Esterase, Urine Negative Negative    Microscopic Examination Not Indicated     Urine Type NotGiven    AccuCheck   Result Value Ref Range    Glucose 122 mg/dL   POCT Glucose   Result Value Ref Range    POC Glucose 122 (H) 70 - 99 mg/dl    Performed on ACCU-CHEK    POCT Glucose   Result Value Ref Range    POC Glucose 216 (H) 70 - 99 mg/dl    Performed on ACCU-CHEK    POCT Glucose   Result Value Ref Range    POC Glucose 171 (H) 70 - 99 mg/dl    Performed on ACCU-CHEK          RECENT VITALS:  BP: (!) 143/54, Temp: 97.6 °F (36.4 °C), Pulse: 72, Resp: 15, SpO2: 98 %       ED Course     Nursing Notes, Past Medical Hx, Past Surgical Hx, Social Hx, Allergies, and Family Hx were reviewed.     The patient was given the following medications:  Orders Placed This Encounter   Medications    glucose (GLUTOSE) 40 % oral gel 15 g    dextrose 50 % IV solution    glucagon (rDNA) injection 1 mg    dextrose 5 % solution    lactated ringers infusion 1,000 mL            CONSULTS:  None    MEDICAL DECISION MAKING / ASSESSMENT / Milderd Hilda is a 72 y.o. male who presents with complaints as noted in HPI. Patient presents from his rehab facility with complaints of hypoglycemia. Per reports was somewhat somnolent prior to glucagon administration. Is alert upon my initial evaluation. Denies complaints. Physical exam unremarkable. Initial Accu-Chek 122, glucose on BMP noted to be 111. No anion gap noted. Mild acidosis on VBG of 7.311. CBC with a mild leukocytosis of 12,000, stable H&H of 10.8/33.5. Urinalysis unremarkable for infection or ketonuria. Chest xray unremarkable. Patient was given a 500ml LR bolus, and was monitored. He was able to tolerate a diet without issue, and repeat blood sugar after this was noted to be in the 190s. His work-up is unremarkable, there is no evidence of acute infection concern for other acute pathology at this time. I do feel patient is stable for discharge back to his rehab facility, he is amenable to this plan. This patient was also evaluated by the attending physician. All care plans were discussed and agreed upon. Clinical Impression     1.  Hypoglycemia        Disposition     PATIENT REFERRED TO:  Lacey Atkins  [address] [place] [id number] [phone]      in 1 week      DISCHARGE MEDICATIONS:  Current Discharge Medication List          DISPOSITION  D/c back to rehab facility        SKY Almodovar - CNP  09/07/20 7888

## 2020-09-07 NOTE — TELEPHONE ENCOUNTER
Notified Nurse Brionna Cramer that Mr. Linda Renteria would be returning today and that he would require a follow up appt with Dr. Deny Grey in 1 week.

## 2020-09-07 NOTE — ED NOTES
Discharge order received. Patient being DC back to [facility]. All paperwork explained to patient, report was also called. He verbalizes understanding and denies any questions. All belongings sent with patient. Pt sent back via ambulance.       Etha Severs, RN  09/07/20 [id number]

## 2020-09-09 NOTE — DISCHARGE SUMMARY
Hospital Medicine Discharge Summary    Patient ID: Ariela Tse      Patient's PCP: Barbara Flower    Admit Date: 8/21/2020     Discharge Date: 8/30/2020     Admitting Physician: Neetu Ruiz MD     Discharge Physician: Michael Alvarado MD     Discharge Diagnoses: see hospital course       C[address]Peg [address] Problems    Diagnosis Date Noted    Acute respiratory failure ([address] [J96.00] 08/21/2020    DKA, type 1, not at goal [facility]) [E10.10] 06/18/2020       The patient was seen and examined on day of discharge and this discharge summary is in conjunction with any daily progress note from day of discharge. Condition at discharge - stable    Hospital Course:  Patient is a 80-year-old male with past medical history of hypertension hyperlipidemia who presented to hospital for altered mental status, hypoxia, respiratory distress. patient seen and evaluated on the day of discharge. Patient informed about following up with appointments. Patient verbalized understanding for follow-up appointments. All questions of the patient answered, patient is in agreement with the discharge plan. Medical reconciliation performed. Patient will be discharged stable condition.  He was encouraged to follow up with his endocrinologist.        Assessment  Acute respiratory failure with hypoxia requiring intubation on 8/21, likely multifactorial, fluid overload - now improved  Diabetic ketoacidosis  Sepsis likely secondary to pneumonia with septic shock-resolved  Lactic acidosis-improved  Elevated troponin likely demand ischemia  Hypertension  Parkinson's disease     Plan  Lantus 25 units nightly, insulin sliding scale, weaned off of insulin drip, dysphagia diabetic diet  Currently on room air, satting greater than 90s  Creatinine has been improving, nephrology following  Continue aspirin, statin,, Coreg Sinemet, Coreg, cardiology following  DC antibiotics  DVT Prophylaxis: lovenox  Diet: DIET DYSPHAGIA SOFT AND Discharge Medication List as of 8/30/2020  1:47 PM           Details   aspirin 81 MG chewable tablet Take 1 tablet by mouth daily, Disp-30 tablet,R-3Normal      carvedilol (COREG) 12.5 MG tablet Take 1 tablet by mouth 2 times daily (with meals), Disp-60 tablet,R-3Normal      spironolactone (ALDACTONE) 25 MG tablet Take 0.5 tablets by mouth daily, Disp-30 tablet,R-3Normal              Details   insulin aspart (NOVOLOG FLEXPEN) 100 UNIT/ML injection pen Inject 10 Units into the skin 3 times daily (before meals), Disp-5 pen,R-3Normal      insulin glargine (LANTUS;BASAGLAR) 100 UNIT/ML injection pen Inject 45 Units into the skin nightly, Disp-5 pen,R-3Normal      vitamin D3 (CHOLECALCIFEROL) 10 MCG (400 UNIT) TABS tablet Take 400 Units by mouth dailyHistorical Med      Cinnamon 500 MG CAPS Take 1 capsule by mouth dailyHistorical Med      fluticasone (FLONASE) 50 MCG/ACT nasal spray 2 sprays by Nasal route dailyHistorical Med      carbidopa-levodopa (SINEMET)  MG per tablet Take 2 tablets by mouth 3 times dailyHistorical Med      losartan (COZAAR) 100 MG tablet Take 100 mg by mouth dailyHistorical Med      pantoprazole (PROTONIX) 40 MG tablet Take 40 mg by mouth 2 times dailyHistorical Med      PARoxetine (PAXIL) 40 MG tablet Take 40 mg by mouth every morningHistorical Med      primidone (MYSOLINE) 50 MG tablet Take 50 mg by mouth 2 times dailyHistorical Med      Insulin Pen Needle ([employer] PEN NEEDLES 29G) 29G X 12MM MISC DAILY Starting Tue 7/28/2020, Disp-100 each,R-3, Normal      rOPINIRole (REQUIP) 3 MG tablet Take 3 mg by mouth 3 times dailyHistorical Med      atorvastatin (LIPITOR) 10 MG tablet Take 10 mg by mouth nightly Historical Med      albuterol (PROVENTIL HFA;VENTOLIN HFA) 108 (90 BASE) MCG/ACT inhaler Inhale 2 puffs into the lungs every 6 hours as needed.                Time Spent on discharge is more than 30 mints in the examination, evaluation, counseling and review of medications and discharge plan.      Signed:    Pablo Cano MD   9/8/2020      Thank you Robin Liriano for the opportunity to be involved in this patient's care. If you have any questions or concerns please feel free to contact me at [phone].

## 2020-09-22 NOTE — ED TRIAGE NOTES
Pt came in for complaints of high BS above 500 at home. PT sates the doctor changed his meds is why It is elevated. PT states that this has happened before.

## 2020-09-22 NOTE — ED PROVIDER NOTES
palpation diffusely  Neurologic:  alert and oriented x4, speech is clear and intact without dysarthria    Diagnostic Results     RADIOLOGY:  No orders to display       LABS:   Results for orders placed or performed during the hospital encounter of 09/22/20   CBC Auto Differential   Result Value Ref Range    WBC 8.2 4.0 - 11.0 K/uL    RBC 3.95 (L) 4.20 - 5.90 M/uL    Hemoglobin 9.8 (L) 13.5 - 17.5 g/dL    Hematocrit 30.0 (L) 40.5 - 52.5 %    MCV 75.8 (L) 80.0 - 100.0 fL    MCH 24.7 (L) 26.0 - 34.0 pg    MCHC 32.6 31.0 - 36.0 g/dL    RDW 19.1 (H) 12.4 - 15.4 %    Platelets 289 893 - 809 K/uL    MPV 7.7 5.0 - 10.5 fL    Neutrophils % 77.2 %    Lymphocytes % 16.1 %    Monocytes % 5.8 %    Eosinophils % 0.3 %    Basophils % 0.6 %    Neutrophils Absolute 6.4 1.7 - 7.7 K/uL    Lymphocytes Absolute 1.3 1.0 - 5.1 K/uL    Monocytes Absolute 0.5 0.0 - 1.3 K/uL    Eosinophils Absolute 0.0 0.0 - 0.6 K/uL    Basophils Absolute 0.1 0.0 - 0.2 K/uL   Basic Metabolic Panel   Result Value Ref Range    Sodium 127 (L) 136 - 145 mmol/L    Potassium 5.1 3.5 - 5.1 mmol/L    Chloride 89 (L) 99 - 110 mmol/L    CO2 19 (L) 21 - 32 mmol/L    Anion Gap 19 (H) 3 - 16    Glucose 595 (H) 70 - 99 mg/dL    BUN 29 (H) 7 - 20 mg/dL    CREATININE 1.0 0.8 - 1.3 mg/dL    GFR Non-African American >60 >60    GFR African American >60 >60    Calcium 9.4 8.3 - 10.6 mg/dL   Urinalysis with Microscopic   Result Value Ref Range    Color, UA Yellow Straw/Yellow    Clarity, UA Clear Clear    Glucose, Ur >=1000 (A) Negative mg/dL    Bilirubin Urine Negative Negative    Ketones, Urine 40 (A) Negative mg/dL    Specific Gravity, UA 1.010 1.005 - 1.030    Blood, Urine TRACE-INTACT (A) Negative    pH, UA 6.0 5.0 - 8.0    Protein, UA Negative Negative mg/dL    Urobilinogen, Urine 0.2 <2.0 E.U./dL    Nitrite, Urine Negative Negative    Leukocyte Esterase, Urine Negative Negative    Microscopic Examination YES     Urine Type NotGiven     WBC, UA 0-2 0 - 5 /HPF    RBC, UA 0-2 0 - 4 /HPF    Epithelial Cells, UA 0-1 0 - 5 /HPF    Trichomonas, UA None Seen None Seen /HPF   Blood Gas, Venous   Result Value Ref Range    pH, Sonido 7.366 7.350 - 7.450    pCO2, Sonido 37.0 (L) 41.0 - 51.0 mmHg    pO2, Sonido 55.4 (H) 25.0 - 40.0 mmHg    HCO3, Venous 20.7 (L) 24.0 - 28.0 mmol/L    Base Excess, Sonido -3.7 (L) -2.0 - 3.0 mmol/L    O2 Sat, Sonido 86 Not established %    Carboxyhemoglobin 2.3 (H) 0.0 - 1.5 %    MetHgb, Sonido 0.5 0.0 - 1.5 %    TC02 (Calc), Sonido 22 mmol/L    Hemoglobin, Sonido, Reduced 13.40 %   Basic Metabolic Panel   Result Value Ref Range    Sodium 130 (L) 136 - 145 mmol/L    Potassium 4.0 3.5 - 5.1 mmol/L    Chloride 93 (L) 99 - 110 mmol/L    CO2 18 (L) 21 - 32 mmol/L    Anion Gap 19 (H) 3 - 16    Glucose 427 (H) 70 - 99 mg/dL    BUN 28 (H) 7 - 20 mg/dL    CREATININE 1.0 0.8 - 1.3 mg/dL    GFR Non-African American >60 >60    GFR African American >60 >60    Calcium 9.1 8.3 - 10.6 mg/dL   Basic Metabolic Panel   Result Value Ref Range    Sodium 130 (L) 136 - 145 mmol/L    Potassium 3.8 3.5 - 5.1 mmol/L    Chloride 96 (L) 99 - 110 mmol/L    CO2 21 21 - 32 mmol/L    Anion Gap 13 3 - 16    Glucose 287 (H) 70 - 99 mg/dL    BUN 27 (H) 7 - 20 mg/dL    CREATININE 0.9 0.8 - 1.3 mg/dL    GFR Non-African American >60 >60    GFR African American >60 >60    Calcium 8.7 8.3 - 10.6 mg/dL   POCT Glucose   Result Value Ref Range    POC Glucose 539 (H) 70 - 99 mg/dl    Performed on ACCU-CHEK    POCT Glucose   Result Value Ref Range    POC Glucose 436 (H) 70 - 99 mg/dl    Performed on ACCU-CHEK    POCT Glucose   Result Value Ref Range    POC Glucose 232 (H) 70 - 99 mg/dl    Performed on ACCU-CHEK        RECENT VITALS:  BP: (!) 132/53, Temp: 98.7 °F (37.1 °C), Pulse: 84, Resp: 18, SpO2: 97 %       ED Course     Nursing Notes, Past Medical Hx, Past Surgical Hx, Social Hx, Allergies, and Family Hx were reviewed.     The patient was given the following medications:  Orders Placed This Encounter   Medications    lactated ringers infusion 1,000 mL    DISCONTD: insulin regular (HUMULIN R;NOVOLIN R) injection 5 Units    insulin regular (HUMULIN R;NOVOLIN R) injection 10 Units    lactated ringers infusion 1,000 mL    insulin regular (HUMULIN R;NOVOLIN R) injection 10 Units       CONSULTS:  None    MEDICAL DECISION MAKING / ASSESSMENT / Scarletjose Lee is a 72 y.o. male who presented to the emergency department with the complaint of elevated blood sugar. Patient had a fingerstick blood sugar greater than 500 here in the emergency department. His laboratory investigations are not indicative of DKA he does have a mild anion gap and a bicarb of 19 but his pH is normal on a venous blood gas his BUN is 29 creatinine is 1.0, these values represent a very mild dehydration. Patient's glucose was 595 with resultant hyponatremia seen on the basic metabolic profile. Here in the emergency department was given 1 L of lactated Ringer's as well as 10 units of regular insulin IV. Following these interventions the patient's fingerstick blood sugar was 062 basic metabolic profile showed continued elevation of his anion gap to 19 bicarb was 18 his glucose was 427 sodium was 130 BUN of 28 creatinine of one-point [place]. He was given an additional liter of lactated Ringer's and additional 10 units of insulin. Following this his basic metabolic profile showed resolved anion gap with a gap of 13 a bicarb of 21 his sodium was 130 glucose was 287. Patient was able to tolerate food and drink by mouth and had and a glucose of 287 on a recheck approximately an hour and a half after his basic metabolic profile was drawn. Ultimately the patient was felt safe for discharge home he already has a plan in place in terms of adjustments to his antihyperglycemic regimen and has been working with his primary care provider to further titrate his medications. .     Clinical Impression     1.  Hyperglycemia        Disposition     PATIENT REFERRED

## 2020-09-23 NOTE — H&P
Internal Medicine PGY- 1 Resident History & Physical ICU      PCP: Yordan Guadalupe    Date of Admission: 9/23/2020    Chief Complaint:  hyperglycemia    HPI:   Patient is a 72 y.o. male with a history of T1DM, Parkinson's disease, asthma, and hypertension presenting to [facility] ED with hyperglycemia. He was just discharged from [facility] ED yesterday evening for hyperglycemia and mildly elevated gap which closed with Iv insulin. He reports he woke up this AM and his blood sugar was very high so he came to the ED. Of not he was last admitted to ICU at [facility] ED on 8/21/20 for DKA. In the ED BMP noted hyponatremia (131), K 6.4, Cl 89, HC)3 11, BUN 35, Cre 1.2, Glucose 637. He had an elevated anion gap of 31. CBC showed leukocytosis (16.3) w/o any obvious sing sof infection. VBG showed pH 7.211, pCO2 28.4, pO2 68.9, HCO3 11.0, O2 sat 89%. Patient was recently discharged from [facility] for a 3 month stay. He states during this time his insulin regimen was changed from Novolin to Lantus with 3x daily Aspart dut to NPH being felt to cause hypoglycemia. He states he did not miss any recent doses of his insulin but after discussing with RN he mentioned he did  niot take a dose this AM. On exam he denies chest pain, dyspnea, nausea, vomitting, diarrhea, constipation, fever, chills, or abdominal pain. Past Medical History:          Diagnosis Date    Asthma     Diabetes mellitus ([facility] [address]     Hypertension     Parkinson disease ([facility] [address]        PastSurgical History:          Procedure Laterality Date    UPPER GASTROINTESTINAL ENDOSCOPY N/A 5/15/2020    EGD CONTROL HEMORRHAGE performed by Damion Sharp MD at [facility] 5/15/2020    EGD BIOPSY performed by Damion Sharp MD at [facility] ENDOSCOPY       Medications Prior to Admission:      Prior to Admission medications    Medication Sig Start Date End Date Taking?  Authorizing Provider   aspirin 81 MG chewable tablet Take 1 tablet by mouth daily 8/31/20   Francis Salas MD   carvedilol (COREG) 12.5 MG tablet Take 1 tablet by mouth 2 times daily (with meals) 8/30/20   Francis Salas MD   spironolactone (ALDACTONE) 25 MG tablet Take 0.5 tablets by mouth daily 8/31/20   Francis Salas MD   insulin aspart (NOVOLOG FLEXPEN) 100 UNIT/ML injection pen Inject 10 Units into the skin 3 times daily (before meals) 8/2/20   Jonel Simmonds, MD   insulin glargine (LANTUS;BASAGLAR) 100 UNIT/ML injection pen Inject 45 Units into the skin nightly 8/2/20   Jonel Simmonds, MD   vitamin D3 (CHOLECALCIFEROL) 10 MCG (400 UNIT) TABS tablet Take 400 Units by mouth daily    Historical Provider, MD   Cinnamon 500 MG CAPS Take 1 capsule by mouth daily    Historical Provider, MD   fluticasone (FLONASE) 50 MCG/ACT nasal spray 2 sprays by Nasal route daily 7/6/20   Historical Provider, MD   carbidopa-levodopa (SINEMET)  MG per tablet Take 2 tablets by mouth 3 times daily 4/22/20   Historical Provider, MD   losartan (COZAAR) 100 MG tablet Take 100 mg by mouth daily 12/4/18   Historical Provider, MD   pantoprazole (PROTONIX) 40 MG tablet Take 40 mg by mouth 2 times daily    Historical Provider, MD   PARoxetine (PAXIL) 40 MG tablet Take 40 mg by mouth every morning 4/16/20   Historical Provider, MD   primidone (MYSOLINE) 50 MG tablet Take 50 mg by mouth 2 times daily 7/16/19   Historical Provider, MD   Insulin Pen Needle ([employer] PEN NEEDLES 29G) 29G X 12MM MISC 1 each by Does not apply route daily 7/28/20   Wally Camacho MD   rOPINIRole (REQUIP) 3 MG tablet Take 3 mg by mouth 3 times daily    Historical Provider, MD   atorvastatin (LIPITOR) 10 MG tablet Take 10 mg by mouth nightly     Historical Provider, MD   albuterol (PROVENTIL HFA;VENTOLIN HFA) 108 (90 BASE) MCG/ACT inhaler Inhale 2 puffs into the lungs every 6 hours as needed. Historical Provider, MD       Allergies: Erythromycin    Social History:      TOBACCO:   reports that he has never smoked.  He has hyperkalmia    Pulm    ID  SIRS  , Resp 24, Temp 98.1, WBC 16.3  -No obvious active infection  -CXR negative. UA pending    Endo  Diabetic Ketoacidosis: likely 2/2 to noncompliance with insulin + poor dietary habits  -Received 2L LR in ED  -Now on Insulin Drip  -Will give an additional 1 L of LR and then transition to NS IV fluids 250ml/hr, switch to D5 0.5 NS 150ml/hr at glucose <250 per DKA protocol  -Monitor Renal function panel Q4 hours. Optimize lytes  -NPO  -Hypoglycemia protocol  -Glucose Q1h  -Social work consulted for repeated admissions with DKA and hypoglycemia;  -Will need diabetes education  -Infectious workup including CXR unremarkable.  Pending UA    Renal  -Cre 1.2  (baseline 0.9)  - IVF  - Holding home losartan  - Strict I/Os    Hyperkalemia: K 6.4  - Holding losartan as above;   - Hold off on K supplementation with insulin for now;    GI Prophylaxis: PPI  Sedation: None  DVT Prophylaxis: Lovenox  Fluids: LR  IV access:    Intubated: No  Diet: No diet orders on file    I will discuss the patient with Dr. Edward Montague MD  Internal Medicine Resident, PGY- 1  Perfect serve

## 2020-09-23 NOTE — ED TRIAGE NOTES
Pt arrived via EMS glucose 569 on triage, c/o elevated at home, d/c'd from hospital at 22:30 recently, tachpnea, denies pain, states changes in insulin is the problem

## 2020-09-23 NOTE — ED NOTES
Pt D/C via stretcher with [facility]. Went over D/C instructions with pt, pt verbalized understanding and all questions were answered.  TK Batres RN  09/22/20 [id number]

## 2020-09-23 NOTE — CONSULTS
disease [facility])        Past Surgical History:   Procedure Laterality Date    UPPER GASTROINTESTINAL ENDOSCOPY N/A 5/15/2020    EGD CONTROL HEMORRHAGE performed by Vanda Marin MD at [facility] N/A 5/15/2020    EGD BIOPSY performed by Vanda Marin MD at [facility]:   The patient lives at home    Alcohol: No use  Illicit drugs: No use  Tobacco: Never smoker  Vaping: No use    Family History:  No family history on file. MEDICATIONS:     No current facility-administered medications on file prior to encounter.       Current Outpatient Medications on File Prior to Encounter   Medication Sig Dispense Refill    aspirin 81 MG chewable tablet Take 1 tablet by mouth daily 30 tablet 3    carvedilol (COREG) 12.5 MG tablet Take 1 tablet by mouth 2 times daily (with meals) 60 tablet 3    spironolactone (ALDACTONE) 25 MG tablet Take 0.5 tablets by mouth daily 30 tablet 3    insulin aspart (NOVOLOG FLEXPEN) 100 UNIT/ML injection pen Inject 10 Units into the skin 3 times daily (before meals) 5 pen 3    insulin glargine (LANTUS;BASAGLAR) 100 UNIT/ML injection pen Inject 45 Units into the skin nightly 5 pen 3    vitamin D3 (CHOLECALCIFEROL) 10 MCG (400 UNIT) TABS tablet Take 400 Units by mouth daily      Cinnamon 500 MG CAPS Take 1 capsule by mouth daily      fluticasone (FLONASE) 50 MCG/ACT nasal spray 2 sprays by Nasal route daily      carbidopa-levodopa (SINEMET)  MG per tablet Take 2 tablets by mouth 3 times daily      losartan (COZAAR) 100 MG tablet Take 100 mg by mouth daily      pantoprazole (PROTONIX) 40 MG tablet Take 40 mg by mouth 2 times daily      PARoxetine (PAXIL) 40 MG tablet Take 40 mg by mouth every morning      primidone (MYSOLINE) 50 MG tablet Take 50 mg by mouth 2 times daily      Insulin Pen Needle ([employer] PEN NEEDLES 29G) 29G X 12MM MISC 1 each by Does not apply route daily 100 each 3    rOPINIRole (REQUIP) 3 headaches. Psychiatric/Behavioral: Negative for confusion and sleep disturbance. All other systems reviewed and are negative. PHYSICAL EXAM:       Vitals: BP (!) 114/50   Pulse 85   Temp 98.1 °F (36.7 °C) (Oral)   Resp 20   Ht 5' 8\" (1.727 m)   Wt 157 lb (71.2 kg)   SpO2 97%   BMI 23.87 kg/m²     I/O:  No intake or output data in the 24 hours ending 09/23/20 1515  No intake/output data recorded. No intake/output data recorded. Physical Examination:     Physical Exam  Vitals signs reviewed. Constitutional:       General: He is not in acute distress. Appearance: He is well-developed and well-groomed. HENT:      Head: Normocephalic and atraumatic. Mouth/Throat:      Mouth: Mucous membranes are moist.      Pharynx: Oropharynx is clear. Eyes:      General: No scleral icterus. Extraocular Movements: Extraocular movements intact. Conjunctiva/sclera: Conjunctivae normal.      Pupils: Pupils are equal, round, and reactive to light. Neck:      Musculoskeletal: Full passive range of motion without pain, normal range of motion and neck supple. Cardiovascular:      Rate and Rhythm: Normal rate and regular rhythm. Pulses: Normal pulses. Heart sounds: Normal heart sounds, S1 normal and S2 normal. No murmur. Pulmonary:      Effort: Pulmonary effort is normal. No respiratory distress. Breath sounds: Normal breath sounds and air entry. Abdominal:      General: Abdomen is flat. Bowel sounds are normal.      Palpations: Abdomen is soft. Tenderness: There is no abdominal tenderness. Musculoskeletal: Normal range of motion. Skin:     General: Skin is warm and dry. Neurological:      General: No focal deficit present. Mental Status: He is alert and oriented to person, place, and time. Cranial Nerves: Cranial nerves are intact. Sensory: Sensation is intact. Motor: Motor function is intact. Coordination: Coordination is intact.       Gait: Gait is intact. Deep Tendon Reflexes: Reflexes are normal and symmetric. Comments: tremor   Psychiatric:         Attention and Perception: Attention and perception normal.         Mood and Affect: Mood normal.         Speech: Speech normal.         Behavior: Behavior normal. Behavior is cooperative. Thought Content: Thought content normal.         Cognition and Memory: Cognition and memory normal.         Judgment: Judgment normal.             DATA:       Labs:  CBC:   Recent Labs     09/22/20  1447 09/23/20  0902   WBC 8.2 16.3*   HGB 9.8* 10.4*   HCT 30.0* 33.9*    241       BMP:   Recent Labs     09/22/20  1658 09/22/20  1914 09/23/20  0902   * 130* 131*   K 4.0 3.8 6.4*   CL 93* 96* 89*   CO2 18* 21 11*   BUN 28* 27* 35*   CREATININE 1.0 0.9 1.2   GLUCOSE 427* 287* 637*     LFT's: No results for input(s): AST, ALT, ALB, BILITOT, ALKPHOS in the last 72 hours. Troponin: No results for input(s): TROPONINI in the last 72 hours. BNP:No results for input(s): BNP in the last 72 hours. ABGs: No results for input(s): PHART, XHA8EOD, PO2ART in the last 72 hours. INR: No results for input(s): INR in the last 72 hours. U/A:  Recent Labs     09/23/20  1312   COLORU Yellow   PHUR 5.5   WBCUA *   RBCUA *   TRICHOMONAS None Seen   BACTERIA Rare*   CLARITYU Clear   SPECGRAV 1.015   LEUKOCYTESUR MODERATE*   UROBILINOGEN 0.2   BILIRUBINUR Negative   BLOODU MODERATE*   GLUCOSEU >=1000*       XR CHEST (2 VW)   Final Result      Mild diffuse interstitial prominence without change since 9/7/2020. Stable cardiomediastinal silhouette. Multiple old left rib fractures are noted. ASSESSMENT AND PLAN:   Mr. Ananda Mcfadden is a 81-year-old male with a past medical history of type 1 diabetes, Parkinson's disease, asthma, and hypertension who presents to the [facility] with hyperglycemia.        Endocrine:  Diabetic ketoacidosis, in setting of longstanding type 1 diabetes. Patient present with elevated blood glucose levels in the 600s. Recently had his insulin regimen changed from NPH to Lantus plus NovoLog. The patient states that this has not or to control his blood sugar. Have plans to transition back to his old regimen but has yet to  the new prescriptions. · Aggressive IV hydration: Per DKA protocol. · Insulin drip  · Every 4 hours BMP  · N.p.o.  · Transition to subcutaneous insulin when gap closed x2  · Hypoglycemia protocol  · Patient would benefit from further diabetes education. Have a regiment the patient is able to adhere to at the time of discharge. Renal:  Acute kidney injury  Creatinine 1.2 on presentation open bracket baseline 0.9]  · On IV fluid  · Strict I's and Os  · Holding home losartan    Hyperkalemia  Potassium 6.4 on arrival.  Likely 2/2 DKA  · Holding home losartan 100 mg.  · Holding home Aldactone 12.5 mg  · Continue to monitor as the patient is currently in DKA    Cardiovascular  Hypertension  · Continue home Coreg 12.5 twice daily  · Hold home losartan and spironolactone in the setting of NABIL and DKA    Hyperlipidemia  · Lipitor 10 mg daily    Gastrointestinal  GERD   · Protonix 40 mg twice daily    Neuro:  Parkinson's disease  · Continue Sinemet  mg 3 times daily  · Requip 3 mg 3 times daily  · Primidone 50 mg twice daily    Code Status:Full Code  FEN: NPO  PPX:  Lovenox  DISPO: ICU    This patient has been staffed and discussed with Dr. Naheed Navarro  -----------------------------  Felecia Loja MD, PGY-2  9/23/2020  3:15 PM       Patient was seen, examined and discussed with Dr. Raissa De Jesus. I agree with the history of present illness, past medical/surgical histories, family history, social history, medication list and allergies as listed. The review of systems is as noted above.  My physical exam confirms the findings listed  Chart was reviewed including labs, CXR, EKG and medical records confirm the findings noted     Patient is known to me from prior admissions. Presented with DKA and hyperkalemia. Known to have Brittle DM. Continue IVF and insulin drip per protocol. Agree on the A/P noted above.

## 2020-09-23 NOTE — ED PROVIDER NOTES
[address] [facility] [address] ENCOUNTER          ATTENDING PHYSICIAN NOTE       Date of evaluation: 9/23/2020    Chief Complaint     Hyperglycemia (569 glucose in triage, d/c last pm)      History of Present Illness     Rigoberto Huggins is a 72 y.o. male who presents with a chief complaint of hyperglycemia. Patient was here yesterday, similar complaints. Presented with a glucose of 569, but was not found to be in DKA. Was given fluids, insulin, discharged with a glucose of 280. Patient states he checked his blood sugar again this morning and it was high. He is very frustrated that he had to come back. States he has not eaten since yesterday because she was worried about his sugar being high. Denies chest pain, shortness of breath, urinary symptoms, abdominal pain, nausea, vomiting. Does state that he was on one insulin regimen for years, and last month his doctor switched him to new insulin, and ever since then he has had \"nothing but problems\". He called his doctor yesterday who told him he could switch him back to his other insulin regimen, but patient has not been able to pick this up from pharmacy yet. Per chart review and discharge summary on 7/28, patient was switched to lantus from NPH because NPH was thought to be causing labile blood sugars and recurrent hypoglycemia episodes. Admitted in late august with sepsis with pneumonia as well as in early August and [facility] for DKA and several other times in July, and also in June, and seen 2 x in May for hypoglycemia. Review of Systems     Review of Systems   Constitutional: Negative for activity change, appetite change, fatigue and fever. HENT: Negative for ear pain, facial swelling, nosebleeds and rhinorrhea. Eyes: Negative for pain, discharge and visual disturbance. Respiratory: Negative for choking, chest tightness and shortness of breath. Cardiovascular: Negative for chest pain and palpitations.    Gastrointestinal: Negative for abdominal pain, diarrhea, nausea and vomiting. Endocrine: Negative for cold intolerance, heat intolerance and polyuria. Genitourinary: Negative for dysuria, flank pain and hematuria. Musculoskeletal: Negative for arthralgias, joint swelling and myalgias. Skin: Negative for rash and wound. Allergic/Immunologic: Negative for environmental allergies. Neurological: Negative for dizziness, seizures, syncope, weakness and light-headedness. Hematological: Does not bruise/bleed easily. Psychiatric/Behavioral: Negative for confusion and hallucinations. Past Medical, Surgical, Family, and Social History     He has a past medical history of Asthma, Diabetes mellitus ([facility] [address] Hypertension, and Parkinson disease ([facility] [address] He has a past surgical history that includes Upper gastrointestinal endoscopy (N/A, 5/15/2020) and Upper gastrointestinal endoscopy (N/A, 5/15/2020). His family history is not on file. He reports that he has never smoked. He has never used smokeless tobacco. He reports that he does not drink alcohol or use drugs.     Medications     Current Discharge Medication List      CONTINUE these medications which have NOT CHANGED    Details   aspirin 81 MG chewable tablet Take 1 tablet by mouth daily  Qty: 30 tablet, Refills: 3      carvedilol (COREG) 12.5 MG tablet Take 1 tablet by mouth 2 times daily (with meals)  Qty: 60 tablet, Refills: 3      spironolactone (ALDACTONE) 25 MG tablet Take 0.5 tablets by mouth daily  Qty: 30 tablet, Refills: 3      insulin aspart (NOVOLOG FLEXPEN) 100 UNIT/ML injection pen Inject 10 Units into the skin 3 times daily (before meals)  Qty: 5 pen, Refills: 3      insulin glargine (LANTUS;BASAGLAR) 100 UNIT/ML injection pen Inject 45 Units into the skin nightly  Qty: 5 pen, Refills: 3      vitamin D3 (CHOLECALCIFEROL) 10 MCG (400 UNIT) TABS tablet Take 400 Units by mouth daily      Cinnamon 500 MG CAPS Take 1 capsule by mouth daily      fluticasone (FLONASE) 50 MCG/ACT nasal spray 2 sprays by Nasal route daily      carbidopa-levodopa (SINEMET)  MG per tablet Take 2 tablets by mouth 3 times daily      losartan (COZAAR) 100 MG tablet Take 100 mg by mouth daily      pantoprazole (PROTONIX) 40 MG tablet Take 40 mg by mouth 2 times daily      PARoxetine (PAXIL) 40 MG tablet Take 40 mg by mouth every morning      primidone (MYSOLINE) 50 MG tablet Take 50 mg by mouth 2 times daily      Insulin Pen Needle ([employer] PEN NEEDLES 29G) 29G X 12MM MISC 1 each by Does not apply route daily  Qty: 100 each, Refills: 3      rOPINIRole (REQUIP) 3 MG tablet Take 3 mg by mouth 3 times daily      atorvastatin (LIPITOR) 10 MG tablet Take 10 mg by mouth nightly       albuterol (PROVENTIL HFA;VENTOLIN HFA) 108 (90 BASE) MCG/ACT inhaler Inhale 2 puffs into the lungs every 6 hours as needed. Allergies     He is allergic to erythromycin. Physical Exam     INITIAL VITALS: BP: (!) 168/80, Temp: 98.1 °F (36.7 °C), Pulse: 102, Resp: 24, SpO2: 95 %   Physical Exam  Constitutional:       General: He is not in acute distress. Appearance: He is well-developed. He is not diaphoretic. HENT:      Head: Normocephalic and atraumatic. Mouth/Throat:      Pharynx: No oropharyngeal exudate. Eyes:      General:         Right eye: No discharge. Left eye: No discharge. Conjunctiva/sclera: Conjunctivae normal.      Pupils: Pupils are equal, round, and reactive to light. Neck:      Musculoskeletal: Normal range of motion and neck supple. Thyroid: No thyromegaly. Vascular: No JVD. Trachea: No tracheal deviation. Cardiovascular:      Rate and Rhythm: Regular rhythm. Tachycardia present. Heart sounds: Normal heart sounds. No murmur. No friction rub. No gallop. Pulmonary:      Effort: Pulmonary effort is normal. No respiratory distress. Breath sounds: Normal breath sounds. No stridor. No wheezing or rales.    Chest:      Chest wall: No tenderness. Abdominal:      General: Bowel sounds are normal. There is no distension. Palpations: Abdomen is soft. Tenderness: There is no abdominal tenderness. There is no guarding or rebound. Musculoskeletal: Normal range of motion. General: No tenderness or deformity. Skin:     General: Skin is warm and dry. Findings: No erythema or rash. Neurological:      Mental Status: He is alert and oriented to person, place, and time. Cranial Nerves: No cranial nerve deficit. Motor: No abnormal muscle tone. Coordination: Coordination normal.      Comments: Tremor noted in arms on exam - patient states this is normal due to parkinsons. Psychiatric:         Behavior: Behavior normal.         Diagnostic Results     EKG   Indication: Tachycardia. Heart rate 100, QRS prolonged at 120, likely underlying intraventricular block, unclear if left or right bundle branch block, T wave inversions in aVL, no signs of ST elevation or depression, but peaked T waves noted diffusely. This is significantly changed from prior EKG. Impression: Tachycardic, no acute ischemia, but peaked T waves diffusely concerning for hyperkalemia. RADIOLOGY:  XR CHEST (2 VW)   Final Result      Mild diffuse interstitial prominence without change since 9/7/2020. Stable cardiomediastinal silhouette. Multiple old left rib fractures are noted.           LABS:   Results for orders placed or performed during the hospital encounter of 09/23/20   CBC Auto Differential   Result Value Ref Range    WBC 16.3 (H) 4.0 - 11.0 K/uL    RBC 4.24 4.20 - 5.90 M/uL    Hemoglobin 10.4 (L) 13.5 - 17.5 g/dL    Hematocrit 33.9 (L) 40.5 - 52.5 %    MCV 79.9 (L) 80.0 - 100.0 fL    MCH 24.5 (L) 26.0 - 34.0 pg    MCHC 30.6 (L) 31.0 - 36.0 g/dL    RDW 19.6 (H) 12.4 - 15.4 %    Platelets 051 282 - 909 K/uL    MPV 8.3 5.0 - 10.5 fL    Neutrophils % 95.9 %    Lymphocytes % 2.6 %    Monocytes % 1.1 %    Eosinophils % 0.0 % (A) 0 - 4 /HPF    Epithelial Cells, UA 2-5 0 - 5 /HPF    Bacteria, UA Rare (A) None Seen /HPF    Trichomonas, UA None Seen None Seen /HPF   POCT Glucose   Result Value Ref Range    POC Glucose 569 (H) 70 - 99 mg/dl    Performed on ACCU-CHEK    POCT Glucose   Result Value Ref Range    POC Glucose 564 (H) 70 - 99 mg/dl    Performed on ACCU-CHEK    POCT Glucose   Result Value Ref Range    POC Glucose 537 (H) 70 - 99 mg/dl    Performed on ACCU-CHEK    POCT Glucose   Result Value Ref Range    POC Glucose 451 (H) 70 - 99 mg/dl    Performed on ACCU-CHEK    EKG 12 Lead   Result Value Ref Range    Ventricular Rate 98 BPM    Atrial Rate 98 BPM    P-R Interval 198 ms    QRS Duration 120 ms    Q-T Interval 368 ms    QTc Calculation (Bazett) 469 ms    P Axis 73 degrees    R Axis -53 degrees    T Axis 80 degrees    Diagnosis       EKG performed in ER and to be interpreted by ER physician. Confirmed by MD, ER ([id number]),  Shey Good ([id number]) on 9/23/2020 10:31:37 AM       ED BEDSIDE ULTRASOUND:  none    RECENT VITALS:  BP: (!) 114/50,Temp: 98.1 °F (36.7 °C), Pulse: 85, Resp: 20, SpO2: 97 %     Procedures     none    ED Course     Nursing Notes, Past Medical Hx, Past Surgical Hx, Social Hx,Allergies, and Family Hx were reviewed.     patient was given the following medications:  Orders Placed This Encounter   Medications    0.9 % sodium chloride bolus    insulin regular (HUMULIN R;NOVOLIN R) injection 10 Units    DISCONTD: glucose (GLUTOSE) 40 % oral gel 15 g    DISCONTD: dextrose 50 % IV solution    DISCONTD: glucagon (rDNA) injection 1 mg    DISCONTD: dextrose 5 % solution    DISCONTD: insulin regular (HUMULIN R;NOVOLIN R) 100 Units in sodium chloride 0.9 % 100 mL infusion    calcium gluconate 10 % injection 1 g    sodium bicarbonate 8.4 % injection 50 mEq    AND Linked Order Group     insulin regular (HUMULIN R;NOVOLIN R) injection 10 Units     dextrose 50 % IV solution    DISCONTD: glucose (GLUTOSE) 40 % oral gel abdomen. Point-of-care glucose was 569, labs revealed signs of DKA with a pH of 7.2, creatinine elevation at 1.2 from baseline of 0.9, hyperkalemia with potassium of 6.4, consistent with his EKG changes. Serum ketones pending, but it is clear that patient is in DKA. He was not in DKA yesterday according to review of labs. Patient given IV insulin, calcium, bicarb, albuterol, fluids for hyperkalemia. Patient had also previously been given fluids and subcu insulin. Patient's septic work-up initiated although he does not appear septic but has had several admissions for DKA and sepsis in the past few months. Chest x-ray without significant infection, urine with  whites and  reds, concerning for infection. Given ceftriaxone. Stable on reassessment. Still does not appear septic    Critical Care:  Due to the immediate potential for life-threatening deterioration due to DKA, possible sepsis, I spent 45 minutes providing critical care. This time excludes time spent performing procedures but includes time spent on direct patient care, history retrieval, review of the chart, and discussions with patient, family, and consultant(s). Clinical Impression     1. Diabetic ketoacidosis without coma associated with type 1 diabetes mellitus ([facility] [address]        Disposition     PATIENT REFERRED TO:  No follow-up provider specified.     DISCHARGE MEDICATIONS:  Current Discharge Medication List          DISPOSITION  -a dmit to icu       Lillie Mcclelland MD  09/23/20 2107

## 2020-09-24 NOTE — PLAN OF CARE
Problem: Falls - Risk of:  Goal: Will remain free from falls  Description: Will remain free from falls  Outcome: Ongoing     Problem: Skin Integrity:  Goal: Absence of new skin breakdown  Description: Absence of new skin breakdown  Outcome: Ongoing     Problem: Fluid Volume - Imbalance:  Goal: Will remain free of signs and symptoms of dehydration  Description: Will remain free of signs and symptoms of dehydration  Outcome: Ongoing     Problem: Serum Glucose Level - Abnormal:  Goal: Ability to maintain appropriate glucose levels will improve  Description: Ability to maintain appropriate glucose levels will improve  Outcome: Ongoing

## 2020-09-24 NOTE — PROGRESS NOTES
Report given to RN on [address] [employer]. Patient's daughter Ignacio Ruelas also called and notified of new room number.  also attempted to call daughter but had to leave a voicemail.  also notified Chandu up on the 6th floor of daughter's stated concerns about going home. No other needs at this time. Will continue to monitor.

## 2020-09-24 NOTE — PROGRESS NOTES
4 Eyes Admission Assessment     I agree as the admission nurse that 2 RN's have performed a thorough Head to Toe Skin Assessment on the patient. ALL assessment sites listed below have been assessed on admission. Areas assessed by both nurses:   [x]   Head, Face, and Ears   [x]   Shoulders, Back, and Chest  [x]   Arms, Elbows, and Hands   [x]   Coccyx, Sacrum, and Ischium  [x]   Legs, Feet, and Heels        Does the Patient have Skin Breakdown?   No abrasion to L elbow, scattered bruising        James Prevention initiated:  Yes   Wound Care Orders initiated:  NA      [facility] nurse consulted for Pressure Injury (Stage 3,4, Unstageable, DTI, NWPT, and Complex wounds) or James score 18 or lower:  NA      Nurse 1 eSignature: Electronically signed by Karis Pierre RN on 9/24/20 at 3:30 PM EDT    **SHARE this note so that the co-signing nurse is able to place an eSignature**    Nurse 2 eSignature: Electronically signed by Tamika Rodriguez RN on 9/24/20 at 6:35 PM EDT

## 2020-09-24 NOTE — PROGRESS NOTES
98.4 °F (36.9 °C) (Oral)   Resp 20   Ht 5' 8\" (1.727 m)   Wt 171 lb 11.8 oz (77.9 kg)   SpO2 99%   BMI 26.11 kg/m²   SPO2 (COPD values may differ): Greater than or equal to 92% on room air = 0    Peak Flow (asthma only): not applicable = 0    RSI: 0-4 = See once and convert to home regimen or discontinue        Plan       Goals: Medication delivery    Patient/caregiver was educated on the proper method of use for Respiratory Care Devices:  Yes      Level of patient/caregiver understanding able to:   ? Verbalize understanding   ? Demonstrate understanding       ? Teach back        ? Needs reinforcement       ? No available caregiver               ? Other:     Response to education:  Excellent     Is patient being placed on Home Treatment Regimen? Yes     Does the patient have everything they need prior to discharge? Yes     Comments: Chart reviewed, patient stated he does not have an inhaler at home however albuterol in listed as a home med. Patient also stated he has been using an inhaler during this admission however no treatments have been documented. Plan of Care: Albuterol PRN    Electronically signed by Ale Cunningham RCP on 9/24/2020 at 3:25 PM    Respiratory Protocol Guidelines     1. Assessment and treatment by Respiratory Therapy will be initiated for medication and therapeutic interventions upon initiation of aerosolized medication. 2. Physician will be contacted for respiratory rate (RR) greater than 35 breaths per minute. Therapy will be held for heart rate (HR) greater than 140 beats per minute, pending direction from physician. 3. Bronchodilators will be administered via Metered Dose Inhaler (MDI) with spacer when the following criteria are met:  a. Alert and cooperative     b. HR < 140 bpm  c. RR < 30 bpm                d. Can demonstrate a 2-3 second inspiratory hold  4.  Bronchodilators will be administered via Hand Held Nebulizer [facility]) to patients when ANY of the following criteria are met  a. Incognizant or uncooperative          b. Patients treated with HHN at Home        c. Unable to demonstrate proper use of MDI with spacer     d. RR > 30 bpm   5. Bronchodilators will be delivered via Metered Dose Inhaler (MDI), HHN, Aerogen to intubated patients on mechanical ventilation. 6. Inhalation medication orders will be delivered and/or substituted as outlined below. Aerosolized Medications Ordering and Administration Guidelines:    1. All Medications will be ordered by a physician, and their frequency and/or modality will be adjusted as defined by the patients Respiratory Severity Index (RSI) score. 2. If the patient does not have documented COPD, consider discontinuing anticholinergics when RSI is less than 9.  3. If the bronchospasm worsens (increased RSI), then the bronchodilator frequency can be increased to a maximum of every 4 hours. If greater than every 4 hours is required, the physician will be contacted. 4. If the bronchospasm improves, the frequency of the bronchodilator can be decreased, based on the patient's RSI, but not less than home treatment regimen frequency. 5. Bronchodilator(s) will be discontinued if patient has a RSI less than 9 and has received no scheduled or as needed treatment for 72  Hrs. Patients Ordered on a Mucolytic Agent:    1. Must always be administered with a bronchodilator. 2. Discontinue if patient experiences worsened bronchospasm, or secretions have lessened to the point that the patient is able to clear them with a cough. Anti-inflammatory and Combination Medications:    1. If the patient lacks prior history of lung disease, is not using inhaled anti-inflammatory medication at home, and lacks wheezing by examination or by history for at least 24 hours, contact physician for possible discontinuation.

## 2020-09-24 NOTE — CARE COORDINATION
Case Management Assessment           Initial Evaluation                Date / Time of Evaluation: 9/24/2020 1:25 PM                 Assessment Completed by: Checo Gomez     I spoke with the patient at bedside regarding discharge needs. Patient understands that he will most likely move to a different floor prior to discharge. He recently left [facility] and I asked if he would return there if the MD recommended this and he said he does not want to and that he is going home. He thought he was going home today. I explained that therapy will probably see him as well. He told me he has home care with [facility] home care and has a walker, cane and WC at home. He told me that they set up an ambulance transport to home last time. I called his girlfriend Billie Smart [phone] but had to leave a message. Patient Name: Gabriel Whittington     YOB: 1950  Diagnosis: DKA, type 2, not at goal [facility]) [E11.10]     Date / Time: 9/23/2020  7:29 AM    Patient Admission Status: Inpatient    If patient is discharged prior to next notation, then this note serves as note for discharge by case management. Current PCP: [address] Patient: No    Chart Reviewed: Yes  Patient/ Family Interviewed: Yes    Initial assessment completed at bedside with: patient    Hospitalization in the last 30 days: No    Emergency Contacts:  Extended Emergency Contact Information  Primary Emergency Contact: [facility] Phone: [phone]  Relation: Child  Secondary Emergency Contact: Suzan Wilkes  [employer] Phone: [phone]  Relation: Domestic Partner  Preferred language: English   needed?  No    Advance Directives:   Code Status: Full Code    Healthcare Power of : Yes  Agent: Yrn Hayes Number: [phone]    Copy present: Yes     In paper Chart: No    Scanned into EMR Yes    Financial  Payor: MEDICARE / Plan: MEDICARE PART A AND B / Product Type: *No

## 2020-09-24 NOTE — PROGRESS NOTES
sounds. Musculoskeletal: No clubbing, cyanosis or edema bilaterally. Full range of motion without deformity. Skin: Skin color, texture, turgor normal.  No rashes or lesions. Neurologic:  Neurovascularly intact without any focal sensory/motor deficits. Cranial nerves: II-XII intact, grossly non-focal.  Bilateral hand tremors noted  Psychiatric: Alert and oriented, thought content appropriate, normal insight  Capillary Refill: Brisk,< 3 seconds   Peripheral Pulses: +2 palpable, equal bilaterally       Labs:   Recent Labs     09/22/20  1447 09/23/20  0902 09/24/20  0504   WBC 8.2 16.3* 12.0*   HGB 9.8* 10.4* 8.9*   HCT 30.0* 33.9* 27.2*    241 204     Recent Labs     09/23/20  2320 09/24/20  0233 09/24/20  0504    135* 138   K 3.7 3.0* 3.4*    101 103   CO2 23 22 23   BUN 32* 30* 28*   CREATININE 1.4* 1.2 1.2   CALCIUM 9.5 9.0 8.8   PHOS 2.7 3.2 3.1     No results for input(s): AST, ALT, BILIDIR, BILITOT, ALKPHOS in the last 72 hours. No results for input(s): INR in the last 72 hours. No results for input(s): Dulcie Mean in the last 72 hours. Urinalysis:      Lab Results   Component Value Date    NITRU Negative 09/23/2020    WBCUA  09/23/2020    BACTERIA Rare 09/23/2020    RBCUA  09/23/2020    BLOODU MODERATE 09/23/2020    SPECGRAV 1.015 09/23/2020    GLUCOSEU >=1000 09/23/2020       Radiology:  XR CHEST (2 VW)   Final Result      Mild diffuse interstitial prominence without change since 9/7/2020. Stable cardiomediastinal silhouette. Multiple old left rib fractures are noted.               Assessment/Plan:    Diabetic ketoacidosis likely secondary noncompliance of medications  -Off insulin drip this morning  - Continue insulin sliding scale and Lantus, continue to monitor blood sugars closely    Parkinson disease  -Continue carbidopa/levodopa, primidone    Hypertension  -Continue Coreg  -Losartan and Aldactone were held because of

## 2020-09-24 NOTE — PROGRESS NOTES
Patient transitioned off insulin gtt, consumed turkey sandwich, lantus given and 2 hours later gtt and fluids turned off. Patient resting comfortably in bed with no complaints at this time. VSS, will continue to monitor closely.

## 2020-09-24 NOTE — PROGRESS NOTES
Internal Medicine PGY- 1 Resident Progress Note      PCP: Rebekah MOYER    Date of Admission: 9/23/2020    Chief Complaint:  hyperglycemia    History Of Present Illness:    Patient is a 80 y. o. male with a history of T1DM, Parkinson's disease, asthma, and hypertension presenting to [facility] ED with hyperglycemia. He was just discharged from [facility] ED yesterday evening for hyperglycemia and mildly elevated gap which closed with Iv insulin. He reports he woke up this AM and his blood sugar was very high so he came to the ED. Of not he was last admitted to ICU at [facility] ED on 8/21/20 for DKA. In the ED BMP noted hyponatremia (131), K 6.4, Cl 89, HC)3 11, BUN 35, Cre 1.2, Glucose 637. He had an elevated anion gap of 31. CBC showed leukocytosis (16.3) w/o any obvious sing sof infection. VBG showed pH 7.211, pCO2 28.4, pO2 68.9, HCO3 11.0, O2 sat 89%. Patient was recently discharged from [facility] for a 3 month stay. He states during this time his insulin regimen was changed from Novolin to Lantus with 3x daily Aspart dut to NPH being felt to cause hypoglycemia. He states he did not miss any recent doses of his insulin but after discussing with RN he mentioned he did  niot take a dose this AM. On exam he denies chest pain, dyspnea, nausea, vomitting, diarrhea, constipation, fever, chills, or abdominal pain. Interval History:  Patient states he feels much better today. Denies abdominal pain, diarrhea, constipation, fever, chills, dyspnea, chest pain. Discussed case with RN. Gap closed x2. Insulin gtt stopped and transitioned to Lantus and Lispro. Will likely transfer to floor today.        Past Medical History:        Diagnosis Date    Asthma     Diabetes mellitus ([facility]     Hypertension     Parkinson disease ([facility] [address]        Past Surgical History:          Procedure Laterality Date    UPPER GASTROINTESTINAL ENDOSCOPY N/A 5/15/2020    EGD CONTROL HEMORRHAGE performed by Enrico Cerda MD at [facility] ENDOSCOPY    UPPER GASTROINTESTINAL ENDOSCOPY N/A 5/15/2020    EGD BIOPSY performed by Gato [place] MD at [facility] ENDOSCOPY       Medications Prior to Admission:      Prior to Admission medications    Medication Sig Start Date End Date Taking?  Authorizing Provider   aspirin 81 MG chewable tablet Take 1 tablet by mouth daily 8/31/20   Fadi Calderon MD   carvedilol (COREG) 12.5 MG tablet Take 1 tablet by mouth 2 times daily (with meals) 8/30/20   Fadi Calderon MD   spironolactone (ALDACTONE) 25 MG tablet Take 0.5 tablets by mouth daily 8/31/20   Fadi Calderon MD   insulin aspart (NOVOLOG FLEXPEN) 100 UNIT/ML injection pen Inject 10 Units into the skin 3 times daily (before meals) 8/2/20   Kaleb Poe MD   insulin glargine (LANTUS;BASAGLAR) 100 UNIT/ML injection pen Inject 45 Units into the skin nightly 8/2/20   Kaleb Poe MD   vitamin D3 (CHOLECALCIFEROL) 10 MCG (400 UNIT) TABS tablet Take 400 Units by mouth daily    Historical Provider, MD   Cinnamon 500 MG CAPS Take 1 capsule by mouth daily    Historical Provider, MD   fluticasone (FLONASE) 50 MCG/ACT nasal spray 2 sprays by Nasal route daily 7/6/20   Historical Provider, MD   carbidopa-levodopa (SINEMET)  MG per tablet Take 2 tablets by mouth 3 times daily 4/22/20   Historical Provider, MD   losartan (COZAAR) 100 MG tablet Take 100 mg by mouth daily 12/4/18   Historical Provider, MD   pantoprazole (PROTONIX) 40 MG tablet Take 40 mg by mouth 2 times daily    Historical Provider, MD   PARoxetine (PAXIL) 40 MG tablet Take 40 mg by mouth every morning 4/16/20   Historical Provider, MD   primidone (MYSOLINE) 50 MG tablet Take 50 mg by mouth 2 times daily 7/16/19   Historical Provider, MD   Insulin Pen Needle ([employer] PEN NEEDLES 29G) 29G X 12MM MISC 1 each by Does not apply route daily 7/28/20   Dirk Millan MD   rOPINIRole (REQUIP) 3 MG tablet Take 3 mg by mouth 3 times daily    Historical Provider, MD   atorvastatin (LIPITOR) 10 MG tablet Take 10 mg by mouth nightly     Historical Provider, MD   albuterol (PROVENTIL HFA;VENTOLIN HFA) 108 (90 BASE) MCG/ACT inhaler Inhale 2 puffs into the lungs every 6 hours as needed. Historical Provider, MD       Allergies:  Erythromycin    Social History:      The patient currently lives. TOBACCO:   reports that he has never smoked. He has never used smokeless tobacco.  ETOH:   reports no history of alcohol use. History:      No family history on file. REVIEW OF SYSTEMS:   Pertinentpositives as noted in the HPI. All other systems reviewed and negative. ROS: Review of Systems    10 point ROS negative except as listed above. PHYSICALEXAM PERFORMED:    BP (!) 156/84   Pulse 81   Temp 97.8 °F (36.6 °C) (Oral)   Resp 21   Ht 5' 8\" (1.727 m)   Wt 171 lb 11.8 oz (77.9 kg)   SpO2 96%   BMI 26.11 kg/m²     General appearance:  No apparent distress, appears stated age and cooperative. HEENT:  Normal cephalic, atraumaticwithout obvious deformity. Pupils equal, round, and reactive to light. Extra ocular muscles intact. Respiratory:  Normal respiratory effort. Clear to auscultation, bilaterally without Rales/Wheezes/Rhonchi. Cardiovascular:  Regular rate and rhythm with normal S1/S2 without murmurs, rubs or gallops. Abdomen: Soft,non-tender, non-distended with normal bowel sounds. Musculoskeletal:  No clubbing, cyanosis or edemabilaterally. Full range of motion without deformity. Neurologic: Neurovascularly intact without any focalsensory/motor deficits.  Cranial nerves: II-XII intact, grossly non-focal.  Psychiatric:  Alert and oriented,thought content appropriate, normal insight  Peripheral Pulses: +2 palpable, equal bilaterally     Labs:     Recent Labs     09/22/20  1447 09/23/20  0902 09/24/20  0504   WBC 8.2 16.3* 12.0*   HGB 9.8* 10.4* 8.9*   HCT 30.0* 33.9* 27.2*    241 204     Recent Labs     09/23/20  2320 09/24/20  0233 09/24/20  0504    135* 138   K 3.7 3.0* 3.4*    101 103 CO2 23 22 23   BUN 32* 30* 28*   CREATININE 1.4* 1.2 1.2   CALCIUM 9.5 9.0 8.8   PHOS 2.7 3.2 3.1     No results for input(s): AST, ALT, BILIDIR, BILITOT, ALKPHOS in the last 72 hours. No results for input(s): INR in the last 72 hours. No results for input(s): Lucila Samaniego in the last 72 hours. Urinalysis:   Lab Results   Component Value Date    NITRU Negative 09/23/2020    WBCUA  09/23/2020    BACTERIA Rare 09/23/2020    RBCUA  09/23/2020    BLOODU MODERATE 09/23/2020    SPECGRAV 1.015 09/23/2020    GLUCOSEU >=1000 09/23/2020       Radiology:  CXR: I have reviewed the CXR with the following interpretation:   EKG:  I have reviewed the EKG with the following interpretation:     XR CHEST (2 VW)   Final Result      Mild diffuse interstitial prominence without change since 9/7/2020. Stable cardiomediastinal silhouette. Multiple old left rib fractures are noted. ASSESSMENT & PLAN:  Patient is a 80 y. o. male with a history of T1DM, Parkinson's disease, asthma, and hypertension admitted with DKA    CNS     Neuro  Parkinson's Disease:  - Continue carbidopa/levidopa, primidone     Cardio     Hypertension  -Continue Coreg  -Hold Losartan and Aldactone in setting of hyperkalmia     Pulm       ID  SIRS  , Resp 24, Temp 98.1, WBC 16.3  -No obvious active infection  -CXR negative. UA negative     Endo  Diabetic Ketoacidosis: likely 2/2 to noncompliance with insulin + poor dietary habits  -Received 2L LR in ED  -Gap closed x2. Insulin gtt stopped and transitioned to Lantus and Lispro.  -will start carb control diet  -Monitor Renal function. Optimize lytes  -Hypoglycemia protocol  -Social work consulted for repeated admissions with DKA and hypoglycemia;  -Will need diabetes education  -Infectious workup including CXR unremarkable.  UA unremarkabkle      Renal  -Cre 1.2  (baseline 0.9)  - IVF  - Holding home losartan  - Strict I/Os  -24 hr  ml     Hyperkalemia: K 3.4  - Holding

## 2020-09-24 NOTE — CARE COORDINATION
Patient's daughter Claire Bateman spoke with Connie Mcmanus RN regarding discharge plans and she does not want him to return to home. I called Claire Bateman [phone] but had to leave a message letting her know that the patient is being transferred to  and Chandu will be the  for him tomorrow and that I have a call out to [facility] at [facility] to check on his covered days. Electronically signed by Kristyn Smart RN on 9/24/2020 at 3:17 PM  [phone]    After leaving a message with Caty Covington from [facility] called back letting me know that the patient has used 74 days.      Electronically signed by Kristyn Smart RN on 9/24/2020 at 3:17 PM  [phone]

## 2020-09-25 NOTE — PROGRESS NOTES
Medium Complexity  OT Education: OT Role;Plan of Care  Patient Education: verb understanding  REQUIRES OT FOLLOW UP: Yes  Activity Tolerance  Activity Tolerance: Patient limited by fatigue;Treatment limited secondary to decreased cognition  Activity Tolerance: Pt reported mod fatigue after short distance mobiltiy and standing in bathroom. Pt with limited safety awareness  Safety Devices  Safety Devices in place: Yes  Type of devices: Left in chair;Nurse notified;Call light within reach; Chair alarm in place           Patient Diagnosis(es): The encounter diagnosis was Diabetic ketoacidosis without coma associated with type 1 diabetes mellitus ([facility] [address] has a past medical history of Asthma, Diabetes mellitus ([address] Hypertension, and Parkinson disease ([facility] [address] has a past surgical history that includes Upper gastrointestinal endoscopy (N/A, 5/15/2020) and Upper gastrointestinal endoscopy (N/A, 5/15/2020). Treatment Diagnosis: decreased ADLs and transfers secondary to hyperglycemia      Restrictions  Position Activity Restriction  Other position/activity restrictions: up as tolerated    Subjective   General  Chart Reviewed: Yes  Patient assessed for rehabilitation services?: Yes  Additional Pertinent Hx: Pt admitted to ED with c/o hyperglycemia,  in ED; pt had just discharged from ED 1 day prior for the same complaint, BS was 569. Pt recently discharged home after 3 month SNF stay. PMHx includes:  Asthma, Diabetes mellitus ([facility] [address] Hypertension, and Parkinson disease ([facility] [address] Family / Caregiver Present: No  Referring Practitioner: Harvey Navarro MD  Diagnosis: DKA  Subjective  Subjective: Pt semi supine in bed upon arrival, agreeable to OT eval and treat.     Social/Functional History  Social/Functional History  Lives With: Alone  Type of Home: House  Home Layout: Two level(pt stays on main level)  Home Access: Stairs to enter with rails  Entrance Stairs - Number of Steps: 4  Entrance Stairs - Rails: Both  Bathroom Shower/Tub: Walk-in shower  Bathroom Toilet: Standard  Bathroom Equipment: Grab bars in shower, Hand-held shower, Grab bars around toilet, Shower chair  Home Equipment: Rolling walker, 4 wheeled walker, Wheelchair-manual, Alert Button, Reacher(alert button ordered but not received yet)  Receives Help From: Other (comment)(he states outside company did laundry but then they stopped because \"theywere too busy\" and daughters to do now while they look for a new company)  ADL Assistance: Independent  Homemaking Assistance: Needs assistance(for laundry, microwaves meals, does his own cleaning)  Homemaking Responsibilities: Yes  Ambulation Assistance: Independent(with either [id number] or RW)  Transfer Assistance: Independent  Active : Yes  Additional Comments: girlfriend visits him, pt reporting he plans to have 24 hr assistance between [facility] and neighbors/ girlfriend, however question accuracy. He notes he was home one day from the rehab center before he came to the hospital. Admits to a fall about three weeks ago.  Uses electric scooter at grocery store       Objective        Orientation  Overall Orientation Status: Within Functional Limits  Observation/Palpation  Posture: Fair(forward flexed)  Balance  Sitting Balance: Stand by assistance(sitting EOB)  Standing Balance: Minimal assistance(with RW)  Standing Balance  Time: 15 mins total  Activity: mobility in room, standing at sink  Functional Mobility  Functional - Mobility Device: Rolling Walker  Activity: To/from bathroom  Assist Level: Minimal assistance  Functional Mobility Comments: pt required cueing and assistance to manage RW into bathroom, in room  Toilet Transfers  Toilet - Technique: Ambulating  Equipment Used: Standard toilet  Toilet Transfer: Minimal assistance  Toilet Transfers Comments: cueing for turning in bathroom; noted festinating gait pattern when attempting to turn in small space/ line up to toilet  ADL  Feeding: Setup(assist to open

## 2020-09-25 NOTE — DISCHARGE SUMMARY
Hospital Discharge Summary    Patient's PCP: Robin Liriano  Admit Date: 9/23/2020   Discharge Date: 9/25/2020    Admitting Physician: Dr. Cary Massey MD  Discharge Physician: Dr. Curry Guard: pulmonary/intensive care and nephrology    HPI:   \"70 y.o. male with a history of T1DM, Parkinson's disease, asthma, and hypertension presenting to [facility] ED with hyperglycemia. He was just discharged from [facility] ED yesterday evening for hyperglycemia and mildly elevated gap which closed with Iv insulin. He reports he woke up this AM and his blood sugar was very high so he came to the ED. Of not he was last admitted to ICU at [facility] ED on 8/21/20 for DKA. In the ED BMP noted hyponatremia (131), K 6.4, Cl 89, HC)3 11, BUN 35, Cre 1.2, Glucose 637. He had an elevated anion gap of 31. CBC showed leukocytosis (16.3) w/o any obvious sing sof infection. VBG showed pH 7.211, pCO2 28.4, pO2 68.9, HCO3 11.0, O2 sat 89%. Patient was recently discharged from [facility] for a 3 month stay. He states during this time his insulin regimen was changed from Novolin to Lantus with 3x daily Aspart dut to NPH being felt to cause hypoglycemia. He states he did not miss any recent doses of his insulin but after discussing with RN he mentioned he did  niot take a dose this AM. On exam he denies chest pain, dyspnea, nausea, vomitting, diarrhea, constipation, fever, chills, or abdominal pain. \"    Brief hospital course:   Diabetic ketoacidosis likely secondary noncompliance of medications  Admitted to the Icu for dka protocol/ insulin gtt. Now improved and has been off insulin drip > 24 hours. Started on lantus and ssi.   Glu stable- continue PTA insulin at discharge     Parkinson disease  -Continue carbidopa/levodopa, primidone     Hypertension  -Continue Coreg  -Losartan and Aldactone were held because of hyperkalemia     Hyperkalemia  -Resolved    Discharge Diagnoses:   Patient Active Problem List   Diagnosis Code    Hypoglycemia E16.2    EKG abnormalities R94.31    Syncope R55    Sepsis (HCC) A41.9    GI bleed K92.2    DKA, type 1, not at goal [facility]) E10.10    Type 1 diabetes mellitus with hypoglycemia and without coma ([facility] [address] E10.649    Fall at home, sequela W19. XXXS, Y92.009    Acute respiratory failure ([facility]) J96.00       Physical Exam: BP (!) 184/87   Pulse 79   Temp 97.4 °F (36.3 °C) (Axillary)   Resp 18   Ht 5' 8\" (1.727 m)   Wt 169 lb 8.5 oz (76.9 kg)   SpO2 95%   BMI 25.78 kg/m²     Recent Labs     09/24/20  0835 09/24/20  1305 09/24/20  1635 09/24/20  2318 09/25/20  0740   POCGLU 106* 244* 297* 129* 101*       General appearance: alert, appears stated age and cooperative  Head: Normocephalic, without obvious abnormality, atraumatic  Lungs: clear to auscultation bilaterally  Heart: regular rate and rhythm  Abdomen: normal findings: soft, non-tender  Neurologic: A&OX 3, Grossly normal    LABS:  Recent Labs     09/23/20  0902 09/24/20  0504 09/25/20  0540   WBC 16.3* 12.0* 6.2   HGB 10.4* 8.9* 8.3*   HCT 33.9* 27.2* 25.6*    204 184                                                                  Recent Labs     09/24/20  0233 09/24/20  0504 09/25/20  0539   * 138 139   K 3.0* 3.4* 3.7    103 104   CO2 22 23 24   BUN 30* 28* 16   CREATININE 1.2 1.2 0.8   GLUCOSE 99 130* 91       Discharge Medications:   Shawna Franco Medication Instructions TONJA:[id number]    Printed on:09/25/20 1012   Medication Information                      albuterol (PROVENTIL HFA;VENTOLIN HFA) 108 (90 BASE) MCG/ACT inhaler  Inhale 2 puffs into the lungs every 6 hours as needed.                aspirin 81 MG chewable tablet  Take 1 tablet by mouth daily             atorvastatin (LIPITOR) 10 MG tablet  Take 10 mg by mouth nightly              carbidopa-levodopa (SINEMET)  MG per tablet  Take 2 tablets by mouth 3 times daily             carvedilol (COREG) 12.5 MG tablet  Take 1 tablet by mouth 2 times daily (with meals)             Cinnamon 500 MG CAPS  Take 1 capsule by mouth daily             fluticasone (FLONASE) 50 MCG/ACT nasal spray  2 sprays by Nasal route daily             insulin aspart (NOVOLOG FLEXPEN) 100 UNIT/ML injection pen  Inject 10 Units into the skin 3 times daily (before meals)             insulin glargine (LANTUS;BASAGLAR) 100 UNIT/ML injection pen  Inject 45 Units into the skin nightly             Insulin Pen Needle ([employer] PEN NEEDLES 29G) 29G X 12MM MISC  1 each by Does not apply route daily             losartan (COZAAR) 100 MG tablet  Take 100 mg by mouth daily             pantoprazole (PROTONIX) 40 MG tablet  Take 40 mg by mouth 2 times daily             PARoxetine (PAXIL) 40 MG tablet  Take 40 mg by mouth every morning             primidone (MYSOLINE) 50 MG tablet  Take 50 mg by mouth 2 times daily             rOPINIRole (REQUIP) 3 MG tablet  Take 3 mg by mouth 3 times daily             spironolactone (ALDACTONE) 25 MG tablet  Take 0.5 tablets by mouth daily             vitamin D3 (CHOLECALCIFEROL) 10 MCG (400 UNIT) TABS tablet  Take 400 Units by mouth daily                Activity: activity as tolerated  Diet: diabetic diet  Wound Care: none needed    Disposition: home  Discharged Condition: Stable  Follow Up: Primary Care Physician in one week    Total time spent on discharge, finalizing medications, referrals and arranging follow up was 25 minutes. Thank you Dr. Flako Luis for the opportunity to be involved in this patients care. If you have any questions or concerns please feel free to contact me at [phone].

## 2020-09-25 NOTE — CARE COORDINATION
TOM following Pt today. TOM spoke with Dr. Daryle Headland this AM re: Pt is refusing SNF placement and wants to return home so will be DC home this afternoon. TOM informed Dr. Daryle Headland that we will need Home Care Orders placed in [facility] to restart Home Care with [facility] [employer] [facility].. Pt needs ambulance run home. TOM called [facility] ([phone]) as they have provided transport for Pt in the past - they can provide a 2:30 run today. TOM discussed with staff RN. Pt is aware and in agreement with plan. SW is still awaiting Home Care Orders from MD.     GLORIA Prince  Case Management  [phone]    1435-Addendum  Home Care Orders are now placed in [address] [facility] called [facility] to inform of Pt's DC and faxed Home Care Orders, H&P, and DC Summary to fax # [phone].     GLORIA Recio  Case Management

## 2020-09-25 NOTE — PLAN OF CARE
Problem: Falls - Risk of:  Goal: Will remain free from falls  Description: Will remain free from falls  Note: Patient is free from accidental falls at this time. Fall prevention protocol in place at this time. Bed is locked, to the lowest position with alrm on and side rails up x2. Bed side table, call button and personal belongings within reach. Patient reminded to call nursing staff for assistance when needed. Patient verbalized understanding. Will continue to monitor and reassess. Problem: Skin Integrity:  Goal: Will show no infection signs and symptoms  Description: Will show no infection signs and symptoms  Note: No signs and symptoms of skin infection noted at this time. Patient able to turn and reposition in bed as needed. Skin cleansed with incontinent barrier as needed. Will continue to monitor and reassess.

## 2020-09-25 NOTE — PROGRESS NOTES
Physical Therapy    Facility/Department: [address]  Initial Assessment and treatment    NAME: Keanu Krishnamurthy  : 1950  MRN: [id number]    Date of Service: 2020    Discharge Recommendations: Keanu Krishnamurthy scored a 17/24 on the AM-PAC short mobility form. Current research shows that an AM-PAC score of 17 or less is typically not associated with a discharge to the patient's home setting. Based on the patient's AM-PAC score and their current functional mobility deficits, it is recommended that the patient have 3-5 sessions per week of Physical Therapy at d/c to increase the patient's independence. Please see assessment section for further patient specific details. If patient discharges prior to next session this note will serve as a discharge summary. Please see below for the latest assessment towards goals. Subacute/Skilled Nursing Facility   PT Equipment Recommendations  Equipment Needed: No(defer to next level though pt has all necessary equip)    Assessment   Body structures, Functions, Activity limitations: Decreased functional mobility ; Decreased safe awareness;Decreased balance;Decreased cognition;Decreased strength;Decreased endurance  Assessment: Pt is a 76 y/o male who was admitted to the hospital after being home for one day from [facility] for hyperglycemia. Pt is forgetful and becomes confused easily with conversation. He had difficulty with MMT commands and was unable to understand knee flexion MMT to perform. He has poor safety awareness and insight to deficits. He is not safe to go home at this time as he requires increased assist with mobility with use of walker and dec activity tolerance. He does not have 24/7 assist or supervision at home which would be necessary. Recommend further PT upon d/c.   Treatment Diagnosis: dec activity tolerance secondary to hypergycemia  Prognosis: Good  Decision Making: Medium Complexity  PT Education: Goals;PT Role;Plan of Care;Orientation;General Safety;Transfer Training;Gait Training  Patient Education: pt needs reinforcement  Barriers to Learning: cognition, motivation  REQUIRES PT FOLLOW UP: Yes  Activity Tolerance  Activity Tolerance: Patient limited by fatigue;Patient limited by endurance; Patient limited by cognitive status  Activity Tolerance: pt has poor awareness of his dec activity tolerance       Patient Diagnosis(es): The encounter diagnosis was Diabetic ketoacidosis without coma associated with type 1 diabetes mellitus ([facility] has a past medical history of Asthma, Diabetes mellitus ([facility] Hypertension, and Parkinson disease ([facility] has a past surgical history that includes Upper gastrointestinal endoscopy (N/A, 5/15/2020) and Upper gastrointestinal endoscopy (N/A, 5/15/2020). Restrictions  Position Activity Restriction  Other position/activity restrictions: up as tolerated  Vision/Hearing  Vision: Impaired  Vision Exceptions: Wears glasses at all times  Hearing: Within functional limits     Subjective  General  Chart Reviewed: Yes  Patient assessed for rehabilitation services?: Yes  Additional Pertinent Hx: Pt is 73 y/o male who presents after being home for one day from 3 month care center stay with hypergycemia. Family does not feel he is safe to return home.   PMH DM, Parkinson's dx, asthma, HTN  Family / Caregiver Present: No  Referring Practitioner: Viraj Maradiaga MD  Diagnosis: DKA, type 2, not at goal  Follows Commands: Impaired  Other (Comment): difficulty following even one step commands at times though typically able to follow a one step command; easily confused  General Comment  Comments: Pt presents supine in bed  Subjective  Subjective: \"I am doing really well and I am waiting to go home today\"  Pain Screening  Patient Currently in Pain: Denies  Vital Signs  Patient Currently in Pain: Denies       Orientation  Orientation  Overall Orientation Status: Impaired  Orientation Level: Oriented to place;Oriented to situation;Oriented to person;Disoriented to time  Social/Functional History  Social/Functional History  Lives With: Alone  Type of Home: House  Home Layout: Two level(pt stays on main level)  Home Access: Stairs to enter with rails  Entrance Stairs - Number of Steps: 4  Entrance Stairs - Rails: Both  Bathroom Shower/Tub: Walk-in shower  Bathroom Toilet: Standard  Bathroom Equipment: Grab bars in shower, Hand-held shower, Grab bars around toilet, Shower chair  Home Equipment: Rolling walker, 4 wheeled walker, Wheelchair-manual, Alert Button, Reacher(alert button ordered but not received yet)  Receives Help From: Other (comment)(he states outside company did laundry but then they stopped because \"theywere too busy\" and daughters to do now while they look for a new company)  ADL Assistance: Independent  Homemaking Assistance: Needs assistance(for laundry, microwaves meals, does his own cleaning)  Homemaking Responsibilities: Yes  Ambulation Assistance: Independent(with either [id number] or RW)  Transfer Assistance: Independent  Active : Yes  Additional Comments: girlfriend visits him, pt reporting he plans to have 24 hr assistance between [facility] and neighbors/ girlfriend, however question accuracy. He notes he was home one day from the rehab center before he came to the hospital. Admits to a fall about three weeks ago. Uses electric scooter at [facility] [address]  Overall Cognitive Status: Exceptions  Arousal/Alertness: Delayed responses to stimuli  Following Commands: Follows one step commands with increased time; Follows one step commands with repetition  Attention Span: Difficulty attending to directions; Difficulty dividing attention  Memory: Decreased recall of recent events  Safety Judgement: Decreased awareness of need for assistance  Problem Solving: Decreased awareness of errors;Assistance required to correct errors made;Assistance required to identify errors made;Assistance

## 2020-09-25 NOTE — HOME CARE
Home Health Care Discharge Note      Name:  Brii Kilgore  YOB: 1950  Social Security Number:  [id number]  Medical Record Number:  [id number]    Date of Admission:  9/23/2020  Date of Discharge:    Discharge Attending:  Tobi John MD  Primary Care Physician:  Dr. Mir Shortment:  EDITH    FINAL HOSPITAL DIAGNOSIS:  Uncontrolled dm1    does not have any pertinent problems on file.     PERTINENT DATA/PROCEDURES/THERAPIES/INTERVENTIONS DURING HOSPITALIZATION:  Iv insulin    Past Medical History:   Diagnosis Date    Asthma     Diabetes mellitus ([facility]     Hypertension     Parkinson disease ([facility]      Past Surgical History:   Procedure Laterality Date    UPPER GASTROINTESTINAL ENDOSCOPY N/A 5/15/2020    EGD CONTROL HEMORRHAGE performed by Jermain Quintanilla MD at [address] [place] [address] 5/15/2020    EGD BIOPSY performed by Jermian Quintanilla MD at [facility] ENDOSCOPY     Allergies:  Erythromycin    Code Status:  full    James Lopes

## 2020-09-25 NOTE — PROGRESS NOTES
Patient discharging home per ambulance at this time. IV and Telemetry box removed. Discharge instructions and prescriptions reviewed, all questions answered. Patient stated has no further questions at this time. All personal belongings packed and sent with patient. Patient leaving the unit via stretcher with [email or web address].

## 2020-09-25 NOTE — PROGRESS NOTES
D: Very forgetful and needy. Calling frequently for assist and asking for watch when it was on his arm, asked several times about cell phone  and wallet. Became very defensive when RN asked about self care at home since pt was unable to amb from recliner chair to bed when chair located next to bed and incontinent of urine. Stated he was fine to take care of self at home and was going to get an  to make sure he could stay at home. Heavy assist x2 with gait belt to return to bed. Sexually inappropriate at times. A: Will ask MD about ordering Psych evaluation.  Cont to monitor during hourly rounds
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
## 2020-10-01 NOTE — ED NOTES
Bed: B17-17  Expected date:   Expected time:   Means of arrival:   Comments:     Otilio Neri RN  10/01/20 1919

## 2020-10-02 NOTE — PROGRESS NOTES
Progress Note  Admit Date: 10/1/2020       Patient seen and examined  Doing ok no new issues or events  No nausea or vomiting no light headedness or dizziness  Denies having any other issues at this time and wants to go home  His sugars were high because eof a dextrose infusion    Scheduled Medications:    losartan  100 mg Oral Daily    insulin glargine  20 Units Subcutaneous Nightly    insulin lispro  0-6 Units Subcutaneous TID WC    insulin lispro  0-3 Units Subcutaneous Nightly    aspirin  81 mg Oral Daily    atorvastatin  10 mg Oral Nightly    carbidopa-levodopa  2 tablet Oral TID    pantoprazole  40 mg Oral BID    PARoxetine  40 mg Oral QAM    primidone  50 mg Oral BID    rOPINIRole  3 mg Oral TID    carvedilol  12.5 mg Oral BID WC    sodium chloride flush  10 mL Intravenous 2 times per day    enoxaparin  40 mg Subcutaneous Daily      PRN Medications: albuterol, sodium chloride flush, acetaminophen **OR** acetaminophen, polyethylene glycol, promethazine **OR** ondansetron, glucose, dextrose, glucagon (rDNA), dextrose, potassium chloride **OR** potassium alternative oral replacement **OR** potassium chloride, magnesium sulfate  Diet: DIET GENERAL;    Continuous Infusions:   dextrose         PHYSICAL EXAM:  BP (!) 160/77   Pulse 63   Temp 97.7 °F (36.5 °C) (Oral)   Resp 16   Ht 5' 8\" (1.727 m)   Wt 172 lb 6.4 oz (78.2 kg)   SpO2 98%   BMI 26.21 kg/m²   Recent Labs     10/01/20  2319 10/02/20  0100 10/02/20  0426 10/02/20  0724 10/02/20  1128   POCGLU 260* 246* 253* 231* 411*       Intake/Output Summary (Last 24 hours) at 10/2/2020 1550  Last data filed at 10/2/2020 1353  Gross per 24 hour   Intake 1920 ml   Output 1175 ml   Net 745 ml       BP (!) 160/77   Pulse 63   Temp 97.7 °F (36.5 °C) (Oral)   Resp 16   Ht 5' 8\" (1.727 m)   Wt 172 lb 6.4 oz (78.2 kg)   SpO2 98%   BMI 26.21 kg/m²   General appearance: alert, appears stated age and cooperative  Head: Normocephalic, without obvious abnormality, atraumatic  Neck: no adenopathy, no carotid bruit, no JVD, supple, symmetrical, trachea midline and thyroid not enlarged, symmetric, no tenderness/mass/nodules  Lungs: clear to auscultation bilaterally  Heart: regular rate and rhythm, S1, S2 normal, no murmur, click, rub or gallop  Abdomen: soft, non-tender; bowel sounds normal; no masses,  no organomegaly  Extremities: extremities normal, atraumatic, no cyanosis or edema  Pulses: 2+ and symmetric  Neuro: has bradykinesia, tremors  LABS:  Recent Labs     10/01/20  1950   WBC 7.2   HGB 9.3*   HCT 28.6*                                                                       Recent Labs     10/01/20  1950 10/02/20  0713   * 136   K 4.0 3.8   CL 98* 101   CO2 28 26   BUN 19 16   CREATININE 0.9 0.9   GLUCOSE 59* 234*     No results for input(s): AST, ALT, ALB, BILITOT, ALKPHOS in the last 72 hours. Recent Labs     10/01/20  1950 10/02/20  0001   TROPONINI 0.02* 0.02*     No results for input(s): BNP in the last 72 hours. No results for input(s): CHOL, HDL in the last 72 hours. Invalid input(s): LDLCALCU  No results for input(s): INR in the last 72 hours. Assessment & Plan:    Patient Active Problem List:     Hypoglycemia     EKG abnormalities     Syncope     Sepsis (HCC)     GI bleed     DKA, type 1, not at goal [facility])     Type 1 diabetes mellitus with hypoglycemia and without coma ([facility] [address]     Fall at home, sequela     Acute respiratory failure ([facility] [address]      71 yo admitted w hypoglycemia - he has a hx of parkinson's disease, continue sinemet, he hasnt' edward  Neurologist in his last two admits, consult neurology. His sugars are very high, dextrose stopped. He recently was started on novolog r two weeks ago and now his sugars are better he recalls taking his premeal but did not take in any food soon enough developed hypoglycemia. I spoke with the patients daughter, Keely Pratt, at length.   He wants to go home, but I discussed risks and benefits at length with both. He is agreeable to staying, he needs pt/ot and then possible placement. The daughter wants to do possible long term care placement. Discussed case with social work and bedside nurse. Greater than 45 minute spent on case over half face to face. The patient and / or the family were informed of the results of any tests, a time was given to answer questions, a plan was proposed and they agreed with plan.   Disposition: continue inpatient stay   Full MD Laz Osei

## 2020-10-02 NOTE — PROGRESS NOTES
Patient was up in chair with alarm on. He got up and alarm went off. Primitivo and I went to room. He refused to sit down, he was on the phone. He had a walker and a gait belt on as well as the no slip socks. When he finished his call he turned and leaned against the wall and slowly slid down the wall to the floor. Dr Salazar Washington was on the floor and saw patient. No injuries from being lowered to floor. Will  Monitor.
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
## 2020-10-02 NOTE — PROGRESS NOTES
4 Eyes Admission Assessment     I agree as the admission nurse that 2 RN's have performed a thorough Head to Toe Skin Assessment on the patient. ALL assessment sites listed below have been assessed on admission. Areas assessed by both nurses:   [x]   Head, Face, and Ears   [x]   Shoulders, Back, and Chest  [x]   Arms, Elbows, and Hands   [x]   Coccyx, Sacrum, and Ischium  [x]   Legs, Feet, and Heels        Does the Patient have Skin Breakdown?   Yes a wound was noted on the Admission Assessment and an LDA was Initiated documentation include the Shamika-wound, Wound Assessment, Measurements, Dressing Treatment, Drainage, and Color\",         James Prevention initiated:  Yes   Wound Care Orders initiated:  Yes      [id number] nurse consulted for Pressure Injury (Stage 3,4, Unstageable, DTI, NWPT, and Complex wounds) or James score 18 or lower:  No      Nurse 1 eSignature: Electronically signed by Aris Lees RN on 10/2/20 at 1:02 AM EDT    **SHARE this note so that the co-signing nurse is able to place an eSignature**    Nurse 2 eSignature: Electronically signed by Brice Ruggiero RN on 10/2/20 at 1:42 AM EDT

## 2020-10-02 NOTE — CARE COORDINATION
Adult Protective Services. Patient has an open case,  is Jennifer Dye, [phone] was called on 9/25/2020 by social work at [facility], [facility]. on last discharge. Patient refused discharge to SNF last admit when it was posed to patient. Patient returned home last discharge with [facility]. Daughter, Madison Warren, did not want patient to return last discharge. Girlfriend Jas Bishop is [facility] [phone]. Patient has been discharged to [facility] in the past. Today, Mr Franco's daughter, Madison Warren, at [phone] called and left a voicemail stating she would like to speak to his  regarding transitioning into assisted living. CM will continue to follow patient until discharge.  Electronically signed by Rosemarie Glass RN on 10/2/2020 at 11:23 AM
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
## 2020-10-02 NOTE — CARE COORDINATION
Never received a return call from Genie Daniel (patient's significant other). Call from Tamie Shaver, daughter and returned call. Vik Nergon a list of Assisted Living Facilities in [facility]. Email: [email or web address] Gave weekend CM's name and number for Tamie Hard to connect with any questions. CM will continue to follow patient until discharge.  Electronically signed by Chesley Ahumada, RN on 10/2/2020 at 3:10 PM

## 2020-10-02 NOTE — CARE COORDINATION
Discharge order taken out for patient. CM will continue to follow patient until discharge.  Electronically signed by Lorin Wong RN on 10/2/2020 at 12:26 PM

## 2020-10-02 NOTE — PROGRESS NOTES
RESPIRATORY THERAPY ASSESSMENT    [facility] [address] Record Number:  [id number]  Age: 72 y.o. Gender: male  : 1950  Today's Date:  10/2/2020  Room:  63/6313-01    Assessment     Is the patient being admitted for a COPD or Asthma exacerbation? No   (If yes the patient will be seen every 4 hours for the first 24 hours and then reassessed)    Patient Admission Diagnosis Hypoglycemia      Allergies  Allergies   Allergen Reactions    Erythromycin Nausea And Vomiting     Pulmonary History:Asthma  Home Oxygen Therapy:  room air  Home Respiratory Therapy:Albuterol   Current Respiratory Therapy:  HHN Albuterol q6hr prn          Respiratory Severity Index(RSI)   Patients with orders for inhalation medications, oxygen, or any therapeutic treatment modality will be placed on Respiratory Protocol. They will be assessed with the first treatment and at least every 72 hours thereafter. The following severity scale will be used to determine frequency of treatment intervention.     Smoking History: Pulmonary Disease or Smoking History, Greater than 15 pack year = 2    Social History  Social History     Tobacco Use    Smoking status: Never Smoker    Smokeless tobacco: Never Used   Substance Use Topics    Alcohol use: No    Drug use: No       Recent Surgical History: None = 0  Past Surgical History  Past Surgical History:   Procedure Laterality Date    UPPER GASTROINTESTINAL ENDOSCOPY N/A 5/15/2020    EGD CONTROL HEMORRHAGE performed by Kennedy Oseguera MD at [facility] 5/15/2020    EGD BIOPSY performed by Kennedy Oseguera MD at [facility] ENDOSCOPY       Level of Consciousness: Alert, Oriented, and Cooperative = 0    Level of Activity: Walking with assistance = 1    Respiratory Pattern: Regular Pattern; RR 8-20 = 0    Breath Sounds: Clear = 0    Sputum   ,  ,    Cough: Strong, spontaneous, non-productive = 0    Vital Signs   BP (!) 176/82   Pulse 79 Temp 97.3 °F (36.3 °C) (Oral)   Resp 16   Ht 5' 8\" (1.727 m)   Wt 172 lb 6.4 oz (78.2 kg)   SpO2 93%   BMI 26.21 kg/m²   SPO2 (COPD values may differ): Greater than or equal to 92% on room air = 0    Peak Flow (asthma only): not applicable = 0    RSI: 0-4 = See once and convert to home regimen or discontinue        Plan       Goals: medication delivery    Patient/caregiver was educated on the proper method of use for Respiratory Care Devices:  Yes      Level of patient/caregiver understanding able to:   ? Verbalize understanding   ? Demonstrate understanding       ? Teach back        ? Needs reinforcement       ? No available caregiver               ? Other:     Response to education:  Good     Is patient being placed on Home Treatment Regimen? Yes     Does the patient have everything they need prior to discharge? NA     Comments: Pt uses an Albuterol MDI q6hr prn, seldom uses. Plan of Care: HHN Albuterol q6hr prn    Electronically signed by Mere Jackson RCP on 10/2/2020 at 12:42 AM    Respiratory Protocol Guidelines     1. Assessment and treatment by Respiratory Therapy will be initiated for medication and therapeutic interventions upon initiation of aerosolized medication. 2. Physician will be contacted for respiratory rate (RR) greater than 35 breaths per minute. Therapy will be held for heart rate (HR) greater than 140 beats per minute, pending direction from physician. 3. Bronchodilators will be administered via Metered Dose Inhaler (MDI) with spacer when the following criteria are met:  a. Alert and cooperative     b. HR < 140 bpm  c. RR < 30 bpm                d. Can demonstrate a 2-3 second inspiratory hold  4. Bronchodilators will be administered via Hand Held Nebulizer [facility]) to patients when ANY of the following criteria are met  a. Incognizant or uncooperative          b. Patients treated with HHN at Home        c.  Unable to demonstrate proper use of MDI with spacer     d. RR > 30 bpm   5. Bronchodilators will be delivered via Metered Dose Inhaler (MDI), HHN, Aerogen to intubated patients on mechanical ventilation. 6. Inhalation medication orders will be delivered and/or substituted as outlined below. Aerosolized Medications Ordering and Administration Guidelines:    1. All Medications will be ordered by a physician, and their frequency and/or modality will be adjusted as defined by the patients Respiratory Severity Index (RSI) score. 2. If the patient does not have documented COPD, consider discontinuing anticholinergics when RSI is less than 9.  3. If the bronchospasm worsens (increased RSI), then the bronchodilator frequency can be increased to a maximum of every 4 hours. If greater than every 4 hours is required, the physician will be contacted. 4. If the bronchospasm improves, the frequency of the bronchodilator can be decreased, based on the patient's RSI, but not less than home treatment regimen frequency. 5. Bronchodilator(s) will be discontinued if patient has a RSI less than 9 and has received no scheduled or as needed treatment for 72  Hrs. Patients Ordered on a Mucolytic Agent:    1. Must always be administered with a bronchodilator. 2. Discontinue if patient experiences worsened bronchospasm, or secretions have lessened to the point that the patient is able to clear them with a cough. Anti-inflammatory and Combination Medications:    1. If the patient lacks prior history of lung disease, is not using inhaled anti-inflammatory medication at home, and lacks wheezing by examination or by history for at least 24 hours, contact physician for possible discontinuation.
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
## 2020-10-02 NOTE — FLOWSHEET NOTE
10/02/20 1431   Encounter Summary   Services provided to: Patient   Referral/Consult From: [facility]   (10/2/2020, LUIS. )   Complexity of Encounter Moderate   Length of Encounter 15 minutes   Routine   Type Initial   Assessment Approachable   Intervention Nurtured hope   Outcome Expressed gratitude

## 2020-10-02 NOTE — DISCHARGE INSTR - COC
Isolation/Infection:   Isolation            No Isolation          Patient Infection Status       Infection Onset Added Last Indicated Last Indicated By Review Planned Expiration Resolved Resolved By    None active    Resolved    COVID-19 Rule Out 08/31/20 08/31/20 08/31/20 COVID-19 (Ordered)   08/31/20 Rule-Out Test Resulted    COVID-19 Rule Out 08/28/20 08/28/20 08/28/20 COVID-19 (Ordered)   08/28/20 Rule-Out Test Resulted    COVID-19 Rule Out 08/21/20 08/21/20 08/21/20 COVID-19 (Ordered)   08/25/20 Rule-Out Test Resulted    COVID-19 Rule Out 08/06/20 08/06/20 08/06/20 COVID-19 (Ordered)   08/06/20 Rule-Out Test Resulted    COVID-19 Rule Out 08/04/20 08/04/20 08/04/20 COVID-19 (Ordered)   08/05/20 Yanci Lopes RN    COVID-19 Rule Out 05/14/20 05/14/20 05/14/20 COVID-19 (Ordered)   05/14/20 Rule-Out Test Resulted    COVID-19 Rule Out 04/28/20 04/28/20 04/28/20 COVID-19 (Ordered)   04/29/20 Rule-Out Test Resulted            Nurse Assessment:  Last Vital Signs: BP (!) 178/85   Pulse 95   Temp 97.4 °F (36.3 °C) (Oral)   Resp 16   Ht 5' 8\" (1.727 m)   Wt 172 lb 6.4 oz (78.2 kg)   SpO2 96%   BMI 26.21 kg/m²     Last documented pain score (0-10 scale): Pain Level: 0  Last Weight:   Wt Readings from Last 1 Encounters:   10/01/20 172 lb 6.4 oz (78.2 kg)     Mental Status:  {IP PT MENTAL STATUS:20030:::0}    IV Access:  { DAT IV ACCESS:987959137:::0}    Nursing Mobility/ADLs:  Walking   {CHP DME ADLs:501706991:::0}  Transfer  {CHP DME ADLs:535972840:::0}  Bathing  {CHP DME ADLs:363499127:::0}  Dressing  {CHP DME ADLs:035396254:::0}  Toileting  {CHP DME ADLs:900315119:::0}  Feeding  {CHP DME ADLs:499437832:::0}  Med Admin  {CHP DME ADLs:101897533:::0}  Med Delivery   { DAT MED Delivery:005070101:::0}    Wound Care Documentation and Therapy:        Elimination:  Continence:    Bowel: {YES / RN:87679}  Bladder: {YES / DE:79671}  Urinary Catheter: {Urinary Catheter:536943681:::0}
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
## 2020-10-03 NOTE — PROGRESS NOTES
Discharge order received. Pt discharged to home. Pt set up [employer] for transportation to home. IV removed. Pt transported to [facility] via wheelchair by this RN. All personal belongings sent with patient.
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
## 2020-10-03 NOTE — DISCHARGE INSTR - DIET
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
## 2020-10-06 NOTE — ED TRIAGE NOTES
Patient arrives c/o a mechanical fall at home while attempting to put his pants on after a shower. Patient c/o left knee and hip pain. Patient lives at home by himself and has had frequent falls recently.

## 2020-10-06 NOTE — ED PROVIDER NOTES
1 [facility]  EMERGENCY DEPARTMENT ENCOUNTER          [facility] RESIDENT NOTE       Date of evaluation: 10/6/2020    Chief Complaint     Fall; Knee Injury (Left); and Hip Injury (Left)      History of Present Illness     Giselle Hernandez is a 72 y.o. male with PMH of T1DM, HTN, asthma, and Parkinson's disease who presents with a mechanical fall. Pt endorses history of frequent falls. Denies being on blood thinners in the past 2 months. Says he had a mechanical fall yesterday where he fell forward and hit his face, and then a mechanical fall earlier at 3:15pm today which he attributes to low blood sugar. He says he fell when putting his pants on, and hit his left hip and left knee on during the fall. He says he was down for 3 hrs before EMS arrived. Currently he describes mild left knee and hip pain that is exacerbated with movement of his left leg, including standing and walking. He says he takes all his medications as prescribed. He says he has been to the hospital at least 1-2 times a year for the past 14 years for low blood sugar. Review of Systems     Review of Systems   Constitutional: Positive for chills and fatigue. Negative for activity change, appetite change, diaphoresis and fever. HENT: Negative. Eyes: Negative. Respiratory: Negative. Cardiovascular: Negative. Gastrointestinal: Negative. Genitourinary: Negative. Musculoskeletal: Positive for arthralgias and joint swelling. Skin:        Endorses recent bruises on left cheek, left hip, and left knee. Neurological: Negative. Psychiatric/Behavioral: Negative. All other systems reviewed and are negative. Past Medical, Surgical, Family, and Social History     He has a past medical history of Asthma, Diabetes mellitus ([address] Hypertension, and Parkinson disease ([facility] [address] He has a past surgical history that includes Upper gastrointestinal endoscopy (N/A, 5/15/2020) and Upper gastrointestinal endoscopy (N/A, 5/15/2020).   His family history is not on file. He reports that he has never smoked. He has never used smokeless tobacco. He reports that he does not drink alcohol or use drugs. Medications     Previous Medications    ALBUTEROL (PROVENTIL HFA;VENTOLIN HFA) 108 (90 BASE) MCG/ACT INHALER    Inhale 2 puffs into the lungs every 6 hours as needed. ASPIRIN 81 MG CHEWABLE TABLET    Take 1 tablet by mouth daily    ATORVASTATIN (LIPITOR) 10 MG TABLET    Take 10 mg by mouth nightly     CARBIDOPA-LEVODOPA (SINEMET)  MG PER TABLET    Take 2 tablets by mouth 3 times daily    CARVEDILOL (COREG) 12.5 MG TABLET    Take 1 tablet by mouth 2 times daily (with meals)    CINNAMON 500 MG CAPS    Take 1 capsule by mouth daily    FLUTICASONE (FLONASE) 50 MCG/ACT NASAL SPRAY    2 sprays by Nasal route daily    INSULIN ASPART (NOVOLOG FLEXPEN) 100 UNIT/ML INJECTION PEN    Inject 10 Units into the skin 3 times daily (before meals)    INSULIN GLARGINE (LANTUS;BASAGLAR) 100 UNIT/ML INJECTION PEN    Inject 30 Units into the skin nightly    INSULIN PEN NEEDLE ([employer] PEN NEEDLES 29G) 29G X 12MM MISC    1 each by Does not apply route daily    LOSARTAN (COZAAR) 100 MG TABLET    Take 100 mg by mouth daily    PANTOPRAZOLE (PROTONIX) 40 MG TABLET    Take 40 mg by mouth 2 times daily    PAROXETINE (PAXIL) 40 MG TABLET    Take 40 mg by mouth every morning    PRIMIDONE (MYSOLINE) 50 MG TABLET    Take 50 mg by mouth 2 times daily    ROPINIROLE (REQUIP) 3 MG TABLET    Take 3 mg by mouth 3 times daily    SPIRONOLACTONE (ALDACTONE) 25 MG TABLET    Take 0.5 tablets by mouth daily    VITAMIN D3 (CHOLECALCIFEROL) 10 MCG (400 UNIT) TABS TABLET    Take 400 Units by mouth daily       Allergies     He is allergic to erythromycin. Physical Exam     INITIAL VITALS: BP: (!) 155/94, Temp: 98.1 °F (36.7 °C), Pulse: 90, Resp: 18, SpO2: 96 %   Physical Exam  Vitals signs reviewed. Constitutional:       Appearance: Normal appearance. He is normal weight.    HENT: Head: Normocephalic and atraumatic. Mouth/Throat:      Mouth: Mucous membranes are moist.   Eyes:      Extraocular Movements: Extraocular movements intact. Conjunctiva/sclera: Conjunctivae normal.      Pupils: Pupils are equal, round, and reactive to light. Neck:      Musculoskeletal: Normal range of motion and neck supple. Cardiovascular:      Rate and Rhythm: Normal rate and regular rhythm. Heart sounds: Normal heart sounds. Pulmonary:      Effort: Pulmonary effort is normal.      Breath sounds: Normal breath sounds. Abdominal:      General: Abdomen is flat. Bowel sounds are normal.      Palpations: Abdomen is soft. Musculoskeletal:         General: Tenderness present. Comments: ROM of LLE difficult to assess 2/2 pain. Point tenderness throughout left hip joint and left knee joint. Skin:     Capillary Refill: Capillary refill takes less than 2 seconds. Neurological:      General: No focal deficit present. Mental Status: He is alert and oriented to person, place, and time. Mental status is at baseline. Psychiatric:         Mood and Affect: Mood normal.         Behavior: Behavior normal.         Thought Content: Thought content normal.         Judgment: Judgment normal.         Diagnostic Results     EKG   None    RADIOLOGY:  XR KNEE LEFT (3 VIEWS)   Final Result   1. Mild degenerative changes      XR HIP 2-3 VW W PELVIS LEFT   Final Result   1. Osteoarthritis. 2. No fracture or dislocation      CT Head WO Contrast   Final Result   1. Brain atrophy   2. Periventricular microangiopathic ischemic small vessel disease and bilateral basal ganglion old lacunar ischemic infarcts.    3. No evidence of acute intracranial hemorrhage          LABS:   Results for orders placed or performed during the hospital encounter of 10/06/20   CBC Auto Differential   Result Value Ref Range    WBC 9.3 4.0 - 11.0 K/uL    RBC 3.57 (L) 4.20 - 5.90 M/uL    Hemoglobin 9.0 (L) 13.5 - 17.5 g/dL were reviewed. The patient was giventhe following medications:  Orders Placed This Encounter   Medications    DISCONTD: dextrose 50 % IV solution    dextrose 50 % solution     [employer]: cabinet override    HYDROmorphone (DILAUDID) injection 1 mg    bacitracin-polymyxin b (POLYSPORIN) ointment    bacitracin-polymyxin b (POLYSPORIN) 500-23304 UNIT/GM ointment     Sig: Apply topically daily. Dispense:  1 Tube     Refill:  2       CONSULTS:  None    MEDICAL Hilda Taylor / DOMINICK / Rafaela Guido is a 72 y.o. male with PMH of T1DM, HTN, asthma, and Parkinson's disease who presents with a mechanical fall. He describes an episode last night where he fell forward and hit his face, and another episode earlier today where he fell and hit his left hip and left knee. He reports recently he has been a little shaky which he attributes to a combination of his Parkinson's and hypoglycemia. POC glucose on arrival was 29. However, pt was fully lucid on admission. Glucose improved with gatorade and half amp of D50. On exam, pt has good ROM in LLE, though slightly limited 2/2 pain. Ordered CBC, CK, CMP, plain films of left hip and knee, and CT head without contrast. Workup all unremarkable. Gave bacitracin ointment to right forearm for patient's complaint of old skin tear in right radial forearm. Continued to monitor patient's blood sugar for a couple of hours. Pt appears to be a brittle diabetic, has had numerous admissions for hypoglycemia and DKA in the past.     On recheck, glucose at expected elevated level. Pt feels ok and ambulating well, will discharge home. Provided pt with instructions on having home health nurse apply bacitracin to affected area in right forearm. Instructed pt him to reduce insulin to half or quarter of what he takes now, with close follow up with PCP. This patient was also evaluated by the attending physician.  All care plans were discussed and agreed upon.    Clinical Impression     1. Fall, initial encounter    2. Contusion of face, initial encounter    3. Contusion of left knee, initial encounter    4. Contusion of left hip, initial encounter    5. Hypoglycemia        Disposition     PATIENT REFERRED TO:  Alba Ortiz  [address] [place] [address] [place] [address]  [phone]    Go in 3 days      [facility] Emergency Department   [place] [address]  [address]  [phone]    If symptoms worsen      DISCHARGE MEDICATIONS:  New Prescriptions    BACITRACIN-POLYMYXIN B (POLYSPORIN) 500-44647 UNIT/GM OINTMENT    Apply topically daily.        Joyce Corona MD  Resident  10/07/20 0746

## 2020-10-07 NOTE — ED NOTES
Discharge instructions and prescriptions reviewed with patient. Pt verbalized understanding. IV d/c. Pt tolerated well. Pt discharged home on stretcher with first care.        Slade Dawson RN  10/07/20 0011

## 2021-01-01 ENCOUNTER — APPOINTMENT (OUTPATIENT)
Dept: GENERAL RADIOLOGY | Age: 71
DRG: 208 | End: 2021-01-01
Payer: MEDICARE

## 2021-01-01 ENCOUNTER — APPOINTMENT (OUTPATIENT)
Dept: CT IMAGING | Age: 71
DRG: 208 | End: 2021-01-01
Payer: MEDICARE

## 2021-01-01 ENCOUNTER — HOSPITAL ENCOUNTER (INPATIENT)
Age: 71
LOS: 3 days | DRG: 208 | End: 2021-01-16
Attending: EMERGENCY MEDICINE | Admitting: INTERNAL MEDICINE
Payer: MEDICARE

## 2021-01-01 VITALS
HEIGHT: 68 IN | WEIGHT: 175 LBS | SYSTOLIC BLOOD PRESSURE: 126 MMHG | RESPIRATION RATE: 13 BRPM | BODY MASS INDEX: 26.52 KG/M2 | TEMPERATURE: 100.3 F | OXYGEN SATURATION: 94 % | DIASTOLIC BLOOD PRESSURE: 58 MMHG | HEART RATE: 98 BPM

## 2021-01-01 DIAGNOSIS — T17.320A CHOKING DUE TO FOOD IN LARYNX, INITIAL ENCOUNTER: Primary | ICD-10-CM

## 2021-01-01 DIAGNOSIS — J96.01 ACUTE RESPIRATORY FAILURE WITH HYPOXIA AND HYPERCARBIA (HCC): ICD-10-CM

## 2021-01-01 DIAGNOSIS — I46.9 CARDIAC ARREST (HCC): ICD-10-CM

## 2021-01-01 DIAGNOSIS — J93.83 OTHER PNEUMOTHORAX: ICD-10-CM

## 2021-01-01 DIAGNOSIS — J96.02 ACUTE RESPIRATORY FAILURE WITH HYPOXIA AND HYPERCARBIA (HCC): ICD-10-CM

## 2021-01-01 LAB
ACANTHOCYTES: ABNORMAL
ALBUMIN SERPL-MCNC: 2.9 G/DL (ref 3.4–5)
ALBUMIN SERPL-MCNC: 3 G/DL (ref 3.4–5)
ALBUMIN SERPL-MCNC: 3 G/DL (ref 3.4–5)
ALBUMIN SERPL-MCNC: 3.1 G/DL (ref 3.4–5)
ALBUMIN SERPL-MCNC: 3.1 G/DL (ref 3.4–5)
ALBUMIN SERPL-MCNC: 3.2 G/DL (ref 3.4–5)
ALP BLD-CCNC: 145 U/L (ref 40–129)
ALT SERPL-CCNC: 18 U/L (ref 10–40)
ANION GAP SERPL CALCULATED.3IONS-SCNC: 11 MMOL/L (ref 3–16)
ANION GAP SERPL CALCULATED.3IONS-SCNC: 12 MMOL/L (ref 3–16)
ANION GAP SERPL CALCULATED.3IONS-SCNC: 12 MMOL/L (ref 3–16)
ANION GAP SERPL CALCULATED.3IONS-SCNC: 13 MMOL/L (ref 3–16)
ANION GAP SERPL CALCULATED.3IONS-SCNC: 13 MMOL/L (ref 3–16)
ANION GAP SERPL CALCULATED.3IONS-SCNC: 14 MMOL/L (ref 3–16)
ANION GAP SERPL CALCULATED.3IONS-SCNC: 17 MMOL/L (ref 3–16)
ANION GAP SERPL CALCULATED.3IONS-SCNC: 18 MMOL/L (ref 3–16)
ANION GAP SERPL CALCULATED.3IONS-SCNC: 20 MMOL/L (ref 3–16)
ANION GAP SERPL CALCULATED.3IONS-SCNC: 9 MMOL/L (ref 3–16)
ANISOCYTOSIS: ABNORMAL
APTT: 28.8 SEC (ref 24.2–36.2)
ARTERIAL PH AT TEMPERATURE: 7.28 (ref 7.35–7.45)
ARTERIAL PH AT TEMPERATURE: 7.38 (ref 7.35–7.45)
AST SERPL-CCNC: 47 U/L (ref 15–37)
BANDED NEUTROPHILS RELATIVE PERCENT: 17 % (ref 0–7)
BANDED NEUTROPHILS RELATIVE PERCENT: 5 % (ref 0–7)
BASE EXCESS ARTERIAL: -10 (ref -3–3)
BASE EXCESS ARTERIAL: -10 (ref -3–3)
BASE EXCESS ARTERIAL: -10 MMOL/L (ref -3–3)
BASE EXCESS ARTERIAL: -4 (ref -3–3)
BASE EXCESS ARTERIAL: -5.4 MMOL/L (ref -3–3)
BASE EXCESS ARTERIAL: -6 (ref -3–3)
BASE EXCESS ARTERIAL: -6 (ref -3–3)
BASE EXCESS ARTERIAL: -8 MMOL/L (ref -3–3)
BASE EXCESS VENOUS: -17.1 MMOL/L (ref -2–3)
BASOPHILS ABSOLUTE: 0 K/UL (ref 0–0.2)
BASOPHILS RELATIVE PERCENT: 0 %
BASOPHILS RELATIVE PERCENT: 0 %
BASOPHILS RELATIVE PERCENT: 0.1 %
BASOPHILS RELATIVE PERCENT: 0.2 %
BILIRUB SERPL-MCNC: 0.4 MG/DL (ref 0–1)
BILIRUBIN DIRECT: <0.2 MG/DL (ref 0–0.3)
BILIRUBIN, INDIRECT: ABNORMAL MG/DL (ref 0–1)
BUN BLDV-MCNC: 17 MG/DL (ref 7–20)
BUN BLDV-MCNC: 22 MG/DL (ref 7–20)
BUN BLDV-MCNC: 22 MG/DL (ref 7–20)
BUN BLDV-MCNC: 23 MG/DL (ref 7–20)
BUN BLDV-MCNC: 24 MG/DL (ref 7–20)
BUN BLDV-MCNC: 26 MG/DL (ref 7–20)
BUN BLDV-MCNC: 30 MG/DL (ref 7–20)
BUN BLDV-MCNC: 30 MG/DL (ref 7–20)
BUN BLDV-MCNC: 33 MG/DL (ref 7–20)
BURR CELLS: ABNORMAL
BURR CELLS: ABNORMAL
CALCIUM IONIZED: 1.18 MMOL/L (ref 1.12–1.32)
CALCIUM IONIZED: 1.18 MMOL/L (ref 1.12–1.32)
CALCIUM IONIZED: 1.19 MMOL/L (ref 1.12–1.32)
CALCIUM IONIZED: 1.23 MMOL/L (ref 1.12–1.32)
CALCIUM SERPL-MCNC: 7 MG/DL (ref 8.3–10.6)
CALCIUM SERPL-MCNC: 7.4 MG/DL (ref 8.3–10.6)
CALCIUM SERPL-MCNC: 7.7 MG/DL (ref 8.3–10.6)
CALCIUM SERPL-MCNC: 8 MG/DL (ref 8.3–10.6)
CALCIUM SERPL-MCNC: 8 MG/DL (ref 8.3–10.6)
CALCIUM SERPL-MCNC: 8.1 MG/DL (ref 8.3–10.6)
CALCIUM SERPL-MCNC: 8.2 MG/DL (ref 8.3–10.6)
CALCIUM SERPL-MCNC: 8.3 MG/DL (ref 8.3–10.6)
CARBOXYHEMOGLOBIN ARTERIAL: 0.8 % (ref 0–1.5)
CARBOXYHEMOGLOBIN ARTERIAL: 0.9 % (ref 0–1.5)
CARBOXYHEMOGLOBIN ARTERIAL: 1.7 % (ref 0–1.5)
CARBOXYHEMOGLOBIN: 0.7 % (ref 0–1.5)
CHLORIDE BLD-SCNC: 101 MMOL/L (ref 99–110)
CHLORIDE BLD-SCNC: 101 MMOL/L (ref 99–110)
CHLORIDE BLD-SCNC: 102 MMOL/L (ref 99–110)
CHLORIDE BLD-SCNC: 102 MMOL/L (ref 99–110)
CHLORIDE BLD-SCNC: 104 MMOL/L (ref 99–110)
CHLORIDE BLD-SCNC: 105 MMOL/L (ref 99–110)
CHLORIDE BLD-SCNC: 106 MMOL/L (ref 99–110)
CHLORIDE BLD-SCNC: 98 MMOL/L (ref 99–110)
CHLORIDE BLD-SCNC: 99 MMOL/L (ref 99–110)
CHLORIDE BLD-SCNC: 99 MMOL/L (ref 99–110)
CO2: 16 MMOL/L (ref 21–32)
CO2: 17 MMOL/L (ref 21–32)
CO2: 18 MMOL/L (ref 21–32)
CO2: 18 MMOL/L (ref 21–32)
CO2: 19 MMOL/L (ref 21–32)
CO2: 20 MMOL/L (ref 21–32)
CO2: 20 MMOL/L (ref 21–32)
CO2: 21 MMOL/L (ref 21–32)
CO2: 21 MMOL/L (ref 21–32)
CREAT SERPL-MCNC: 1 MG/DL (ref 0.8–1.3)
CREAT SERPL-MCNC: 1.1 MG/DL (ref 0.8–1.3)
CREAT SERPL-MCNC: 1.1 MG/DL (ref 0.8–1.3)
CREAT SERPL-MCNC: 1.2 MG/DL (ref 0.8–1.3)
CREAT SERPL-MCNC: 1.2 MG/DL (ref 0.8–1.3)
CREAT SERPL-MCNC: 1.3 MG/DL (ref 0.8–1.3)
CREAT SERPL-MCNC: 1.6 MG/DL (ref 0.8–1.3)
CREAT SERPL-MCNC: 1.8 MG/DL (ref 0.8–1.3)
CREAT SERPL-MCNC: 1.9 MG/DL (ref 0.8–1.3)
CREAT SERPL-MCNC: 2 MG/DL (ref 0.8–1.3)
EKG ATRIAL RATE: 105 BPM
EKG DIAGNOSIS: NORMAL
EKG Q-T INTERVAL: 422 MS
EKG QRS DURATION: 160 MS
EKG QTC CALCULATION (BAZETT): 591 MS
EKG R AXIS: 180 DEGREES
EKG T AXIS: 16 DEGREES
EKG VENTRICULAR RATE: 118 BPM
EOSINOPHILS ABSOLUTE: 0 K/UL (ref 0–0.6)
EOSINOPHILS ABSOLUTE: 0.4 K/UL (ref 0–0.6)
EOSINOPHILS RELATIVE PERCENT: 0 %
EOSINOPHILS RELATIVE PERCENT: 0 %
EOSINOPHILS RELATIVE PERCENT: 0.2 %
EOSINOPHILS RELATIVE PERCENT: 2 %
GFR AFRICAN AMERICAN: 40
GFR AFRICAN AMERICAN: 43
GFR AFRICAN AMERICAN: 45
GFR AFRICAN AMERICAN: 52
GFR AFRICAN AMERICAN: >60
GFR NON-AFRICAN AMERICAN: 33
GFR NON-AFRICAN AMERICAN: 35
GFR NON-AFRICAN AMERICAN: 38
GFR NON-AFRICAN AMERICAN: 43
GFR NON-AFRICAN AMERICAN: 55
GFR NON-AFRICAN AMERICAN: 60
GFR NON-AFRICAN AMERICAN: 60
GFR NON-AFRICAN AMERICAN: >60
GLUCOSE BLD-MCNC: 103 MG/DL (ref 70–99)
GLUCOSE BLD-MCNC: 103 MG/DL (ref 70–99)
GLUCOSE BLD-MCNC: 104 MG/DL (ref 70–99)
GLUCOSE BLD-MCNC: 105 MG/DL (ref 70–99)
GLUCOSE BLD-MCNC: 105 MG/DL (ref 70–99)
GLUCOSE BLD-MCNC: 107 MG/DL (ref 70–99)
GLUCOSE BLD-MCNC: 109 MG/DL (ref 70–99)
GLUCOSE BLD-MCNC: 109 MG/DL (ref 70–99)
GLUCOSE BLD-MCNC: 112 MG/DL (ref 70–99)
GLUCOSE BLD-MCNC: 113 MG/DL (ref 70–99)
GLUCOSE BLD-MCNC: 114 MG/DL (ref 70–99)
GLUCOSE BLD-MCNC: 122 MG/DL (ref 70–99)
GLUCOSE BLD-MCNC: 181 MG/DL (ref 70–99)
GLUCOSE BLD-MCNC: 198 MG/DL (ref 70–99)
GLUCOSE BLD-MCNC: 247 MG/DL (ref 70–99)
GLUCOSE BLD-MCNC: 289 MG/DL (ref 70–99)
GLUCOSE BLD-MCNC: 291 MG/DL (ref 70–99)
GLUCOSE BLD-MCNC: 301 MG/DL (ref 70–99)
GLUCOSE BLD-MCNC: 36 MG/DL (ref 70–99)
GLUCOSE BLD-MCNC: 360 MG/DL (ref 70–99)
GLUCOSE BLD-MCNC: 385 MG/DL (ref 70–99)
GLUCOSE BLD-MCNC: 404 MG/DL (ref 70–99)
GLUCOSE BLD-MCNC: 427 MG/DL (ref 70–99)
GLUCOSE BLD-MCNC: 44 MG/DL (ref 70–99)
GLUCOSE BLD-MCNC: 445 MG/DL (ref 70–99)
GLUCOSE BLD-MCNC: 474 MG/DL (ref 70–99)
GLUCOSE BLD-MCNC: 48 MG/DL (ref 70–99)
GLUCOSE BLD-MCNC: 48 MG/DL (ref 70–99)
GLUCOSE BLD-MCNC: 489 MG/DL (ref 70–99)
GLUCOSE BLD-MCNC: 49 MG/DL (ref 70–99)
GLUCOSE BLD-MCNC: 497 MG/DL (ref 70–99)
GLUCOSE BLD-MCNC: 50 MG/DL (ref 70–99)
GLUCOSE BLD-MCNC: 51 MG/DL (ref 70–99)
GLUCOSE BLD-MCNC: 525 MG/DL (ref 70–99)
GLUCOSE BLD-MCNC: 535 MG/DL (ref 70–99)
GLUCOSE BLD-MCNC: 57 MG/DL (ref 70–99)
GLUCOSE BLD-MCNC: 57 MG/DL (ref 70–99)
GLUCOSE BLD-MCNC: 58 MG/DL (ref 70–99)
GLUCOSE BLD-MCNC: 58 MG/DL (ref 70–99)
GLUCOSE BLD-MCNC: 60 MG/DL (ref 70–99)
GLUCOSE BLD-MCNC: 64 MG/DL (ref 70–99)
GLUCOSE BLD-MCNC: 65 MG/DL (ref 70–99)
GLUCOSE BLD-MCNC: 65 MG/DL (ref 70–99)
GLUCOSE BLD-MCNC: 68 MG/DL (ref 70–99)
GLUCOSE BLD-MCNC: 69 MG/DL (ref 70–99)
GLUCOSE BLD-MCNC: 70 MG/DL (ref 70–99)
GLUCOSE BLD-MCNC: 74 MG/DL (ref 70–99)
GLUCOSE BLD-MCNC: 74 MG/DL (ref 70–99)
GLUCOSE BLD-MCNC: 78 MG/DL (ref 70–99)
GLUCOSE BLD-MCNC: 79 MG/DL (ref 70–99)
GLUCOSE BLD-MCNC: 80 MG/DL (ref 70–99)
GLUCOSE BLD-MCNC: 81 MG/DL (ref 70–99)
GLUCOSE BLD-MCNC: 82 MG/DL (ref 70–99)
GLUCOSE BLD-MCNC: 82 MG/DL (ref 70–99)
GLUCOSE BLD-MCNC: 83 MG/DL (ref 70–99)
GLUCOSE BLD-MCNC: 84 MG/DL (ref 70–99)
GLUCOSE BLD-MCNC: 84 MG/DL (ref 70–99)
GLUCOSE BLD-MCNC: 86 MG/DL (ref 70–99)
GLUCOSE BLD-MCNC: 87 MG/DL (ref 70–99)
GLUCOSE BLD-MCNC: 87 MG/DL (ref 70–99)
GLUCOSE BLD-MCNC: 88 MG/DL (ref 70–99)
GLUCOSE BLD-MCNC: 91 MG/DL (ref 70–99)
GLUCOSE BLD-MCNC: 91 MG/DL (ref 70–99)
GLUCOSE BLD-MCNC: 92 MG/DL (ref 70–99)
GLUCOSE BLD-MCNC: 92 MG/DL (ref 70–99)
GLUCOSE BLD-MCNC: 96 MG/DL (ref 70–99)
GLUCOSE BLD-MCNC: 99 MG/DL (ref 70–99)
HCO3 ARTERIAL: 17 MMOL/L (ref 21–29)
HCO3 ARTERIAL: 17.2 MMOL/L (ref 21–29)
HCO3 ARTERIAL: 17.4 MMOL/L (ref 21–29)
HCO3 ARTERIAL: 18 MMOL/L (ref 21–29)
HCO3 ARTERIAL: 19 MMOL/L (ref 21–29)
HCO3 ARTERIAL: 20.9 MMOL/L (ref 21–29)
HCO3 ARTERIAL: 21.2 MMOL/L (ref 21–29)
HCO3 ARTERIAL: 21.6 MMOL/L (ref 21–29)
HCO3 VENOUS: 18.3 MMOL/L (ref 24–28)
HCT VFR BLD CALC: 22.1 % (ref 40.5–52.5)
HCT VFR BLD CALC: 25.5 % (ref 40.5–52.5)
HCT VFR BLD CALC: 27.2 % (ref 40.5–52.5)
HCT VFR BLD CALC: 28.3 % (ref 40.5–52.5)
HEMOGLOBIN, ART, EXTENDED: 8.4 G/DL
HEMOGLOBIN, ART, EXTENDED: 8.5 G/DL
HEMOGLOBIN, ART, EXTENDED: 8.9 G/DL
HEMOGLOBIN: 7.4 G/DL (ref 13.5–17.5)
HEMOGLOBIN: 8.2 G/DL (ref 13.5–17.5)
HEMOGLOBIN: 8.5 G/DL (ref 13.5–17.5)
HEMOGLOBIN: 8.7 G/DL (ref 13.5–17.5)
HYPOCHROMIA: ABNORMAL
INR BLD: 1.11 (ref 0.86–1.14)
INR BLD: 1.14 (ref 0.86–1.14)
LACTATE: 1.54 MMOL/L (ref 0.4–2)
LACTATE: 5.54 MMOL/L (ref 0.4–2)
LACTATE: 8.23 MMOL/L (ref 0.4–2)
LACTIC ACID, SEPSIS: 9.5 MMOL/L (ref 0.4–1.9)
LACTIC ACID: 3.9 MMOL/L (ref 0.4–2)
LACTIC ACID: 5.2 MMOL/L (ref 0.4–2)
LACTIC ACID: 6.8 MMOL/L (ref 0.4–2)
LACTIC ACID: 7.5 MMOL/L (ref 0.4–2)
LACTIC ACID: 8.3 MMOL/L (ref 0.4–2)
LYMPHOCYTES ABSOLUTE: 0.3 K/UL (ref 1–5.1)
LYMPHOCYTES ABSOLUTE: 0.6 K/UL (ref 1–5.1)
LYMPHOCYTES ABSOLUTE: 0.7 K/UL (ref 1–5.1)
LYMPHOCYTES ABSOLUTE: 7.3 K/UL (ref 1–5.1)
LYMPHOCYTES RELATIVE PERCENT: 2 %
LYMPHOCYTES RELATIVE PERCENT: 37 %
LYMPHOCYTES RELATIVE PERCENT: 5.1 %
LYMPHOCYTES RELATIVE PERCENT: 5.5 %
MAGNESIUM: 1.5 MG/DL (ref 1.8–2.4)
MAGNESIUM: 1.6 MG/DL (ref 1.8–2.4)
MAGNESIUM: 1.9 MG/DL (ref 1.8–2.4)
MAGNESIUM: 2 MG/DL (ref 1.8–2.4)
MAGNESIUM: 2.1 MG/DL (ref 1.8–2.4)
MAGNESIUM: 2.1 MG/DL (ref 1.8–2.4)
MCH RBC QN AUTO: 23.5 PG (ref 26–34)
MCH RBC QN AUTO: 23.6 PG (ref 26–34)
MCH RBC QN AUTO: 23.7 PG (ref 26–34)
MCH RBC QN AUTO: 24.4 PG (ref 26–34)
MCHC RBC AUTO-ENTMCNC: 30.7 G/DL (ref 31–36)
MCHC RBC AUTO-ENTMCNC: 31.3 G/DL (ref 31–36)
MCHC RBC AUTO-ENTMCNC: 32.2 G/DL (ref 31–36)
MCHC RBC AUTO-ENTMCNC: 33.5 G/DL (ref 31–36)
MCV RBC AUTO: 72.8 FL (ref 80–100)
MCV RBC AUTO: 73.1 FL (ref 80–100)
MCV RBC AUTO: 75.6 FL (ref 80–100)
MCV RBC AUTO: 76.9 FL (ref 80–100)
METAMYELOCYTES RELATIVE PERCENT: 6 %
METHEMOGLOBIN ARTERIAL: 0.4 % (ref 0–1.4)
METHEMOGLOBIN ARTERIAL: 0.5 % (ref 0–1.4)
METHEMOGLOBIN ARTERIAL: 1 % (ref 0–1.4)
METHEMOGLOBIN VENOUS: 0.6 % (ref 0–1.5)
MICROCYTES: ABNORMAL
MICROCYTES: ABNORMAL
MONOCYTES ABSOLUTE: 0 K/UL (ref 0–1.3)
MONOCYTES ABSOLUTE: 0.3 K/UL (ref 0–1.3)
MONOCYTES ABSOLUTE: 0.5 K/UL (ref 0–1.3)
MONOCYTES ABSOLUTE: 0.8 K/UL (ref 0–1.3)
MONOCYTES RELATIVE PERCENT: 0 %
MONOCYTES RELATIVE PERCENT: 3 %
MONOCYTES RELATIVE PERCENT: 4 %
MONOCYTES RELATIVE PERCENT: 4 %
MYELOCYTE PERCENT: 4 %
NEUTROPHILS ABSOLUTE: 10.1 K/UL (ref 1.7–7.7)
NEUTROPHILS ABSOLUTE: 11.2 K/UL (ref 1.7–7.7)
NEUTROPHILS ABSOLUTE: 11.3 K/UL (ref 1.7–7.7)
NEUTROPHILS ABSOLUTE: 13.8 K/UL (ref 1.7–7.7)
NEUTROPHILS RELATIVE PERCENT: 41 %
NEUTROPHILS RELATIVE PERCENT: 81 %
NEUTROPHILS RELATIVE PERCENT: 90.4 %
NEUTROPHILS RELATIVE PERCENT: 91.5 %
O2 SAT, ARTERIAL: 100 % (ref 93–100)
O2 SAT, ARTERIAL: 90 % (ref 93–100)
O2 SAT, ARTERIAL: 95 % (ref 93–100)
O2 SAT, ARTERIAL: 98 % (ref 93–100)
O2 SAT, VEN: 48 %
OVALOCYTES: ABNORMAL
OVALOCYTES: ABNORMAL
PCO2 ARTERIAL: 37.6 MM HG (ref 35–45)
PCO2 ARTERIAL: 37.7 MM HG (ref 35–45)
PCO2 ARTERIAL: 38.5 MM HG (ref 35–45)
PCO2 ARTERIAL: 41.5 MMHG (ref 35–45)
PCO2 ARTERIAL: 48.7 MM HG (ref 35–45)
PCO2 ARTERIAL: 50.2 MM HG (ref 35–45)
PCO2 AT TEMP: 32.6 MMHG (ref 35–45)
PCO2 AT TEMP: 38.1 MMHG (ref 35–45)
PCO2, VEN: 109 MMHG (ref 41–51)
PDW BLD-RTO: 24.9 % (ref 12.4–15.4)
PDW BLD-RTO: 24.9 % (ref 12.4–15.4)
PDW BLD-RTO: 25.1 % (ref 12.4–15.4)
PDW BLD-RTO: 25.7 % (ref 12.4–15.4)
PERFORMED ON: ABNORMAL
PERFORMED ON: NORMAL
PH ARTERIAL: 7.22 (ref 7.35–7.45)
PH ARTERIAL: 7.24 (ref 7.35–7.45)
PH ARTERIAL: 7.24 (ref 7.35–7.45)
PH ARTERIAL: 7.26 (ref 7.35–7.45)
PH ARTERIAL: 7.27 (ref 7.35–7.45)
PH ARTERIAL: 7.36 (ref 7.35–7.45)
PH VENOUS: 6.83 (ref 7.35–7.45)
PH VENOUS: 7.27 (ref 7.35–7.45)
PH VENOUS: 7.3 (ref 7.35–7.45)
PHOSPHORUS: 2.3 MG/DL (ref 2.5–4.9)
PHOSPHORUS: 3.2 MG/DL (ref 2.5–4.9)
PHOSPHORUS: 3.3 MG/DL (ref 2.5–4.9)
PHOSPHORUS: 3.6 MG/DL (ref 2.5–4.9)
PHOSPHORUS: 3.6 MG/DL (ref 2.5–4.9)
PHOSPHORUS: 4 MG/DL (ref 2.5–4.9)
PHOSPHORUS: 5.2 MG/DL (ref 2.5–4.9)
PHOSPHORUS: 5.2 MG/DL (ref 2.5–4.9)
PHOSPHORUS: 7.2 MG/DL (ref 2.5–4.9)
PHOSPHORUS: 7.7 MG/DL (ref 2.5–4.9)
PLATELET # BLD: 228 K/UL (ref 135–450)
PLATELET # BLD: 272 K/UL (ref 135–450)
PLATELET # BLD: 285 K/UL (ref 135–450)
PLATELET # BLD: 401 K/UL (ref 135–450)
PMV BLD AUTO: 7.9 FL (ref 5–10.5)
PMV BLD AUTO: 8.2 FL (ref 5–10.5)
PMV BLD AUTO: 8.3 FL (ref 5–10.5)
PMV BLD AUTO: 8.5 FL (ref 5–10.5)
PO2 ARTERIAL: 221.9 MM HG (ref 75–108)
PO2 ARTERIAL: 425.5 MM HG (ref 75–108)
PO2 ARTERIAL: 70.9 MMHG (ref 75–108)
PO2 ARTERIAL: 77.7 MM HG (ref 75–108)
PO2 ARTERIAL: 88.6 MM HG (ref 75–108)
PO2 ARTERIAL: 89.6 MM HG (ref 75–108)
PO2 AT TEMP: 160 MMHG (ref 75–108)
PO2 AT TEMP: 169 MMHG (ref 75–108)
PO2, VEN: 48.5 MMHG (ref 25–40)
POC POTASSIUM: 2.6 MMOL/L (ref 3.5–5.1)
POC POTASSIUM: 3.8 MMOL/L (ref 3.5–5.1)
POC POTASSIUM: 6.2 MMOL/L (ref 3.5–5.1)
POC SAMPLE TYPE: ABNORMAL
POC SODIUM: 138 MMOL/L (ref 136–145)
POC SODIUM: 140 MMOL/L (ref 136–145)
POLYCHROMASIA: ABNORMAL
POTASSIUM REFLEX MAGNESIUM: 4.1 MMOL/L (ref 3.5–5.1)
POTASSIUM REFLEX MAGNESIUM: 5.9 MMOL/L (ref 3.5–5.1)
POTASSIUM SERPL-SCNC: 3.1 MMOL/L (ref 3.5–5.1)
POTASSIUM SERPL-SCNC: 3.2 MMOL/L (ref 3.5–5.1)
POTASSIUM SERPL-SCNC: 3.5 MMOL/L (ref 3.5–5.1)
POTASSIUM SERPL-SCNC: 3.8 MMOL/L (ref 3.5–5.1)
POTASSIUM SERPL-SCNC: 3.9 MMOL/L (ref 3.5–5.1)
POTASSIUM SERPL-SCNC: 3.9 MMOL/L (ref 3.5–5.1)
POTASSIUM SERPL-SCNC: 4.3 MMOL/L (ref 3.5–5.1)
POTASSIUM SERPL-SCNC: 5.1 MMOL/L (ref 3.5–5.1)
POTASSIUM SERPL-SCNC: 6 MMOL/L (ref 3.5–5.1)
POTASSIUM SERPL-SCNC: 6.4 MMOL/L (ref 3.5–5.1)
POTASSIUM SERPL-SCNC: 6.5 MMOL/L (ref 3.5–5.1)
PROMYELOCYTES PERCENT: 1 %
PROTHROMBIN TIME: 12.9 SEC (ref 10–13.2)
PROTHROMBIN TIME: 13.2 SEC (ref 10–13.2)
RBC # BLD: 3.04 M/UL (ref 4.2–5.9)
RBC # BLD: 3.49 M/UL (ref 4.2–5.9)
RBC # BLD: 3.6 M/UL (ref 4.2–5.9)
RBC # BLD: 3.68 M/UL (ref 4.2–5.9)
SCHISTOCYTES: ABNORMAL
SCHISTOCYTES: ABNORMAL
SODIUM BLD-SCNC: 129 MMOL/L (ref 136–145)
SODIUM BLD-SCNC: 132 MMOL/L (ref 136–145)
SODIUM BLD-SCNC: 133 MMOL/L (ref 136–145)
SODIUM BLD-SCNC: 134 MMOL/L (ref 136–145)
SODIUM BLD-SCNC: 135 MMOL/L (ref 136–145)
SODIUM BLD-SCNC: 136 MMOL/L (ref 136–145)
SODIUM BLD-SCNC: 137 MMOL/L (ref 136–145)
SODIUM BLD-SCNC: 137 MMOL/L (ref 136–145)
SODIUM BLD-SCNC: 138 MMOL/L (ref 136–145)
SPHEROCYTES: ABNORMAL
TCO2 ARTERIAL: 18 MMOL/L
TCO2 ARTERIAL: 18 MMOL/L
TCO2 ARTERIAL: 19 MMOL/L
TCO2 ARTERIAL: 19 MMOL/L
TCO2 ARTERIAL: 20 MMOL/L
TCO2 ARTERIAL: 22 MMOL/L
TCO2 ARTERIAL: 22 MMOL/L
TCO2 ARTERIAL: 23 MMOL/L
TCO2 CALC VENOUS: 21 MMOL/L
TEAR DROP CELLS: ABNORMAL
TEMPERATURE: 37 DEGREES CELSIUS (ref 35–45)
TEMPERATURE: 37 DEGREES CELSIUS (ref 35–45)
TOTAL PROTEIN: 6 G/DL (ref 6.4–8.2)
TROPONIN: 0.04 NG/ML
WBC # BLD: 11.1 K/UL (ref 4–11)
WBC # BLD: 12.5 K/UL (ref 4–11)
WBC # BLD: 14.1 K/UL (ref 4–11)
WBC # BLD: 19.7 K/UL (ref 4–11)

## 2021-01-01 PROCEDURE — 99291 CRITICAL CARE FIRST HOUR: CPT | Performed by: INTERNAL MEDICINE

## 2021-01-01 PROCEDURE — 71045 X-RAY EXAM CHEST 1 VIEW: CPT

## 2021-01-01 PROCEDURE — 2000000000 HC ICU R&B

## 2021-01-01 PROCEDURE — 80069 RENAL FUNCTION PANEL: CPT

## 2021-01-01 PROCEDURE — 6360000002 HC RX W HCPCS: Performed by: STUDENT IN AN ORGANIZED HEALTH CARE EDUCATION/TRAINING PROGRAM

## 2021-01-01 PROCEDURE — 2700000000 HC OXYGEN THERAPY PER DAY

## 2021-01-01 PROCEDURE — 2580000003 HC RX 258: Performed by: INTERNAL MEDICINE

## 2021-01-01 PROCEDURE — 82803 BLOOD GASES ANY COMBINATION: CPT

## 2021-01-01 PROCEDURE — 99024 POSTOP FOLLOW-UP VISIT: CPT | Performed by: THORACIC SURGERY (CARDIOTHORACIC VASCULAR SURGERY)

## 2021-01-01 PROCEDURE — 36415 COLL VENOUS BLD VENIPUNCTURE: CPT

## 2021-01-01 PROCEDURE — 2580000003 HC RX 258: Performed by: STUDENT IN AN ORGANIZED HEALTH CARE EDUCATION/TRAINING PROGRAM

## 2021-01-01 PROCEDURE — 2500000003 HC RX 250 WO HCPCS: Performed by: STUDENT IN AN ORGANIZED HEALTH CARE EDUCATION/TRAINING PROGRAM

## 2021-01-01 PROCEDURE — 6360000002 HC RX W HCPCS: Performed by: INTERNAL MEDICINE

## 2021-01-01 PROCEDURE — 85610 PROTHROMBIN TIME: CPT

## 2021-01-01 PROCEDURE — 85025 COMPLETE CBC W/AUTO DIFF WBC: CPT

## 2021-01-01 PROCEDURE — 0BH17EZ INSERTION OF ENDOTRACHEAL AIRWAY INTO TRACHEA, VIA NATURAL OR ARTIFICIAL OPENING: ICD-10-PCS | Performed by: EMERGENCY MEDICINE

## 2021-01-01 PROCEDURE — 84100 ASSAY OF PHOSPHORUS: CPT

## 2021-01-01 PROCEDURE — 82805 BLOOD GASES W/O2 SATURATION: CPT

## 2021-01-01 PROCEDURE — 71250 CT THORAX DX C-: CPT

## 2021-01-01 PROCEDURE — 84295 ASSAY OF SERUM SODIUM: CPT

## 2021-01-01 PROCEDURE — 96374 THER/PROPH/DIAG INJ IV PUSH: CPT

## 2021-01-01 PROCEDURE — 94761 N-INVAS EAR/PLS OXIMETRY MLT: CPT

## 2021-01-01 PROCEDURE — 2500000003 HC RX 250 WO HCPCS

## 2021-01-01 PROCEDURE — 36620 INSERTION CATHETER ARTERY: CPT | Performed by: INTERNAL MEDICINE

## 2021-01-01 PROCEDURE — 02HV33Z INSERTION OF INFUSION DEVICE INTO SUPERIOR VENA CAVA, PERCUTANEOUS APPROACH: ICD-10-PCS | Performed by: STUDENT IN AN ORGANIZED HEALTH CARE EDUCATION/TRAINING PROGRAM

## 2021-01-01 PROCEDURE — 83735 ASSAY OF MAGNESIUM: CPT

## 2021-01-01 PROCEDURE — 80048 BASIC METABOLIC PNL TOTAL CA: CPT

## 2021-01-01 PROCEDURE — 93010 ELECTROCARDIOGRAM REPORT: CPT | Performed by: INTERNAL MEDICINE

## 2021-01-01 PROCEDURE — 37799 UNLISTED PX VASCULAR SURGERY: CPT

## 2021-01-01 PROCEDURE — 6370000000 HC RX 637 (ALT 250 FOR IP): Performed by: STUDENT IN AN ORGANIZED HEALTH CARE EDUCATION/TRAINING PROGRAM

## 2021-01-01 PROCEDURE — 96372 THER/PROPH/DIAG INJ SC/IM: CPT

## 2021-01-01 PROCEDURE — 2580000003 HC RX 258: Performed by: EMERGENCY MEDICINE

## 2021-01-01 PROCEDURE — 5A1945Z RESPIRATORY VENTILATION, 24-96 CONSECUTIVE HOURS: ICD-10-PCS | Performed by: EMERGENCY MEDICINE

## 2021-01-01 PROCEDURE — 36620 INSERTION CATHETER ARTERY: CPT

## 2021-01-01 PROCEDURE — 36556 INSERT NON-TUNNEL CV CATH: CPT

## 2021-01-01 PROCEDURE — 32551 INSERTION OF CHEST TUBE: CPT

## 2021-01-01 PROCEDURE — 6360000002 HC RX W HCPCS

## 2021-01-01 PROCEDURE — 84132 ASSAY OF SERUM POTASSIUM: CPT

## 2021-01-01 PROCEDURE — 82947 ASSAY GLUCOSE BLOOD QUANT: CPT

## 2021-01-01 PROCEDURE — 31500 INSERT EMERGENCY AIRWAY: CPT

## 2021-01-01 PROCEDURE — 70450 CT HEAD/BRAIN W/O DYE: CPT

## 2021-01-01 PROCEDURE — 2500000003 HC RX 250 WO HCPCS: Performed by: INTERNAL MEDICINE

## 2021-01-01 PROCEDURE — 95813 EEG EXTND MNTR 61-119 MIN: CPT

## 2021-01-01 PROCEDURE — 36592 COLLECT BLOOD FROM PICC: CPT

## 2021-01-01 PROCEDURE — 80076 HEPATIC FUNCTION PANEL: CPT

## 2021-01-01 PROCEDURE — 83605 ASSAY OF LACTIC ACID: CPT

## 2021-01-01 PROCEDURE — 94003 VENT MGMT INPAT SUBQ DAY: CPT

## 2021-01-01 PROCEDURE — 0W9930Z DRAINAGE OF RIGHT PLEURAL CAVITY WITH DRAINAGE DEVICE, PERCUTANEOUS APPROACH: ICD-10-PCS | Performed by: THORACIC SURGERY (CARDIOTHORACIC VASCULAR SURGERY)

## 2021-01-01 PROCEDURE — 85730 THROMBOPLASTIN TIME PARTIAL: CPT

## 2021-01-01 PROCEDURE — 99221 1ST HOSP IP/OBS SF/LOW 40: CPT | Performed by: NURSE PRACTITIONER

## 2021-01-01 PROCEDURE — 99283 EMERGENCY DEPT VISIT LOW MDM: CPT

## 2021-01-01 PROCEDURE — 94002 VENT MGMT INPAT INIT DAY: CPT

## 2021-01-01 PROCEDURE — 82330 ASSAY OF CALCIUM: CPT

## 2021-01-01 PROCEDURE — 93005 ELECTROCARDIOGRAM TRACING: CPT | Performed by: EMERGENCY MEDICINE

## 2021-01-01 PROCEDURE — 6370000000 HC RX 637 (ALT 250 FOR IP): Performed by: INTERNAL MEDICINE

## 2021-01-01 PROCEDURE — 2500000003 HC RX 250 WO HCPCS: Performed by: EMERGENCY MEDICINE

## 2021-01-01 PROCEDURE — 74018 RADEX ABDOMEN 1 VIEW: CPT

## 2021-01-01 PROCEDURE — 93005 ELECTROCARDIOGRAM TRACING: CPT | Performed by: STUDENT IN AN ORGANIZED HEALTH CARE EDUCATION/TRAINING PROGRAM

## 2021-01-01 PROCEDURE — 84484 ASSAY OF TROPONIN QUANT: CPT

## 2021-01-01 PROCEDURE — 6360000002 HC RX W HCPCS: Performed by: EMERGENCY MEDICINE

## 2021-01-01 RX ORDER — ROCURONIUM BROMIDE 10 MG/ML
INJECTION, SOLUTION INTRAVENOUS
Status: DISPENSED
Start: 2021-01-01 | End: 2021-01-01

## 2021-01-01 RX ORDER — CALCIUM GLUCONATE 94 MG/ML
1 INJECTION, SOLUTION INTRAVENOUS ONCE
Status: COMPLETED | OUTPATIENT
Start: 2021-01-01 | End: 2021-01-01

## 2021-01-01 RX ORDER — NICOTINE POLACRILEX 4 MG
15 LOZENGE BUCCAL PRN
Status: DISCONTINUED | OUTPATIENT
Start: 2021-01-01 | End: 2021-01-17 | Stop reason: HOSPADM

## 2021-01-01 RX ORDER — DEXTROSE AND SODIUM CHLORIDE 5; .9 G/100ML; G/100ML
INJECTION, SOLUTION INTRAVENOUS CONTINUOUS
Status: DISCONTINUED | OUTPATIENT
Start: 2021-01-01 | End: 2021-01-01

## 2021-01-01 RX ORDER — CHLORHEXIDINE GLUCONATE 0.12 MG/ML
15 RINSE ORAL 2 TIMES DAILY
Status: DISCONTINUED | OUTPATIENT
Start: 2021-01-01 | End: 2021-01-17 | Stop reason: HOSPADM

## 2021-01-01 RX ORDER — DEXTROSE MONOHYDRATE 25 G/50ML
12.5 INJECTION, SOLUTION INTRAVENOUS PRN
Status: DISCONTINUED | OUTPATIENT
Start: 2021-01-01 | End: 2021-01-17 | Stop reason: HOSPADM

## 2021-01-01 RX ORDER — LIDOCAINE HYDROCHLORIDE AND EPINEPHRINE 10; 10 MG/ML; UG/ML
20 INJECTION, SOLUTION INFILTRATION; PERINEURAL ONCE
Status: COMPLETED | OUTPATIENT
Start: 2021-01-01 | End: 2021-01-01

## 2021-01-01 RX ORDER — SODIUM CHLORIDE 9 MG/ML
INJECTION, SOLUTION INTRAVENOUS CONTINUOUS
Status: DISCONTINUED | OUTPATIENT
Start: 2021-01-01 | End: 2021-01-01

## 2021-01-01 RX ORDER — LORAZEPAM 2 MG/ML
INJECTION INTRAMUSCULAR
Status: COMPLETED
Start: 2021-01-01 | End: 2021-01-01

## 2021-01-01 RX ORDER — LORAZEPAM 2 MG/ML
4 INJECTION INTRAMUSCULAR ONCE
Status: COMPLETED | OUTPATIENT
Start: 2021-01-01 | End: 2021-01-01

## 2021-01-01 RX ORDER — FENTANYL CITRATE 50 UG/ML
25 INJECTION, SOLUTION INTRAMUSCULAR; INTRAVENOUS
Status: DISCONTINUED | OUTPATIENT
Start: 2021-01-01 | End: 2021-01-17 | Stop reason: HOSPADM

## 2021-01-01 RX ORDER — PROPOFOL 10 MG/ML
INJECTION, EMULSION INTRAVENOUS
Status: DISPENSED
Start: 2021-01-01 | End: 2021-01-01

## 2021-01-01 RX ORDER — PROMETHAZINE HYDROCHLORIDE 25 MG/1
12.5 TABLET ORAL EVERY 6 HOURS PRN
Status: DISCONTINUED | OUTPATIENT
Start: 2021-01-01 | End: 2021-01-01

## 2021-01-01 RX ORDER — MIDAZOLAM HYDROCHLORIDE 1 MG/ML
1 INJECTION, SOLUTION INTRAMUSCULAR; INTRAVENOUS EVERY 30 MIN PRN
Status: DISCONTINUED | OUTPATIENT
Start: 2021-01-01 | End: 2021-01-17 | Stop reason: HOSPADM

## 2021-01-01 RX ORDER — MORPHINE SULFATE 4 MG/ML
4 INJECTION, SOLUTION INTRAMUSCULAR; INTRAVENOUS
Status: DISCONTINUED | OUTPATIENT
Start: 2021-01-01 | End: 2021-01-17 | Stop reason: HOSPADM

## 2021-01-01 RX ORDER — ONDANSETRON 2 MG/ML
4 INJECTION INTRAMUSCULAR; INTRAVENOUS EVERY 6 HOURS PRN
Status: DISCONTINUED | OUTPATIENT
Start: 2021-01-01 | End: 2021-01-17 | Stop reason: HOSPADM

## 2021-01-01 RX ORDER — LORAZEPAM 2 MG/ML
INJECTION INTRAMUSCULAR
Status: DISPENSED
Start: 2021-01-01 | End: 2021-01-01

## 2021-01-01 RX ORDER — POLYVINYL ALCOHOL 14 MG/ML
1 SOLUTION/ DROPS OPHTHALMIC PRN
Status: DISCONTINUED | OUTPATIENT
Start: 2021-01-01 | End: 2021-01-17 | Stop reason: HOSPADM

## 2021-01-01 RX ORDER — ETOMIDATE 2 MG/ML
INJECTION INTRAVENOUS
Status: COMPLETED
Start: 2021-01-01 | End: 2021-01-01

## 2021-01-01 RX ORDER — ROCURONIUM BROMIDE 10 MG/ML
100 INJECTION, SOLUTION INTRAVENOUS ONCE
Status: COMPLETED | OUTPATIENT
Start: 2021-01-01 | End: 2021-01-01

## 2021-01-01 RX ORDER — DEXTROSE MONOHYDRATE 50 MG/ML
INJECTION, SOLUTION INTRAVENOUS CONTINUOUS
Status: DISCONTINUED | OUTPATIENT
Start: 2021-01-01 | End: 2021-01-01

## 2021-01-01 RX ORDER — DEXTROSE MONOHYDRATE 100 MG/ML
INJECTION, SOLUTION INTRAVENOUS CONTINUOUS
Status: DISCONTINUED | OUTPATIENT
Start: 2021-01-01 | End: 2021-01-17 | Stop reason: HOSPADM

## 2021-01-01 RX ORDER — DEXTROSE MONOHYDRATE 50 MG/ML
100 INJECTION, SOLUTION INTRAVENOUS PRN
Status: DISCONTINUED | OUTPATIENT
Start: 2021-01-01 | End: 2021-01-17 | Stop reason: HOSPADM

## 2021-01-01 RX ORDER — LORAZEPAM 2 MG/ML
2 INJECTION INTRAMUSCULAR EVERY 30 MIN PRN
Status: DISCONTINUED | OUTPATIENT
Start: 2021-01-01 | End: 2021-01-17 | Stop reason: HOSPADM

## 2021-01-01 RX ORDER — ETOMIDATE 2 MG/ML
20 INJECTION INTRAVENOUS ONCE
Status: COMPLETED | OUTPATIENT
Start: 2021-01-01 | End: 2021-01-01

## 2021-01-01 RX ORDER — POTASSIUM CHLORIDE 29.8 MG/ML
20 INJECTION INTRAVENOUS PRN
Status: DISCONTINUED | OUTPATIENT
Start: 2021-01-01 | End: 2021-01-17 | Stop reason: HOSPADM

## 2021-01-01 RX ORDER — EPINEPHRINE 0.1 MG/ML
SYRINGE (ML) INJECTION DAILY PRN
Status: COMPLETED | OUTPATIENT
Start: 2021-01-01 | End: 2021-01-01

## 2021-01-01 RX ORDER — MAGNESIUM SULFATE IN WATER 40 MG/ML
2 INJECTION, SOLUTION INTRAVENOUS ONCE
Status: COMPLETED | OUTPATIENT
Start: 2021-01-01 | End: 2021-01-01

## 2021-01-01 RX ORDER — FUROSEMIDE 10 MG/ML
40 INJECTION INTRAMUSCULAR; INTRAVENOUS ONCE
Status: COMPLETED | OUTPATIENT
Start: 2021-01-01 | End: 2021-01-01

## 2021-01-01 RX ORDER — LORAZEPAM 2 MG/ML
2 INJECTION INTRAMUSCULAR
Status: DISCONTINUED | OUTPATIENT
Start: 2021-01-01 | End: 2021-01-01

## 2021-01-01 RX ORDER — HEPARIN SODIUM 5000 [USP'U]/ML
5000 INJECTION, SOLUTION INTRAVENOUS; SUBCUTANEOUS EVERY 8 HOURS SCHEDULED
Status: DISCONTINUED | OUTPATIENT
Start: 2021-01-01 | End: 2021-01-17 | Stop reason: HOSPADM

## 2021-01-01 RX ORDER — PROPOFOL 10 MG/ML
10 INJECTION, EMULSION INTRAVENOUS
Status: DISCONTINUED | OUTPATIENT
Start: 2021-01-01 | End: 2021-01-17 | Stop reason: HOSPADM

## 2021-01-01 RX ORDER — 0.9 % SODIUM CHLORIDE 0.9 %
30 INTRAVENOUS SOLUTION INTRAVENOUS ONCE
Status: DISCONTINUED | OUTPATIENT
Start: 2021-01-01 | End: 2021-01-17 | Stop reason: HOSPADM

## 2021-01-01 RX ORDER — DEXTROSE MONOHYDRATE 25 G/50ML
25 INJECTION, SOLUTION INTRAVENOUS ONCE
Status: COMPLETED | OUTPATIENT
Start: 2021-01-01 | End: 2021-01-01

## 2021-01-01 RX ORDER — ROCURONIUM BROMIDE 10 MG/ML
INJECTION, SOLUTION INTRAVENOUS
Status: COMPLETED
Start: 2021-01-01 | End: 2021-01-01

## 2021-01-01 RX ADMIN — SODIUM CHLORIDE 30 UNITS/HR: 900 INJECTION, SOLUTION INTRAVENOUS at 11:11

## 2021-01-01 RX ADMIN — POLYVINYL ALCOHOL 1 DROP: 14 SOLUTION/ DROPS OPHTHALMIC at 14:21

## 2021-01-01 RX ADMIN — DEXTROSE MONOHYDRATE 12.5 G: 500 INJECTION PARENTERAL at 16:10

## 2021-01-01 RX ADMIN — ETOMIDATE 20 MG: 2 INJECTION, SOLUTION INTRAVENOUS at 18:28

## 2021-01-01 RX ADMIN — LORAZEPAM 4 MG: 2 INJECTION INTRAMUSCULAR; INTRAVENOUS at 22:15

## 2021-01-01 RX ADMIN — PROPOFOL INJECTABLE EMULSION 35 MCG/KG/MIN: 10 INJECTION, EMULSION INTRAVENOUS at 19:27

## 2021-01-01 RX ADMIN — LORAZEPAM 4 MG: 2 INJECTION INTRAMUSCULAR; INTRAVENOUS at 00:32

## 2021-01-01 RX ADMIN — EPINEPHRINE 18 MCG/MIN: 1 INJECTION INTRAMUSCULAR; INTRAVENOUS; SUBCUTANEOUS at 11:35

## 2021-01-01 RX ADMIN — FAMOTIDINE 20 MG: 10 INJECTION INTRAVENOUS at 20:26

## 2021-01-01 RX ADMIN — CALCIUM GLUCONATE 1 G: 98 INJECTION, SOLUTION INTRAVENOUS at 06:02

## 2021-01-01 RX ADMIN — EPINEPHRINE 1 MG: 0.1 INJECTION, SOLUTION ENDOTRACHEAL; INTRACARDIAC; INTRAVENOUS at 18:20

## 2021-01-01 RX ADMIN — EPINEPHRINE 0.1 MCG/KG/MIN: 1 INJECTION INTRAMUSCULAR; INTRAVENOUS; SUBCUTANEOUS at 18:48

## 2021-01-01 RX ADMIN — Medication 15 ML: at 00:52

## 2021-01-01 RX ADMIN — INSULIN HUMAN 10 UNITS: 100 INJECTION, SOLUTION PARENTERAL at 06:03

## 2021-01-01 RX ADMIN — AMPICILLIN SODIUM AND SULBACTAM SODIUM 3 G: 2; 1 INJECTION, POWDER, FOR SOLUTION INTRAMUSCULAR; INTRAVENOUS at 09:46

## 2021-01-01 RX ADMIN — DEXTROSE MONOHYDRATE 12.5 G: 500 INJECTION PARENTERAL at 14:20

## 2021-01-01 RX ADMIN — SODIUM CHLORIDE 7 UNITS/HR: 900 INJECTION, SOLUTION INTRAVENOUS at 02:21

## 2021-01-01 RX ADMIN — FUROSEMIDE 40 MG: 10 INJECTION, SOLUTION INTRAMUSCULAR; INTRAVENOUS at 06:48

## 2021-01-01 RX ADMIN — HEPARIN SODIUM 5000 UNITS: 5000 INJECTION INTRAVENOUS; SUBCUTANEOUS at 06:47

## 2021-01-01 RX ADMIN — Medication 15 ML: at 20:26

## 2021-01-01 RX ADMIN — AMPICILLIN SODIUM AND SULBACTAM SODIUM 3 G: 2; 1 INJECTION, POWDER, FOR SOLUTION INTRAMUSCULAR; INTRAVENOUS at 04:13

## 2021-01-01 RX ADMIN — EPINEPHRINE 0.05 MCG/KG/MIN: 1 INJECTION, SOLUTION INTRAMUSCULAR; INTRAVENOUS; SUBCUTANEOUS at 02:09

## 2021-01-01 RX ADMIN — HEPARIN SODIUM 5000 UNITS: 5000 INJECTION INTRAVENOUS; SUBCUTANEOUS at 02:36

## 2021-01-01 RX ADMIN — DEXTROSE MONOHYDRATE 12.5 G: 500 INJECTION PARENTERAL at 18:21

## 2021-01-01 RX ADMIN — DEXTROSE MONOHYDRATE: 100 INJECTION, SOLUTION INTRAVENOUS at 01:55

## 2021-01-01 RX ADMIN — VASOPRESSIN 0.04 UNITS/MIN: 20 INJECTION INTRAVENOUS at 11:11

## 2021-01-01 RX ADMIN — SODIUM CHLORIDE 6.75 UNITS/HR: 900 INJECTION, SOLUTION INTRAVENOUS at 20:20

## 2021-01-01 RX ADMIN — PROPOFOL INJECTABLE EMULSION 50 MCG/KG/MIN: 10 INJECTION, EMULSION INTRAVENOUS at 09:58

## 2021-01-01 RX ADMIN — FENTANYL CITRATE 100 MCG/HR: 0.05 INJECTION, SOLUTION INTRAMUSCULAR; INTRAVENOUS at 01:55

## 2021-01-01 RX ADMIN — POTASSIUM CHLORIDE 20 MEQ: 29.8 INJECTION, SOLUTION INTRAVENOUS at 11:38

## 2021-01-01 RX ADMIN — DEXTROSE MONOHYDRATE: 100 INJECTION, SOLUTION INTRAVENOUS at 16:25

## 2021-01-01 RX ADMIN — HEPARIN SODIUM 5000 UNITS: 5000 INJECTION INTRAVENOUS; SUBCUTANEOUS at 14:46

## 2021-01-01 RX ADMIN — DEXTROSE MONOHYDRATE 12.5 G: 500 INJECTION PARENTERAL at 23:05

## 2021-01-01 RX ADMIN — DEXTROSE MONOHYDRATE 12.5 G: 500 INJECTION PARENTERAL at 11:52

## 2021-01-01 RX ADMIN — FENTANYL CITRATE 25 MCG/HR: 50 INJECTION, SOLUTION INTRAMUSCULAR; INTRAVENOUS at 19:17

## 2021-01-01 RX ADMIN — LORAZEPAM 2 MG: 2 INJECTION INTRAMUSCULAR; INTRAVENOUS at 15:15

## 2021-01-01 RX ADMIN — POLYVINYL ALCOHOL 1 DROP: 14 SOLUTION/ DROPS OPHTHALMIC at 21:40

## 2021-01-01 RX ADMIN — DEXTROSE AND SODIUM CHLORIDE: 5; 900 INJECTION, SOLUTION INTRAVENOUS at 11:42

## 2021-01-01 RX ADMIN — POLYVINYL ALCOHOL 1 DROP: 14 SOLUTION/ DROPS OPHTHALMIC at 10:01

## 2021-01-01 RX ADMIN — VASOPRESSIN 0.04 UNITS/MIN: 20 INJECTION INTRAVENOUS at 01:16

## 2021-01-01 RX ADMIN — POTASSIUM CHLORIDE 20 MEQ: 29.8 INJECTION, SOLUTION INTRAVENOUS at 09:31

## 2021-01-01 RX ADMIN — LORAZEPAM 2 MG: 2 INJECTION INTRAMUSCULAR; INTRAVENOUS at 08:07

## 2021-01-01 RX ADMIN — DEXTROSE MONOHYDRATE 12.5 G: 500 INJECTION PARENTERAL at 06:15

## 2021-01-01 RX ADMIN — HEPARIN SODIUM 5000 UNITS: 5000 INJECTION INTRAVENOUS; SUBCUTANEOUS at 20:20

## 2021-01-01 RX ADMIN — EPINEPHRINE 100 MCG: 1 INJECTION INTRAMUSCULAR; INTRAVENOUS; SUBCUTANEOUS at 18:31

## 2021-01-01 RX ADMIN — LORAZEPAM 2 MG: 2 INJECTION INTRAMUSCULAR; INTRAVENOUS at 12:46

## 2021-01-01 RX ADMIN — EPINEPHRINE 10 MCG/MIN: 1 INJECTION INTRAMUSCULAR; INTRAVENOUS; SUBCUTANEOUS at 04:56

## 2021-01-01 RX ADMIN — DEXTROSE MONOHYDRATE 12.5 G: 500 INJECTION PARENTERAL at 08:30

## 2021-01-01 RX ADMIN — AMPICILLIN SODIUM AND SULBACTAM SODIUM 3 G: 2; 1 INJECTION, POWDER, FOR SOLUTION INTRAMUSCULAR; INTRAVENOUS at 21:46

## 2021-01-01 RX ADMIN — MORPHINE SULFATE 4 MG: 4 INJECTION INTRAVENOUS at 14:16

## 2021-01-01 RX ADMIN — MAGNESIUM SULFATE HEPTAHYDRATE 2 G: 40 INJECTION, SOLUTION INTRAVENOUS at 13:51

## 2021-01-01 RX ADMIN — MORPHINE SULFATE 4 MG: 4 INJECTION INTRAVENOUS at 12:45

## 2021-01-01 RX ADMIN — DEXTROSE MONOHYDRATE 25 G: 25 INJECTION, SOLUTION INTRAVENOUS at 06:02

## 2021-01-01 RX ADMIN — EPINEPHRINE 100 MCG: 1 INJECTION INTRAMUSCULAR; INTRAVENOUS; SUBCUTANEOUS at 18:37

## 2021-01-01 RX ADMIN — DEXTROSE MONOHYDRATE 12.5 G: 500 INJECTION PARENTERAL at 03:01

## 2021-01-01 RX ADMIN — DEXTROSE MONOHYDRATE: 100 INJECTION, SOLUTION INTRAVENOUS at 06:33

## 2021-01-01 RX ADMIN — FAMOTIDINE 20 MG: 10 INJECTION INTRAVENOUS at 09:45

## 2021-01-01 RX ADMIN — MORPHINE SULFATE 4 MG: 4 INJECTION INTRAVENOUS at 15:16

## 2021-01-01 RX ADMIN — FENTANYL CITRATE 50 MCG/HR: 0.05 INJECTION, SOLUTION INTRAMUSCULAR; INTRAVENOUS at 03:28

## 2021-01-01 RX ADMIN — PROPOFOL INJECTABLE EMULSION 35 MCG/KG/MIN: 10 INJECTION, EMULSION INTRAVENOUS at 06:48

## 2021-01-01 RX ADMIN — DEXTROSE MONOHYDRATE 12.5 G: 500 INJECTION PARENTERAL at 01:16

## 2021-01-01 RX ADMIN — PROPOFOL INJECTABLE EMULSION 5 MCG/KG/MIN: 10 INJECTION, EMULSION INTRAVENOUS at 00:33

## 2021-01-01 RX ADMIN — HEPARIN SODIUM 5000 UNITS: 5000 INJECTION INTRAVENOUS; SUBCUTANEOUS at 06:21

## 2021-01-01 RX ADMIN — ROCURONIUM BROMIDE 100 MG: 10 INJECTION INTRAVENOUS at 18:29

## 2021-01-01 RX ADMIN — PROPOFOL INJECTABLE EMULSION 35 MCG/KG/MIN: 10 INJECTION, EMULSION INTRAVENOUS at 15:10

## 2021-01-01 RX ADMIN — LORAZEPAM 2 MG: 2 INJECTION INTRAMUSCULAR; INTRAVENOUS at 13:15

## 2021-01-01 RX ADMIN — LIDOCAINE HYDROCHLORIDE,EPINEPHRINE BITARTRATE 20 ML: 10; .01 INJECTION, SOLUTION INFILTRATION; PERINEURAL at 13:00

## 2021-01-01 RX ADMIN — POLYVINYL ALCOHOL 1 DROP: 14 SOLUTION/ DROPS OPHTHALMIC at 17:19

## 2021-01-01 RX ADMIN — SODIUM BICARBONATE 100 MEQ: 84 INJECTION, SOLUTION INTRAVENOUS at 08:52

## 2021-01-01 RX ADMIN — HEPARIN SODIUM 5000 UNITS: 5000 INJECTION INTRAVENOUS; SUBCUTANEOUS at 13:52

## 2021-01-01 RX ADMIN — VASOPRESSIN 0.04 UNITS/MIN: 20 INJECTION INTRAVENOUS at 20:49

## 2021-01-01 RX ADMIN — MORPHINE SULFATE 4 MG: 4 INJECTION INTRAVENOUS at 13:15

## 2021-01-01 RX ADMIN — Medication 15 ML: at 09:45

## 2021-01-01 RX ADMIN — DEXTROSE MONOHYDRATE 12.5 G: 500 INJECTION PARENTERAL at 11:02

## 2021-01-01 RX ADMIN — FAMOTIDINE 20 MG: 10 INJECTION INTRAVENOUS at 10:54

## 2021-01-01 RX ADMIN — AMPICILLIN SODIUM AND SULBACTAM SODIUM 3 G: 2; 1 INJECTION, POWDER, FOR SOLUTION INTRAMUSCULAR; INTRAVENOUS at 22:16

## 2021-01-01 RX ADMIN — EPINEPHRINE 12 MCG/MIN: 1 INJECTION INTRAMUSCULAR; INTRAVENOUS; SUBCUTANEOUS at 00:53

## 2021-01-01 RX ADMIN — LORAZEPAM 2 MG: 2 INJECTION INTRAMUSCULAR; INTRAVENOUS at 14:16

## 2021-01-01 RX ADMIN — ROCURONIUM BROMIDE 100 MG: 10 INJECTION, SOLUTION INTRAVENOUS at 18:29

## 2021-01-01 RX ADMIN — AMPICILLIN SODIUM AND SULBACTAM SODIUM 3 G: 2; 1 INJECTION, POWDER, FOR SOLUTION INTRAMUSCULAR; INTRAVENOUS at 16:10

## 2021-01-01 RX ADMIN — HEPARIN SODIUM 5000 UNITS: 5000 INJECTION INTRAVENOUS; SUBCUTANEOUS at 21:46

## 2021-01-01 RX ADMIN — DEXTROSE MONOHYDRATE: 100 INJECTION, SOLUTION INTRAVENOUS at 21:32

## 2021-01-01 RX ADMIN — PROPOFOL INJECTABLE EMULSION 35 MCG/KG/MIN: 10 INJECTION, EMULSION INTRAVENOUS at 01:18

## 2021-01-01 RX ADMIN — AMPICILLIN SODIUM AND SULBACTAM SODIUM 3 G: 2; 1 INJECTION, POWDER, FOR SOLUTION INTRAMUSCULAR; INTRAVENOUS at 15:10

## 2021-01-01 RX ADMIN — LORAZEPAM 4 MG: 2 INJECTION INTRAMUSCULAR at 00:32

## 2021-01-01 RX ADMIN — DEXTROSE MONOHYDRATE: 50 INJECTION, SOLUTION INTRAVENOUS at 13:16

## 2021-01-01 RX ADMIN — EPINEPHRINE 14 MCG/MIN: 1 INJECTION INTRAMUSCULAR; INTRAVENOUS; SUBCUTANEOUS at 18:26

## 2021-01-01 RX ADMIN — POTASSIUM CHLORIDE 20 MEQ: 29.8 INJECTION, SOLUTION INTRAVENOUS at 10:33

## 2021-01-01 RX ADMIN — FAMOTIDINE 20 MG: 10 INJECTION INTRAVENOUS at 21:49

## 2021-01-01 RX ADMIN — DEXTROSE MONOHYDRATE 12.5 G: 500 INJECTION PARENTERAL at 08:03

## 2021-01-01 RX ADMIN — DEXTROSE MONOHYDRATE 12.5 G: 500 INJECTION PARENTERAL at 09:37

## 2021-01-01 RX ADMIN — LIDOCAINE HYDROCHLORIDE,EPINEPHRINE BITARTRATE 20 ML: 10; .01 INJECTION, SOLUTION INFILTRATION; PERINEURAL at 01:00

## 2021-01-01 RX ADMIN — Medication 15 ML: at 09:31

## 2021-01-01 RX ADMIN — LORAZEPAM 2 MG: 2 INJECTION INTRAMUSCULAR; INTRAVENOUS at 13:48

## 2021-01-01 RX ADMIN — ETOMIDATE 20 MG: 2 INJECTION INTRAVENOUS at 18:28

## 2021-01-01 RX ADMIN — EPINEPHRINE 9 MCG/MIN: 1 INJECTION INTRAMUSCULAR; INTRAVENOUS; SUBCUTANEOUS at 20:26

## 2021-01-01 RX ADMIN — EPINEPHRINE 1 MCG/MIN: 1 INJECTION INTRAMUSCULAR; INTRAVENOUS; SUBCUTANEOUS at 09:57

## 2021-01-01 RX ADMIN — FAMOTIDINE 20 MG: 10 INJECTION INTRAVENOUS at 09:31

## 2021-01-01 RX ADMIN — AMPICILLIN SODIUM AND SULBACTAM SODIUM 3 G: 2; 1 INJECTION, POWDER, FOR SOLUTION INTRAMUSCULAR; INTRAVENOUS at 10:00

## 2021-01-01 RX ADMIN — AMPICILLIN SODIUM AND SULBACTAM SODIUM 3 G: 2; 1 INJECTION, POWDER, FOR SOLUTION INTRAMUSCULAR; INTRAVENOUS at 02:57

## 2021-01-01 RX ADMIN — AMPICILLIN SODIUM AND SULBACTAM SODIUM 3 G: 2; 1 INJECTION, POWDER, FOR SOLUTION INTRAMUSCULAR; INTRAVENOUS at 17:20

## 2021-01-01 RX ADMIN — FAMOTIDINE 20 MG: 10 INJECTION INTRAVENOUS at 02:36

## 2021-01-01 RX ADMIN — DEXTROSE MONOHYDRATE 12.5 G: 500 INJECTION PARENTERAL at 07:56

## 2021-01-01 RX ADMIN — DEXTROSE MONOHYDRATE 12.5 G: 500 INJECTION PARENTERAL at 13:15

## 2021-01-01 RX ADMIN — Medication 15 ML: at 21:40

## 2021-01-01 RX ADMIN — HEPARIN SODIUM 5000 UNITS: 5000 INJECTION INTRAVENOUS; SUBCUTANEOUS at 06:03

## 2021-01-01 RX ADMIN — Medication 15 ML: at 09:00

## 2021-01-01 ASSESSMENT — PULMONARY FUNCTION TESTS
PIF_VALUE: 23
PIF_VALUE: 29
PIF_VALUE: 19
PIF_VALUE: 34
PIF_VALUE: 22
PIF_VALUE: 36
PIF_VALUE: 19
PIF_VALUE: 22
PIF_VALUE: 15
PIF_VALUE: 21
PIF_VALUE: 26
PIF_VALUE: 21
PIF_VALUE: 20
PIF_VALUE: 25
PIF_VALUE: 21
PIF_VALUE: 35
PIF_VALUE: 20
PIF_VALUE: 18
PIF_VALUE: 23
PIF_VALUE: 19
PIF_VALUE: 26
PIF_VALUE: 25
PIF_VALUE: 26
PIF_VALUE: 29
PIF_VALUE: 20
PIF_VALUE: 31
PIF_VALUE: 22
PIF_VALUE: 23
PIF_VALUE: 19
PIF_VALUE: 21
PIF_VALUE: 21
PIF_VALUE: 20
PIF_VALUE: 34
PIF_VALUE: 19
PIF_VALUE: 19
PIF_VALUE: 24
PIF_VALUE: 22
PIF_VALUE: 21
PIF_VALUE: 28
PIF_VALUE: 25
PIF_VALUE: 30
PIF_VALUE: 22
PIF_VALUE: 21
PIF_VALUE: 23
PIF_VALUE: 22
PIF_VALUE: 20
PIF_VALUE: 23
PIF_VALUE: 34
PIF_VALUE: 20
PIF_VALUE: 22
PIF_VALUE: 20
PIF_VALUE: 26
PIF_VALUE: 21
PIF_VALUE: 31
PIF_VALUE: 21
PIF_VALUE: 19
PIF_VALUE: 30
PIF_VALUE: 26
PIF_VALUE: 24
PIF_VALUE: 26
PIF_VALUE: 23
PIF_VALUE: 17
PIF_VALUE: 19
PIF_VALUE: 20
PIF_VALUE: 23
PIF_VALUE: 28
PIF_VALUE: 22
PIF_VALUE: 22
PIF_VALUE: 21
PIF_VALUE: 24
PIF_VALUE: 24
PIF_VALUE: 27
PIF_VALUE: 20
PIF_VALUE: 29
PIF_VALUE: 16
PIF_VALUE: 26
PIF_VALUE: 23
PIF_VALUE: 28
PIF_VALUE: 27
PIF_VALUE: 21
PIF_VALUE: 23
PIF_VALUE: 27

## 2021-01-01 ASSESSMENT — PAIN SCALES - GENERAL
PAINLEVEL_OUTOF10: 0
PAINLEVEL_OUTOF10: 0
PAINLEVEL_OUTOF10: 6
PAINLEVEL_OUTOF10: 1
PAINLEVEL_OUTOF10: 0
PAINLEVEL_OUTOF10: 1
PAINLEVEL_OUTOF10: 0
PAINLEVEL_OUTOF10: 0

## 2021-01-13 PROBLEM — I46.9 CARDIAC ARREST (HCC): Status: ACTIVE | Noted: 2021-01-01

## 2021-01-14 NOTE — CONSULTS
General Surgery   Resident Consult Note    Reason for Consult: Right pneumothorax     History of Present Illness:   Keysha Feng is a 72 y.o. male with Hx of T1DM, HTN, asthma, and parkinson's was found slumped and unresponsive in his chair at the nursing home where he lives. CPR was started there and the patient was brought to [facility] ED and ROSC was achieved after a total of 35 minutes of downtime. The patient was found to have aspirated food likely causing his arrest. The patient was transferred to the ICU and was subsequently found to have a right sided pneumothorax, likely from chest compression as he was also found to have multiple rib fractures. Cardiothoracic surgery was consulted for chest tube placement. Past Medical History:        Diagnosis Date    Asthma     Diabetes mellitus ([facility] [address]     Hypertension     Parkinson disease ([facility] [address]        Past Surgical History:        Procedure Laterality Date    UPPER GASTROINTESTINAL ENDOSCOPY N/A 5/15/2020    EGD CONTROL HEMORRHAGE performed by Shantanu Li MD at [facility] 5/15/2020    EGD BIOPSY performed by Shantanu Li MD at [facility] ENDOSCOPY       Allergies:  Benzonatate and Erythromycin    Medications:   Home Meds  No current facility-administered medications on file prior to encounter.       Current Outpatient Medications on File Prior to Encounter   Medication Sig Dispense Refill    insulin glargine (LANTUS;BASAGLAR) 100 UNIT/ML injection pen Inject 30 Units into the skin nightly 5 pen 3    aspirin 81 MG chewable tablet Take 1 tablet by mouth daily 30 tablet 3    carvedilol (COREG) 12.5 MG tablet Take 1 tablet by mouth 2 times daily (with meals) 60 tablet 3    spironolactone (ALDACTONE) 25 MG tablet Take 0.5 tablets by mouth daily 30 tablet 3    insulin aspart (NOVOLOG FLEXPEN) 100 UNIT/ML injection pen Inject 10 Units into the skin 3 times daily (before meals) 5 pen 3  vitamin D3 (CHOLECALCIFEROL) 10 MCG (400 UNIT) TABS tablet Take 400 Units by mouth daily      Cinnamon 500 MG CAPS Take 1 capsule by mouth daily      fluticasone (FLONASE) 50 MCG/ACT nasal spray 2 sprays by Nasal route daily      carbidopa-levodopa (SINEMET)  MG per tablet Take 2 tablets by mouth 3 times daily      losartan (COZAAR) 100 MG tablet Take 100 mg by mouth daily      pantoprazole (PROTONIX) 40 MG tablet Take 40 mg by mouth 2 times daily      PARoxetine (PAXIL) 40 MG tablet Take 40 mg by mouth every morning      primidone (MYSOLINE) 50 MG tablet Take 50 mg by mouth 2 times daily      Insulin Pen Needle ([employer] PEN NEEDLES 29G) 29G X 12MM MISC 1 each by Does not apply route daily 100 each 3    rOPINIRole (REQUIP) 3 MG tablet Take 3 mg by mouth 3 times daily      atorvastatin (LIPITOR) 10 MG tablet Take 10 mg by mouth nightly       albuterol (PROVENTIL HFA;VENTOLIN HFA) 108 (90 BASE) MCG/ACT inhaler Inhale 2 puffs into the lungs every 6 hours as needed.            Current Meds      promethazine (PHENERGAN) tablet 12.5 mg, Q6H PRN    Or      ondansetron (ZOFRAN) injection 4 mg, Q6H PRN      chlorhexidine (PERIDEX) 0.12 % solution 15 mL, BID      famotidine (PEPCID) injection 20 mg, BID      insulin regular (HUMULIN R;NOVOLIN R) 100 Units in sodium chloride 0.9 % 100 mL infusion, Continuous      glucose (GLUTOSE) 40 % oral gel 15 g, PRN      dextrose 50 % IV solution, PRN      glucagon (rDNA) injection 1 mg, PRN      dextrose 5 % solution, PRN      fentaNYL (SUBLIMAZE) injection 25 mcg, Q1H PRN      midazolam PF (VERSED) injection 1 mg, Q30 Min PRN      0.9 % sodium chloride bolus, Once      0.9 % sodium chloride infusion, Continuous      potassium chloride 20 mEq/50 mL IVPB (Central Line), PRN      heparin (porcine) injection 5,000 Units, 3 times per day      EPINEPHrine (EPINEPHrine HCL) 5 mg in sodium chloride 0.9 % 250 mL infusion, Continuous   fentaNYL (SUBLIMAZE) 2,000 mcg in sodium chloride 0.9 % 200 mL, Continuous      vasopressin 20 Units in sodium chloride 0.9 % 100 mL infusion, Continuous      LORazepam (ATIVAN) 2 MG/ML injection,       propofol injection, Titrated        Family History:   History reviewed. No pertinent family history. Social History:   TOBACCO:   reports that he has never smoked. He has never used smokeless tobacco.  ETOH:   reports no history of alcohol use. DRUGS:   reports no history of drug use. Review of Systems:   Unable to obtain secondary to patient being intubated and sedated.     Physical exam:    Vitals:    01/14/21 0615 01/14/21 0630 01/14/21 0645 01/14/21 0700   BP: (!) 112/54 (!) 107/56 (!) 91/55 (!) 101/53   Pulse: 74 74 74 72   Resp: 16 15 17 11   Temp:       TempSrc:       SpO2: 94% (!) 89% (!) 87% (!) 83%   Weight:       Height:           General appearance: intubated and sedated, likely seizure activity  Eyes: pupils sluggish on exam, no scleral icterus  Neck: trachea midline, no JVD, RIJ CVC  Chest/Lungs: symmetrical chest rise, mechanical breathe sounds decreased at right apex  Cardiovascular: RRR  Abdomen: soft, non-distended, no grimace, no grimace with palpation  Skin: warm and dry, no rashes  Extremities: no edema, no cyanosis  Neuro: intubated and sedated, likely seizure activity, hypothermia protocol initiated after CT placement     Labs:    CBC:   Recent Labs     01/13/21 1839 01/14/21  0405   WBC 19.7* 14.1*   HGB 8.7* 8.5*   HCT 28.3* 27.2*   MCV 76.9* 75.6*    285     BMP:   Recent Labs     01/13/21 1839 01/14/21  0405   * 132*   K 4.1 3.2*   CL 99 98*   CO2 16* 16*   PHOS  --  3.6   BUN 17 23*   CREATININE 1.3 1.2     PT/INR:   Recent Labs     01/13/21 1838 01/14/21  0405   PROTIME 13.2 12.9   INR 1.14 1.11     APTT:   Recent Labs     01/14/21  0405   APTT 28.8     Liver Profile:   Lab Results   Component Value Date    AST 26 10/06/2020    ALT 15 10/06/2020 BILIDIR 0.3 08/21/2020    BILITOT 1.4 10/06/2020    ALKPHOS 113 10/06/2020     Lab Results   Component Value Date    CHOL 96 07/31/2020    HDL 40 07/31/2020    TRIG 69 07/31/2020     UA:   Lab Results   Component Value Date    COLORU Yellow 10/02/2020    PHUR 7.0 10/02/2020    WBCUA  09/23/2020    RBCUA  09/23/2020    MUCUS 1+ 08/01/2014    TRICHOMONAS None Seen 09/23/2020    BACTERIA Rare 09/23/2020    CLARITYU Clear 10/02/2020    SPECGRAV 1.015 10/02/2020    LEUKOCYTESUR Negative 10/02/2020    UROBILINOGEN 0.2 10/02/2020    BILIRUBINUR Negative 10/02/2020    BLOODU Negative 10/02/2020    GLUCOSEU 500 10/02/2020    AMORPHOUS 4+ 04/29/2020       Imaging:   XR CHEST PORTABLE   Final Result     Right apical pneumothorax is slightly larger, with the about 30-40%    loss in volume. XR ABDOMEN (KUB) (SINGLE AP VIEW)   Final Result     Tip of OG tube is in the body the stomach. XR CHEST PORTABLE   Final Result   Status post chest tube placement. The pneumothorax is substantially    improved. CT CHEST WO CONTRAST   Final Result      1. Large right pneumothorax with atelectatic right upper lobe and left lower lobe with mucus plugging or debris in the bronchus intermedius. Superimposed pneumonia in the right lower lobe is suspected. 2. Multiple acute right-sided rib fractures with comminuted angulated fracture of the right third rib. 3. Multiple subacute fractures of the right ribs and chronic fractures of the left ribs. 4. Suspected subacute fracture of the mid sternum with some degree of callus formation. Clinical correlation is recommended. 5. Worsening compression fracture at L1 which may represent an acute on chronic compression fracture. 6. Right-sided chest wall gas presumably related to the rib fracture and pneumothorax. CT Head WO Contrast   Final Result      1. Stable exam with no acute intracranial abnormality. 2. Retained fluid in the nasopharynx presumably related to altered mental status. XR CHEST PORTABLE   Final Result   1. Small right pneumothorax with right basilar atelectasis. 2. Right chest wall soft tissue gas. 3. Life-support devices as described. XR CHEST PORTABLE   Final Result   1. Soft tissue gas along the right ribs and right chest wall and into the right axilla suspicious for a lung injury although a measurable pneumothorax is not identified. 2. Multiple right-sided rib fractures. Consider further evaluation with chest CT. 3. Endotracheal tube in appropriate position. Assessment/Plan: This is a 72 y.o. male with Hx of T1DM, HTN, asthma, and parkinson's was found slumped and unresponsive in his chair at the nursing home where he lives. The patient underwent 35 minutes of CPR and was subsequently found to have a large right sided pneumothorax on CT. Cardiothoracic surgery will place [address] [facility] [place] catheter chest tube on the right side.       - Right sided chest tube placed with good resolution of pneumothorax with small residual apical 5% pneumothorax present  - Continue chest tube to -20 suction  - Further medical management per primary team  - Thoracic surgery will follow    Rui Cook DO  01/14/21  7:18 AM

## 2021-01-14 NOTE — PROCEDURES
Donna Zuniga is a 72 y.o. male patient. 1. Choking due to food in larynx, initial encounter    2. Cardiac arrest ([facility] [address]    3. Acute respiratory failure with hypoxia and hypercarbia (HCC)      Past Medical History:   Diagnosis Date    Asthma     Diabetes mellitus ([facility] [address]     Hypertension     Parkinson disease ([facility] [address]      Blood pressure 125/70, pulse 93, temperature 96.4 °F (35.8 °C), temperature source Core, resp. rate 20, height 5' 8\" (1.727 m), weight 175 lb (79.4 kg), SpO2 98 %. Chest Tube Insertion    Date/Time: 1/14/2021 1:47 AM  Performed by: Mauri Ashby DO  Authorized by: Mauri Ashby DO   Consent: Verbal consent obtained. Risks and benefits: risks, benefits and alternatives were discussed  Consent given by: power of  (concent was obtained from daughter Tyler Renteria. Phone number in chart.)  Procedure consent: procedure consent matches procedure scheduled  Relevant documents: relevant documents present and verified  Test results: test results available and properly labeled  Imaging studies: imaging studies available  Required items: required blood products, implants, devices, and special equipment available  Patient identity confirmed: arm band  Time out: Immediately prior to procedure a \"time out\" was called to verify the correct patient, procedure, equipment, support staff and site/side marked as required. Indications: pneumothorax    Sedation:  Patient sedated: yes (Patient intubated and sedated. See MAR for details.)      Anesthesia:  Local Anesthetic: lidocaine 1% with epinephrine  Anesthetic total: 10 mL  Preparation: skin prepped with Betadine  Placement location: right lateral  Scalpel size: 15  Tube size ([place]): 14.   Tube connected to: suction  Suture material: 0 silk  Dressing: 4x4 sterile gauze  Post-insertion x-ray findings: tube in good position  Patient tolerance: patient tolerated the procedure well with no immediate complications  Comments: EBL: <5ml [facility],   1/14/2021

## 2021-01-14 NOTE — ED PROVIDER NOTES
[address] ENCOUNTER          ATTENDING PHYSICIAN NOTE       Date of evaluation: 1/13/2021    Chief Complaint     Cardiac Arrest      History of Present Illness     Rangel Ruby is a 72 y.o. male with history of poorly controlled diabetes presenting to the emergency department today in cardiac arrest.  Patient was found by providers at his care facility unresponsive and appearing to have choked on food. He did not have a pulse and CPR was started. Initial rhythm was PEA upon  arrival.  He received 2 rounds of epinephrine and approximately 25 minutes of CPR prior to arrival without ROSC. An LMA was placed and there is noted to be food in the oropharynx with ongoing emesis. Review of Systems     Unable to obtain given cardiac arrest unresponsiveness. Physical Exam     INITIAL VITALS: BP: (!) 167/79, Temp: 95.2 °F (35.1 °C), Pulse: 119, Resp: 21, SpO2: (!) 39 %     Nursing note and vitals reviewed. General:  Adult male, unresponsive, CPR in progress HENT: Normocephalic and atraumatic. External ears normal. Nose appears normal externally. Eyes: Conjunctivae normal. No scleral icterus. Pupils mid and fixed  Neck: Neck supple. No tracheal deviation present. CV: CPR in progress  Chest: Clearance Brick device in place, bilateral breath sounds while bagging with LMA in place. Abdominal: Soft. No distension. No masses  Musculoskeletal: No edema. No gross deformities. Neurological: Unresponsive, CPR in progress  Skin: Cool, dry. No rash. No diaphoresis or erythema. Procedures   Arterial Line    Date/Time: 1/13/2021 8:00 PM  Performed by: Vasquez Britt MD  Authorized by:  Vasquez Britt MD     Consent:     Consent obtained:  Emergent situation  Indications:     Indications: hemodynamic monitoring and multiple ABGs    Pre-procedure details:     Skin preparation:  2% Chlorhexidine    Preparation: Patient was prepped and draped in sterile fashion    Procedure details: Location:  R radial    Needle gauge:  20 G    Placement technique:  Seldinger    Number of attempts:  1    Transducer: waveform confirmed    Post-procedure details:     Post-procedure:  Secured with tape, sterile dressing applied and sutured    CMS:  Unchanged    Patient tolerance of procedure: Tolerated well, no immediate complications    Intubation    Date/Time: 1/13/2021 9:54 PM  Performed by: Wild Gurrola MD  Authorized by: Wild Gurrola MD     Consent:     Consent obtained:  Emergent situation  Pre-procedure details:     Patient status:  Unresponsive    Paralytics:  Rocuronium  Procedure details:     Preoxygenation:  SARAH/LMA    CPR in progress: no      Intubation method:  Oral    Oral intubation technique:  Video-assisted    Laryngoscope size: Glide 4. Tube size (mm):  8.0    Tube type:  Cuffed    Number of attempts:  1    Ventilation between attempts: no      Cricoid pressure: no      Tube visualized through cords: yes    Placement assessment:     ETT to lip:  23    Tube secured with: Twill tape. Breath sounds:  Equal and absent over the epigastrium    Placement verification: chest rise, condensation, CXR verification, direct visualization, equal breath sounds and ETCO2 detector      CXR findings:  ETT in right main stem    Tube repositioned: yes (Pulled back 2cm, repeat CXR with proper positioning)    Post-procedure details:     Patient tolerance of procedure: Tolerated well, no immediate complications  Comments:      Intubation attempted with glide scope following ROSC. Large piece of food noted in the hypopharynx which was removed with Abby forceps. Following this, a grade 2A CL view was obtained.         MEDICAL DECISION MAKING MDM: Stephany Duncan is a 72 y.o. male with history of poorly controlled DM presenting in cardiac arrest.  On arrival, patient is undergoing CPR with [place] device in place. He has green thick vomitus emanating around to the LMA in place. CPR was continued and at the time of pulse check and pad placement he was noted to have peripheral pulses. Pupils were mid and fixed and he did not have any spontaneous neurologic movements or response to painful stimuli. He did require push doses of epinephrine to maintain blood pressure and was started on an epinephrine infusion. His LMA airway was exchanged for an ET tube as per the procedure above. There was a large piece of food in the hypopharynx that was removed with Abby forceps and intubation following this was uncomplicated. The tube was at the origin of the right mainstem bronchus and was retracted 2 cm with appropriate positioning on repeat x-ray. Suspect his cardiac arrest was secondary to hypoxia and choking. He did have a large amount of subcutaneous air on x-ray but no pneumothorax and this was improved on repeat chest x-ray after central line placement. Arterial and central lines were placed sterilely. Patient initially required a relatively high dose of epinephrine infusion and the addition of vasopressin due to exceptionally low diastolic pressures. This gradually improved as his acidosis improved as well. He did have an elevated lactate in the nines that was improving on repeat. He was not febrile and hyperthermia protocol was not initiated at this point. He will undergo CT head to confirm no intracranial hemorrhage prior to this and be transported to the ICU. CT chest will also be added to assess for rib injuries and possible small pneumothorax. His epinephrine was weaned significantly and blood pressures remained with MAP goals above 65. Patient discussed with the ICU attending and residents as well as the hospitalist.  He will be admitted in critical condition for further care and management. Critical Care:  Due to the immediate potential for life-threatening deterioration due to cardiac arrest, severe metabolic derangements, hypoxia and hypercarbia, likely anoxic brain injury, I spent 68 minutes providing critical care. Thistime excludes time spent performing procedures but includes time spent on direct patient care, history retrieval, review of the chart, and discussions with patient, family, and consultant(s). Clinical Impression     1.  Choking due to food in larynx, initial encounter 2. Cardiac arrest ([facility]    3. Acute respiratory failure with hypoxia and hypercarbia [facility])        Disposition     DISPOSITION Admitted 01/13/2021 07:08:05 PM        Sweta Mirza MD  10:22 PM                     Past Medical, Surgical, Family, and Social History     He has a past medical history of Asthma, Diabetes mellitus ([facility] [address] Hypertension, and [facility] He has a past surgical history that includes Upper gastrointestinal endoscopy (N/A, 5/15/2020) and Upper gastrointestinal endoscopy (N/A, 5/15/2020). His family history is not on file. He reports that he has never smoked. He has never used smokeless tobacco. He reports that he does not drink alcohol or use drugs. Medications     Previous Medications    ALBUTEROL (PROVENTIL HFA;VENTOLIN HFA) 108 (90 BASE) MCG/ACT INHALER    Inhale 2 puffs into the lungs every 6 hours as needed.       ASPIRIN 81 MG CHEWABLE TABLET    Take 1 tablet by mouth daily    ATORVASTATIN (LIPITOR) 10 MG TABLET    Take 10 mg by mouth nightly     CARBIDOPA-LEVODOPA (SINEMET)  MG PER TABLET    Take 2 tablets by mouth 3 times daily    CARVEDILOL (COREG) 12.5 MG TABLET    Take 1 tablet by mouth 2 times daily (with meals)    CINNAMON 500 MG CAPS    Take 1 capsule by mouth daily    FLUTICASONE (FLONASE) 50 MCG/ACT NASAL SPRAY    2 sprays by Nasal route daily    INSULIN ASPART (NOVOLOG FLEXPEN) 100 UNIT/ML INJECTION PEN    Inject 10 Units into the skin 3 times daily (before meals)    INSULIN GLARGINE (LANTUS;BASAGLAR) 100 UNIT/ML INJECTION PEN    Inject 30 Units into the skin nightly    INSULIN PEN NEEDLE ([employer] PEN NEEDLES 29G) 29G X 12MM MISC    1 each by Does not apply route daily    LOSARTAN (COZAAR) 100 MG TABLET    Take 100 mg by mouth daily    PANTOPRAZOLE (PROTONIX) 40 MG TABLET    Take 40 mg by mouth 2 times daily    PAROXETINE (PAXIL) 40 MG TABLET    Take 40 mg by mouth every morning    PRIMIDONE (MYSOLINE) 50 MG TABLET    Take 50 mg by mouth 2 times daily ROPINIROLE (REQUIP) 3 MG TABLET    Take 3 mg by mouth 3 times daily    SPIRONOLACTONE (ALDACTONE) 25 MG TABLET    Take 0.5 tablets by mouth daily    VITAMIN D3 (CHOLECALCIFEROL) 10 MCG (400 UNIT) TABS TABLET    Take 400 Units by mouth daily       Allergies     He is allergic to benzonatate and erythromycin. ED Course     Nursing Notes, Past Medical Hx, Past Surgical Hx, Social Hx,Allergies, and Family Hx were reviewed. Patient was given the following medications:  Orders Placed This Encounter   Medications    etomidate (AMIDATE) 2 MG/ML injection     Alice Nicholas: cabinet override    rocuronium (ZEMURON) 50 MG/5ML injection     Alice Nicholas: cabinet override    rocuronium (ZEMURON) 50 MG/5ML injection     Jamila Desir: cabinet override    fentaNYL (SUBLIMAZE) 2,000 mcg in dextrose 5 % 200 mL    EPINEPHrine 1 MG/10ML injection    DISCONTD: EPINEPHrine (EPINEPHrine HCL) 5 mg in dextrose 5 % 250 mL infusion    etomidate (AMIDATE) injection 20 mg    rocuronium (ZEMURON) injection 100 mg    EPINEPHrine (EPINEPHrine HCL) 5 mg in dextrose 5 % 250 mL infusion    vasopressin 20 Units in dextrose 5 % 100 mL infusion    EPINEPHrine 100 mcg/0.1 mL injection    EPINEPHrine 100 mcg/0.1 mL injection       Diagnostic Results     EKG   Wide-complex tachycardia, left bundle branch block morphology. No inappropriate concordance or discordance per Sgarbossa criteria. RECENT VITALS:  BP: (!) 149/68,Temp: 95.2 °F (35.1 °C), Pulse: 93, Resp: 16, SpO2: 100 %     RADIOLOGY:  XR CHEST PORTABLE   Final Result   1. Small right pneumothorax with right basilar atelectasis. 2. Right chest wall soft tissue gas. 3. Life-support devices as described. XR CHEST PORTABLE   Final Result   1. Soft tissue gas along the right ribs and right chest wall and into the right axilla suspicious for a lung injury although a measurable pneumothorax is not identified. pO2, Sonido 48.5 (H) 25.0 - 40.0 mmHg    HCO3, Venous 18.3 (L) 24.0 - 28.0 mmol/L    Base Excess, Sonido -17.1 (L) -2.0 - 3.0 mmol/L    O2 Sat, Sonido 48 Not established %    Carboxyhemoglobin 0.7 0.0 - 1.5 %    MetHgb, Sonido 0.6 0.0 - 1.5 %    TC02 (Calc), Sonido 21 mmol/L   Lactate, Sepsis   Result Value Ref Range    Lactic Acid, Sepsis 9.5 (HH) 0.4 - 1.9 mmol/L   PT - INR   Result Value Ref Range    Protime 13.2 10.0 - 13.2 sec    INR 1.14 0.86 - 1.14   Blood gas, arterial (Lab)   Result Value Ref Range    pH, Arterial 7.222 (L) 7.350 - 7.450    pCO2, Arterial 41.5 35.0 - 45.0 mmHg    pO2, Arterial 70.9 (L) 75.0 - 108.0 mmHg    HCO3, Arterial 17 (L) 21 - 29 mmol/L    Base Excess, Arterial -10.0 (L) -3.0 - 3.0 mmol/L    Hemoglobin, Art, Extended 8.90 g/dL    O2 Sat, Arterial 90 (L) 93 - 100 %    Carboxyhgb, Arterial 0.8 0.0 - 1.5 %    Methemoglobin, Arterial 0.5 0.0 - 1.4 %    TCO2, Arterial 18 mmol/L   Lactic Acid, Plasma   Result Value Ref Range    Lactic Acid 8.3 (HH) 0.4 - 2.0 mmol/L       CONSULTS:  IP CONSULT TO CRITICAL CARE  IP CONSULT TO HOSPITALIST  IP CONSULT TO PALLIATIVE CARE    PATIENT REFERRED TO:  No follow-up provider specified.     DISCHARGE MEDICATIONS:  New Prescriptions    No medications on file         Bonny Mercado MD  01/13/21 [id number]

## 2021-01-14 NOTE — PROGRESS NOTES
Physician Progress Note      PATIENT:               Russ Zambrano  [facility] #:                  [id number]  :                       1950  ADMIT DATE:       2021 6:19 PM  [address] [place] DATE:  RESPONDING  PROVIDER #:        Irma Antunez MD          QUERY TEXT:    Dear Attending Provider,    Pt admitted with PEA arrest after choking episode. Noted hypoxia requiring   intubation. If possible, please document in the progress notes and discharge   summary if you are evaluating and/or treating any of the following: The medical record reflects the following:  Risk Factors: 73 yo resides NH, choking event  Clinical Indicators: Admitted after choking episode at NH, PEA arrest. and   hypoxia. RR 19-38, 02 saturations 39-70's to 80% required intubation. VBG: PH 6.831, pC02 109  ABG: PH 7.222, pC02 41.5, p02 70.9  Treatment: Intubation, VBG, ABG, Admission to ICU with ongoing monitoring  Options provided:  -- Acute respiratory failure with hypoxia secondary to asphyxiation from   chocking event, POA  -- Other - I will add my own diagnosis  -- Disagree - Not applicable / Not valid  -- Disagree - Clinically unable to determine / Unknown  -- Refer to Clinical Documentation Reviewer    PROVIDER RESPONSE TEXT:    This patient is in acute respiratory failure with hypoxia secondary to   asphyxiation from choking event, POA .     Query created by: Ashlee Yanez on 2021 1:23 PM      Electronically signed by:  Irma Antunez MD 2021 2:02 PM

## 2021-01-14 NOTE — H&P
Internal Medicine PGY- 1 Resident History & Physical ICU      PCP: Keaton Murguia    Date of Admission: 1/13/2021      Chief Complaint:  PEA arrest    HPI:     Brandon Marcum is a 72 y.o. male with PMH of T1DM, HTN, asthma, and Parkinson's disease who presents with PEA arrest. Pt had unwitnessed choking episode, EMS arrived and performed CPR for 25 minutes. Rhythm was PEA throughout. Pt arrived in ED in PEA, with ROSC about 10 minutes after arrival (total down time around 35 minutes after EMS arrival). Pt was suctioned and a large hot dog was removed. ED course: After ROSC, pt was intubated. Neuro exam revealed mid fixed pupils with no response to painful stimuli before sedation. Of note, pt had 3 episodes of seizure like activity resembling myoclonic jerks in ED. Pt now on vaso, epi, and fentanyl. A-line and central line placed. Lab workup showed Glucose 291, AGAP 20, CO2 16, lactate 9.5, troponin 0.04, WBC 19.7. VBG showed pH 6.83, pCO2 109, pO2 48.5. CXR showed pneumothorax vs subcutaneous air, confirmed as pneumothorax on CT Chest. CT head without acute bleed. Apparently, significant other Zaina is POA. However, family was contacted in ED and say not to contact Zaina, and that Adult Protective Services is involved with her. Attempted to call Zaina as she is POA, but no response. Past Medical History:          Diagnosis Date    Asthma     Diabetes mellitus ([facility] [address]     Hypertension     Parkinson disease ([facility] [address]        Past Surgical History:          Procedure Laterality Date    UPPER GASTROINTESTINAL ENDOSCOPY N/A 5/15/2020    EGD CONTROL HEMORRHAGE performed by Miguel Jenkins MD at [address] [facility] 5/15/2020    EGD BIOPSY performed by Miguel Jenkins MD at [address] ENDOSCOPY       Medications Prior to Admission:      Prior to Admission medications    Medication Sig Start Date End Date Taking?  Authorizing Provider TOBACCO:   reports that he has never smoked. He has never used smokeless tobacco.  ETOH:  reports no history of alcohol use. Family History:     History reviewed. No pertinent family history. PHYSICAL EXAM PERFORMED:    BP (!) 166/69   Pulse 92   Temp 95.2 °F (35.1 °C) (Rectal)   Resp 16   Ht 5' 8\" (1.727 m)   Wt 175 lb (79.4 kg)   SpO2 100%   BMI 26.61 kg/m²     Physical Exam  Physical Exam:  General:  Chronically ill appearing, intubated and sedated  HENT: Normocephalic and atraumatic. External ears normal. Nose appears normal externally. Eyes: Conjunctivae normal. No scleral icterus. Pupils mid and fixed  Neck: Neck supple. No tracheal deviation present. CV: RRR, no MRG  Chest: Inbuated, mechanical breath sounds  Abdominal: Soft. No distension. No masses  Musculoskeletal: No edema. No gross deformities. Neurological: Sedated. No response to painful stimuli. Skin: Cool, dry. No rash. No diaphoresis or erythema. Labs:     Recent Labs     01/13/21  1839   WBC 19.7*   HGB 8.7*   HCT 28.3*        Recent Labs     01/13/21  1839   *   K 4.1   CL 99   CO2 16*   BUN 17   CREATININE 1.3   CALCIUM 8.3     No results for input(s): AST, ALT, BILIDIR, BILITOT, ALKPHOS in the last 72 hours. Recent Labs     01/13/21  1838   INR 1.14     Recent Labs     01/13/21  1839   TROPONINI 0.04*       Urinalysis:      Lab Results   Component Value Date    NITRU Negative 10/02/2020    WBCUA  09/23/2020    BACTERIA Rare 09/23/2020    RBCUA  09/23/2020    BLOODU Negative 10/02/2020    SPECGRAV 1.015 10/02/2020    GLUCOSEU 500 10/02/2020       Radiology:     CXR: I have reviewed the CXR with the following interpretation:     XR CHEST PORTABLE   Final Result   1. Soft tissue gas along the right ribs and right chest wall and into the right axilla suspicious for a lung injury although a measurable pneumothorax is not identified. 2. Multiple right-sided rib fractures. Consider further evaluation with chest CT. 3. Endotracheal tube in appropriate position. CT Head WO Contrast    (Results Pending)   CT CHEST WO CONTRAST    (Results Pending)   XR CHEST PORTABLE    (Results Pending)       ASSESSMENT & PLAN:    Nelly Nance is a 72 y.o. male with PMH of T1DM, HTN, asthma, and Parkinson's disease who presents with PEA arrest.    PEA arrest 2/2 hypoxia  Hypoxia from choking event. PEA for 35 min before ROSC. CXR showed subQ air. Pt had 3 episodes of seizure like activity in ED. -Hypothermia protocol if seizure like activity can be suppressed. Will need to obtain machines from [address] [place] [address]. -NS bolus and infusion 100 ml/hr  -vaso and epi  -fentanyl  -intubated: Vent Mode: AC/VC Rate Set: 16 bmp/Vt Ordered: 550 mL/ /FiO2 : 100 %   -lactate q2h, ABG q6h, BMP q6h, Ca q6h, Mg q6h, Phos q6h  -CT Chest WO and CT head pending  -central line and art line placed  -Palliative care consulted. Apparently, significant other Zaina is POA. However, family was contacted in ED and say not to contact Zaina, and that Adult Protective Services is involved with her. Attempted to call Zaina as she is POA, but no response.  -q4h neuro checks    Pneumothorax 2/2 trauma from CPR  Pneumothorax on CT Chest 11/13. Rib fractures and possible superimposed pneumonia on CT Chest as well. -General surgery paged for chest tube placement. Seizure-like activity  Resembling myoclonic jerks. At 3 episodes in ED.  -sedate heavily with propofol or versed  -Hypothermia protocol if seizure like activity can be suppressed with sedation. Hyperglycemia in setting of T1DM  Multiple prior admissions for DKA.  Glucose 291 on arrival  -insulin gtt  -hypoglycemia protocol    Anion gap lactic acidosis 2/2 PEA arrest  Initial lactate 8.3, AGAP 20, pH 6.83  -management of primary problems as above    Chronic problems:  HTN  -holding home meds    Asthma  -holding home meds Parkinson's disease  -holding home meds      GI Prophylaxis: famotidine 20 mg BID  Sedation: fentanyl  DVT Prophylaxis: subQ heparin  Fluids: NS at 100  IV access: CVC   Intubated: yes  Diet: No diet orders on file    I will discuss the patient with Dr. Luis Alberto Sheikh MD  Internal Medicine Resident, PGY- 1  Perfect serve

## 2021-01-14 NOTE — PROCEDURES
Chest Tube Insertion    Date/Time: 1/14/2021 1:30 PM  Performed by: Spike Gomez MD  Authorized by: Merlinda Saunas, MD   Consent: Verbal consent obtained. Consent given by: power of   Patient identity confirmed: arm band and hospital-assigned identification number  Time out: Immediately prior to procedure a \"time out\" was called to verify the correct patient, procedure, equipment, support staff and site/side marked as required. Indications: pneumothorax    Sedation:  Patient sedated: yes  Analgesia: see MAR for details    Anesthesia: local infiltration    Anesthesia:  Local Anesthetic: lidocaine 1% with epinephrine  Anesthetic total: 5 mL  Preparation: skin prepped with ChloraPrep  Placement location: right anterior  Scalpel size: 11  Tube size (Ladora Christie): 14Fr. Dissection instrument: Seldinger technique. Ultrasound guidance: no  Tension pneumothorax heard: no  Tube connected to: suction  Suture material: 2-0 silk  Dressing: Biopatch & Tegaderm. Post-insertion x-ray comments: Tube repositioned; see subsequent surgery note  Patient tolerance: patient tolerated the procedure well with no immediate complications  Comments: EBL: minimal      Kaylah Dodson MD  PGY1, General Surgery  01/14/21  3:23 PM  [phone]

## 2021-01-14 NOTE — ED NOTES
Dr. Marisol Lizama pulled back ET tube 2cm and now is 21 at the lip. To verification per chest xray.       Rodney Padilla RN  01/1950

## 2021-01-14 NOTE — PROGRESS NOTES
Spoke with the daughter Ana Paula Sawyer, updated her regarding her fathers condition. She stated that her father would not have wished to live like a vegetable and the only thing keeping him alive is the ventilator and the drugs. As per the daughter, she has paperwork to prove that she is POA to her father but per the palliative team, Sushil Hernandez, [address] who is currently the POA and primary decision maker, was cleared by the adult protective services. Ana Paula Sawyer will be coming later today to submit the paperwork she has regarding the POA. Bambi Ayon is currently listed as secondary decision maker. Zaina, the current POA and primary decision maker is still not reachable. Multiple attempts were made to contact her.      Leo Godfrey MD   IM, PGY-1

## 2021-01-14 NOTE — PROCEDURES
Rangel Ruby is a 72 y.o. male patient. 1. Choking due to food in larynx, initial encounter    2. Cardiac arrest ([facility] [address]    3. Acute respiratory failure with hypoxia and hypercarbia (HCC)    4. Other pneumothorax      Past Medical History:   Diagnosis Date    Asthma     Diabetes mellitus ([facility]     Hypertension     Parkinson disease ([facility]      Blood pressure (!) 127/56, pulse 79, temperature (!) 90.3 °F (32.4 °C), temperature source Bladder, resp. rate 24, height 5' 8\" (1.727 m), weight 175 lb (79.4 kg), SpO2 100 %. Insert Arterial Line    Date/Time: 1/14/2021 3:26 PM  Performed by: Kapil Gardiner MD  Authorized by: Kapil Gardiner MD   Consent: The procedure was performed in an emergent situation. Relevant documents: relevant documents present and verified  Test results: test results available and properly labeled  Site marked: the operative site was marked  Imaging studies: imaging studies available  Required items: required blood products, implants, devices, and special equipment available  Patient identity confirmed: arm band  Time out: Immediately prior to procedure a \"time out\" was called to verify the correct patient, procedure, equipment, support staff and site/side marked as required. Preparation: Patient was prepped and draped in the usual sterile fashion. Indications: multiple ABGs and hemodynamic monitoring  Location: left radial    Sedation:  Patient sedated: yes    Needle gauge: 20  Seldinger technique: Seldinger technique used  Number of attempts: 1  Post-procedure: line sutured and dressing applied  Patient tolerance: patient tolerated the procedure well with no immediate complications  Comments: EBL <5cc.            Kapil Gardiner MD   IM, PGY-1   1/14/2021    Pulm/CC    I was present and supervised procedure    Raine Tracy MD

## 2021-01-14 NOTE — PROGRESS NOTES
Pt remains intubated/sedated/non reactive. Pupils non reactive, no gag, lungs diminished, OU adequate. OG to LIS. Chest tube -20. Titrate pressors to maintain MAP 70. Insulin gtt titrated per protocol.

## 2021-01-14 NOTE — CONSULTS
[facility]  Palliative Medicine Consultation Note      Date Of Admission:1/13/2021  Date of consult: 01/14/21  Seen by [facility] in the past:  Yes    Recommendations:        Pt is known to the palliative care team. Of note, he completed a HCPOA when he was last admitted and alert and oriented x4. He named his significant other Thea Naranjo ([phone]) as his agent, and daughter Jocelyn Hoover as alternate agent. Per chart review, apparently there has been some conflict between the patient's daughters and his HCPOA Zaina and there may have been an APS referral.     Saw pt at the bedside. Called Zaina, no answer, left VM. 1309: Called Zaina, no answer, left VM.     1424: Called Emili, as she is the secondary contact. No answer, left VM. 1500: Called pt's other daughter Angelika Valentino, who provided the number for pt's APS  Federicoeden Ramon [phone]. She will send the older copy of the pt's HCPOA as well. Explained that it is difficult to get HCPOA invalidated once it is signed, but regardless we are unable to reach Zaina after trying multiple times over the past two days. Jocelyn Hoover also joined the call. Discussed her understanding of the pt's medical condition. Explained that as we cannot reach [place] would be the decision maker. Recommended that they seek emergency guardianship if they have concerns about Zaina. Discussed code status in depth, pt to remain a full code for now. 1. Goals of Care/Advanced Care planning/Code status: Full code, family would like to see how he does once hypothermia protocol is completed and then reevaluate. 2. Pain: Pt is currently unresponsive  3. SOB: Pt is intubated  4. PEA arrest: The pt presented to the hospital after a choking episode, ROSC obtained after 35 minutes. D/w pt's daughters as above.    4. Disposition: TBD pending hospital course    Reason for Consult:         [x]  Goals of Care  [x]  Code Status Discussion/Advanced Care Planning [x]  Psychosocial/Family Support  []  Symptom Management  []  Other (Specify)    Requesting Physician: Dr. 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 He had an unwitnessed choking episode per reports, EMS was called and CPR was performed for 25 minutes. Rhythm was PEA. He arrived to ED in PEA with ROSC about 10 minutes after arrival. He was suctioned and a large hot dog was removed. He was intubated in the ED. Neuro exam revealed mid fixed pupils with no response to painful stimuli before sedation. Of note, pt had 3 episodes of seizure like activity resembling myoclonic jerks in ED. He was admitted to the ICU.     Subjective:         Past Medical History:        Diagnosis Date    Asthma     Diabetes mellitus ([facility] [address]     Hypertension     Parkinson disease ([facility] [address]        Past Surgical History:        Procedure Laterality Date    UPPER GASTROINTESTINAL ENDOSCOPY N/A 5/15/2020    EGD CONTROL HEMORRHAGE performed by Brigid Perdomo MD at [address] [place] [address] N/A 5/15/2020    EGD BIOPSY performed by Brigid Perdomo MD at [facility] ENDOSCOPY       Current Medications:    Medications Prior to Admission: insulin glargine (LANTUS;BASAGLAR) 100 UNIT/ML injection pen, Inject 30 Units into the skin nightly  aspirin 81 MG chewable tablet, Take 1 tablet by mouth daily  carvedilol (COREG) 12.5 MG tablet, Take 1 tablet by mouth 2 times daily (with meals)  spironolactone (ALDACTONE) 25 MG tablet, Take 0.5 tablets by mouth daily  insulin aspart (NOVOLOG FLEXPEN) 100 UNIT/ML injection pen, Inject 10 Units into the skin 3 times daily (before meals)  vitamin D3 (CHOLECALCIFEROL) 10 MCG (400 UNIT) TABS tablet, Take 400 Units by mouth daily [] 50% Mainly sit/lie; Extensive disease; Can't do any work; Considerable assist; intake normal  Or reduced; LOC full/confusion  [] 40% Mainly in bed; Extensive disease; Mainly assist; intake normal or reduced; occasional assist; LOC full/confusion  [] 30% Bed Bound; Extensive disease; Total care; intake reduced; LOC full/confusion  [x] 20% Bed Bound; Extensive disease; Total care; intake minimal; Drowsy/coma  [] 10% Bed Bound; Extensive disease; Total care; Mouth care only; Drowsy/coma  [] 0% Death    PPS: 20     Vitals:    BP (!) 101/53   Pulse 65   Temp (!) 86.7 °F (30.4 °C) (Core)   Resp 16   Ht 5' 8\" (1.727 m)   Wt 175 lb (79.4 kg)   SpO2 100%   BMI 26.61 kg/m²     Labs:    BMP:   Recent Labs     01/13/21 1839 01/14/21  0405   * 132*   K 4.1 3.2*   CL 99 98*   CO2 16* 16*   BUN 17 23*   CREATININE 1.3 1.2   GLUCOSE 291* 535*     CBC:   Recent Labs     01/13/21 1839 01/14/21  0405   WBC 19.7* 14.1*   HGB 8.7* 8.5*   HCT 28.3* 27.2*    285       LFT's: No results for input(s): AST, ALT, ALB, BILITOT, ALKPHOS in the last 72 hours. Troponin:   Recent Labs     01/13/21 1839   TROPONINI 0.04*     BNP: No results for input(s): BNP in the last 72 hours. ABGs:   Recent Labs     01/13/21 2120   PHART 7.222*   YPJ9YUK 41.5   PO2ART 70.9*     INR:   Recent Labs     01/13/21 1838 01/14/21  0405   INR 1.14 1.11       U/A:No results for input(s): NITRITE, COLORU, PHUR, LABCAST, WBCUA, RBCUA, MUCUS, TRICHOMONAS, YEAST, BACTERIA, CLARITYU, SPECGRAV, LEUKOCYTESUR, UROBILINOGEN, BILIRUBINUR, BLOODU, GLUCOSEU, AMORPHOUS in the last 72 hours. Invalid input(s): KETONESU    XR CHEST PORTABLE   Final Result     Right apical pneumothorax is slightly larger, with the about 30-40%    loss in volume. XR ABDOMEN (KUB) (SINGLE AP VIEW)   Final Result     Tip of OG tube is in the body the stomach.           XR CHEST PORTABLE   Final Result Status post chest tube placement. The pneumothorax is substantially    improved. CT CHEST WO CONTRAST   Final Result      1. Large right pneumothorax with atelectatic right upper lobe and left lower lobe with mucus plugging or debris in the bronchus intermedius. Superimposed pneumonia in the right lower lobe is suspected. 2. Multiple acute right-sided rib fractures with comminuted angulated fracture of the right third rib. 3. Multiple subacute fractures of the right ribs and chronic fractures of the left ribs. 4. Suspected subacute fracture of the mid sternum with some degree of callus formation. Clinical correlation is recommended. 5. Worsening compression fracture at L1 which may represent an acute on chronic compression fracture. 6. Right-sided chest wall gas presumably related to the rib fracture and pneumothorax. CT Head WO Contrast   Final Result      1. Stable exam with no acute intracranial abnormality. 2. Retained fluid in the nasopharynx presumably related to altered mental status. XR CHEST PORTABLE   Final Result   1. Small right pneumothorax with right basilar atelectasis. 2. Right chest wall soft tissue gas. 3. Life-support devices as described. XR CHEST PORTABLE   Final Result   1. Soft tissue gas along the right ribs and right chest wall and into the right axilla suspicious for a lung injury although a measurable pneumothorax is not identified. 2. Multiple right-sided rib fractures. Consider further evaluation with chest CT. 3. Endotracheal tube in appropriate position. XR CHEST PORTABLE    (Results Pending)         Conclusion/Time spent:         Recommendations see above    Time spent with patient and/or family: 20  Time reviewing records: 10 min   Time communicating with staff: 5 min     A total of 35 minutes spent with the patient and family on unit greater than 50% in counseling regarding palliative care and in goals of care for the patient. Thank you to Dr. Renetta Sheppard for this consultation. We will continue to follow Mr. Florencio Frazier candis as needed.       [facility]  Inpatient Palliative Care  [phone]

## 2021-01-14 NOTE — PROGRESS NOTES
Patient admitted to room 4524. NSR on monitor. HR 80s. Fentanyl, Vasopressin, and Epinephrine infusing. Pearcy and CVC placed in ED. Patient not responding to painful stimuli. Pupils are round, equal and reacting. Patient is having twitching motions. Medical residents made aware and came to bedside. Jeffrey in place. CHG bath given. Sacral heart in place. Patient has blanchable redness to coccyx and L shoulder. Mepilex in place.

## 2021-01-14 NOTE — PROGRESS NOTES
CT Surg  POC    Chest tube pulled back  1.5\", CXR shows tube in better position. Dr. Theo Juarez bedside to assess, agrees with above.     Qian Morales MD   Gen Surg PGY 3  1/14/2021  2:55 PM  [phone]

## 2021-01-14 NOTE — PROGRESS NOTES
Hospitalist Progress Note      PCP: Shaniqua MOYER    Date of Admission: 1/13/2021    Chief Complaint: Cardiac arrest    Hospital Course: This is a 18-year-old male with a past medical history of type 1 diabetes mellitus hypertension asthma Parkinson disease who is presenting with PEA arrest probably related to choking. He had a CPR for 35 minutes with subsequent ROSC. Currently he is on hypothermia protocol, on pressor      Subjective: Patient seen and examined   on vent, not responsive  On 1 pressor  Palliative care following    Medications:  Reviewed    Infusion Medications    insulin 30 Units/hr (01/14/21 1111)    dextrose      sodium chloride Stopped (01/14/21 0239)    fentaNYL 2000 mcg/200 mL IV infusion 100 mcg/hr (01/14/21 0155)    vasopressin (Septic Shock) infusion 0.04 Units/min (01/14/21 1111)    EPINEPHrine infusion 8 mcg/min (01/14/21 1549)    propofol 35 mcg/kg/min (01/14/21 1510)     Scheduled Medications    chlorhexidine  15 mL Mouth/Throat BID    famotidine (PEPCID) injection  20 mg Intravenous BID    sodium chloride  30 mL/kg Intravenous Once    heparin (porcine)  5,000 Units Subcutaneous 3 times per day    propofol        ampicillin-sulbactam  3 g Intravenous Q6H     PRN Meds: promethazine **OR** ondansetron, glucose, dextrose, glucagon (rDNA), dextrose, fentanNYL, midazolam, potassium chloride, polyvinyl alcohol, LORazepam      Intake/Output Summary (Last 24 hours) at 1/14/2021 1610  Last data filed at 1/14/2021 1600  Gross per 24 hour   Intake 0 ml   Output 1185 ml   Net -1185 ml       Physical Exam Performed:    BP (!) 131/57   Pulse 73   Temp (!) 90.3 °F (32.4 °C) (Bladder)   Resp 20   Ht 5' 8\" (1.727 m)   Wt 175 lb (79.4 kg)   SpO2 98%   BMI 26.61 kg/m²     General appearance: Chronically ill-appearing, intubated and sedated. HEENT: Pupils equal, round, and reactive to light. Conjunctivae/corneas clear. Neck: Supple, with full range of motion. No jugular venous distention. Trachea midline. Respiratory:  Normal respiratory effort. Clear to auscultation, bilaterally without Rales/Wheezes/Rhonchi. Cardiovascular: Regular rate and rhythm with normal S1/S2 without murmurs, rubs or gallops. Abdomen: Soft, non-tender, non-distended with normal bowel sounds. Musculoskeletal: No clubbing, cyanosis or edema bilaterally. Full range of motion without deformity. Skin: Skin color, texture, turgor normal.  No rashes or lesions. Neurologic: Unable to assess   capillary Refill: Brisk,< 3 seconds   Peripheral Pulses: +2 palpable, equal bilaterally       Labs:   Recent Labs     01/13/21  1839 01/14/21  0405   WBC 19.7* 14.1*   HGB 8.7* 8.5*   HCT 28.3* 27.2*    285     Recent Labs     01/14/21  0405 01/14/21  0816 01/14/21  1336   * 134* 133*   K 3.2* 3.1* 3.9   CL 98* 101 102   CO2 16* 16* 17*   BUN 23* 22* 24*   CREATININE 1.2 1.1 1.1   CALCIUM 8.2* 8.0* 8.2*   PHOS 3.6 3.2 2.3*     No results for input(s): AST, ALT, BILIDIR, BILITOT, ALKPHOS in the last 72 hours. Recent Labs     01/13/21  1838 01/14/21  0405   INR 1.14 1.11     Recent Labs     01/13/21  1839   TROPONINI 0.04*       Urinalysis:      Lab Results   Component Value Date    NITRU Negative 10/02/2020    WBCUA  09/23/2020    BACTERIA Rare 09/23/2020    RBCUA  09/23/2020    BLOODU Negative 10/02/2020    SPECGRAV 1.015 10/02/2020    GLUCOSEU 500 10/02/2020       Radiology:  XR CHEST PORTABLE   Final Result      Interval decrease in size of the right-sided pneumothorax since earlier same day. More prominent emphysema over the right chest wall. XR CHEST PORTABLE   Final Result      Mild decrease in size of right apical pneumothorax, with pleural margin currently located 5 cm from the apical osseous margin (previously 6 cm). Slight change in location of the left chest tube is noted. Examination otherwise without change. XR CHEST PORTABLE   Final Result      Stable small-moderate right sided pneumothorax. Right basilar chest tube is again noted. Right subcutaneous emphysema again noted. Left lung remains clear. Stable cardiomediastinal silhouette. Lines and tubes without change. Old left rib fractures again noted. XR CHEST PORTABLE   Final Result     Right apical pneumothorax is slightly larger, with the about 30-40%    loss in volume. XR ABDOMEN (KUB) (SINGLE AP VIEW)   Final Result     Tip of OG tube is in the body the stomach. XR CHEST PORTABLE   Final Result   Status post chest tube placement. The pneumothorax is substantially    improved. CT CHEST WO CONTRAST   Final Result      1. Large right pneumothorax with atelectatic right upper lobe and left lower lobe with mucus plugging or debris in the bronchus intermedius. Superimposed pneumonia in the right lower lobe is suspected. 2. Multiple acute right-sided rib fractures with comminuted angulated fracture of the right third rib. 3. Multiple subacute fractures of the right ribs and chronic fractures of the left ribs. 4. Suspected subacute fracture of the mid sternum with some degree of callus formation. Clinical correlation is recommended. 5. Worsening compression fracture at L1 which may represent an acute on chronic compression fracture. 6. Right-sided chest wall gas presumably related to the rib fracture and pneumothorax. CT Head WO Contrast   Final Result      1. Stable exam with no acute intracranial abnormality. 2. Retained fluid in the nasopharynx presumably related to altered mental status. XR CHEST PORTABLE   Final Result   1. Small right pneumothorax with right basilar atelectasis. 2. Right chest wall soft tissue gas. 3. Life-support devices as described.       XR CHEST PORTABLE   Final Result

## 2021-01-14 NOTE — CONSULTS
Neurology consult Note    Dr. Bryson Campo  CC: \"Myoclonus versus seizure. s/p cardiac arrest\"      HPI:     The patient is unable to provide his history. History obtained by chart review. The patient presented to the hospital on 1/13/21. Patient was found by providers at his care facility unresponsive and appearing to have choked on food. He did not have a pulse and CPR was started. Initial rhythm was PEA upon  arrival.  He received 2 rounds of epinephrine and approximately 25 minutes of CPR prior to arrival without ROSC. An LMA was placed and there is noted to be food in the oropharynx with ongoing emesis. On arrival, patient is undergoing CPR with Winfred device in place. He has green thick vomitus emanating around to the LMA in place. CPR was continued and at the time of pulse check and pad placement he was noted to have peripheral pulses. Pupils were mid and fixed and he did not have any spontaneous neurologic movements or response to painful stimuli. He did require push doses of epinephrine to maintain blood pressure and was started on an epinephrine infusion. His LMA airway was exchanged for an ET tube. There was a large piece of food in the hypopharynx that was removed with Abby forceps and intubation following this was uncomplicated. CT head was unrevealing for acute findings. He remained unresponsive after ROSC achieved. Overall, it appears that he had 35 minutes of CPR with subsequent ROSC. He was noted to have myoclonic jerks. Myoclonic jerks decreased with increase in sedation. He was stated on hypothermia protocol. Found to have large right pneumothorax and chest tube was placed. He has a history of poorly controlled diabetes.       Past Medical History:   Diagnosis Date    Asthma     Diabetes mellitus ([facility] [address]     Hypertension     Parkinson disease ([facility] [address]      Past Surgical History:   Procedure Laterality Date  UPPER GASTROINTESTINAL ENDOSCOPY N/A 5/15/2020    EGD CONTROL HEMORRHAGE performed by Bryan Doty MD at [address] [facility] 5/15/2020    EGD BIOPSY performed by Bryan Doty MD at [address]:    Current Facility-Administered Medications:     promethazine (PHENERGAN) tablet 12.5 mg, 12.5 mg, Oral, Q6H PRN **OR** ondansetron (ZOFRAN) injection 4 mg, 4 mg, Intravenous, Q6H PRN, Shin Haas MD    chlorhexidine (PERIDEX) 0.12 % solution 15 mL, 15 mL, Mouth/Throat, BID, Shin Haas MD, 15 mL at 01/14/21 0931    famotidine (PEPCID) injection 20 mg, 20 mg, Intravenous, BID, Shin Haas MD, 20 mg at 01/14/21 0931    insulin regular (HUMULIN R;NOVOLIN R) 100 Units in sodium chloride 0.9 % 100 mL infusion, 0.5 Units/hr, Intravenous, Continuous, Shin Haas MD, Last Rate: 30 mL/hr at 01/14/21 1111, 30 Units/hr at 01/14/21 1111    glucose (GLUTOSE) 40 % oral gel 15 g, 15 g, Oral, PRN, Shin Haas MD    dextrose 50 % IV solution, 12.5 g, Intravenous, PRN, Shin Haas MD    glucagon (rDNA) injection 1 mg, 1 mg, Intramuscular, PRN, Shin Haas MD    dextrose 5 % solution, 100 mL/hr, Intravenous, PRN, Shin Haas MD    fentaNYL (SUBLIMAZE) injection 25 mcg, 25 mcg, Intravenous, Q1H PRN, Shin Haas MD    midazolam PF (VERSED) injection 1 mg, 1 mg, Intravenous, Q30 Min PRN, Shin Haas MD    0.9 % sodium chloride bolus, 30 mL/kg, Intravenous, Once, Shin Haas MD    0.9 % sodium chloride infusion, , Intravenous, Continuous, Shin Haas MD, Stopped at 01/14/21 0239    potassium chloride 20 mEq/50 mL IVPB (Central Line), 20 mEq, Intravenous, PRN, Shin Haas MD, Last Rate: 50 mL/hr at 01/14/21 1138, 20 mEq at 01/14/21 1138    heparin (porcine) injection 5,000 Units, 5,000 Units, Subcutaneous, 3 times per day, Shin Haas MD, 5,000 Units at 01/14/21 2084   fentaNYL (SUBLIMAZE) 2,000 mcg in sodium chloride 0.9 % 200 mL, 25 mcg/hr, Intravenous, Continuous, Kirill Zapata MD, Last Rate: 10 mL/hr at 01/14/21 0155, 100 mcg/hr at 01/14/21 0155    vasopressin 20 Units in sodium chloride 0.9 % 100 mL infusion, 0.04 Units/min, Intravenous, Continuous, Kirill Zapata MD, Last Rate: 12 mL/hr at 01/14/21 1111, 0.04 Units/min at 01/14/21 1111    EPINEPHrine (EPINEPHrine HCL) 5 mg in dextrose 5 % 250 mL infusion, 1 mcg/min, Intravenous, Continuous, Ashleigh Moser MD, Last Rate: 3 mL/hr at 01/14/21 0957, 1 mcg/min at 01/14/21 0957    propofol 1000 MG/100ML injection, , , ,     polyvinyl alcohol (LIQUIFILM TEARS) 1.4 % ophthalmic solution 1 drop, 1 drop, Both Eyes, PRN, Robin Mak MD, 1 drop at 01/14/21 1421    magnesium sulfate 2 g in 50 mL IVPB premix, 2 g, Intravenous, Once, Cathryn Ceron MD, Last Rate: 25 mL/hr at 01/14/21 1351, 2 g at 01/14/21 1351    LORazepam (ATIVAN) injection 2 mg, 2 mg, Intravenous, Q2H PRN, Robin Mak MD    ampicillin-sulbactam (UNASYN) 3 g ivpb minibag, 3 g, Intravenous, Q6H, Robin Mak MD    propofol injection, 10 mcg/kg/min, Intravenous, Titrated, Kirill Zapata MD, Last Rate: 23.8 mL/hr at 01/14/21 0958, 50 mcg/kg/min at 01/14/21 0958      ROS:   Unable to assess, intubated    Exam:   Please see attending attestation      Images:    CT head, 1/13/21  1. Stable exam with no acute intracranial abnormality. 2. Retained fluid in the nasopharynx presumably related to altered mental status. Labs:  BUN: 24  Creatinine: 1.1  Lactate: 5.54  Glucose: 247  WBC: 14.1        Assessment:   1. Cardiac arrest  2. Encephalopathy, likely anoxic  3. Myoclonus  4. Pneumothorax     72year old who had prolonged cardiac arrest after choking who is now having suspected myoclonic jerks and on hypothermia protocol. On exam today, he only had some mild and intermittent twitching of his left face and neck. Need to rule out seizures. It's important to get an idea of his underlying EEG rhythm. Please hold off on versed gtt or ativan gtt for seizures. Great concern for anoxic injury given prolonged cardiac arrest and now with myoclonic jerking. Plan:  -cvEEG(ordered)  -MRI tomorrow afternoon or Saturday afternoon. If to unstable to tolerate MRI, can consider CT head tomorrow afternoon to observe for interval changes   - If he were to have worsening myoclonus overnight, then can consider initiating Keppra. Would avoid use of benzos for myoclonic movements only.  Can give benzo if concern for electrographic seizures on cvEEG  - Continue supportive care as you are      Marcus Ramos [address]  Neurology
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
## 2021-01-14 NOTE — ED NOTES
Dr. Phillip Fall to suction mouth and airway. Dr. Phillip Fall used Abby forceps to and pulled out a large hotdog from airway.       Shamar Chairez RN  01/13/21 1946
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
## 2021-01-14 NOTE — PROGRESS NOTES
Insulin gtt started due to patient being diabetic and frequently seen in this hospital for hypo/hyperglcemia. Will start Q1H glucose checks. See eMAR.

## 2021-01-14 NOTE — PROGRESS NOTES
4 Eyes Admission Assessment     I agree as the admission nurse that 2 RN's have performed a thorough Head to Toe Skin Assessment on the patient. ALL assessment sites listed below have been assessed on admission. Areas assessed by both nurses:   [x]   Head, Face, and Ears   [x]   Shoulders, Back, and Chest  [x]   Arms, Elbows, and Hands   [x]   Coccyx, Sacrum, and Ischium  [x]   Legs, Feet, and Heels        Does the Patient have Skin Breakdown?   No         James Prevention initiated:  Yes   Wound Care Orders initiated:  No      [facility] nurse consulted for Pressure Injury (Stage 3,4, Unstageable, DTI, NWPT, and Complex wounds) or James score 18 or lower:  No      Nurse 1 eSignature: Electronically signed by Julian Graf RN on 1/14/21 at 5:21 AM EST    **SHARE this note so that the co-signing nurse is able to place an eSignature**    Nurse 2 eSignature: Electronically signed by Franklyn Dave RN on 1/14/21 at 5:57 AM EST

## 2021-01-14 NOTE — ED NOTES
Dr. Lynn Bunch placed a 8.0 ET tube. 22 at the Teeth, Good breath sounds per Dr. Lynn Bunch.       Ugo Pulido RN  01/13/21 1949
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
## 2021-01-14 NOTE — PROGRESS NOTES
Hypothermia protocol initiated. Medical Residents did talk with Dr. Johana Ellis about starting hypothermia due to twitching motions by patient. Sedation started and OK to start hypothermia since twitching is suppressed.

## 2021-01-15 NOTE — PROCEDURES
Continuous EEG monitoring record    Patient name: Sara Huggins    START: 01/14/2021 @ 20:55pm  END: 01/15/2021 @ 08:00am      Electroencephalographer: Adithya Clark MD PhD      CLINICAL DETAILS:  This EEG was performed on this 72 y. o.yo male admitted for Altered mental Status and concer for subclinical seizures      TECHNICAL DETAILS:  Continuous video-EEG monitoring was performed with 27 surface scalp electrodes placed according to the International 10-20 electrode placement system, using a 32-channel [employer] headbox. All EEG and video information was acquired digitally, including the use of automated spike and seizure detection software to detect epileptiform activity. An event button was also available to be depressed during clinical events. 01/14/2021 20:55pm - 08:00am on 01/15/2021  SEIZURES:  Abundant brief 6-8 second bursts of spike and wave discharges associated clinical myoclonus consistent with myoclonic seizures. INTERICTAL:  None  PUSHBUTTONS:  None  BACKGROUND:  Diffuse attenuation of the background with persistent superimposed EMG and fast activity (likely artifact) most prominent in the frontal leads. No clear reactivity is present. EKG:  regular    CLINICAL INTERPRETATION:  This was an abnormal tracing for age and state due to a profound diffuse cerebral dysfunction which can be of multiple causes, including structural, or vascular abnormalities, toxic/metabolic conditions, hydrocephalus, or postictal conditions. Frequent brief myoclonic seizures were seen as described above. On call resident was notified overnight. Clinical correlation is advised.           Adithya Clark MD PhD

## 2021-01-15 NOTE — PROGRESS NOTES
ICU SURGERY DAILY PROGRESS NOTE    CC: Right pneumothorax secondary to CPR    SUBJECTIVE:   Interval Hx:   Artificially hypothermic to low of 88.7F yesterday; rewarming started this morning at 03:00 after 24 hours. Continuous EEG with myoclonic seizure activity; Ativan given overnight. Intermittently hypoglycemic to 50s; insulin drip stopped at 23:00. Goals of care discussion still ongoing; unable to reach primary POA despite multiple attempts, secondary POA to supply further paperwork regarding POA status today. ROS: A 14 point review of systems was conducted, significant findings as noted above. All other systems negative. OBJECTIVE:   Vitals:   Vitals:    01/15/21 0530 01/15/21 0545 01/15/21 0600 01/15/21 0615   BP:   (!) 129/56    Pulse: 80 79 77 74   Resp: 20 20 20 20   Temp:   93 °F (33.9 °C)    TempSrc:       SpO2: 100% 100% 100% 100%   Weight:       Height:           I/O:     Intake/Output Summary (Last 24 hours) at 1/15/2021 0626  Last data filed at 1/15/2021 0500  Gross per 24 hour   Intake 3333 ml   Output 1060 ml   Net 2273 ml     I/O last 3 completed shifts: In: [address] [I.V.:1871]  Out: 7673 [Urine:1330;  Chest Tube:60]    Diet: Diet NPO Effective Now      Physical Examination:   General appearance: Intubated, sedated  HEENT: NCAT; not tracking with EOMs, pupils non-reactive bilaterally; OG 57cm, ETT 22cm at the teeth  Neck: trachea midline, no appreciable JVD  Chest/Lungs: Mechanical breath sounds bilaterally, symmetric chest rise; R 14Fr chest tube to -20 suction, no air leak  Cardiovascular: Regular rate and rhythm  Abdomen: soft, non-distended  Skin: cool and pale, no rashes  Extremities: no edema, no cyanosis  Neuro: Sedated; no blink reflex; not following commands    Labs:  CBC:   Recent Labs     01/13/21  1839 01/14/21  0405 01/15/21  0358   WBC 19.7* 14.1* 11.1*   HGB 8.7* 8.5* 8.2*   HCT 28.3* 27.2* 25.5*    285 272       BMP:   Recent Labs     01/14/21  2019 01/15/21 0156 01/15/21  0358    135* 138   K 3.5 3.5 3.5    104 106   CO2 17* 18* 18*   BUN 24* 23* 22*   CREATININE 1.0 1.0 1.0   GLUCOSE 122* 86 82     ABGs:   Recent Labs     01/14/21  0829 01/14/21  1338   PHART 7.259* 7.272*   QZA1TWF 38.5 37.7   PO2ART 425.5* 221.9*     INR:   Recent Labs     01/13/21  1838 01/14/21  0405   INR 1.14 1.11       Rad:   XR CHEST PORTABLE   Final Result      Interval decrease in size of the right-sided pneumothorax since earlier same day. More prominent emphysema over the right chest wall. XR CHEST PORTABLE   Final Result      Mild decrease in size of right apical pneumothorax, with pleural margin currently located 5 cm from the apical osseous margin (previously 6 cm). Slight change in location of the left chest tube is noted. Examination otherwise without change. XR CHEST PORTABLE   Final Result      Stable small-moderate right sided pneumothorax. Right basilar chest tube is again noted. Right subcutaneous emphysema again noted. Left lung remains clear. Stable cardiomediastinal silhouette. Lines and tubes without change. Old left rib fractures again noted. XR CHEST PORTABLE   Final Result     Right apical pneumothorax is slightly larger, with the about 30-40%    loss in volume. XR ABDOMEN (KUB) (SINGLE AP VIEW)   Final Result     Tip of OG tube is in the body the stomach. XR CHEST PORTABLE   Final Result   Status post chest tube placement. The pneumothorax is substantially    improved. CT CHEST WO CONTRAST   Final Result      1. Large right pneumothorax with atelectatic right upper lobe and left lower lobe with mucus plugging or debris in the bronchus intermedius. Superimposed pneumonia in the right lower lobe is suspected. 2. Multiple acute right-sided rib fractures with comminuted angulated fracture of the right third rib. 3. Multiple subacute fractures of the right ribs and chronic fractures of the left ribs. 4. Suspected subacute fracture of the mid sternum with some degree of callus formation. Clinical correlation is recommended. 5. Worsening compression fracture at L1 which may represent an acute on chronic compression fracture. 6. Right-sided chest wall gas presumably related to the rib fracture and pneumothorax. CT Head WO Contrast   Final Result      1. Stable exam with no acute intracranial abnormality. 2. Retained fluid in the nasopharynx presumably related to altered mental status. XR CHEST PORTABLE   Final Result   1. Small right pneumothorax with right basilar atelectasis. 2. Right chest wall soft tissue gas. 3. Life-support devices as described. XR CHEST PORTABLE   Final Result   1. Soft tissue gas along the right ribs and right chest wall and into the right axilla suspicious for a lung injury although a measurable pneumothorax is not identified. 2. Multiple right-sided rib fractures. Consider further evaluation with chest CT. 3. Endotracheal tube in appropriate position. ASSESSMENT AND PLAN:   Shmuel Witt is a 72 y.o. male with history of DM, hypertension, asthma, and Parkinson disease who was found down in PEA arrest following an aspiration event at his nursing home. Developed right-sided pneumothorax following CPR, for which our service was consulted. 14Fr R chest tube placed 1/14 with initial improvement, later relocated more superiorly with further pneumothorax resolution.    - Chest tube to be set to water seal; follow up CXR at noon. - Follow up status of POA/goals of care  - Further medical management per primary team  - Thoracic surgery will continue to follow  -----------------------------  Ervin Bettencourt.  Kelvin Goode MD  PGY1, General Surgery  01/15/21  6:26 AM  [phone]

## 2021-01-15 NOTE — CARE COORDINATION
Case Management Assessment           Daily Note                 Date/ Time of Note: 1/15/2021 1:58 PM         Note completed by: Timbo Early    Patient Name: Nelly Nance  YOB: 1950    Diagnosis:Cardiac arrest [facility]) [I46.9]  Patient Admission Status: Inpatient    Date of Admission:1/13/2021  6:19 PM Length of Stay: 2 GLOS:      Current Plan of Care: remains intubated/continous EEG/CT to water seal  ________________________________________________________________________________________  PT AM-PAC:   / 24 per last evaluation on: NA    OT AM-PAC:   / 24 per last evaluation on: NA    DME Needs for discharge: NA  ________________________________________________________________________________________  Discharge Plan: To Be Determined DUE TO: family wants to continue current care    Tentative discharge date: TBD    Current barriers to discharge: family considering possible transition to comfort care-maybe tomorrow    Referrals completed: Not Applicable    Resources/ information provided: Not indicated at this time  ________________________________________________________________________________________  Case Management Notes:Patient is from [address] [place] [address] but currently intubated/chest tube and continuous EEG. [place] Suraj and his family were provided with choice of provider; he and his family are in agreement with the discharge plan.     Care Transition Patient: Stephie Early RN  [facility]  Case Management Department  Ph: [phone]  Fax: [phone]

## 2021-01-15 NOTE — PROGRESS NOTES
Called and spoke with patient's daughters. Discussed that patient has a very poor prognosis for any meaningful neurologic recovery and that he has sustained a devastating anoxic brain injury. Daughters voiced their understanding regarding poor prognosis. They did ask about obtaining MRI. I discussed that it is certainly reasonable to obtain to gather more supportive date, however , it will unlikely . They would like more time to decide on whether or not to pursue MRI and will let bedside nursing know if they want this to be completed.        Marcus Ramos CNP [facility] Neurology

## 2021-01-15 NOTE — PLAN OF CARE
Problem: Nutrition  Goal: Optimal nutrition therapy  Outcome: Ongoing  Note: Nutrition Problem #1: Inadequate oral intake  Intervention: Food and/or Nutrient Delivery: Continue NPO  Nutritional Goals: Pt will tolerate the most appropriate nutrition regimen to meet >75% of estimated needs

## 2021-01-15 NOTE — PROGRESS NOTES
ICU Progress Note    Admit Date: 1/13/2021  Hospital Day: 3    ICU DAY  Code:Full Code  PCP: Radha MOYER            SUBJECTIVE:   Interval Hx:     Pt seen and examined at bedside. Intubated. Pt was on hypothermia protocol yesterday, rewarming initiated this AM. Abnormal EEG overnight, pt got 2 of Ativan. Insulin gtt stopped overnight, got D5 amps, started on D5 gtt this AM.   cEEG discontinued, per neurology. MEDICATIONS:   Scheduled Meds:   chlorhexidine  15 mL Mouth/Throat BID    famotidine (PEPCID) injection  20 mg Intravenous BID    sodium chloride  30 mL/kg Intravenous Once    heparin (porcine)  5,000 Units Subcutaneous 3 times per day    ampicillin-sulbactam  3 g Intravenous Q6H      Continuous Infusions:   EPINEPHrine infusion 14 mcg/min (01/15/21 1250)    dextrose 100 mL/hr at 01/15/21 1625    insulin Stopped (01/14/21 2305)    dextrose      fentaNYL 2000 mcg/200 mL IV infusion Stopped (01/15/21 1215)    vasopressin (Septic Shock) infusion Stopped (01/15/21 1210)    propofol Stopped (01/15/21 1200)     PRN Meds:[DISCONTINUED] promethazine **OR** ondansetron, glucose, dextrose, glucagon (rDNA), dextrose, fentanNYL, midazolam, potassium chloride, polyvinyl alcohol, LORazepam  Allergies: Allergies   Allergen Reactions    Benzonatate Other (See Comments)     dizzy    Erythromycin Nausea And Vomiting       PHYSICAL EXAM:       Vitals: BP (!) 105/57   Pulse 94   Temp 97.9 °F (36.6 °C) (Bladder)   Resp (!) 32   Ht 5' 8\" (1.727 m)   Wt 175 lb (79.4 kg)   SpO2 96%   BMI 26.61 kg/m²   I/O:      Intake/Output Summary (Last 24 hours) at 1/15/2021 1628  Last data filed at 1/15/2021 1440  Gross per 24 hour   Intake 4296.4 ml   Output 1021 ml   Net 3275.4 ml     No intake/output data recorded. I/O last 3 completed shifts:   In: 4296.4 [I.V.:4296.4]  Out: 0749 [Urine:995; Emesis/NG output:25; Chest Tube:51]    Physical Examination:   General appearance: Intubated, sedated - Palliative care consulted; Bhavna Brown changed to daughters.         Hypoxic Encephalopathy 2/2 PEA arrest   - CT head: no acute findings   - q4h neuro checks  - MRI Brain today   - no EEG per neurology, poor prognosis   - Palliative care consulted; HC-POA changed to daughters.         Pneumothorax 2/2 trauma from CPR  Pneumothorax on CT Chest 11/13. Rib fractures and possible superimposed pneumonia on CT Chest as well. - chest tube placed, 65 cc bloody fluid drained, managed per surgery.      Seizure-like activity  Resembling myoclonic jerks. At 3 episodes in ED.  -  propofol   - on hypothermia protocol        Hyperglycemia in setting of T1DM - now hypoglycemic 2/2 rewarming protocol   Multiple prior admissions for DKA. Glucose 291 on arrival  - insulin gtt stopped overnight   - pt started on D5    - hypoglycemia protocol  - Q1H glucose checks      Anion gap lactic acidosis 2/2 PEA arrest  Initial lactate 8.3, AGAP 20, pH 6.83  - management of primary problems as above     Chronic problems:  HTN  -holding home meds     Asthma  -holding home meds     Parkinson's disease  -holding home meds    Code Status:Full Code  FEN: Regular Diet  PPX: Lovenox 40  DISPO: ICU for further management as above. W/D/W/A    This pt was discussed with Dr. Shanae Trotter MD   -----------------------------  Mitchell Tse M.D.   PGY-1 Internal Medicine  Pager: [phone]  1/15/2021 4:28 PM     Pulm     Patient seen and examined. I agree with Dr. Dana Long history, physical, lab findings, assessment and plan.     Assessment  1. PEA cardiac arrest -suspected due to hypoxemia with obstruction of airway with a hot dog. Downtime estimated at 35 minutes. Status post hypothermia protocol  2. Diabetes -now with recurrence hypoglycemia requiring D10 drip  3. History of hypertension  4. Asthma  5. Parkinson's  6. Multiple right-sided rib fractures with large pneumothorax  7. Suspected right lower lobe aspiration pneumonia  8.   Shock 9.  Lactic acidemia -resolved     Plan  1. Increase D10 drip to 125  2. Wean epinephrine for systolic blood pressure of 100  3. Continue propofol and fentanyl for goal RASS of -1   4. Surgery managing chest tube  5. On cv EEG -had brief myoclonic seizures overnight  6. Continue Unasyn for suspected aspiration pneumonia  7. Neurology following  8. Ativan 2 mg IV as needed seizures  9. May need continuous EEG if exhibited seizure-like activity  10. Healthcare power of  has been unreachable spite multiple calls -daughter is secondary. She states that she is coming in to visit tomorrow and has expressed wishes for possible transition to comfort care  11. Vent: Vt 500, RR 20, FIO2 50%, PEEP 5. Daily ABG after hypothermia protocol is done. Wean FiO2 for goal oxygen saturation 88%  12. . Full code     Pt has a high probability of imminent or life-threatening deterioration requiring close monitoring, and highly complex decision-making and/or interventions of high intensity to assess, manipulate, and support his critical organ systems to prevent a likely inevitable decline which could occur if left untreated.      A total critical care time 34 minutes was used. This includes but not limited to examining patient, collaborating with other physicians, monitoring vital signs, telemetry, continuous pulse oximetry, and clinical response to IV medications, documentation time, review and interpretation of laboratory and radiological data, review of nursing notes and old record review.  This time excludes any time that may have been spent performing procedures for life threatening organ failure.     Connor Hurtado MD

## 2021-01-15 NOTE — PROGRESS NOTES
BG continuing to drop below 70 requiring frequent D50 IVP. ICU team made aware, new order for D10 continuous infusion.

## 2021-01-15 NOTE — PROGRESS NOTES
[facility]. Patient is having seizures. Medical residents made aware. 4 mg ativan ordered. Will continue to monitor patient. Spoke with mom. Mom is concerned because patient woke up crying the last couple nights at 11pm and had his eyes closed. Parents were able to soothe child back to sleep within 5 minutes both times. Mom is wondering if she should watch out for anything or bring patient in because she's not sure why he behaved like this. Explained to mom that this could have happened for multiple reasons (gas, tummy ache, startled by noise, bad dream) and that if she was easily able to soothe the child back to sleep then it doesn't seem to be something concerning. Advised mom that if this keeps happening then she should call us back or make an appt to be seen by a provider. Mom verbalized understanding.

## 2021-01-15 NOTE — PROGRESS NOTES
This RN spoke with patient's daughter Paul Cornell, states she and her sister plan on changing patient's code status to comfort care tomorrow morning when they can be at his side.

## 2021-01-15 NOTE — PROGRESS NOTES
Palliative Care Chart Review  and Check in Note:     NAME:  Dre Gorman  Admit Date: 1/13/2021  Hospital Day:  Hospital Day: 3   Current Code status: Full Code    Palliative care is continuing to following Mr. Juliana Gardner for symptom management,  and goals of care discussion as needed. Patient's chart reviewed today 1/15/21. Spoke with pt's daughters Carollynn Schaumann and Ade Cordovaabhijeet over the phone, provided brief updates. They understand that at this point the pt's prognosis is poor. They request to speak with neurology APRN for their perspective, d/w Tiffanie Ponce who will reach out to Carollynn Schaumann. Discussed code status again, they are not in agreement and have decided to keep the pt a full code for now. They are still considering transition to comfort care, possibly tomorrow. The following are the currently established goals/code status, and Symptom management.      Goals of care: Continue with current management    Code status: Full code, discussed today    Discharge plan: TBD pending hospital course and family decision    [address] [place] [address]  [phone]

## 2021-01-15 NOTE — PROGRESS NOTES
Neurology Progress Note  Seen for encephalopathy and myoclonus    Updates:  cvEEG with frequent brief myoclonic seizures   Now rewarming    ROS:   Unable to assess, intubated       Exam:  On propofol at 35 mcgs  Blood pressure 127/67, pulse 98, temperature 95 °F (35 °C), temperature source Bladder, resp. rate 22, height 5' 8\" (1.727 m), weight 175 lb (79.4 kg), SpO2 100 %. Constitutional    Vital signs: BP, HR, and RR reviewed   General Depressed mental status, no distress, well-nourished  Eyes: fundoscopic exam not attempted   Cardiovascular: pulses symmetric in all 4 extremities. No peripheral edema. Psychiatric: Limited exam given encephalopathy   Neurologic  Mental status: Eyes do not open to central noxious stimuli   orientation Limited exam given encephalopathy    General fund of knowledge Limited exam given encephalopathy    Memory Limited exam given encephalopathy    Attention  Poor   Language Nonverbal, does not attempt to speak around ETT   Comprehension Does not follow commands  Cranial nerves:   CN2: Does not blink to threat right or left  CN 3,4,6: OCR absent, pupils 2 mm and not reactive to light   CN5: Corneal reflex absent  CN7:face appears symmetric, limited exam given ETT and duarte  CN8: Limited exam given encephalopathy   CN9: No cough with suction   CN11: Limited exam given encephalopathy   CN12: Limited exam given encephalopathy   Strength: No movement to peripheral noxious stimuli throughout   Sensory: Does not withdraw to peripheral noxious stimuli   Tone: normal in all 4 extremities  Other: Intermittent myoclonic jerks of left face and body noted, stimulus induced      Studies:  CT head, 21  1. Stable exam with no acute intracranial abnormality.    2. Retained fluid in the nasopharynx presumably related to altered mental status.        CvEE2021 20:55pm - 08:00am on 01/15/2021  SEIZURES: Abundant brief 6-8 second bursts of spike and wave discharges associated clinical myoclonus consistent with myoclonic seizures. INTERICTAL:  None  PUSHBUTTONS:  None  BACKGROUND:  Diffuse attenuation of the background with persistent superimposed EMG and fast activity (likely artifact) most prominent in the frontal leads. No clear reactivity is present. EKG:  regular     CLINICAL INTERPRETATION:  This was an abnormal tracing for age and state due to a profound diffuse cerebral dysfunction which can be of multiple causes, including structural, or vascular abnormalities, toxic/metabolic conditions, hydrocephalus, or postictal conditions. Frequent brief myoclonic seizures were seen as described above. On call resident was notified overnight. Clinical correlation is advised.          Labs:  BUN: 23  Creatinine: 1.0  Lactate: 3.9  Glucose: 247  WBC: 11.1        Assessment:   1. Cardiac arrest  2. Encephalopathy, likely anoxic  3. Myoclonic seizures    72year old who had prolonged cardiac arrest after choking with myoclonic jerks and now on target temperature management. Currently rewarming. Great concern for anoxic injury.      cvEEG with abundant brief 6-8 second bursts of spike and wave discharges associated clinical myoclonus consistent with myoclonic seizures. This carries a very poor prognosis. Clinical exam remains very poor, and very unlikely he will have any neurologic recovery.        Plan:  - Continue cvEEG   - Decrease sedation  - Appreciate palliative care involvement  - Can consider MRI brain for help with prognostication           Dorina Larios [address]  Neurology

## 2021-01-15 NOTE — PROGRESS NOTES
NSR on monitor. Remains on Epi gtt for BP support. Patient remains unresponsive. Pupils non reactive. Remains on insulin gtt. Will continue Q1H sugar checks. Still on hypothermia protocol. Will monitor.

## 2021-01-15 NOTE — PROGRESS NOTES
Hospitalist Progress Note      PCP: Morales MOYER    Date of Admission: 1/13/2021    Chief Complaint: Cardiac arrest    Hospital Course: This is a 71-year-old male with a past medical history of type 1 diabetes mellitus hypertension asthma Parkinson disease who is presenting with PEA arrest probably related to choking. He had a CPR for 35 minutes with subsequent ROSC. Currently he is on hypothermia protocol, on pressor      Subjective: Patient seen and examined   on vent, not responsive  On  pressor  Palliative care following    Medications:  Reviewed    Infusion Medications    EPINEPHrine infusion 14 mcg/min (01/15/21 1250)    dextrose 100 mL/hr at 01/15/21 1316    insulin Stopped (01/14/21 2305)    dextrose      fentaNYL 2000 mcg/200 mL IV infusion Stopped (01/15/21 1215)    vasopressin (Septic Shock) infusion Stopped (01/15/21 1210)    propofol Stopped (01/15/21 1200)     Scheduled Medications    chlorhexidine  15 mL Mouth/Throat BID    famotidine (PEPCID) injection  20 mg Intravenous BID    sodium chloride  30 mL/kg Intravenous Once    heparin (porcine)  5,000 Units Subcutaneous 3 times per day    ampicillin-sulbactam  3 g Intravenous Q6H     PRN Meds: [DISCONTINUED] promethazine **OR** ondansetron, glucose, dextrose, glucagon (rDNA), dextrose, fentanNYL, midazolam, potassium chloride, polyvinyl alcohol, LORazepam      Intake/Output Summary (Last 24 hours) at 1/15/2021 1410  Last data filed at 1/15/2021 1158  Gross per 24 hour   Intake 3333 ml   Output 1070 ml   Net 2263 ml       Physical Exam Performed:    BP (!) 89/54   Pulse 96   Temp 98.2 °F (36.8 °C) (Bladder)   Resp 12   Ht 5' 8\" (1.727 m)   Wt 175 lb (79.4 kg)   SpO2 97%   BMI 26.61 kg/m²     General appearance: Chronically ill-appearing, intubated and sedated. HEENT: Pupils equal, round, and reactive to light. Conjunctivae/corneas clear. Neck: Supple, with full range of motion. No jugular venous distention. Trachea midline. Respiratory:  Normal respiratory effort. Clear to auscultation, bilaterally without Rales/Wheezes/Rhonchi. Cardiovascular: Regular rate and rhythm with normal S1/S2 without murmurs, rubs or gallops. Abdomen: Soft, non-tender, non-distended with normal bowel sounds. Musculoskeletal: No clubbing, cyanosis or edema bilaterally. Full range of motion without deformity. Skin: Skin color, texture, turgor normal.  No rashes or lesions. Neurologic: Unable to assess   capillary Refill: Brisk,< 3 seconds   Peripheral Pulses: +2 palpable, equal bilaterally       Labs:   Recent Labs     01/13/21  1839 01/14/21  0405 01/15/21  0358   WBC 19.7* 14.1* 11.1*   HGB 8.7* 8.5* 8.2*   HCT 28.3* 27.2* 25.5*    285 272     Recent Labs     01/15/21  0358 01/15/21  0610 01/15/21  0802 01/15/21  0955    138 138 137   K 3.5 3.5 3.8 3.9    106 106 105   CO2 18* 21 20* 20*   BUN 22* 23* 23* 23*   CREATININE 1.0 1.0 1.0 1.0   CALCIUM 8.2* 8.1* 8.2* 8.0*   PHOS 4.0  --  5.2* 5.2*     No results for input(s): AST, ALT, BILIDIR, BILITOT, ALKPHOS in the last 72 hours. Recent Labs     01/13/21  1838 01/14/21  0405   INR 1.14 1.11     Recent Labs     01/13/21  1839   TROPONINI 0.04*       Urinalysis:      Lab Results   Component Value Date    NITRU Negative 10/02/2020    WBCUA  09/23/2020    BACTERIA Rare 09/23/2020    RBCUA  09/23/2020    BLOODU Negative 10/02/2020    SPECGRAV 1.015 10/02/2020    GLUCOSEU 500 10/02/2020       Radiology:  XR CHEST PORTABLE   Final Result   1. Right-sided pigtail chest tube in similar position with no measurable pneumothorax identified. 2. Right-sided soft tissue gas appears slightly decreased from prior. 3. Persistent right basilar airspace disease suggesting atelectasis. 4. Stable right-sided rib fractures and stable left support devices as described.       XR CHEST PORTABLE Final Result      Interval decrease in size of the right-sided pneumothorax since earlier same day. More prominent emphysema over the right chest wall. XR CHEST PORTABLE   Final Result      Mild decrease in size of right apical pneumothorax, with pleural margin currently located 5 cm from the apical osseous margin (previously 6 cm). Slight change in location of the left chest tube is noted. Examination otherwise without change. XR CHEST PORTABLE   Final Result      Stable small-moderate right sided pneumothorax. Right basilar chest tube is again noted. Right subcutaneous emphysema again noted. Left lung remains clear. Stable cardiomediastinal silhouette. Lines and tubes without change. Old left rib fractures again noted. XR CHEST PORTABLE   Final Result     Right apical pneumothorax is slightly larger, with the about 30-40%    loss in volume. XR ABDOMEN (KUB) (SINGLE AP VIEW)   Final Result     Tip of OG tube is in the body the stomach. XR CHEST PORTABLE   Final Result   Status post chest tube placement. The pneumothorax is substantially    improved. CT CHEST WO CONTRAST   Final Result      1. Large right pneumothorax with atelectatic right upper lobe and left lower lobe with mucus plugging or debris in the bronchus intermedius. Superimposed pneumonia in the right lower lobe is suspected. 2. Multiple acute right-sided rib fractures with comminuted angulated fracture of the right third rib. 3. Multiple subacute fractures of the right ribs and chronic fractures of the left ribs. 4. Suspected subacute fracture of the mid sternum with some degree of callus formation. Clinical correlation is recommended. 5. Worsening compression fracture at L1 which may represent an acute on chronic compression fracture. 6. Right-sided chest wall gas presumably related to the rib fracture and pneumothorax.          CT Head WO Contrast   Final Result 1. Stable exam with no acute intracranial abnormality. 2. Retained fluid in the nasopharynx presumably related to altered mental status. XR CHEST PORTABLE   Final Result   1. Small right pneumothorax with right basilar atelectasis. 2. Right chest wall soft tissue gas. 3. Life-support devices as described. XR CHEST PORTABLE   Final Result   1. Soft tissue gas along the right ribs and right chest wall and into the right axilla suspicious for a lung injury although a measurable pneumothorax is not identified. 2. Multiple right-sided rib fractures. Consider further evaluation with chest CT. 3. Endotracheal tube in appropriate position. MRI BRAIN WO CONTRAST    (Results Pending)           Assessment/Plan:    Active Hospital Problems    Diagnosis    Cardiac arrest ([facility] [address] [I46.9]     1. PEA arrest secondary to choking event, ROSC after 35 minutes of CPR  Currently on vent sedated  S/p Hypothermia protocol  Critical care team following    2. Pneumothorax secondary to trauma from CPR  CT surgery following patient has chest tube,     3.  Seizure-like activity like myoclonic jerks  On heavy sedation  Neurology consulted      4. hyperglycemia  on insulin drip    DVT Prophylaxis: lovenox  Diet: Diet NPO Effective Now  Code Status: Full Code    PT/OT Eval Status: n/a     Dispo -icu, prognosis poor    Florian Carey MD

## 2021-01-15 NOTE — PROGRESS NOTES
CONTINUOUS EEG    Name:  Martha [address] Record Number:  [id number]  Age: 72 y.o. Gender: male  : 1950  Today's Date:  2021  Room:  6106/7595-05  Vital Signs   BP (!) 122/57   Pulse 71   Temp (!) 90.7 °F (32.6 °C) (Bladder)   Resp 20   Ht 5' 8\" (1.727 m)   Wt 175 lb (79.4 kg)   SpO2 (!) 33%   BMI 26.61 kg/m²           Continuous EEG Testing Start Time:      Continuous EEG Testing End Time:       Comments: Inderjit briseno [facility] contacted  to begin monitoring. Impedence on all leads within normal limits. Plan of Care: Begin monitoring.     Electronically signed by Jodi Michael on 2021 at 10:08 PM

## 2021-01-15 NOTE — PROGRESS NOTES
NUTRITION ASSESSMENT  Admission Date: 1/13/2021     Type and Reason for Visit: Initial(vent)    NUTRITION RECOMMENDATIONS:     Monitor clinical status and goals of care. If aggressive care is desired and pt is hemodynamically stable, recommend starting EN within 24-48 hrs     Enteral Nutrition Recommendations. · Recommend initiate semi-elemental formula \"Vital AF\" @ 20mL/hr and as tolerated, increase by 10 mL/hr q 4 hours until goal of 65  mL/hr is met. · Recommend water bolus 100 ml every 4 hours when no IVF or Maintenance water flush of 30 ml every 4 hours. NUTRITION ASSESSMENT:  PMHx T1DM, HTN, asthma, and Parkinson's disease who presents with PEA arrest after choking episode. Pt is intubated, off of sedation. Requiring Epi and vasopressin. D5IVF initiated r/t hypoglycemia. Palliative care involved to discuss goals of care. If aggressive care is desired and pt is hemodynamically stable, recommend starting EN within 24-48 hrs      MALNUTRITION ASSESSMENT  Context of Malnutrition: Acute Illness   Malnutrition Status:  At risk for malnutrition (Comment)  Findings of the 6 clinical characteristics of malnutrition (Minimum of 2 out of 6 clinical characteristics is required to make the diagnosis of moderate or severe Protein Calorie Malnutrition based on AND/ASPEN Guidelines):    NUTRITION DIAGNOSIS   Problem: Problem #1: Inadequate oral intake  Etiology: Impaired respiratory function-inability to consume food  Signs & Symptoms: NPO status due to medical condition    NUTRITION INTERVENTION  Food and/or Nutrient Delivery:Continue NPO  Nutrition education/counseling/coordination of care: Continue Inpatient Monitoring     NUTRITION MONITORING & EVALUATION:  Evaluation:Goals set   Goals:Goals: Pt will tolerate the most appropriate nutrition regimen to meet >75% of estimated needs  Monitoring: Hemodynamic Status  or Nutrition Progression      OBJECTIVE DATA: · Nutrition-Focused Physical Findings: chest tube; cEEG; OGT  · Wounds None      Past Medical History:   Diagnosis Date    Asthma     Diabetes mellitus ([facility] [address]     Hypertension     Parkinson disease ([facility]         ANTHROPOMETRICS  Current Height: 5' 8\" (172.7 cm)  Current Weight: 175 lb (79.4 kg)    Admission weight: 175 lb (79.4 kg)  Ideal Bodyweight 154 lb         BMI BMI (Calculated): 26.7    Wt Readings from Last 50 Encounters:   01/13/21 175 lb (79.4 kg)   10/06/20 160 lb (72.6 kg)   10/01/20 172 lb 6.4 oz (78.2 kg)   09/25/20 169 lb 8.5 oz (76.9 kg)   09/07/20 160 lb (72.6 kg)   08/28/20 160 lb 15 oz (73 kg)   08/05/20 184 lb 8.4 oz (83.7 kg)   08/01/20 190 lb 14.7 oz (86.6 kg)   07/26/20 185 lb (83.9 kg)   07/15/20 190 lb (86.2 kg)   06/22/20 206 lb 12.7 oz (93.8 kg)   06/17/20 185 lb (83.9 kg)   05/27/20 207 lb (93.9 kg)   05/19/20 207 lb 10.8 oz (94.2 kg)   05/05/20 193 lb 5.5 oz (87.7 kg)       COMPARATIVE STANDARDS  Estimated Total Kcals/Day : 20-25 Current Bodyweight (79 kg) 9273-3914 kcal    Estimated Total Protein (g/day) : 1.5-2.0 Ideal Bodyweight  (70 kg) 105-140 g/day  Estimated Daily Total Fluid (ml/day): 5385-9629 mL per day     Food / Nutrition-Related History  Pre-Admission / Home Diet:    TEA  Home Supplements / Herbals:    none noted  Food Restrictions / Cultural Requests:    none noted    Current Nutrition Therapies   Diet NPO Effective Now     Current Tube Feeding (TF) Orders:  · Feeding Route: Orogastric  · Formula:    · Schedule:    · Additives/Modulars:    · Water Flushes:    · Current TF & Flush Orders Provides:    · Goal TF & Flush Orders Provides: Vital AF @ goal rate 65 mL/hr to provide 1560 total volume, 1872 kcal, 117 g protein and 1265 ml free water    Additional Calorie Sources:      PO Intake: NPO  PO Supplement: NPO   PO Supplement Intake: NPO  IVF: D5 IVF @ 100 ml/hr     NUTRITION RISK LEVEL: Risk Level: [facility], [address]  [place]:  [phone]  Office:  [phone]

## 2021-01-16 NOTE — PROGRESS NOTES
BP 91/38; Sp02 94. Pt continues comfort care. [facility] aware of pt status. Comfort medications given as needed.

## 2021-01-16 NOTE — PROGRESS NOTES
Called [facility] in attempts to contact renée Penny POA. Stated they had not been in contact with her or had she called to contact them.

## 2021-01-16 NOTE — PROGRESS NOTES
Emanuel Zarco called this RN at [phone]. Clarissa Pham stated she no longer wished to be primary POA for [facility].

## 2021-01-16 NOTE — PROGRESS NOTES
ABG and renal panel done at 1000 per verbal orders from [place]. [place] notified of results.

## 2021-01-16 NOTE — PROGRESS NOTES
Critical potassium on labs. ICU resident Dr. Patricio Schmidt notified. New orders for EKG, potassium, Dextrose, and calcium ordered. Check of BG prior to insulin was 113. Will recheck K after insulin/Dextrose is given.

## 2021-01-16 NOTE — PROGRESS NOTES
ICU SURGERY DAILY PROGRESS NOTE    CC: Right pneumothorax secondary to CPR    SUBJECTIVE:   Interval Hx:   Normothermic; MAPs ranging 64-88, currently on 6mcg/min Epinephrine. Respiratory status stable on ventilator. Per nursing, family anticipates withdrawing care later today. ROS: A 14 point review of systems was conducted, significant findings as noted above. All other systems negative. OBJECTIVE:   Vitals:   Vitals:    01/16/21 0400 01/16/21 0401 01/16/21 0500 01/16/21 0600   BP: (!) 106/50  (!) 108/51 (!) 104/55   Pulse: 99 99 99 95   Resp: 23 26 27 20   Temp: 97.7 °F (36.5 °C)  97.6 °F (36.4 °C)    TempSrc: Oral  Axillary    SpO2: 100% 99% 100%    Weight:       Height:           I/O:     Intake/Output Summary (Last 24 hours) at 1/16/2021 0746  Last data filed at 1/16/2021 0615  Gross per 24 hour   Intake 3606.4 ml   Output 683 ml   Net 2923.4 ml     I/O last 3 completed shifts:   In: 3606.4 [I.V.:3606.4]  Out: 683 [Urine:592; Emesis/NG output:25; Chest Tube:66]    Diet: Diet NPO Effective Now      Physical Examination:   General appearance: Intubated, sedated  HEENT: NCAT; not tracking with EOMs, pupils non-reactive bilaterally; OG 57cm, ETT 22cm at the teeth  Neck: trachea midline, no appreciable JVD  Chest/Lungs: Mechanical breath sounds bilaterally, symmetric chest rise; R 14Fr chest tube to water seal, intermittent air leak  Cardiovascular: Tachycardic, on Epinephrine 6mcg/min  Abdomen: soft, non-distended  Skin: cool and pale, no rashes  Extremities: no edema, no cyanosis  Neuro: Sedated; no blink reflex; not following commands    Labs:  CBC:   Recent Labs     01/14/21  0405 01/15/21  0358 01/16/21  0351   WBC 14.1* 11.1* 12.5*   HGB 8.5* 8.2* 7.4*   HCT 27.2* 25.5* 22.1*    272 228       BMP:   Recent Labs     01/15/21  1435 01/15/21  1845 01/16/21  0351 01/16/21  0653   * 137 132*  132*  --    K 4.3 5.1 6.4*  6.5* 5.9*    105 101  102  --    CO2 19* 19* 17*  19*  --

## 2021-01-16 NOTE — PROGRESS NOTES
Pharmacy Note - Renal Dosing    Famotidine 20mg IV BID ordered for patient. This medication is renally eliminated. Will change to famotidine 20mg IV QD for CrCl <60 mL/min per renal dose adjustment policy. Estimated Creatinine Clearance: 34 mL/min (A) (based on SCr of 2 mg/dL (H)). Pharmacy will continue to monitor renal function and adjust dose as necessary. Please call with any questions.        Alex Stuart PharmD, [address] [place] [address] Pharmacy: [phone]

## 2021-01-16 NOTE — DISCHARGE SUMMARY
INTERNAL MEDICINE DEPARTMENT AT [address] [place] [address]  DISCHARGE SUMMARY    Patient ID: Gregory Ann                                             Discharge Date: 1/16/2021   Patient's PCP: Mason Pedersen                                          Discharge Physician: Monika Del Valle MD  Admit Date: 1/13/2021   Admitting Physician: Connor Hurtado MD    PROBLEMS DURING HOSPITALIZATION:  Present on Admission:   Cardiac arrest [facility])      LOREN Car 89 a 72 y. o. male with PMH of T1DM, HTN, asthma, and Parkinson's disease who presents with PEA arrest. Pt had unwitnessed choking episode, EMS arrived and performed CPR for 25 minutes. Rhythm was PEA throughout. Pt arrived in ED in PEA, with ROSC about 10 minutes after arrival (total down time around 35 minutes after EMS arrival). Pt was suctioned and a large hot dog was removed.      ED course: After ROSC, pt was intubated.  Neuro exam revealed mid fixed pupils with no response to painful stimuli before sedation. Of note, pt had 3 episodes of seizure like activity resembling myoclonic jerks in ED. Pt required vaso, epi, and fentanyl. A-line and central line were placed. VBG showed pH 6.83, pCO2 109, pO2 48. 5. CT chest showed pneumothorax on CT, chest tube was placed. Patient was put on cvEEG which showed myoclonic seizures. Patient's family was made aware of very poor prognosis given patient's profound anoxic brain injury. Patient was extubated to comfort care and placed on 2L O2 NC on 1/16. He became bradycardic and went into asystole. Patient was pronounced dead at 7:02 PM on 1/16/2020.     Physical Exam:  BP (!) 126/58   Pulse 98   Temp 100.3 °F (37.9 °C) (Bladder)   Resp 13   Ht 5' 8\" (1.727 m)   Wt 175 lb (79.4 kg)   SpO2 94%   BMI 26.61 kg/m²      Physical Examination:   Physical Exam:  VS: No palpable pulse, no blood pressure   GEN: Pallor with no spontaneous movement   HEENT: Pupils fixed and dilated   CVS: No heart sounds audible Chest: No audible lung sounds  Neuro: Absent reflexes    Consults: neurology and cardiothoracic surgery, intensive care, palliative  Significant Diagnostic Studies:  cvEEG, CT head, CT chest  Treatments: unasyn, IV fluids, calcium gluconate, epi, lasix, chest tube, propofol, rocuronium, [id number]%, D5, sodium bicarb, epi gtt, fentanyl, versed, vasopressin    DISCHARGE MEDICATION:     Medication List      ASK your doctor about these medications    albuterol sulfate  (90 Base) MCG/ACT inhaler     aspirin 81 MG chewable tablet  Take 1 tablet by mouth daily     atorvastatin 10 MG tablet  Commonly known as: LIPITOR     carbidopa-levodopa  MG per tablet  Commonly known as: SINEMET     carvedilol 12.5 MG tablet  Commonly known as: COREG  Take 1 tablet by mouth 2 times daily (with meals)     Cinnamon 500 MG Caps     fluticasone 50 MCG/ACT nasal spray  Commonly known as: FLONASE     insulin glargine 100 UNIT/ML injection pen  Commonly known as: LANTUS;BASAGLAR  Inject 30 Units into the skin nightly     [employer] Pen Schuyler 29G 29G X 12MM Misc  Generic drug: Insulin Pen Needle  1 each by Does not apply route daily     losartan 100 MG tablet  Commonly known as: COZAAR     NovoLOG FlexPen 100 UNIT/ML injection pen  Generic drug: insulin aspart  Inject 10 Units into the skin 3 times daily (before meals)     pantoprazole 40 MG tablet  Commonly known as: PROTONIX     PARoxetine 40 MG tablet  Commonly known as: PAXIL     primidone 50 MG tablet  Commonly known as:  MYSOLINE     rOPINIRole 3 MG tablet  Commonly known as: REQUIP     spironolactone 25 MG tablet  Commonly known as: ALDACTONE  Take 0.5 tablets by mouth daily     vitamin D3 10 MCG (400 UNIT) Tabs tablet  Commonly known as: CHOLECALCIFEROL            Time Spent on discharge is more than 30 minutes    Signed:  Maria De Jesus Reed MD, PGY-1     1/16/2021

## 2021-01-16 NOTE — PROGRESS NOTES
CONTINUOUS EEG    Name:  Martha [address] Record Number:  [id number]  Age: 72 y.o. Gender: male  : 1950  Today's Date:  1/15/2021  Room:  97869840-71  Vital Signs   BP (!) 95/57   Pulse 98   Temp 98.1 °F (36.7 °C) (Bladder)   Resp 25   Ht 5' 8\" (1.727 m)   Wt 175 lb (79.4 kg)   SpO2 93%   BMI 26.61 kg/m²           Continuous EEG Testing Start Time:      Continuous EEG Testing End Time:       Comments: Armond Mcrae M.D. pt test completed. [facility] contacted via team viewer. Plan of Care: Leads removed.     Electronically signed by Elin Tabares on 1/15/2021 at 10:35 PM

## 2021-01-16 NOTE — PROGRESS NOTES
Hector Zambrano notified that family wants to terminally extubate patient. Stated to call back with cardiac time of death.

## 2021-01-16 NOTE — PROGRESS NOTES
Attempted to call primary JAN Hoff. Voicemail left to call back as soon as possible. Call back number included. Will attempt again.

## 2021-01-16 NOTE — PROCEDURES
Continuous EEG monitoring record    Patient name: Ashley Bernard    START: 01/15/2021 @ 08:00am  END: 01/15/2021 @ 22:05pm      Electroencephalographer: Thomas Ang MD PhD      CLINICAL DETAILS:  This EEG was performed on this 72 y. o.yo male admitted for Altered mental Status and concer for subclinical seizures      TECHNICAL DETAILS:  Continuous video-EEG monitoring was performed with 27 surface scalp electrodes placed according to the International 10-20 electrode placement system, using a 32-channel Sequenom headbox. All EEG and video information was acquired digitally, including the use of automated spike and seizure detection software to detect epileptiform activity. An event button was also available to be depressed during clinical events. 01/15/2021 08:00am to 22:05pm  SEIZURES:  Abundant brief 6-8 second bursts of spike and wave discharges associated clinical myoclonus consistent with myoclonic seizures. INTERICTAL:  None  PUSHBUTTONS:  None  BACKGROUND:  Diffuse attenuation of the background with persistent superimposed EMG most prominent in the frontal leads. No clear reactivity is present. EKG:  regular    CLINICAL INTERPRETATION:  This was an abnormal tracing for age and state due to a profound diffuse cerebral dysfunction which can be of multiple causes, including structural, or vascular abnormalities, toxic/metabolic conditions, hydrocephalus, or postictal conditions. Frequent brief myoclonic seizures were seen as described above. Unchanged from yesterday. Clinical correlation is advised.           Thomas Ang MD PhD

## 2021-01-16 NOTE — PROGRESS NOTES
On midnight assessment. RN was assessing Chest tube and noticed patient has crepitous around insertion site and about 4 inches in all directions from insertion site. No air leak present. Minimal blood output. Neuro assessment unchanged. Pt still having myoclonic jerking. Blood glucose ~ 90-110s when checking BG. Pt remains on epi.

## 2021-01-16 NOTE — PROGRESS NOTES
Hospitalist Progress Note      PCP: Thierno MOYER    Date of Admission: 1/13/2021    Chief Complaint: Cardiac arrest    Hospital Course: This is a 71-year-old male with a past medical history of type 1 diabetes mellitus hypertension asthma Parkinson disease who is presenting with PEA arrest probably related to choking. He had a CPR for 35 minutes with subsequent ROSC. Currently he is on hypothermia protocol, on pressor      Subjective: Patient seen and examined   on vent, not responsive  On  pressors  Palliative care following    Medications:  Reviewed    Infusion Medications    dextrose      EPINEPHrine infusion Stopped (01/16/21 1257)    dextrose 125 mL/hr at 01/16/21 6291    dextrose      fentaNYL 2000 mcg/200 mL IV infusion Stopped (01/16/21 1257)    vasopressin (Septic Shock) infusion Stopped (01/15/21 1210)    propofol Stopped (01/15/21 1200)     Scheduled Medications    sodium bicarbonate        [START ON 1/17/2021] famotidine (PEPCID) injection  20 mg Intravenous Daily    chlorhexidine  15 mL Mouth/Throat BID    sodium chloride  30 mL/kg Intravenous Once    heparin (porcine)  5,000 Units Subcutaneous 3 times per day    ampicillin-sulbactam  3 g Intravenous Q6H     PRN Meds: glucose, dextrose, glucagon (rDNA), dextrose, LORazepam, morphine, [DISCONTINUED] promethazine **OR** ondansetron, glucose, dextrose, glucagon (rDNA), dextrose, fentanNYL, midazolam, potassium chloride, polyvinyl alcohol      Intake/Output Summary (Last 24 hours) at 1/16/2021 1511  Last data filed at 1/16/2021 1200  Gross per 24 hour   Intake 2643 ml   Output 207 ml   Net 2436 ml       Physical Exam Performed:    BP (!) 119/57   Pulse 102   Temp 100.3 °F (37.9 °C) (Bladder)   Resp 20   Ht 5' 8\" (1.727 m)   Wt 175 lb (79.4 kg)   SpO2 (!) 82%   BMI 26.61 kg/m²     General appearance: Chronically ill-appearing, intubated and sedated. HEENT: Pupils equal, round, and reactive to light. Conjunctivae/corneas clear. Neck: Supple, with full range of motion. No jugular venous distention. Trachea midline. Respiratory:  Normal respiratory effort. Clear to auscultation, bilaterally without Rales/Wheezes/Rhonchi. Cardiovascular: Regular rate and rhythm with normal S1/S2 without murmurs, rubs or gallops. Abdomen: Soft, non-tender, non-distended with normal bowel sounds. Musculoskeletal: No clubbing, cyanosis or edema bilaterally. Full range of motion without deformity. Skin: Skin color, texture, turgor normal.  No rashes or lesions. Neurologic: Unable to assess   capillary Refill: Brisk,< 3 seconds   Peripheral Pulses: +2 palpable, equal bilaterally       Labs:   Recent Labs     01/14/21  0405 01/15/21  0358 01/16/21  0351   WBC 14.1* 11.1* 12.5*   HGB 8.5* 8.2* 7.4*   HCT 27.2* 25.5* 22.1*    272 228     Recent Labs     01/15/21  0955 01/15/21  0955 01/15/21  1845 01/16/21  0351 01/16/21  0653 01/16/21  0950      < > 137 132*  132*  --  129*   K 3.9   < > 5.1 6.4*  6.5* 5.9* 6.0*      < > 105 101  102  --  99   CO2 20*   < > 19* 17*  19*  --  21   BUN 23*   < > 26* 30*  30*  --  33*   CREATININE 1.0   < > 1.6* 1.9*  1.8*  --  2.0*   CALCIUM 8.0*   < > 7.4* 7.0*  7.0*  --  7.0*   PHOS 5.2*  --   --  7.2*  --  7.7*    < > = values in this interval not displayed.      Recent Labs     01/15/21  0955   AST 47*   ALT 18   BILIDIR <0.2   BILITOT 0.4   ALKPHOS 145*     Recent Labs     01/13/21  1838 01/14/21  0405   INR 1.14 1.11     Recent Labs     01/13/21  1839   TROPONINI 0.04*       Urinalysis:      Lab Results   Component Value Date    NITRU Negative 10/02/2020    WBCUA  09/23/2020    BACTERIA Rare 09/23/2020    RBCUA  09/23/2020    BLOODU Negative 10/02/2020    SPECGRAV 1.015 10/02/2020    GLUCOSEU 500 10/02/2020       Radiology:  XR CHEST PORTABLE   Final Result 1. Right-sided pigtail chest tube in similar position with no measurable pneumothorax identified. 2. Right-sided soft tissue gas appears slightly decreased from prior. 3. Persistent right basilar airspace disease suggesting atelectasis. 4. Stable right-sided rib fractures and stable left support devices as described. XR CHEST PORTABLE   Final Result      Interval decrease in size of the right-sided pneumothorax since earlier same day. More prominent emphysema over the right chest wall. XR CHEST PORTABLE   Final Result      Mild decrease in size of right apical pneumothorax, with pleural margin currently located 5 cm from the apical osseous margin (previously 6 cm). Slight change in location of the left chest tube is noted. Examination otherwise without change. XR CHEST PORTABLE   Final Result      Stable small-moderate right sided pneumothorax. Right basilar chest tube is again noted. Right subcutaneous emphysema again noted. Left lung remains clear. Stable cardiomediastinal silhouette. Lines and tubes without change. Old left rib fractures again noted. XR CHEST PORTABLE   Final Result     Right apical pneumothorax is slightly larger, with the about 30-40%    loss in volume. XR ABDOMEN (KUB) (SINGLE AP VIEW)   Final Result     Tip of OG tube is in the body the stomach. XR CHEST PORTABLE   Final Result   Status post chest tube placement. The pneumothorax is substantially    improved. CT CHEST WO CONTRAST   Final Result      1. Large right pneumothorax with atelectatic right upper lobe and left lower lobe with mucus plugging or debris in the bronchus intermedius. Superimposed pneumonia in the right lower lobe is suspected. 2. Multiple acute right-sided rib fractures with comminuted angulated fracture of the right third rib. 3. Multiple subacute fractures of the right ribs and chronic fractures of the left ribs. 4. Suspected subacute fracture of the mid sternum with some degree of callus formation. Clinical correlation is recommended. 5. Worsening compression fracture at L1 which may represent an acute on chronic compression fracture. 6. Right-sided chest wall gas presumably related to the rib fracture and pneumothorax. CT Head WO Contrast   Final Result      1. Stable exam with no acute intracranial abnormality. 2. Retained fluid in the nasopharynx presumably related to altered mental status. XR CHEST PORTABLE   Final Result   1. Small right pneumothorax with right basilar atelectasis. 2. Right chest wall soft tissue gas. 3. Life-support devices as described. XR CHEST PORTABLE   Final Result   1. Soft tissue gas along the right ribs and right chest wall and into the right axilla suspicious for a lung injury although a measurable pneumothorax is not identified. 2. Multiple right-sided rib fractures. Consider further evaluation with chest CT. 3. Endotracheal tube in appropriate position. MRI BRAIN WO CONTRAST    (Results Pending)           Assessment/Plan:    Active Hospital Problems    Diagnosis    Cardiac arrest ([facility] [address] [I46.9]     1. PEA arrest secondary to choking event, ROSC after 35 minutes of CPR  Currently on vent sedated  S/p Hypothermia protocol  Critical care team following    2. Pneumothorax secondary to trauma from CPR  CT surgery following patient has chest tube,     3.  Seizure-like activity like myoclonic jerks  On heavy sedation  Neurology consulted      4. hyperglycemia  on insulin drip    DVT Prophylaxis: lovenox  Diet: Diet NPO Effective Now  Code Status: DNR-CC    PT/OT Eval Status: n/a     Dispo -icu, prognosis poor    Madelyn Gregory MD

## 2021-01-16 NOTE — PROGRESS NOTES
ICU Progress Note    Admit Date: 1/13/2021  Hospital Day: 4    ICU DAY 4  Code:DNR-CC  PCP: Yazmin MOYER            SUBJECTIVE:   Interval Hx:     Pt seen and examined at bedside. Intubated. Overnight patient had K of 6.4, he received lasix, insulin, d50, calcium gluconate. On recheck it was 6 this AM. On epi 6 and fentanyl 50    MEDICATIONS:   Scheduled Meds:   sodium bicarbonate        chlorhexidine  15 mL Mouth/Throat BID    famotidine (PEPCID) injection  20 mg Intravenous BID    sodium chloride  30 mL/kg Intravenous Once    heparin (porcine)  5,000 Units Subcutaneous 3 times per day    ampicillin-sulbactam  3 g Intravenous Q6H      Continuous Infusions:   dextrose      EPINEPHrine infusion 6 mcg/min (01/16/21 0749)    dextrose 125 mL/hr at 01/16/21 0633    dextrose      fentaNYL 2000 mcg/200 mL IV infusion 50 mcg/hr (01/15/21 1800)    vasopressin (Septic Shock) infusion Stopped (01/15/21 1210)    propofol Stopped (01/15/21 1200)     PRN Meds:glucose, dextrose, glucagon (rDNA), dextrose, LORazepam, morphine, [DISCONTINUED] promethazine **OR** ondansetron, glucose, dextrose, glucagon (rDNA), dextrose, fentanNYL, midazolam, potassium chloride, polyvinyl alcohol  Allergies: Allergies   Allergen Reactions    Benzonatate Other (See Comments)     dizzy    Erythromycin Nausea And Vomiting       PHYSICAL EXAM:       Vitals: BP (!) 119/57   Pulse 101   Temp 100.3 °F (37.9 °C) (Bladder)   Resp 16   Ht 5' 8\" (1.727 m)   Wt 175 lb (79.4 kg)   SpO2 98%   BMI 26.61 kg/m²   I/O:      Intake/Output Summary (Last 24 hours) at 1/16/2021 1256  Last data filed at 1/16/2021 1200  Gross per 24 hour   Intake 3606.4 ml   Output 258 ml   Net 3348.4 ml     I/O this shift:  In: -   Out: 15 [Chest Tube:15]  I/O last 3 completed shifts:   In: 3606.4 [I.V.:3606.4]  Out: 683 [Urine:592; Emesis/NG output:25; Chest Tube:66]    Physical Examination:   General appearance: Intubated, sedated HEENT: NCAT; not tracking with EOMs, pupils non-reactive bilaterally  Neck: trachea midline, no appreciable JVD  Chest/Lungs: Mechanical breath sounds bilaterally, symmetric chest rise; R 14Fr chest tube to -20 suction, no air leak  Cardiovascular: Regular rate and rhythm  Abdomen: soft, non-distended  Skin: cool and pale, no rashes  Extremities: no edema, no cyanosis  Neuro: Sedated; no blink reflex; not following commands or responding to pain    Vent Settings: Vent Mode: VS Rate Set: 24 bmp/Vt Ordered: 550 mL/ /FiO2 : 30 %    DATA:       Labs:  CBC:   Recent Labs     01/14/21  0405 01/15/21  0358 01/16/21  0351   WBC 14.1* 11.1* 12.5*   HGB 8.5* 8.2* 7.4*   HCT 27.2* 25.5* 22.1*    272 228       BMP:   Recent Labs     01/15/21  1845 01/16/21  0351 01/16/21  0653 01/16/21  0950    132*  132*  --  129*   K 5.1 6.4*  6.5* 5.9* 6.0*    101  102  --  99   CO2 19* 17*  19*  --  21   BUN 26* 30*  30*  --  33*   CREATININE 1.6* 1.9*  1.8*  --  2.0*   GLUCOSE 104* 92  96  --  81     ABGs:   Recent Labs     01/15/21  1434 01/16/21  0948   PHART 7.241* 7.359   CET2OTQ 48.7* 37.6   PO2ART 89.6 77.7     Rad:   EKG:     ASSESSMENT AND PLAN:   Venu Ware a 72 y. o. male with PMH of T1DM, HTN, asthma, and Parkinson's disease who presents with PEA arrest.     PEA arrest 2/2 hypoxia  Hypoxia from choking event. PEA for 35 min before ROSC.  CXR showed subQ air. Pt had 3 episodes of seizure like activity in ED.  - Hypothermia protocol started   - on vaso (added this AM) and epi   - Intubated: AC/VC Rate Set: 24 bmp/Vt: 550 mL/ /FiO2 : 30 % PEEP 5   - q4h neuro checks  - Palliative care consulted; Nathaniel Boom changed to daughters.      Hypoxic Encephalopathy 2/2 PEA arrest   - CT head: no acute findings   - q4h neuro checks  - no EEG per neurology, poor prognosis   - Palliative care consulted; HC-POA changed to daughters - wish to withdraw care     Pneumothorax 2/2 trauma from CPR Pneumothorax on CT Chest 11/13. Rib fractures and possible superimposed pneumonia on CT Chest as well. - chest tube placed, 65 cc bloody fluid drained, managed per surgery.       Hyperglycemia in setting of T1DM - now hypoglycemic 2/2 rewarming protocol   Multiple prior admissions for DKA. Glucose 291 on arrival  - insulin gtt stopped overnight   - pt started on D5    - hypoglycemia protocol  - Q1H glucose checks      Anion gap lactic acidosis 2/2 PEA arrest  Initial lactate 8.3, AGAP 20, pH 6.83  - management of primary problems as above    Seizure-like activity- resolved.     Chronic problems:  HTN  -holding home meds     Asthma  -holding home meds     Parkinson's disease  -holding home meds    Code Status:DNR-CC  FEN: Regular Diet  PPX: Lovenox 40  DISPO: Withdrawing care today    This pt was discussed with Dr. Johan Dimas MD   -----------------------------  Claudean Rav, M.D.   PGY-1 Internal Medicine  1/16/2021 12:56 PM     Pulm     Patient seen and examined. I agree with Dr. Rose's history, physical, lab findings, assessment and plan. We finally make contact with healthcare power of  of record, Lesli Taylor. She stated that she has no desire to make any medical decisions for Arabella Wooten. The first alternate on healthcare power of  is listed as Bambi Ayon. She currently is present at bedside. I updated her to her dad status and she desires transition to comfort care as she does not want to see him suffer any further     Assessment  1.  PEA cardiac arrest -suspected due to hypoxemia with obstruction of airway with a hot dog.  Downtime estimated at 35 minutes.   Status post hypothermia protocol  2.  Diabetes -continue with recurrence hypoglycemia requiring D10 drip  3.  History of hypertension  4.  Asthma  5.  Parkinson's  6.  Multiple right-sided rib fractures with large pneumothorax  7.  Suspected right lower lobe aspiration pneumonia  8.  Shock - remains on epinephrine 9.  Lactic acidemia -resolved     Plan  1.   Continue D10 drip at 125  2.   Wean epinephrine for systolic blood pressure of 100  3.  Continue propofol and fentanyl for goal RASS of -1   4.  Surgery managing chest tube  5.  cv EEG stopped last night  6.  Continue Unasyn for suspected aspiration pneumonia  7.  Neurology following  8.  Ativan 2 mg IV as needed seizures  9.  May need continuous EEG if exhibited seizure-like activity  10. Vent: Change to volume support 550, 30%, PEEP 5 with backup rate of 24 to facilitate vent synchrony.  Wean FiO2 for goal oxygen saturation 88%  12. .Robbie Garcia has been changed to DNR CC and will extubate when family ready. Prognosis grave and I suspect death imminent once extubated and off vasopressors     Pt has a high probability of imminent or life-threatening deterioration requiring close monitoring, and highly complex decision-making and/or interventions of high intensity to assess, manipulate, and support his critical organ systems to prevent a likely inevitable decline which could occur if left untreated.      A total critical care time 31 minutes was used. This includes but not limited to examining patient, collaborating with other physicians, monitoring vital signs, telemetry, continuous pulse oximetry, and clinical response to IV medications, documentation time, review and interpretation of laboratory and radiological data, review of nursing notes and old record review.  This time excludes any time that may have been spent performing procedures for life threatening organ failure.     Mustapha Payne MD

## 2021-01-17 LAB
EKG ATRIAL RATE: 81 BPM
EKG DIAGNOSIS: NORMAL
EKG Q-T INTERVAL: 360 MS
EKG QRS DURATION: 108 MS
EKG QTC CALCULATION (BAZETT): 475 MS
EKG R AXIS: -46 DEGREES
EKG T AXIS: 106 DEGREES
EKG VENTRICULAR RATE: 105 BPM

## 2021-01-17 PROCEDURE — 37799 UNLISTED PX VASCULAR SURGERY: CPT

## 2021-01-17 NOTE — PROGRESS NOTES
[phone]. Hold placed for possible donation. [place] [facility] [place] at this time.

## 2021-01-17 NOTE — DEATH NOTES
Notice of Death:  Time of Death: 7:00  Time Pronounced: 7:02    I personally evaluated the Pt at bedside. Pt was found to be without cardiac activity on monitor, absent brain stem reflexes; no pupillary response, pupils were fixed, dilated, no corneal reflex, no doll's eyes. Pt was areflexic, without spontaneous respirations or respiratory movements. No palpable pulses throughout. No response to painful stimuli. GCS of 3.     Physical Exam:  VS: No palpable pulse, no blood pressure   GEN: Pallor with no spontaneous movement   HEENT: Pupils fixed and dilated   CVS: No heart sounds audible   Chest: No audible lung sounds  Neuro: Absent reflexes    Shamika Nicole MD  PGY-1

## 2021-03-08 PROBLEM — G93.1 ANOXIC BRAIN INJURY (HCC): Status: ACTIVE | Noted: 2021-03-08

## 2021-08-30 NOTE — PROGRESS NOTES
Patient: Conrad Dasilva Date: 2021   : 1950    71 year old male      OUTPATIENT WOUND CARE PROGRESS NOTE    Supervising Wound Care Physician: Elgin Cherry DPM    Chief Complaint:    Chief Complaint   Patient presents with   • Wound           History of Present Illness:  This is a 71 year old male who presents to wound care clinic for evaluation of wound to left foot TMA stump site. Currently using endoform to the wound sites.. Doing well in DM shoes and inserts.  States he had a flat tire on the way to the clinic so was on his feet doing that today. No other pedal complaints.    Review of Systems:  reviewed with pertinent findings as above    Past Medical History:   Diagnosis Date   • Complete heart block (CMS/[facility])    • Coronary artery disease     single vessel pLAD 60%   • Diabetic ketoacidosis without coma associated with type 2 diabetes mellitus (CMS/[facility]) 1/10/2021   • Essential hypertension, benign    • [place] (hard of hearing)    • Hyperlipidemia    • Ischemic cardiomyopathy     EF 40-45%, NYHA class II symptoms s/p STJ BiVPM , EF improved 55%   • Presence of biventricular cardiac pacemaker     ST  DEPENDENT (complete heart block)   • Recurrent syncope     d/t intermittent complete heart block   • Syncope     d/t intermittent complete heart block   • TIA (transient ischemic attack)      transient dizziness and R arm weakness, short term plavix, no f/u with neurology, no further events   • Uncontrolled type 2 diabetes mellitus with hyperglycemia (CMS/[facility]) 2019     Past Surgical History:   Procedure Laterality Date   • Amputation foot,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 Pt's daughter updated on plan of care. VSS. on file   Occupational History   • Occupation: retired   Tobacco Use   • Smoking status: Former Smoker     Packs/day: 3.00     Types: Cigarettes     Quit date: 2001     Years since quittin.6   • Smokeless tobacco: Never Used   • Tobacco comment: quit 20 yrs ago   Vaping Use   • Vaping Use: Never assessed   Substance and Sexual Activity   • Alcohol use: Not Currently     Alcohol/week: 2.0 standard drinks     Types: 2 Standard drinks or equivalent per week     Comment: quit 2001   • Drug use: Not Currently     Types: Cannabinols     Comment: CBD oil   • Sexual activity: Not on file   Other Topics Concern   •  Service Not Asked   • Blood Transfusions Not Asked   • Caffeine Concern Yes     Comment: 1 cup of coffee a day   • Occupational Exposure Not Asked   • Hobby Hazards Not Asked   • Sleep Concern Not Asked   • Stress Concern Not Asked   • Weight Concern Not Asked   • Special Diet Not Asked   • Back Care Not Asked   • Exercise No     Comment: walking his dogs,daily    • Bike Helmet Not Asked   • Seat Belt Not Asked   • Self-Exams Not Asked   Social History Narrative     Children yes 2, Healthy      Social Determinants of Health     Financial Resource Strain:    • Social Determinants: Financial Resource Strain:    Food Insecurity:    • Social Determinants: Food Insecurity:    Transportation Needs:    • Lack of Transportation (Medical):    • Lack of Transportation (Non-Medical):    Physical Activity: Sufficiently Active   • Days of Exercise per Week: 7 days   • Minutes of Exercise per Session: 30 min   Stress:    • Social Determinants: Stress:    Social Connections:    • Social Determinants: Social Connections:    Intimate Partner Violence: Not At Risk   • Social Determinants: Intimate Partner Violence Past Fear: No   • Social Determinants: Intimate Partner Violence Current Fear: No     Family History   Problem Relation Age of Onset   • Migraine Mother    • Patient is unaware of any medical  problems Father        Current Outpatient Medications   Medication Sig   • gabapentin (NEURONTIN) 400 MG capsule Take 2 capsules by mouth every morning. As needed   • meloxicam (MOBIC) 15 MG tablet Take 1 tablet by mouth daily.   • doxycycline hyclate (VIBRAMYCIN) 100 MG capsule    • gemfibrozil (LOPID) 600 MG tablet TAKE 1 TABLET BY MOUTH TWICE A DAY   • lisinopril (ZESTRIL) 2.5 MG tablet TAKE 1 TABLET BY MOUTH EVERY DAY   • atorvastatin (LIPITOR) 80 MG tablet TAKE 1 TABLET BY MOUTH EVERY DAY   • ciprofloxacin-dexamethasone (Ciprodex) otic suspension Place 4 drops into both ears 2 times daily.   • traMADol (ULTRAM) 50 MG tablet Take 1 tablet by mouth every 6 hours as needed for Pain.   • clopidogrel (PLAVIX) 75 MG tablet TAKE 1 TABLET BY MOUTH EVERY DAY   • NovoLOG FlexPen 100 UNIT/ML pen-injector INJECT 20 UNITS 3 TIMES A DAY BEFORE MEALS   • levothyroxine 50 MCG tablet TAKE 1 TABLET BY MOUTH EVERY DAY   • Acetaminophen 500 MG Cap Take 1,000 mg by mouth every 8 hours as needed (moderate pain).   • aspirin-acetaminophen-caffeine (EXCEDRIN MIGRAINE) 250-250-65 MG per tablet Take 1 tablet by mouth every 6 hours as needed for Pain. Migraine   • Icosapent Ethyl 1 g Cap Take 2 g by mouth 2 times daily.   • collagenase (Santyl) 250 UNIT/GM ointment Apply 1 Dose topically 2 days a week. APPLY TO LEFT FOOT AMPUTATION OPEN AREA   • melatonin 3 MG Take 3 mg by mouth at bedtime as needed for Insomnia.   • insulin lispro (HumaLOG KwikPen) 200 UNIT/ML pen-injector Inject 12 Units into the skin 3 times daily (with meals). Prime 2 units before each dose.   • Amino Acids-Protein Hydrolys (Pro-Stat AWC) Liquid Take 30 mLs by mouth daily. DC at discharge   • ZINC SULFATE PO Take 220 mg by mouth daily. x14 days   • ascorbic acid (VITAMIN C) 500 MG tablet Take 500 mg by mouth 2 times daily. DC at discharge   • Insulin Syringe 31G X 5/16\" 1 ML Misc 1 Syringe.   • insulin glargine 100 UNIT/ML pen-injector Inject 25 Units into the skin  every 12 hours. Prime 2 units before each dose.   • albuterol 108 (90 Base) MCG/ACT inhaler Inhale 2 puffs into the lungs every 6 hours. x3 days, then PRN   • Multiple Vitamins-Minerals (MULTIVITAMIN & MINERAL PO) Take 1 tablet by mouth daily.   • LACTOBACILLUS RHAMNOSUS, GG, PO Take 1 tablet by mouth 2 times daily.   • metoPROLOL succinate (TOPROL-XL) 50 MG 24 hr tablet TAKE 1 TABLET BY MOUTH DAILY   • aspirin 81 MG chewable tablet Chew 1 tablet by mouth daily. Do not start before January 22, 2021.   • fluticasone (Flonase) 50 MCG/ACT nasal spray Spray 2 sprays in each nostril daily.   • ACCU-CHEK SMARTVIEW test strip TEST THREE TIMES DAILY AS DIRECTED   • metformin (GLUCOPHAGE) 1000 MG tablet Take 1,000 mg by mouth 2 times daily (with meals). Do not start before February 7, 2021.     No current facility-administered medications for this encounter.        ALLERGIES:  Patient has no known allergies.    OBJECTIVE:  Vital Signs:    Visit Vitals  /68 (BP Location: RUE - Right upper extremity, Patient Position: Sitting)   Pulse (S) (!) 122 Comment: irregular   Resp 18   SpO2 96%         Physical Exam:  General: Patient is alert and oriented and in NAD  Vascular: CFT brisk to surgical flaps. DP/ PT pulses difficult to palpate on exam today.  Neurological: Sensation diminished to level of the malleoli, nasra   Integument: See below.    Wound Foot Left Distal;Medial Friction/Shear (Active)   Date First Assessed: 06/28/21   Location: Foot  Laterality: Left  Modifier: Distal;Medial  Level of Skin Injury: Partial Thickness  Primary Wound Type: Friction/Shear  Wound Approximate Age at First Assessment (Weeks): 1 weeks      Assessments 8/30/2021  1:00 PM   Wound Image      Wound Care Team Consult Date 06/28/21   Dressing Assessment Drainage present;Intact   Dressing Activity Changed   Dressing Changed On   08/21/21   Wound Exudate Moderate;Serosanguineous   Cleansing Agent Hypochlorous acid (e.g. Vashe, Exsept)   Wound  Bed/Tissue Type Granulated   Periwound Condition Callus   Wound Edge Attached to wound bed;Well defined   Wound Status Improving   Wound Dressing Gauze;Gauze roll-conforming (e.g. Tone) (endoform)   Wound Last Measured 08/23/21   PhotoTaken? Yes   Time Out 1313   Debridement Percentage (%) 100   Wound Bed % Granulated 100 %       PROCEDURE:  He underwent excisional debridement of wounds sites today to level of sub q tissue with tissue nippers, minimal bleeding, pressure hemostasis achieved.    Procedure was Performed by:  Physician     Laboratory assessments reviewed:  No results found for: PAB   Albumin (g/dL)   Date Value   04/01/2021 3.3 (L)   03/10/2021 3.4 (L)   02/16/2021 3.0 (L)      No results available in last 24 hours    Lab Results   Component Value Date    WBC 8.4 04/08/2021    GLUCOSE 108 (H) 04/01/2021    HGBA1C 7.0 (H) 05/18/2021    CRP <0.3 04/08/2021    RESR 39 (H) 04/08/2021    CREATININE 1.03 04/01/2021    GFRA 77 10/23/2020    GFRNA 67 10/23/2020        Culture results:  No results found for: SDES No results found for: CULT       Problem List Items Addressed This Visit        Musculoskeletal and Injuries    Wound of left foot - Primary       Neuro    Diabetic polyneuropathy associated with diabetes mellitus due to underlying condition (CMS/HCC) (Chronic)           Medical Decision Making:     Patient seen and examined. Diagnosis and treatment options discussed.    Wound to left foot improving. Dressing perry with endoform.    He underwent excisional debridement of wounds sites today, full procedure note above    He can do dressing changes every other day with endoform and continue to monitor sites closely    He should continue in DM shoes with toe filler, doing well in these    All questions answered.    F/u  Two weeks. F/u sooner if issues arise    Complete Wound Care  Every visit  Diagnosis: Other (specify)  Other (specify): Friction shear  Dressing change(s) to be done by: Other  (specify)  Other (specify): patient  Dressing frequency: Every other day  Wound location: left foot  Dressing change(s) to be done using: Clean Technique  Clean wound with: Wound cleanser  Clean wound with: Soap (not antimicrobial or scented) and water  Topical agents: Other (specify)  Apply Other (specify) to: endoform  Dressing type: Gauze  Cover dressing: Kerlix  Specify order of application: endoform, gauze,kerlix,

## 2022-03-15 NOTE — PROGRESS NOTES
Body Location Override (Optional - Billing Will Still Be Based On Selected Body Map Location If Applicable): left malar cheek [id number] NUTRITION ASSESSMENT  Admission Date: 8/21/2020     Type and Reason for Visit: Reassess    NUTRITION RECOMMENDATIONS:   1. Dysphagia soft and bite sized, Carb Control diet  2. Add Glucerna BID, encourage acceptance. NUTRITION ASSESSMENT:  Patient is at nutritional compromise r/t decreased PO intake since diet adv r/t nausea and decreased appetite. Pt w/ breakfast at bedside this am untouched but reports nausea is improved today and plans to try to eat soon. Reviewed MNT for nausea (eat small, frequent meals; try dry/ starchy foods, sip of ginger ale or clear liquids first). Pt is also receptive to Glucerna once he can tolerated more PO intake (encouraged not to try as first foods). Monitor tolerance to diet, PO intake and ONS acceptance. MALNUTRITION ASSESSMENT  Context of Malnutrition: Acute Illness   Malnutrition Status: At risk for malnutrition (Comment)  Findings of the 6 clinical characteristics of malnutrition (Minimum of 2 out of 6 clinical characteristics is required to make the diagnosis of moderate or severe Protein Calorie Malnutrition based on AND/ASPEN Guidelines):  Energy Intake: Less than/equal to 50% of estimated energy requirements    Energy Intake Time: Greater than or equal to 5 days    Weight Loss %: Unable to assess    Weight loss Time: Unable to assess   Body Fat Loss: Unable to Assess   Body Fat Location: Unable to assess    Body Muscle Loss: Unable to Assess   Body Muscle Loss Location: Unable to assess    Fluid Accumulation: No significant    Fluid Accumulation Location: No significant     Strength: Not Performed;  Not Measured     NUTRITION DIAGNOSIS   Problem: Problem #1: Inadequate oral intake  Etiology: Insufficient energy/nutrient consumption Nausea   Signs & Symptoms: Intake 0-25%    NUTRITION INTERVENTION  Food and/or Nutrient Delivery:Continue Current diet  or Start ONS   Nutrition education/counseling/coordination of care: Continue Inpatient Monitoring     NUTRITION Detail Level: Detailed MONITORING & EVALUATION:  Evaluation:No progress towards goal   Goals:Goals: Patient will tolerate po diet and consume 50% or greater of all meals and supplements  Monitoring: Diet Tolerance , GI Function , Meal Intake , Nausea  or Supplement Intake      OBJECTIVE DATA:  · Nutrition-Focused Physical Findings: LBM 8/27; BLE +1  · Wounds None      Past Medical History:   Diagnosis Date    Asthma     Diabetes mellitus ([facility]     Hypertension     Parkinson disease ([facility]       ANTHROPOMETRICS  Current Height: 5' 8\" (172.7 cm)  Current Weight: 160 lb 15 oz (73 kg)    Admission weight: 184 lb (83.5 kg)  Ideal Bodyweight 154 lb (70 kg)   Usual Bodyweight 185-200# per EMR      BMI BMI (Calculated): 24.5    Wt Readings from Last 50 Encounters:   08/28/20 160 lb 15 oz (73 kg)   08/05/20 184 lb 8.4 oz (83.7 kg)   08/01/20 190 lb 14.7 oz (86.6 kg)   07/26/20 185 lb (83.9 kg)   07/15/20 190 lb (86.2 kg)   06/22/20 206 lb 12.7 oz (93.8 kg)   06/17/20 185 lb (83.9 kg)   05/27/20 207 lb (93.9 kg)   05/19/20 207 lb 10.8 oz (94.2 kg)   05/05/20 193 lb 5.5 oz (87.7 kg)   10/31/19 185 lb (83.9 kg)   01/19/18 185 lb (83.9 kg)   04/20/17 185 lb (83.9 kg)       COMPARATIVE STANDARDS  Estimated Total Kcals/Day : 22-25  Current Bodyweight (83 kg) 7307-0338 kcal    Estimated Total Protein (g/day) : 1.3-1.5 Current Bodyweight (83 kg) 107-124 g/day  Estimated Daily Total Fluid (ml/day): 2086-8102 mL per day     Food / Nutrition-Related History  Pre-Admission / Home Diet:  Pre-Admission/Home Diet: Carb Control   Home Supplements / Herbals:    none noted  Food Restrictions / Cultural Requests:    none noted    Diet Orders / Intake / Nutrition Support  Current diet/supplement order: DIET DYSPHAGIA SOFT AND BITE-SIZED;  Carb Control: 4 carb choices (60 gms)/meal; Dysphagia Minced and Moist  Dietary Nutrition Supplements: Diabetic Oral Supplement     NSG Recorded PO:   PO Fluids P.O.: 120 mL  PO Meals PO Meals Eaten (%): 1 - 25%   PO Intake: 0% Depth Of Biopsy: dermis Was A Bandage Applied: Yes Size Of Lesion In Cm: 0 Biopsy Type: H and E Biopsy Method: Personna blade Anesthesia Type: 1% lidocaine with 1:100,000 epinephrine Anesthesia Volume In Cc: 0.5 Hemostasis: Drysol and Electrocautery Wound Care: Antibiotic ointment Dressing: Band-Aid Destruction After The Procedure: No Type Of Destruction Used: Curettage Curettage Text: The wound bed was treated with curettage after the biopsy was performed. Cryotherapy Text: The wound bed was treated with cryotherapy after the biopsy was performed. Electrodesiccation Text: The wound bed was treated with electrodesiccation after the biopsy was performed. Electrodesiccation And Curettage Text: The wound bed was treated with electrodesiccation and curettage after the biopsy was performed. Silver Nitrate Text: The wound bed was treated with silver nitrate after the biopsy was performed. Lab: [id number] Lab Facility: [id number] Consent: Written consent was obtained and risks were reviewed including but not limited to scarring, infection, bleeding, scabbing, incomplete removal, nerve damage and allergy to anesthesia. Post-Care Instructions: I reviewed with the patient in detail post-care instructions. Patient is to keep the biopsy site dry overnight. In am wash biopsy site with soap and water, apply topical antibiotic, then apply band aide. Wound care twice daily for two weeks or as directed. Notification Instructions: Patient will be notified of biopsy results. However, patient instructed to call the office if not contacted within 2 weeks. Billing Type: Third-Party Bill Information: Selecting Yes will display possible errors in your note based on the variables you have selected. This validation is only offered as a suggestion for you. PLEASE NOTE THAT THE VALIDATION TEXT WILL BE REMOVED WHEN YOU FINALIZE YOUR NOTE. IF YOU WANT TO FAX A PRELIMINARY NOTE YOU WILL NEED TO TOGGLE THIS TO 'NO' IF YOU DO NOT WANT IT IN YOUR FAXED NOTE.

## 2022-03-29 NOTE — ED NOTES
Patient: Zeenat Sutherland is a 81 y.o. female who presents today with the following Chief Complaint(s):    Chief Complaint   Patient presents with    Follow-Up from [address] headed and UTI at the hospital, elevated BP; D/C 3/25/22    Emesis         HPIBatool was recently hospitalized with a UTI. Treated with appropriate antibiotic. Episodes of emesis at that time. Currently no nausea. She continues to suffer with memory and cognitive challenges often calling daughter someone else and asking for mother who is decreased. She gets very agitated with daughter and at one point wanted to get out of a moving car while daughter was driving. -            Current Outpatient Medications   Medication Sig Dispense Refill    BIOTIN PO Take by mouth      diclofenac sodium (VOLTAREN) 1 % GEL Apply 4 g topically 4 times daily as needed for Pain 4 g 2    vitamin D (ERGOCALCIFEROL) 1.25 MG (95067 UT) CAPS capsule Take 50,000 Units by mouth once a week      potassium chloride (KLOR-CON M) 20 MEQ extended release tablet Take 20 mEq by mouth 2 times daily      furosemide (LASIX) 20 MG tablet Take 1 tablet by mouth daily as needed (leg swelling.) 15 tablet 1    mirtazapine (REMERON) 7.5 MG tablet Take 1 tablet by mouth nightly 90 tablet 1    simvastatin (ZOCOR) 10 MG tablet 1 Tablet at bedtime for high cholesterol. 90 tablet 3    Multiple Vitamins-Minerals (CENTRUM SILVER PO) Take by mouth daily      hydroxychloroquine (PLAQUENIL) 200 MG tablet TAKE 1 AND 1/2 TABLETS(300 MG) BY MOUTH DAILY      atenolol (TENORMIN) 100 MG tablet TAKE 1 TABLET BY MOUTH DAILY 90 tablet 3    Handicap Placard MISC by Does not apply route Diagnosis: diabetes.     Expires: 3/16/22. 1 each 0    famotidine (PEPCID) 20 MG tablet TAKE 1 TABLET BY MOUTH TWICE DAILY BEFORE MEALS 180 tablet 3    acetaminophen (TYLENOL) 650 MG extended release tablet Take 1 tablet by mouth every 8 hours as needed for Pain 60 tablet 3    blood glucose test Report given to ICU RN Kimball Saint, RN  06/18/20 8713 strips (ONE TOUCH ULTRA TEST) strip USE TO TEST DAILY. 100 strip 3    Handicap Placard MISC by Does not apply route Diagnosis: arthritis. 1 each 0    Blood Glucose Monitoring Suppl RAQUEL Test blood sugar twice daily  Diag:  E11.9 1 Device 0    aspirin 81 MG EC tablet Take 81 mg by mouth daily.  traZODone (DESYREL) 100 MG tablet TAKE 1 TABLET BY MOUTH EVERY NIGHT AS NEEDED FOR SLEEP (Patient not taking: Reported on 3/29/2022)      gabapentin (NEURONTIN) 100 MG capsule TAKE 1 CAPSULE BY MOUTH EVERY NIGHT AT BEDTIME 90 capsule 1    folic acid (FOLVITE) 1 MG tablet Take 1 tablet by mouth daily Take 1 tab po daily. 90 tablet 1     No current facility-administered medications for this visit. Patient's past medical history, surgical history, family history, medications,and allergies  were all reviewed and updated as appropriate today. Review of Systems   Constitutional: Negative. HENT: Negative. Eyes: Negative. Respiratory: Negative. Cardiovascular:        Htn, treated with blker. Gastrointestinal: Negative. Genitourinary: Negative. Musculoskeletal: Negative. Skin: Negative. Neurological: Negative. Psychiatric/Behavioral:        See HPI. Physical Exam  Constitutional:       General: She is not in acute distress. Appearance: She is well-developed. HENT:      Head: Normocephalic and atraumatic. Right Ear: External ear normal.      Left Ear: External ear normal.      Nose: Nose normal.   Eyes:      General: No scleral icterus. Conjunctiva/sclera: Conjunctivae normal.      Pupils: Pupils are equal, round, and reactive to light. Neck:      Thyroid: No thyromegaly. Cardiovascular:      Rate and Rhythm: Normal rate and regular rhythm. Heart sounds: Normal heart sounds. Pulmonary:      Effort: Pulmonary effort is normal.      Breath sounds: Normal breath sounds. Abdominal:      General: Bowel sounds are normal.      Palpations: Abdomen is soft. There is no mass. Musculoskeletal:      Cervical back: Normal range of motion and neck supple. Lymphadenopathy:      Cervical: No cervical adenopathy. Skin:     General: Skin is warm and dry. Neurological:      Mental Status: She is alert. Deep Tendon Reflexes: Reflexes are normal and symmetric. Psychiatric:         Behavior: Behavior normal.         Thought Content: Thought content normal.         Judgment: Judgment normal.      Comments: Responds to simple questions appropriately. As family also notes at times she is oriented. Vitals:    03/29/22 1625   BP: 130/70   Pulse: 92   SpO2: 99%       Assessment:   Diagnosis Orders   1. Primary hypertension  Continue same med regimen. DASH and low sodium diet discussed. 2. MCI (mild cognitive impairment) with memory loss  Planning and organizing daily activity. Proper rest, healthy diet, regular physical activity. F/U psychiatry. Address safety concerns. 3. H/O urinary tract infection  Resolved. Increase water intake. Plan:  See plans above.

## 2023-12-04 NOTE — DISCHARGE SUMMARY
Hospital Medicine Discharge Summary    Patient ID: Ted Rahman      Patient's PCP: Scott Lindsay    Admit Date: 8/4/2020     Discharge Date:   8/6/2020    Admitting Physician: Keila Payne MD     Discharge Physician: Keila Payne MD     Discharge Diagnoses: Active Hospital Problems    Diagnosis Date Noted    DKA, type 1, not at goal [facility]) [E10.10] 06/18/2020       The patient was seen and examined on day of discharge and this discharge summary is in conjunction with any daily progress note from day of discharge. Hospital Course: 81-year-old gentleman with history of diabetes mellitus type 1, Parkinson's disease, multiple admissions for DKA and hypoglycemia admitted for DKA after recently being discharged. He was resuscitated in ICU as per DKA protocol. At this time patient is agreeable to be discharged to nursing home. Patient will be discharged home insulin regiment as per last discharge summary. Medically stable to be discharge follow-up with PCP. Discussed with patient regarding referral for endocrinology due to her brittle diabetes mellitus. Patient will discuss with his PCP and will get a referral from him. #Diabetic ketoacidosis resolved  # Diabetes mellitus type 1  #Noncompliance with meds  #NABIL  #Pseudohyponatremia  #Parkinson's disease            Physical Exam Performed:     BP (!) 167/78   Pulse 90   Temp 97.5 °F (36.4 °C) (Oral)   Resp 16   Ht 5' 8\" (1.727 m)   Wt 184 lb 8.4 oz (83.7 kg)   SpO2 95%   BMI 28.06 kg/m²       General appearance:  No apparent distress, appears stated age and cooperative. HEENT:  Normal cephalic, atraumatic without obvious deformity. Pupils equal, round, and reactive to light. Extra ocular muscles intact. Conjunctivae/corneas clear. Neck: Supple, with full range of motion. No jugular venous distention. Trachea midline. Respiratory:  Normal respiratory effort.  Clear to auscultation, bilaterally without Increase aerobic exercise with a goal of at least 30 minutes, 5 days a week.     Rales/Wheezes/Rhonchi. Cardiovascular:  Regular rate and rhythm with normal S1/S2 without murmurs, rubs or gallops. Abdomen: Soft, non-tender, non-distended with normal bowel sounds. Musculoskeletal:  No clubbing, cyanosis or edema bilaterally. Full range of motion without deformity. Mild tremors on both  Skin: Skin color, texture, turgor normal.  No rashes or lesions. Neurologic:  Neurovascularly intact without any focal sensory/motor deficits. Cranial nerves: II-XII intact, grossly non-focal.  Psychiatric:  Alert and oriented, thought content appropriate, normal insight  Capillary Refill: Brisk,< 3 seconds   Peripheral Pulses: +2 palpable, equal bilaterally       Labs: For convenience and continuity at follow-up the following most recent labs are provided:      CBC:    Lab Results   Component Value Date    WBC 7.0 08/06/2020    HGB 9.5 08/06/2020    HCT 28.9 08/06/2020     08/06/2020       Renal:    Lab Results   Component Value Date     08/06/2020    K 4.6 08/06/2020    K 5.8 08/04/2020    CL 97 08/06/2020    CO2 19 08/06/2020    BUN 25 08/06/2020    CREATININE 0.9 08/06/2020    CALCIUM 8.4 08/06/2020    PHOS 2.7 08/06/2020         Significant Diagnostic Studies    Radiology:   XR CHEST PORTABLE   Final Result   1. No findings for acute cardiopulmonary disease.              Consults:     IP CONSULT TO HOSPITALIST  IP CONSULT TO CRITICAL CARE  IP CONSULT TO PALLIATIVE CARE  IP CONSULT TO SOCIAL WORK    Dis[facility]: [facility] to [facility]    Condition at Discharge: Stable    Discharge Instructions/Follow-up: Follow-up with PCP medication compliance    Code Status:  Full Code     Activity: activity as tolerated    Diet: diabetic diet      Discharge Medications:     Current Discharge Medication List           Details   vitamin D3 (CHOLECALCIFEROL) 10 MCG (400 UNIT) TABS tablet Take 400 Units by mouth daily      carbidopa-levodopa (SINEMET)  MG per tablet Take 2 tablets by mouth 3

## 2024-09-04 NOTE — PROGRESS NOTES
"MATERNAL-FETAL MEDICINE PROGRESS NOTE    SUBJECTIVE:     Ms. Tony Ruggiero is a 27 y.o.  female with IUP at 26w3d who is seen in consultation by MFM for evaluation and management of:  Problem   - Abnormal findings on prenatal screening   - Chronic hypertension affecting pregnancy   Echogenic Intracardiac Focus of Fetus On Prenatal Ultrasound       She is feeling well and has no current complaints. No LOF, VB, ctx. +FM.   She says she has been having HA and is taking compazine for them.   She says her BP have been elevated at home (140/100s). Here BP is 130/86.        Medication List with Changes/Refills   New Medications    LABETALOL (NORMODYNE) 200 MG TABLET    Take 1 tablet (200 mg total) by mouth 2 (two) times daily.   Current Medications    ASPIRIN 81 MG CHEW    Take 81 mg by mouth once daily.    PNV NO.153/FA/OM3/DHA/EPA/FISH (PRENATAL GUMMIES ORAL)    Take 2 Units by mouth Daily.    PROCHLORPERAZINE (COMPAZINE) 5 MG TABLET    Take 1 tablet (5 mg total) by mouth every 6 (six) hours as needed (headaches).       Objective:   /86 (BP Location: Left arm)   Pulse 85   Ht 5' 5" (1.651 m)   Wt 109.7 kg (241 lb 13.5 oz)   LMP 2024   BMI 40.25 kg/m²     Ultrasound performed. See viewpoint for full ultrasound report.    A detailed fetal anatomic ultrasound examination was performed for the following high risk indication: Obesity.   The fetus is cephalic.  No fetal structural malformations are identified; however, fetal imaging is incomplete today for heart views.  A follow-up study will be scheduled to complete the fetal anatomic survey.   Fetal size today is consistent with established gestational age (51%).   Amniotic fluid volume is normal.  Placental location is anterior without evidence of previa.     ASSESSMENT/PLAN:     27 y.o.  female with IUP at 26w3d     - Chronic hypertension affecting pregnancy  Today I counseled the patient on maternal/fetal risks associated with CHTN during " ICU Progress Note  PGY-1    PCP: Eugenie MOYER    Code:Full Code  Admit Date: 8/21/2020  IV Access:  PIV Duration:  <1 day   IV Fluids: LR bolus  Diet: NPO Effective now    CC: Hyperglycemia     HISTORY OF PRESENT ILLNESS:    Patient is a 72 y.o. male with a history of T1DM, Parkinson's disease, asthma, and hypertension presenting with hyperglycemia and altered mental status. According to the patients daughter he was just discharged from a nursing facility yesterday. He was last seen between 10-2 PM yesterday. Today his friend attempted to call him; however, there was no answer which is very abnormal and EMS was summoned. They reported that they had to force entry into the house. Patient has had numerous admissions for DKA and hypoglycemia, most recently discharged 8/6/20. He was discharged to SNF on 45U of lantus nightly, and 10U rapid w/ meals. Patient has history of temporary HD for NABIL on previous admission in June 2020. In ED was oriented to name only, otherwise unable to provide additional history. Patient noted to be afebrile, borderline tachypneic to 28, hypotensive with systolic of 87-15 and SpO2 of 79% on 2L nasal canula. VBG significant for pH of 7.068, pCO2 42.9, pO2 28.9, HCO3 10. Other labs significant for hyperkalemia to 5.8, Na of 130, NABIL with SCr of 1.9 (baseline 0.9-1.0), anion gap of 31, lactate of 9.8, and glucose of 822. WBC elevated to 14.8 (7.0 on 8/6), Hb stable at 9.7. UDS positive for barbituates. EKG in ED with ST depressions in multiple leads, repeat EKG NSR HR of 77, no significant ST-T changes. Troponin elevated to 0.15 from 0.06 (7/26). CXR revealed cardiomegaly with diffuse vascular congestion and interstitial pulmonary edema without evidence of focal infiltrate or significant effusion. Levophed drip was started for hypotension. DKA protocol was initiated and patient was emergently intubated in ED.  Broad spectrum antibiotics were started out of concern for possible pneumonia. CT head for AMS with only chronic changes, possible subcentimeter lesion of anterior left chin vs artifact. Pro-BNP elevated to 3,675, (1,493 on 7/26). Echo 5/1/20 with EF of 30%, normal systolic and diastolic function and no wall motion abnormalities. Interval History:  Pt not oriented, with signs of confusion, trying to get out of bed, and required ativan for agitation this AM. Making good urine and does not look volume overloaded. Past Medical /Surgical History:    Past Medical History:   Diagnosis Date    Asthma     Diabetes mellitus [facility] [address]     Hypertension     Parkinson disease ([facility] [address]      Past Surgical History:   Procedure Laterality Date    UPPER GASTROINTESTINAL ENDOSCOPY N/A 5/15/2020    EGD CONTROL HEMORRHAGE performed by Diane Flores MD at [address] [facility] 5/15/2020    EGD BIOPSY performed by Diane Flores MD at [facility] ENDOSCOPY       Medications Prior to Admission:    No current facility-administered medications on file prior to encounter.       Current Outpatient Medications on File Prior to Encounter   Medication Sig Dispense Refill    insulin aspart (NOVOLOG FLEXPEN) 100 UNIT/ML injection pen Inject 10 Units into the skin 3 times daily (before meals) 5 pen 3    insulin glargine (LANTUS;BASAGLAR) 100 UNIT/ML injection pen Inject 45 Units into the skin nightly 5 pen 3    vitamin D3 (CHOLECALCIFEROL) 10 MCG (400 UNIT) TABS tablet Take 400 Units by mouth daily      Cinnamon 500 MG CAPS Take 1 capsule by mouth daily      fluticasone (FLONASE) 50 MCG/ACT nasal spray 2 sprays by Nasal route daily      carbidopa-levodopa (SINEMET)  MG per tablet Take 2 tablets by mouth 3 times daily      losartan (COZAAR) 100 MG tablet Take 100 mg by mouth daily      NIFEdipine (PROCARDIA XL) 60 MG extended release tablet Take 60 mg by mouth nightly      pantoprazole (PROTONIX) 40 MG tablet Take 40 mg by mouth 2 times pregnancy. Risks include but not limited to fetal growth restriction, miscarriage, abruption, maternal end organ disease (renal failure, MI, and stroke),  delivery, development of superimposed preeclampsia, and eclampsia. She was counseled on the recommendations for blood pressure control, serial ultrasound for fetal growth assessment and  testing, and timing of delivery. I also counseled her on the recommendation for aspirin 81 mg daily which may decrease her risk of developing superimposed preeclampsia.     States that her blood pressure has been elevated at home repeatedly.  She is having some headaches additionally.  She has a strong history of chronic hypertension requiring multiple medications outside of pregnancy.  I recommend she start labetalol 200 mg twice daily at this time.    Recommendations ([facility]):  -Continue aspirin 81 mg daily for preeclampsia risk reduction  -Start labetalol 200mg BID  -Baseline evaluation with primary OB:   24-hour urine protein or baseline P/C ratio, CMP, and CBC (completed)  Maternal EKG  Maternal ophthalmic evaluation  Maternal echocardiogram if HTN has been long-standing or EKG is abnormal  -Serial fetal growth ultrasounds every 4-6 weeks, beginning at 26-28 weeks.   -Continued close observation of patient's blood pressures. Avoid hypotension as this has been associated with uteroplacental insufficiency.  -In conjunction with the CHAP study recommendation for BP control: If BP is persistently >=140/90 antihypertensive medication is recommended with goal BP of 120-140/80-90.  -Weekly antepartum testing at 32 weeks (NST+AFV); twice weekly testing if control is poor, multiple comorbidities are present, or requires several medications for control   -Delivery timing:  No medications, no comorbid conditions: 39 0/7 - 39 6/7 weeks gestation  No medications, comorbid conditions: 38 0/7 - 38 6/7 weeks gestation  Controlled on single  agent, no comorbid conditions: 38 0/7 - 38 6/7 weeks gestation  Controlled on single agent, comorbid conditions: 37 0/7 - 38 6/7 weeks gestation  Uncontrolled or requiring >= 2 medications: 36 0/7 - 37 6/7 weeks gestation    Comorbid conditions include BMI >= 40, diabetes, and complex medical condition associated with placental dysfunction (ie lupus or other vascular disease)  Delivery may be recommended earlier pending results of fetal growth ultrasounds, AFV assessment, or antepartum testing results.        Echogenic intracardiac focus of fetus on prenatal ultrasound  This is not a true structural abnormality and is not associated with congenital heart disease or abnormal cardiac function. It is seen as a normal variant in about 5 - 7% of pregnancies. Down syndrome fetuses have a higher incidence of echogenic foci than normal fetuses, therefore it is considered to be a potential marker for fetal Down syndrome. In a woman with other risk factors for Down syndrome (advanced maternal age, elevated quad screen risk or presence of other markers), the presence of an echogenic focus may increase the relative risk of Down syndrome slightly. In a younger woman (< age 35) or in a woman without other risk factors or markers for Down syndrome, the significance of an isolated echogenic focus is minimal. The overwhelming majority (99%) of these fetuses will not have Down syndrome. The presence of an EIF is an indication for a targeted anatomic survey, which was done today. No fetal major structural malformations or other minor markers associated with Down Syndrome were identified. Serum screening, either with a quad screen or cfDNA testing, can be used to provide a quantitative estimate of the chance for fetal Down Syndrome. She has completed NIPT with low risk results.     Cardiac imaging is suboptimal today secondary to fetal position.      - Abnormal findings on prenatal screening  With routine prenatal labs, genetic carrier  daily      PARoxetine (PAXIL) 40 MG tablet Take 40 mg by mouth every morning      primidone (MYSOLINE) 50 MG tablet Take 50 mg by mouth 2 times daily      Insulin Pen Needle ([employer] PEN NEEDLES 29G) 29G X 12MM MISC 1 each by Does not apply route daily 100 each 3    rOPINIRole (REQUIP) 3 MG tablet Take 3 mg by mouth 3 times daily      atorvastatin (LIPITOR) 10 MG tablet Take 10 mg by mouth nightly       albuterol (PROVENTIL HFA;VENTOLIN HFA) 108 (90 BASE) MCG/ACT inhaler Inhale 2 puffs into the lungs every 6 hours as needed. Allergies:  Erythromycin    SocialHistory:   TOBACCO:   reports that he has never smoked. He has never used smokeless tobacco.     ETOH:   reports no history of alcohol use. Patient currently lives alone    Family History:   No family history on file. ROS:   Review of Systems   Constitutional: Positive for diaphoresis. Negative for chills, fatigue and fever. HENT: Negative for congestion, sinus pressure and sore throat. Eyes: Negative for photophobia and visual disturbance. Respiratory: Negative for cough, chest tightness, shortness of breath and wheezing. Cardiovascular: Negative for chest pain, palpitations and leg swelling. Gastrointestinal: Positive for nausea and vomiting. Negative for abdominal distention, abdominal pain, constipation and diarrhea. Genitourinary: Negative for dysuria, frequency and urgency. Skin: Negative for color change and rash. Neurological: Positive for tremors (resting). Negative for dizziness, syncope, weakness and headaches. All other systems reviewed and are negative.     PHYSICAL EXAM:  BP (!) 141/80   Pulse 80   Temp 99 °F (37.2 °C) (Bladder)   Resp 21   Ht 5' 8\" (1.727 m)   Wt 169 lb 5 oz (76.8 kg)   SpO2 94%   BMI 25.74 kg/m²   Recent Labs     08/24/20  1507 08/24/20  1705 08/24/20  2102 08/24/20  2301 08/25/20  0556   POCGLU 101* 120* 220* 224* 200*     Physical Exam  Constitutional:       Comments: Not oriented, testing (NIPT through [facility]) was performed. 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 Prior to this pregnancy, she was unaware of any genetic conditions she may carry. The father of the baby had carrier testing drawn this morning with pending results.    We have discussed this motor neuron disease, briefly, with her. There are four different types of SMA with varying degrees of motor function. It is an autosomal recessive condition. We have discussed this particular type of inheritance pattern. 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 If her partner is a carrier, the risk of passing SMA would be her risk of 1 in 34 plus the 25% chance they would both pass a mutated copy.    9/4-FOB testing was negative.  The risk of SMA is significantly decreased based on this result.        FOLLOW UP:   F/u in 4 weeks for US/MFM visit      Beata Garcia MD  Maternal Fetal Medicine            with signs of confusion and agitation  HENT:      Head: Normocephalic. Eyes:      Extraocular Movements: Extraocular movements intact. Pupils: Pupils are equal, round, and reactive to light. Cardiovascular:      Rate and Rhythm: Normal rate and regular rhythm. Pulses: Normal pulses. Heart sounds: Normal heart sounds. No murmur. No friction rub. No gallop. Pulmonary:      Breath sounds: Normal breath sounds. No wheezing, rhonchi or rales. Abdominal:      Palpations: Abdomen is soft. Tenderness: There is no abdominal tenderness. There is no guarding or rebound. Skin:     General: Skin is warm and dry. LABS:  Recent Labs     08/23/20  0445 08/24/20  0538 08/25/20  0336   WBC 11.0 8.6 6.8   HGB 8.9* 8.2* 9.4*   HCT 26.9* 24.3* 28.0*    132* 152                                                                  Recent Labs     08/23/20  1734 08/24/20  0538 08/25/20  0336   * 135* 139   K 3.9 3.5 3.4*   CL 93* 96* 96*   CO2 24 28 34*   BUN 34* 33* 27*   CREATININE 1.3 1.3 1.0   GLUCOSE 113* 151* 164*     Recent Labs     08/23/20  1734 08/24/20  0538 08/25/20  0336   AST 34 26 22   ALT <5* <5* <5*   BILITOT 0.7 0.6 0.5   ALKPHOS 184* 121 135*     Recent Labs     08/22/20  1531 08/22/20  2209 08/23/20  0446   TROPONINI 1.05* 1.11* 1.10*     No results for input(s): BNP in the last 72 hours. Lab Results   Component Value Date    PHART 7.419 08/23/2020    JYW4ZYC 42.8 08/23/2020    PO2ART 92.9 08/23/2020     No results for input(s): INR in the last 72 hours. No results for input(s): NITRITE, COLORU, PHUR, LABCAST, WBCUA, RBCUA, MUCUS, TRICHOMONAS, YEAST, BACTERIA, CLARITYU, SPECGRAV, LEUKOCYTESUR, UROBILINOGEN, BILIRUBINUR, BLOODU, GLUCOSEU, AMORPHOUS in the last 72 hours. Invalid input(s): KETONESU   No results for input(s): LACTA, LACTATE in the last 72 hours.     IMAGING:  XR ABDOMEN (KUB) (SINGLE AP VIEW)   Final Result   Impression:   Enteric tube tip overlying the body of the stomach. CT HEAD WO CONTRAST   Final Result      1. No acute intracranial process. 2.  Moderate cerebral atrophy and mild chronic small vessel ischemic disease. XR CHEST PORTABLE   Final Result   1. There is been interval placement of a right internal jugular approach    central venous catheter with the tip near the confluence of the    brachiocephalic veins. No pneumothorax. The remaining support tubes and    lines in the chest are nonsignificantly altered in position. 2.  Previously described interstitial edema appears to questionably    slightly improved. XR ABDOMEN (KUB) (SINGLE AP VIEW)   Final Result     The nasogastric tube traverses the mediastinum and terminates in the    mid to distal body of the stomach. CT HEAD WO CONTRAST   Final Result      Evidence of diffuse chronic small vessel white matter disease. Subtle small round subcentimeter lesion suggested in the anterior left chin. This may be artifactual. Further evaluation with MR imaging of the brain without and with contrast is recommended for further evaluation. No other acute intracranial findings. No other acute intracranial findings. Negative noncontrast CT study of the head. XR CHEST PORTABLE   Final Result      ET tube and left IJ central venous catheter placed as described. No evidence of diffuse pulmonary vasculature congestion and interstitial edema, similar to the prior exam. No focal infiltrates seen. XR CHEST PORTABLE   Final Result   1. Cardiomegaly with vascular congestion and pulmonary interstitial edema. Assessment & Plan:   Alice Dumont is a 72 y.o. male with PMH of T1DM, Parkinson's disease, asthma, and HTN who was admitted with DKA. Pulmonary:  Acute respiratory failure 2/2 to flash pulmonary edema and new acute onset heart failure  - self extubated on 5/23.   Currently on room air with sats of 92%  - off levo gtt , VSS  - pulmonary edema improved  Possible Pneumonia  - AMS in setting of leukocytosis and acute onset respiratory failure  - Blood cultures: no growth till date  - Resp viral panel: negative  - legionella and Strep Pneumo cultures: presumptive negative  - respiratory cultures: Gram stain showed 3+ wbcs , No organisms   - MRSA negative  - COVID negative  - Discontinued abx     CV:   - possible cardiogenic shock in setting of Acute onset heart failure  - shock - unclear etiology - resolved  - as above  - initial EKG showed ST changes  - Cardiology following  - discontinued Heparin gtt,   - started aspirin, statin for ACS  - will plan for ischemic work-up next week when renal is okay with contrast  - discontinued lasix gtt, will start lasix IV pushes    HTN:  - Continue home nifedipine; holding losartan as above     Endocrine:  Diabetic Ketoacidosis:   likely 2/2 to noncompliance with insulin + poor dietary habits  - NPO  - Hypoglycemia protocol  - Glucose Q1h  - insulin gtt  - Social work consulted for repeated admissions with DKA and hypoglycemia; Patient needs more support    Renal:  NABIL: likely prerenal in setting of DKA;   Cr 1.3 today (baseline 0.9)  - IVF  - Holding home losartan   - Strict I/Os  - off CRRT  - discontinued lasix gtt, will start lasix IV pushes  - Had UOP of 5L over the past 24 hours, with net negative 3 liters.     - He has mild hypokalemia - replace K  Lactic acidosis on admission - resolved  DKA - blood sugar is better controlled       ID:   - no clear source of infection   - keep off ABX    Neuro  Hospital delirium and Parkinson's Disease:  - Continue carbidopa/levidopa, primidone  - discontinued Precedex gtt  - start on Seroquel tonight    GI  - continue tube feeds    Code Status: Full  F/E/N:  NPO  GI / DVT Prophylaxis: Heparin drip  Disposition: ICU      I will discuss the patient and findings with the attending Dr. So Patel MD.      Ralph Sierra MD  Internal Medicine Resident, PGY-1  8/25/2020  10:52 AM Patient was seen, examined and discussed with Dr. Harrison . I agree with the interval history. My physical exam confirms the findings listed below  Chart was reviewed including labs and medical records confirm the findings noted     Acute respiratory failure, self extubated on 5/23. Currently on room air with sats of 92%  Pulmonary edema - improved   Shock - unclear etiology - resolved.    Lactic acidosis on admission - resolved.    NABIL on admission required CRRT.  now improved. Had UOP of 5L over the past 24 hours, with net negative 3 liters. He has mild hypokalemia   DKA - blood sugar is better controlled    ID: no clear source of infection   Hospital delirium     Maxed on precedex.   Start Seroquel, low dose   D/c lasix drip and switch to 40mg IV BID  Keep off ABX  Replace K  Continue TF

## 2025-02-05 NOTE — PROGRESS NOTES
No changes. [facility] updated. Patient : Andrei Ross Age: 64 year old Sex: male   MRN: [id number] Encounter Date: 2/5/2025      History     Chief Complaint   Patient presents with    Chest Pain       Chest Pain        64 Y M who is homeless with CKD, CHF, and Bipolar disorder, presenting with multiple complaints. Patient reports chest tightness. He states he \"just wanted to be checked out and needs to leave for business.\" Patient is seen frequently in the ED. This is his 5th visit this month, it is February 5th.     Allergies   Allergen Reactions    Green Tea Leaf Ext   (Food Or Med) Nausea & Vomiting    Milk   (Food Or Med) GI UPSET       Current Discharge Medication List        Prior to Admission Medications    Details   aspirin 81 MG chewable tablet Chew 1 tablet by mouth daily.  Qty: 30 tablet, Refills: 11      FLUoxetine (PROzac) 20 MG capsule Take 1 capsule by mouth daily.  Qty: 30 capsule, Refills: 2      guaiFENesin 100 MG/5ML Take 10 mLs by mouth 4 times daily as needed for cough.  Qty: 473 mL, Refills: 0      clotrimazole (LOTRIMIN) 1 % cream Apply topically 2 times daily.  Qty: 30 g, Refills: 0      acetaminophen (TYLENOL) 500 MG tablet Take 1 tablet by mouth every 6 hours as needed for Pain.  Qty: 30 tablet, Refills: 0      ibuprofen (MOTRIN) 600 MG tablet Take 1 tablet by mouth 3 times daily as needed for Pain.  Qty: 30 tablet, Refills: 0      atorvastatin (LIPITOR) 20 MG tablet Take 1 tablet by mouth nightly.  Qty: 30 tablet, Refills: 0      Cholecalciferol (Vitamin D3) 50 mcg (2,000 units) capsule Take 1 capsule by mouth daily.  Qty: 30 capsule, Refills: 0      empagliflozin (JARDIANCE) 10 MG tablet Take 1 tablet by mouth daily (before breakfast).  Qty: 90 tablet, Refills: 3      fluticasone (FLONASE) 50 MCG/ACT nasal spray Instill 1 Spray in each nostril daily.  Qty: 16 g, Refills: 0      fluticasone-salmeterol 250-50 MCG/ACT inhaler Inhale 1 puff into the lungs in the morning and 1 puff in the evening.  Qty: 60 each, Refills: 0       furosemide (LASIX) 20 MG tablet Take 1 tablet by mouth daily.  Qty: 30 tablet, Refills: 0      metFORMIN (GLUCOPHAGE) 500 MG tablet Take 1 tablet by mouth daily (with breakfast).  Qty: 90 tablet, Refills: 1      OLANZapine (ZyPREXA) 10 MG tablet Take 1 tablet by mouth nightly.  Qty: 30 tablet, Refills: 0      pantoprazole (PROTONIX) 40 MG tablet Take 1 tablet by mouth daily.  Qty: 60 tablet, Refills: 0      traZODone (DESYREL) 50 MG tablet Take 1 tablet by mouth at bedtime.  Qty: 30 tablet, Refills: 0      loperamide (IMODIUM) 2 MG capsule Take 1 capsule by mouth 4 times daily as needed for Diarrhea.  Qty: 20 capsule, Refills: 0      nystatin (MYCOSTATIN) 183934 UNIT/GM cream Apply topically 2 times daily.  Qty: 30 g, Refills: 0      albuterol 108 (90 Base) MCG/ACT inhaler Inhale 2 puffs into the lungs 4 times daily as needed.  Qty: 8.5 g, Refills: 0      Rectiv 0.4 % Ointment [None received]      lidocaine (LMX) 4 % cream Apply 1 Application topically daily as needed.      cetirizine (ZyrTEC) 10 MG tablet Take 1 tablet by mouth daily.  Qty: 90 tablet, Refills: 3      hydrOXYzine (ATARAX) 50 MG tablet Take 1 tablet by mouth 3 times daily as needed for anxiety or sleep.  Qty: 90 tablet, Refills: 3      ammonium lactate (AMLACTIN) 12 % lotion Apply topically 2 times daily.  Qty: 400 g, Refills: 1      blood glucose test strip Use to test 1 time a day  Qty: 100 each, Refills: 0      Lancets (OneTouch Delica Plus [id number]) Misc Use to test 1 time a day  Qty: 100 each, Refills: 0      blood glucose meter Use to test 1 time a day  Qty: 1 kit, Refills: 0             Past Medical History:   Diagnosis Date    Acute kidney injury (CMD) 11/30/2023    Allergic rhinitis     Asthma (CMD)     Bipolar disorder, unspecified  (CMD)     Cardiomyopathy (CMD)     CHF (congestive heart failure)  (CMD)     CKD (chronic kidney disease)     CVA (cerebral vascular accident)  (CMD)     Depression     GERD (gastroesophageal reflux disease)      Hernia, umbilical     Narcolepsy (CMD)        Past Surgical History:   Procedure Laterality Date    OPEN ACCESS COLONOSCOPY  01/17/2022    done in [facility]. diverticulosis. 5 year recall recommended. no specimens taken       Family History   Problem Relation Age of Onset    Patient is unaware of any medical problems Sister     Patient is unaware of any medical problems Brother        Social History     Tobacco Use    Smoking status: Never    Smokeless tobacco: Never   Vaping Use    Vaping status: never used   Substance Use Topics    Alcohol use: Not Currently     Alcohol/week: 6.0 - 8.0 standard drinks of alcohol     Types: 6 - 8 Cans of beer per week     Comment: small bottle of vodka rolled out of pants    Drug use: Never       E-cigarette/Vaping    E-Cigarette/Vaping Use Never Used     Passive Exposure No     Counseling Given No      E-Cigarette/Vaping Substances & Devices    Nicotine No     THC No     CBD No     Flavoring No     Disposable No     Pre-filled or Refillable Cartridge No     Refillable Tank No     Pre-filled Pod No        Review of Systems   Cardiovascular:  Positive for chest pain.   All other systems reviewed and are negative.      Physical Exam     ED Triage Vitals [02/05/25 0000]   ED Triage Vitals Group      Temp 97.5 °F (36.4 °C)      Heart Rate 75      Resp 18      BP (!) 144/102      SpO2 96 %      EtCO2 mmHg       Height       Weight       Weight Scale Used       BMI (Calculated)       IBW/kg (Calculated)        Physical Exam  Vitals and nursing note reviewed.   Constitutional:       Appearance: He is well-developed. He is obese. He is not ill-appearing or diaphoretic.   HENT:      Head: Normocephalic and atraumatic.   Cardiovascular:      Rate and Rhythm: Normal rate.      Pulses: Normal pulses.   Pulmonary:      Effort: Pulmonary effort is normal.      Breath sounds: Normal breath sounds.   Abdominal:      Palpations: Abdomen is soft.      Tenderness: There is no abdominal tenderness.  There is no guarding or rebound.   Skin:     General: Skin is warm and dry.      Capillary Refill: Capillary refill takes less than 2 seconds.   Neurological:      General: No focal deficit present.      Mental Status: He is alert.   Psychiatric:         Attention and Perception: Attention normal.         Speech: Speech is tangential.         Behavior: Behavior is cooperative.         Thought Content: Thought content normal.         ED Course     Procedures    Lab Results     Results for orders placed or performed during the hospital encounter of 02/05/25   Comprehensive Metabolic Panel    Specimen: Blood, Venous   Result Value Ref Range    Fasting Status      Sodium 143 135 - 145 mmol/L    Potassium 4.1 3.4 - 5.1 mmol/L    Chloride 100 97 - 110 mmol/L    Carbon Dioxide 30 21 - 32 mmol/L    Anion Gap 17 7 - 19 mmol/L    Glucose 97 70 - 99 mg/dL    BUN 11 6 - 20 mg/dL    Creatinine 1.13 0.67 - 1.17 mg/dL    Glomerular Filtration Rate 73 >=60    BUN/Cr 10 7 - 25    Calcium 8.0 (L) 8.4 - 10.2 mg/dL    Bilirubin, Total 0.3 0.2 - 1.0 mg/dL    GOT/AST 36 <=37 Units/L    GPT/ALT 33 <64 Units/L    Alkaline Phosphatase 104 45 - 117 Units/L    Albumin 3.5 3.4 - 5.0 g/dL    Protein, Total 8.0 6.4 - 8.2 g/dL    Globulin 4.5 (H) 2.0 - 4.0 g/dL    A/G Ratio 0.8 (L) 1.0 - 2.4   TROPONIN I, HIGH SENSITIVITY    Specimen: Blood, Venous   Result Value Ref Range    Troponin I, High Sensitivity 21 <77 ng/L   CBC with Automated Differential (performable only)    Specimen: Blood, Venous   Result Value Ref Range    WBC 5.9 4.2 - 11.0 K/mcL    RBC 4.06 (L) 4.50 - 5.90 mil/mcL    HGB 12.3 (L) 13.0 - 17.0 g/dL    HCT 37.2 (L) 39.0 - 51.0 %    MCV 91.6 78.0 - 100.0 fl    MCH 30.3 26.0 - 34.0 pg    MCHC 33.1 32.0 - 36.5 g/dL    RDW-CV 14.4 11.0 - 15.0 %    RDW-SD 48.6 39.0 - 50.0 fL     140 - 450 K/mcL    NRBC 0 <=0 /100 WBC    Neutrophil, Percent 40 %    Lymphocytes, Percent 47 %    Mono, Percent 10 %    Eosinophils, Percent 2 %     Basophils, Percent 1 %    Immature Granulocytes 0 %    Absolute Neutrophils 2.4 1.8 - 7.7 K/mcL    Absolute Lymphocytes 2.8 1.0 - 4.0 K/mcL    Absolute Monocytes 0.6 0.3 - 0.9 K/mcL    Absolute Eosinophils  0.1 0.0 - 0.5 K/mcL    Absolute Basophils 0.1 0.0 - 0.3 K/mcL    Absolute Immature Granulocytes 0.0 0.0 - 0.2 K/mcL   Alcohol    Specimen: Blood, Venous   Result Value Ref Range    Alcohol 226 (H) <3 mg/dL       EKG Results     EKG Interpretation  Rate: 76  Rhythm: normal sinus rhythm   Abnormality: no    EKG tracing interpreted by ED physician    Radiology Results     Imaging Results              XR CHEST PA AND LATERAL 2 VIEWS (Final result)  Result time 02/05/25 00:48:21      Final result                   Impression:    IMPRESSION:    No acute cardiopulmonary disease.    Electronically Signed by: Bear Rice MD  Signed on: 2/5/2025 12:48 AM  Created on Workstation ID: [id number]  Signed on Workstation ID: [id number]               Narrative:    EXAM: XR CHEST PA AND LATERAL 2 VIEWS    CLINICAL INDICATION: Chest pain.    TECHNIQUE: Frontal and lateral views of the chest were obtained.    COMPARISON: Chest radiograph on 2/3/2025.    FINDINGS:    The cardiomediastinal silhouette appears to be within normal limits. The  trachea is midline. The hilar contours are normal. The pulmonary  vasculature is normally distributed. Mild scattered subpleural reticular  opacities and bandlike opacities in the mid to lower lungs, not  significantly changed, compatible with scarring. No acute airspace opacity.  No pleural effusion or pneumothorax.                                      ED Medication Orders (From admission, onward)      None                 Medical Decision Making  Patient is well appearing. He is afebrile and hemodynamically appropriate. He is resting comfortably in bed. Labs significant for Alcohol 226 and otherwise all WNL. XR showed no acute findings. HEART Score Total : 2    Patient is up and ambulatory with a  steady gait, he is anxious to leave. Patient is appropriate for discharge. Discussed follow up. Warming room information provided.    Clinical Impression     ED Diagnosis   1. Alcoholic intoxication without complication (CMD)        2. Homeless        3. Bipolar affective disorder, remission status unspecified  (CMD)        4. Atypical chest pain            Disposition        Discharge 2/5/2025  1:14 AM  Andrei Ross discharge to home/self care.             Abbe Mac MD  02/05/25 0145

## 2025-06-06 NOTE — PLAN OF CARE
Problem: Falls - Risk of:  Goal: Will remain free from falls  Description: Will remain free from falls  Outcome: Ongoing   Pt remains free from falls 36.9

## (undated) DEVICE — FORCEPS BX L240CM JAW DIA2.4MM ORNG L CAP W/ NDL DISP RAD

## (undated) DEVICE — CLIP LIG L235CM RESOL 360 BX/20